# Patient Record
Sex: FEMALE | Race: BLACK OR AFRICAN AMERICAN | NOT HISPANIC OR LATINO | Employment: UNEMPLOYED | URBAN - METROPOLITAN AREA
[De-identification: names, ages, dates, MRNs, and addresses within clinical notes are randomized per-mention and may not be internally consistent; named-entity substitution may affect disease eponyms.]

---

## 2019-07-04 ENCOUNTER — HOSPITAL ENCOUNTER (EMERGENCY)
Facility: HOSPITAL | Age: 29
Discharge: HOME | End: 2019-07-05
Attending: EMERGENCY MEDICINE | Admitting: EMERGENCY MEDICINE
Payer: COMMERCIAL

## 2019-07-04 DIAGNOSIS — S09.93XA FACIAL INJURY, INITIAL ENCOUNTER: Primary | ICD-10-CM

## 2019-07-04 PROCEDURE — 99284 EMERGENCY DEPT VISIT MOD MDM: CPT

## 2019-07-05 ENCOUNTER — APPOINTMENT (EMERGENCY)
Dept: CT IMAGING | Facility: HOSPITAL | Age: 29
End: 2019-07-05
Payer: COMMERCIAL

## 2019-07-05 VITALS
HEIGHT: 64 IN | HEART RATE: 102 BPM | DIASTOLIC BLOOD PRESSURE: 70 MMHG | SYSTOLIC BLOOD PRESSURE: 113 MMHG | BODY MASS INDEX: 30.86 KG/M2 | WEIGHT: 180.78 LBS | RESPIRATION RATE: 18 BRPM | TEMPERATURE: 99.2 F | OXYGEN SATURATION: 100 %

## 2019-07-05 PROCEDURE — 70486 CT MAXILLOFACIAL W/O DYE: CPT

## 2019-07-05 PROCEDURE — 99283 EMERGENCY DEPT VISIT LOW MDM: CPT | Performed by: EMERGENCY MEDICINE

## 2019-07-05 PROCEDURE — 70450 CT HEAD/BRAIN W/O DYE: CPT

## 2019-07-05 RX ORDER — OXYCODONE HYDROCHLORIDE 5 MG/1
5 TABLET ORAL ONCE
Status: COMPLETED | OUTPATIENT
Start: 2019-07-05 | End: 2019-07-05

## 2019-07-05 RX ORDER — ACETAMINOPHEN 325 MG/1
650 TABLET ORAL ONCE
Status: COMPLETED | OUTPATIENT
Start: 2019-07-05 | End: 2019-07-05

## 2019-07-05 RX ADMIN — OXYCODONE HYDROCHLORIDE 5 MG: 5 TABLET ORAL at 02:21

## 2019-07-05 RX ADMIN — ACETAMINOPHEN 650 MG: 325 TABLET, FILM COATED ORAL at 00:58
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
## 2019-07-05 NOTE — DISCHARGE INSTRUCTIONS
Take motrin/tylenol for pain and follow up with your doctor for recheck   Nose may swell, try ice for this

## 2020-04-09 ENCOUNTER — TELEMEDICINE (OUTPATIENT)
Dept: OBGYN CLINIC | Facility: CLINIC | Age: 30
End: 2020-04-09
Payer: COMMERCIAL

## 2020-04-09 VITALS — HEIGHT: 64 IN | BODY MASS INDEX: 31.03 KG/M2

## 2020-04-09 DIAGNOSIS — Z98.84 HISTORY OF BARIATRIC SURGERY: ICD-10-CM

## 2020-04-09 DIAGNOSIS — G47.30 SLEEP APNEA, UNSPECIFIED TYPE: ICD-10-CM

## 2020-04-09 DIAGNOSIS — N91.1 SECONDARY AMENORRHEA: Primary | ICD-10-CM

## 2020-04-09 PROBLEM — Z72.0 TOBACCO ABUSE: Status: ACTIVE | Noted: 2018-03-15

## 2020-04-09 PROBLEM — A49.02 MRSA (METHICILLIN RESISTANT STAPHYLOCOCCUS AUREUS) INFECTION: Status: ACTIVE | Noted: 2020-03-26

## 2020-04-09 PROCEDURE — 99201 PR OFFICE OUTPATIENT NEW 10 MINUTES: CPT | Performed by: STUDENT IN AN ORGANIZED HEALTH CARE EDUCATION/TRAINING PROGRAM

## 2020-04-28 ENCOUNTER — TELEMEDICINE (OUTPATIENT)
Dept: OBGYN CLINIC | Facility: CLINIC | Age: 30
End: 2020-04-28
Payer: COMMERCIAL

## 2020-04-28 DIAGNOSIS — Z98.84 HISTORY OF BARIATRIC SURGERY: ICD-10-CM

## 2020-04-28 DIAGNOSIS — Z34.81 PRENATAL CARE, SUBSEQUENT PREGNANCY, FIRST TRIMESTER: Primary | ICD-10-CM

## 2020-04-28 PROCEDURE — 99215 OFFICE O/P EST HI 40 MIN: CPT | Performed by: STUDENT IN AN ORGANIZED HEALTH CARE EDUCATION/TRAINING PROGRAM

## 2020-04-28 RX ORDER — FERROUS SULFATE 325(65) MG
325 TABLET ORAL
Status: ON HOLD | COMMUNITY
End: 2021-11-03 | Stop reason: SDUPTHER

## 2020-05-04 ENCOUNTER — TELEPHONE (OUTPATIENT)
Dept: PERINATAL CARE | Facility: CLINIC | Age: 30
End: 2020-05-04

## 2020-05-11 ENCOUNTER — APPOINTMENT (OUTPATIENT)
Dept: LAB | Facility: HOSPITAL | Age: 30
End: 2020-05-11
Attending: STUDENT IN AN ORGANIZED HEALTH CARE EDUCATION/TRAINING PROGRAM
Payer: COMMERCIAL

## 2020-05-11 ENCOUNTER — TRANSCRIBE ORDERS (OUTPATIENT)
Dept: ADMINISTRATIVE | Facility: HOSPITAL | Age: 30
End: 2020-05-11

## 2020-05-11 ENCOUNTER — TELEPHONE (OUTPATIENT)
Dept: PERINATAL CARE | Facility: OTHER | Age: 30
End: 2020-05-11

## 2020-05-11 DIAGNOSIS — Z98.84 HISTORY OF BARIATRIC SURGERY: ICD-10-CM

## 2020-05-11 DIAGNOSIS — Z34.81 PRENATAL CARE, SUBSEQUENT PREGNANCY, FIRST TRIMESTER: Primary | ICD-10-CM

## 2020-05-11 DIAGNOSIS — Z34.81 PRENATAL CARE, SUBSEQUENT PREGNANCY, FIRST TRIMESTER: ICD-10-CM

## 2020-05-11 LAB
25(OH)D3 SERPL-MCNC: 14.8 NG/ML (ref 30–100)
ABO GROUP BLD: NORMAL
ALBUMIN SERPL BCP-MCNC: 3.2 G/DL (ref 3.5–5)
ALP SERPL-CCNC: 46 U/L (ref 46–116)
ALT SERPL W P-5'-P-CCNC: 13 U/L (ref 12–78)
ANION GAP SERPL CALCULATED.3IONS-SCNC: 11 MMOL/L (ref 4–13)
AST SERPL W P-5'-P-CCNC: 13 U/L (ref 5–45)
BACTERIA UR QL AUTO: ABNORMAL /HPF
BASOPHILS # BLD AUTO: 0.01 THOUSANDS/ΜL (ref 0–0.1)
BASOPHILS NFR BLD AUTO: 0 % (ref 0–1)
BILIRUB SERPL-MCNC: 0.3 MG/DL (ref 0.2–1)
BILIRUB UR QL STRIP: NEGATIVE
BLD GP AB SCN SERPL QL: NEGATIVE
BUN SERPL-MCNC: 8 MG/DL (ref 5–25)
CALCIUM SERPL-MCNC: 9 MG/DL (ref 8.3–10.1)
CHLORIDE SERPL-SCNC: 105 MMOL/L (ref 100–108)
CLARITY UR: CLEAR
CO2 SERPL-SCNC: 26 MMOL/L (ref 21–32)
COLOR UR: YELLOW
CREAT SERPL-MCNC: 0.72 MG/DL (ref 0.6–1.3)
EOSINOPHIL # BLD AUTO: 0.02 THOUSAND/ΜL (ref 0–0.61)
EOSINOPHIL NFR BLD AUTO: 0 % (ref 0–6)
ERYTHROCYTE [DISTWIDTH] IN BLOOD BY AUTOMATED COUNT: 13.8 % (ref 11.6–15.1)
EST. AVERAGE GLUCOSE BLD GHB EST-MCNC: 108 MG/DL
FOLATE SERPL-MCNC: >20 NG/ML (ref 3.1–17.5)
GFR SERPL CREATININE-BSD FRML MDRD: 132 ML/MIN/1.73SQ M
GLUCOSE P FAST SERPL-MCNC: 73 MG/DL (ref 65–99)
GLUCOSE UR STRIP-MCNC: NEGATIVE MG/DL
HBA1C MFR BLD: 5.4 %
HBV SURFACE AG SER QL: NORMAL
HCT VFR BLD AUTO: 33.6 % (ref 34.8–46.1)
HGB BLD-MCNC: 10.7 G/DL (ref 11.5–15.4)
HGB UR QL STRIP.AUTO: NEGATIVE
IMM GRANULOCYTES # BLD AUTO: 0.02 THOUSAND/UL (ref 0–0.2)
IMM GRANULOCYTES NFR BLD AUTO: 0 % (ref 0–2)
KETONES UR STRIP-MCNC: NEGATIVE MG/DL
LEUKOCYTE ESTERASE UR QL STRIP: ABNORMAL
LYMPHOCYTES # BLD AUTO: 2.09 THOUSANDS/ΜL (ref 0.6–4.47)
LYMPHOCYTES NFR BLD AUTO: 37 % (ref 14–44)
MCH RBC QN AUTO: 25.7 PG (ref 26.8–34.3)
MCHC RBC AUTO-ENTMCNC: 31.8 G/DL (ref 31.4–37.4)
MCV RBC AUTO: 81 FL (ref 82–98)
MONOCYTES # BLD AUTO: 0.32 THOUSAND/ΜL (ref 0.17–1.22)
MONOCYTES NFR BLD AUTO: 6 % (ref 4–12)
MUCOUS THREADS UR QL AUTO: ABNORMAL
NEUTROPHILS # BLD AUTO: 3.17 THOUSANDS/ΜL (ref 1.85–7.62)
NEUTS SEG NFR BLD AUTO: 57 % (ref 43–75)
NITRITE UR QL STRIP: POSITIVE
NON-SQ EPI CELLS URNS QL MICRO: ABNORMAL /HPF
NRBC BLD AUTO-RTO: 0 /100 WBCS
PH UR STRIP.AUTO: 7 [PH]
PLATELET # BLD AUTO: 222 THOUSANDS/UL (ref 149–390)
PMV BLD AUTO: 10.2 FL (ref 8.9–12.7)
POTASSIUM SERPL-SCNC: 3.4 MMOL/L (ref 3.5–5.3)
PROT SERPL-MCNC: 7.8 G/DL (ref 6.4–8.2)
PROT UR STRIP-MCNC: NEGATIVE MG/DL
RBC # BLD AUTO: 4.17 MILLION/UL (ref 3.81–5.12)
RBC #/AREA URNS AUTO: ABNORMAL /HPF
RH BLD: POSITIVE
RUBV IGG SERPL IA-ACNC: 75.1 IU/ML
SODIUM SERPL-SCNC: 142 MMOL/L (ref 136–145)
SP GR UR STRIP.AUTO: 1.02 (ref 1–1.03)
SPECIMEN EXPIRATION DATE: NORMAL
TSH SERPL DL<=0.05 MIU/L-ACNC: 1.36 UIU/ML (ref 0.36–3.74)
UROBILINOGEN UR QL STRIP.AUTO: 2 E.U./DL
VIT B12 SERPL-MCNC: 229 PG/ML (ref 100–900)
WBC # BLD AUTO: 5.63 THOUSAND/UL (ref 4.31–10.16)
WBC #/AREA URNS AUTO: ABNORMAL /HPF

## 2020-05-11 PROCEDURE — 80053 COMPREHEN METABOLIC PANEL: CPT

## 2020-05-11 PROCEDURE — 81001 URINALYSIS AUTO W/SCOPE: CPT

## 2020-05-11 PROCEDURE — 87186 SC STD MICRODIL/AGAR DIL: CPT

## 2020-05-11 PROCEDURE — 36415 COLL VENOUS BLD VENIPUNCTURE: CPT

## 2020-05-11 PROCEDURE — 87086 URINE CULTURE/COLONY COUNT: CPT

## 2020-05-11 PROCEDURE — 82607 VITAMIN B-12: CPT

## 2020-05-11 PROCEDURE — 83036 HEMOGLOBIN GLYCOSYLATED A1C: CPT

## 2020-05-11 PROCEDURE — 82746 ASSAY OF FOLIC ACID SERUM: CPT

## 2020-05-11 PROCEDURE — 80081 OBSTETRIC PANEL INC HIV TSTG: CPT

## 2020-05-11 PROCEDURE — 82306 VITAMIN D 25 HYDROXY: CPT

## 2020-05-11 PROCEDURE — 84443 ASSAY THYROID STIM HORMONE: CPT

## 2020-05-12 ENCOUNTER — ROUTINE PRENATAL (OUTPATIENT)
Dept: PERINATAL CARE | Facility: OTHER | Age: 30
End: 2020-05-12
Payer: COMMERCIAL

## 2020-05-12 VITALS
TEMPERATURE: 97.7 F | HEART RATE: 98 BPM | BODY MASS INDEX: 30.01 KG/M2 | SYSTOLIC BLOOD PRESSURE: 117 MMHG | HEIGHT: 64 IN | WEIGHT: 175.8 LBS | DIASTOLIC BLOOD PRESSURE: 77 MMHG

## 2020-05-12 DIAGNOSIS — O99.841 PREVIOUS GASTRIC BYPASS AFFECTING PREGNANCY IN FIRST TRIMESTER, ANTEPARTUM: ICD-10-CM

## 2020-05-12 DIAGNOSIS — Z34.81 PRENATAL CARE, SUBSEQUENT PREGNANCY, FIRST TRIMESTER: ICD-10-CM

## 2020-05-12 DIAGNOSIS — O35.9XX0 SUSPECTED FETAL ANOMALY, ANTEPARTUM, SINGLE OR UNSPECIFIED FETUS: Primary | ICD-10-CM

## 2020-05-12 DIAGNOSIS — Z3A.13 13 WEEKS GESTATION OF PREGNANCY: ICD-10-CM

## 2020-05-12 DIAGNOSIS — Z36.82 NUCHAL TRANSLUCENCY OF FETUS ON PRENATAL ULTRASOUND: ICD-10-CM

## 2020-05-12 PROBLEM — E55.9 VITAMIN D DEFICIENCY: Status: ACTIVE | Noted: 2020-05-12

## 2020-05-12 LAB
HIV 1+2 AB+HIV1 P24 AG SERPL QL IA: NORMAL
RPR SER QL: NORMAL

## 2020-05-12 PROCEDURE — 76801 OB US < 14 WKS SINGLE FETUS: CPT | Performed by: OBSTETRICS & GYNECOLOGY

## 2020-05-12 PROCEDURE — 76813 OB US NUCHAL MEAS 1 GEST: CPT | Performed by: OBSTETRICS & GYNECOLOGY

## 2020-05-12 PROCEDURE — 99241 PR OFFICE CONSULTATION NEW/ESTAB PATIENT 15 MIN: CPT | Performed by: OBSTETRICS & GYNECOLOGY

## 2020-05-12 RX ORDER — MELATONIN
1000 DAILY
COMMUNITY

## 2020-05-13 ENCOUNTER — DOCUMENTATION (OUTPATIENT)
Dept: PERINATAL CARE | Facility: CLINIC | Age: 30
End: 2020-05-13

## 2020-05-13 DIAGNOSIS — O23.41 URINARY TRACT INFECTION IN MOTHER DURING FIRST TRIMESTER OF PREGNANCY: Primary | ICD-10-CM

## 2020-05-13 LAB — BACTERIA UR CULT: ABNORMAL

## 2020-05-13 RX ORDER — CEPHALEXIN 500 MG/1
500 CAPSULE ORAL EVERY 6 HOURS SCHEDULED
Qty: 28 CAPSULE | Refills: 0 | Status: SHIPPED | OUTPATIENT
Start: 2020-05-13 | End: 2020-05-20

## 2020-05-14 ENCOUNTER — TELEPHONE (OUTPATIENT)
Dept: OBGYN CLINIC | Facility: CLINIC | Age: 30
End: 2020-05-14

## 2020-05-18 ENCOUNTER — INITIAL PRENATAL (OUTPATIENT)
Dept: OBGYN CLINIC | Facility: CLINIC | Age: 30
End: 2020-05-18
Payer: COMMERCIAL

## 2020-05-18 VITALS — SYSTOLIC BLOOD PRESSURE: 124 MMHG | DIASTOLIC BLOOD PRESSURE: 70 MMHG | BODY MASS INDEX: 29.76 KG/M2 | WEIGHT: 173.4 LBS

## 2020-05-18 DIAGNOSIS — Z11.3 SCREENING FOR STDS (SEXUALLY TRANSMITTED DISEASES): ICD-10-CM

## 2020-05-18 DIAGNOSIS — Z98.84 HISTORY OF BARIATRIC SURGERY: ICD-10-CM

## 2020-05-18 DIAGNOSIS — E55.9 VITAMIN D DEFICIENCY: ICD-10-CM

## 2020-05-18 DIAGNOSIS — Z34.92 PREGNANCY, OBSTETRICAL CARE, SECOND TRIMESTER: Primary | ICD-10-CM

## 2020-05-18 DIAGNOSIS — Z72.0 TOBACCO ABUSE: ICD-10-CM

## 2020-05-18 DIAGNOSIS — O35.9XX0 SUSPECTED FETAL ANOMALY, ANTEPARTUM, SINGLE OR UNSPECIFIED FETUS: ICD-10-CM

## 2020-05-18 LAB
SL AMB  POCT GLUCOSE, UA: NORMAL
SL AMB POCT URINE PROTEIN: NORMAL

## 2020-05-18 PROCEDURE — 87591 N.GONORRHOEAE DNA AMP PROB: CPT | Performed by: STUDENT IN AN ORGANIZED HEALTH CARE EDUCATION/TRAINING PROGRAM

## 2020-05-18 PROCEDURE — 99213 OFFICE O/P EST LOW 20 MIN: CPT | Performed by: STUDENT IN AN ORGANIZED HEALTH CARE EDUCATION/TRAINING PROGRAM

## 2020-05-18 PROCEDURE — 87491 CHLMYD TRACH DNA AMP PROBE: CPT | Performed by: STUDENT IN AN ORGANIZED HEALTH CARE EDUCATION/TRAINING PROGRAM

## 2020-05-18 PROCEDURE — G0145 SCR C/V CYTO,THINLAYER,RESCR: HCPCS | Performed by: STUDENT IN AN ORGANIZED HEALTH CARE EDUCATION/TRAINING PROGRAM

## 2020-05-21 LAB
C TRACH DNA SPEC QL NAA+PROBE: NEGATIVE
N GONORRHOEA DNA SPEC QL NAA+PROBE: NEGATIVE

## 2020-05-23 LAB
# FETUSES US: 1
CFDNA.FET/TOTAL PLAS.CFDNA: NORMAL
FET CHR 13 TS PLAS.CFDNA QL: NEGATIVE
FET CHR 18 TS PLAS.CFDNA QL: NEGATIVE
FET CHR 21 TS PLAS.CFDNA QL: NEGATIVE
FET CHR X + Y ANEUP PLAS.CFDNA QL: NORMAL
FET CHROM X + Y ANEUP CFDNA IMP: NORMAL
FET Y CHROM PLAS.CFDNA QL: NOT DETECTED
FET Y CHROM PLAS.CFDNA: NORMAL
GA (DAYS): 5 D
GA (WEEKS): 14 WK
MICRODELETION SYND BLD/T FISH: NORMAL
REF LAB TEST METHOD: NORMAL
SERVICE CMNT-IMP: NORMAL
SERVICE CMNT-IMP: NORMAL
SL AMB ABNORMAL MSS?: NORMAL
SL AMB ABNORMAL US?: NORMAL
SL AMB ADVANCED MATERNAL AGE?: NORMAL
SL AMB MICRODELETION INTERP: NORMAL
SL AMB MICRODELETION: NOT DETECTED
SL AMB PERSONAL/FAM HISTORY?: NORMAL
SL AMB SPECIFICATIONS: NORMAL

## 2020-05-26 LAB
LAB AP GYN PRIMARY INTERPRETATION: NORMAL
Lab: NORMAL

## 2020-05-27 ENCOUNTER — TELEPHONE (OUTPATIENT)
Dept: PERINATAL CARE | Facility: CLINIC | Age: 30
End: 2020-05-27

## 2020-05-27 ENCOUNTER — TELEPHONE (OUTPATIENT)
Dept: OBGYN CLINIC | Facility: CLINIC | Age: 30
End: 2020-05-27

## 2020-06-01 ENCOUNTER — TELEPHONE (OUTPATIENT)
Dept: LABOR AND DELIVERY | Facility: HOSPITAL | Age: 30
End: 2020-06-01

## 2020-06-01 ENCOUNTER — TELEPHONE (OUTPATIENT)
Dept: OBGYN CLINIC | Facility: CLINIC | Age: 30
End: 2020-06-01

## 2020-06-01 ENCOUNTER — HOSPITAL ENCOUNTER (INPATIENT)
Facility: HOSPITAL | Age: 30
LOS: 1 days | Discharge: HOME | DRG: 541 | End: 2020-06-03
Attending: OBSTETRICS & GYNECOLOGY | Admitting: OBSTETRICS & GYNECOLOGY
Payer: COMMERCIAL

## 2020-06-01 ENCOUNTER — TELEPHONE (OUTPATIENT)
Dept: OTHER | Facility: OTHER | Age: 30
End: 2020-06-01

## 2020-06-01 DIAGNOSIS — O03.4 INEVITABLE SPONTANEOUS ABORTION: Primary | ICD-10-CM

## 2020-06-01 DIAGNOSIS — O20.0 THREATENED ABORTION IN SECOND TRIMESTER: Primary | ICD-10-CM

## 2020-06-01 PROBLEM — Z3A.16 16 WEEKS GESTATION OF PREGNANCY: Status: ACTIVE | Noted: 2020-06-01

## 2020-06-02 ENCOUNTER — ANESTHESIA (INPATIENT)
Dept: LABOR AND DELIVERY | Facility: HOSPITAL | Age: 30
DRG: 541 | End: 2020-06-02
Payer: COMMERCIAL

## 2020-06-02 LAB
ABO GROUP BLD: NORMAL
AMPHETAMINES SERPL QL SCN: NEGATIVE
APTT PPP: 28 SECONDS (ref 23–37)
APTT PPP: 29 SECONDS (ref 23–37)
BACTERIA UR QL AUTO: ABNORMAL /HPF
BARBITURATES UR QL: NEGATIVE
BASOPHILS # BLD AUTO: 0.01 THOUSANDS/ΜL (ref 0–0.1)
BASOPHILS # BLD AUTO: 0.02 THOUSANDS/ΜL (ref 0–0.1)
BASOPHILS NFR BLD AUTO: 0 % (ref 0–1)
BASOPHILS NFR BLD AUTO: 0 % (ref 0–1)
BENZODIAZ UR QL: NEGATIVE
BILIRUB UR QL STRIP: NEGATIVE
BLD GP AB SCN SERPL QL: NEGATIVE
CLARITY UR: ABNORMAL
COCAINE UR QL: NEGATIVE
COLOR UR: YELLOW
EOSINOPHIL # BLD AUTO: 0.01 THOUSAND/ΜL (ref 0–0.61)
EOSINOPHIL # BLD AUTO: 0.03 THOUSAND/ΜL (ref 0–0.61)
EOSINOPHIL NFR BLD AUTO: 0 % (ref 0–6)
EOSINOPHIL NFR BLD AUTO: 0 % (ref 0–6)
ERYTHROCYTE [DISTWIDTH] IN BLOOD BY AUTOMATED COUNT: 13.4 % (ref 11.6–15.1)
ERYTHROCYTE [DISTWIDTH] IN BLOOD BY AUTOMATED COUNT: 13.7 % (ref 11.6–15.1)
FIBRINOGEN PPP-MCNC: 459 MG/DL (ref 227–495)
GLUCOSE UR STRIP-MCNC: NEGATIVE MG/DL
HCT VFR BLD AUTO: 24.6 % (ref 34.8–46.1)
HCT VFR BLD AUTO: 32.6 % (ref 34.8–46.1)
HGB BLD-MCNC: 10.5 G/DL (ref 11.5–15.4)
HGB BLD-MCNC: 8 G/DL (ref 11.5–15.4)
HGB UR QL STRIP.AUTO: ABNORMAL
IMM GRANULOCYTES # BLD AUTO: 0.03 THOUSAND/UL (ref 0–0.2)
IMM GRANULOCYTES # BLD AUTO: 0.07 THOUSAND/UL (ref 0–0.2)
IMM GRANULOCYTES NFR BLD AUTO: 0 % (ref 0–2)
IMM GRANULOCYTES NFR BLD AUTO: 1 % (ref 0–2)
INR PPP: 1.24 (ref 0.84–1.19)
KETONES UR STRIP-MCNC: ABNORMAL MG/DL
LEUKOCYTE ESTERASE UR QL STRIP: NEGATIVE
LYMPHOCYTES # BLD AUTO: 1.46 THOUSANDS/ΜL (ref 0.6–4.47)
LYMPHOCYTES # BLD AUTO: 2.64 THOUSANDS/ΜL (ref 0.6–4.47)
LYMPHOCYTES NFR BLD AUTO: 12 % (ref 14–44)
LYMPHOCYTES NFR BLD AUTO: 29 % (ref 14–44)
MCH RBC QN AUTO: 25.8 PG (ref 26.8–34.3)
MCH RBC QN AUTO: 26.1 PG (ref 26.8–34.3)
MCHC RBC AUTO-ENTMCNC: 32.2 G/DL (ref 31.4–37.4)
MCHC RBC AUTO-ENTMCNC: 32.5 G/DL (ref 31.4–37.4)
MCV RBC AUTO: 80 FL (ref 82–98)
MCV RBC AUTO: 80 FL (ref 82–98)
METHADONE UR QL: NEGATIVE
MONOCYTES # BLD AUTO: 0.53 THOUSAND/ΜL (ref 0.17–1.22)
MONOCYTES # BLD AUTO: 0.75 THOUSAND/ΜL (ref 0.17–1.22)
MONOCYTES NFR BLD AUTO: 6 % (ref 4–12)
MONOCYTES NFR BLD AUTO: 6 % (ref 4–12)
MUCOUS THREADS UR QL AUTO: ABNORMAL
NEUTROPHILS # BLD AUTO: 10.4 THOUSANDS/ΜL (ref 1.85–7.62)
NEUTROPHILS # BLD AUTO: 5.83 THOUSANDS/ΜL (ref 1.85–7.62)
NEUTS SEG NFR BLD AUTO: 65 % (ref 43–75)
NEUTS SEG NFR BLD AUTO: 81 % (ref 43–75)
NITRITE UR QL STRIP: NEGATIVE
NON-SQ EPI CELLS URNS QL MICRO: ABNORMAL /HPF
NRBC BLD AUTO-RTO: 0 /100 WBCS
NRBC BLD AUTO-RTO: 0 /100 WBCS
OPIATES UR QL SCN: NEGATIVE
PCP UR QL: NEGATIVE
PH UR STRIP.AUTO: 6.5 [PH]
PLATELET # BLD AUTO: 168 THOUSANDS/UL (ref 149–390)
PLATELET # BLD AUTO: 168 THOUSANDS/UL (ref 149–390)
PLATELET # BLD AUTO: 228 THOUSANDS/UL (ref 149–390)
PMV BLD AUTO: 10 FL (ref 8.9–12.7)
PMV BLD AUTO: 9.8 FL (ref 8.9–12.7)
PMV BLD AUTO: 9.8 FL (ref 8.9–12.7)
PROT UR STRIP-MCNC: NEGATIVE MG/DL
PROTHROMBIN TIME: 14.7 SECONDS (ref 11.6–14.5)
PROTHROMBIN TIME: 14.9 SECONDS (ref 11.6–14.5)
PT 1H NP PPP: 13.6 SEC (ref 12–14.3)
PT IMM NP PPP: 13.3 SECONDS (ref 12–14.3)
RBC # BLD AUTO: 3.06 MILLION/UL (ref 3.81–5.12)
RBC # BLD AUTO: 4.07 MILLION/UL (ref 3.81–5.12)
RBC #/AREA URNS AUTO: ABNORMAL /HPF
RH BLD: POSITIVE
RPR SER QL: NORMAL
SP GR UR STRIP.AUTO: 1.02 (ref 1–1.03)
SPECIMEN EXPIRATION DATE: NORMAL
THC UR QL: NEGATIVE
THROMBIN TIME: 13.6 SECONDS (ref 14.7–18.4)
UROBILINOGEN UR QL STRIP.AUTO: 4 E.U./DL
WBC # BLD AUTO: 12.71 THOUSAND/UL (ref 4.31–10.16)
WBC # BLD AUTO: 9.07 THOUSAND/UL (ref 4.31–10.16)
WBC #/AREA URNS AUTO: ABNORMAL /HPF

## 2020-06-02 PROCEDURE — 88305 TISSUE EXAM BY PATHOLOGIST: CPT | Performed by: PATHOLOGY

## 2020-06-02 PROCEDURE — 86920 COMPATIBILITY TEST SPIN: CPT

## 2020-06-02 PROCEDURE — 85025 COMPLETE CBC W/AUTO DIFF WBC: CPT | Performed by: OBSTETRICS & GYNECOLOGY

## 2020-06-02 PROCEDURE — 85610 PROTHROMBIN TIME: CPT | Performed by: OBSTETRICS & GYNECOLOGY

## 2020-06-02 PROCEDURE — 86901 BLOOD TYPING SEROLOGIC RH(D): CPT | Performed by: STUDENT IN AN ORGANIZED HEALTH CARE EDUCATION/TRAINING PROGRAM

## 2020-06-02 PROCEDURE — 86900 BLOOD TYPING SEROLOGIC ABO: CPT | Performed by: STUDENT IN AN ORGANIZED HEALTH CARE EDUCATION/TRAINING PROGRAM

## 2020-06-02 PROCEDURE — 99214 OFFICE O/P EST MOD 30 MIN: CPT

## 2020-06-02 PROCEDURE — 85611 PROTHROMBIN TEST: CPT | Performed by: OBSTETRICS & GYNECOLOGY

## 2020-06-02 PROCEDURE — 86592 SYPHILIS TEST NON-TREP QUAL: CPT | Performed by: STUDENT IN AN ORGANIZED HEALTH CARE EDUCATION/TRAINING PROGRAM

## 2020-06-02 PROCEDURE — NC001 PR NO CHARGE: Performed by: OBSTETRICS & GYNECOLOGY

## 2020-06-02 PROCEDURE — 85730 THROMBOPLASTIN TIME PARTIAL: CPT | Performed by: OBSTETRICS & GYNECOLOGY

## 2020-06-02 PROCEDURE — 85049 AUTOMATED PLATELET COUNT: CPT | Performed by: OBSTETRICS & GYNECOLOGY

## 2020-06-02 PROCEDURE — 85025 COMPLETE CBC W/AUTO DIFF WBC: CPT | Performed by: STUDENT IN AN ORGANIZED HEALTH CARE EDUCATION/TRAINING PROGRAM

## 2020-06-02 PROCEDURE — 86850 RBC ANTIBODY SCREEN: CPT | Performed by: STUDENT IN AN ORGANIZED HEALTH CARE EDUCATION/TRAINING PROGRAM

## 2020-06-02 PROCEDURE — 59812 TREATMENT OF MISCARRIAGE: CPT | Performed by: OBSTETRICS & GYNECOLOGY

## 2020-06-02 PROCEDURE — 81001 URINALYSIS AUTO W/SCOPE: CPT | Performed by: STUDENT IN AN ORGANIZED HEALTH CARE EDUCATION/TRAINING PROGRAM

## 2020-06-02 PROCEDURE — 85732 THROMBOPLASTIN TIME PARTIAL: CPT | Performed by: OBSTETRICS & GYNECOLOGY

## 2020-06-02 PROCEDURE — 80307 DRUG TEST PRSMV CHEM ANLYZR: CPT | Performed by: STUDENT IN AN ORGANIZED HEALTH CARE EDUCATION/TRAINING PROGRAM

## 2020-06-02 PROCEDURE — 85384 FIBRINOGEN ACTIVITY: CPT | Performed by: OBSTETRICS & GYNECOLOGY

## 2020-06-02 PROCEDURE — 85670 THROMBIN TIME PLASMA: CPT | Performed by: OBSTETRICS & GYNECOLOGY

## 2020-06-02 PROCEDURE — 10D17ZZ EXTRACTION OF PRODUCTS OF CONCEPTION, RETAINED, VIA NATURAL OR ARTIFICIAL OPENING: ICD-10-PCS | Performed by: OBSTETRICS & GYNECOLOGY

## 2020-06-02 PROCEDURE — 99221 1ST HOSP IP/OBS SF/LOW 40: CPT | Performed by: OBSTETRICS & GYNECOLOGY

## 2020-06-02 RX ORDER — MEPERIDINE HYDROCHLORIDE 25 MG/ML
12.5 INJECTION INTRAMUSCULAR; INTRAVENOUS; SUBCUTANEOUS
Status: DISCONTINUED | OUTPATIENT
Start: 2020-06-02 | End: 2020-06-03

## 2020-06-02 RX ORDER — SODIUM CHLORIDE, SODIUM LACTATE, POTASSIUM CHLORIDE, CALCIUM CHLORIDE 600; 310; 30; 20 MG/100ML; MG/100ML; MG/100ML; MG/100ML
100 INJECTION, SOLUTION INTRAVENOUS CONTINUOUS
Status: DISCONTINUED | OUTPATIENT
Start: 2020-06-02 | End: 2020-06-03 | Stop reason: HOSPADM

## 2020-06-02 RX ORDER — ONDANSETRON 2 MG/ML
4 INJECTION INTRAMUSCULAR; INTRAVENOUS ONCE AS NEEDED
Status: DISCONTINUED | OUTPATIENT
Start: 2020-06-02 | End: 2020-06-03

## 2020-06-02 RX ORDER — MISOPROSTOL 200 UG/1
600 TABLET ORAL ONCE
Status: COMPLETED | OUTPATIENT
Start: 2020-06-02 | End: 2020-06-02

## 2020-06-02 RX ORDER — MISOPROSTOL 200 UG/1
600 TABLET ORAL ONCE
Status: DISCONTINUED | OUTPATIENT
Start: 2020-06-02 | End: 2020-06-02

## 2020-06-02 RX ORDER — FENTANYL CITRATE 50 UG/ML
INJECTION, SOLUTION INTRAMUSCULAR; INTRAVENOUS AS NEEDED
Status: DISCONTINUED | OUTPATIENT
Start: 2020-06-02 | End: 2020-06-02 | Stop reason: SURG

## 2020-06-02 RX ORDER — FENTANYL CITRATE/PF 50 MCG/ML
25 SYRINGE (ML) INJECTION
Status: DISCONTINUED | OUTPATIENT
Start: 2020-06-02 | End: 2020-06-03

## 2020-06-02 RX ORDER — PROPOFOL 10 MG/ML
INJECTION, EMULSION INTRAVENOUS AS NEEDED
Status: DISCONTINUED | OUTPATIENT
Start: 2020-06-02 | End: 2020-06-02 | Stop reason: SURG

## 2020-06-02 RX ORDER — HYDROMORPHONE HCL/PF 1 MG/ML
0.4 SYRINGE (ML) INJECTION
Status: DISCONTINUED | OUTPATIENT
Start: 2020-06-02 | End: 2020-06-03

## 2020-06-02 RX ORDER — BUTORPHANOL TARTRATE 1 MG/ML
1 INJECTION, SOLUTION INTRAMUSCULAR; INTRAVENOUS ONCE
Status: COMPLETED | OUTPATIENT
Start: 2020-06-02 | End: 2020-06-02

## 2020-06-02 RX ORDER — CEFAZOLIN SODIUM 2 G/50ML
SOLUTION INTRAVENOUS AS NEEDED
Status: DISCONTINUED | OUTPATIENT
Start: 2020-06-02 | End: 2020-06-02 | Stop reason: SURG

## 2020-06-02 RX ORDER — ACETAMINOPHEN 325 MG/1
650 TABLET ORAL EVERY 6 HOURS PRN
Status: DISCONTINUED | OUTPATIENT
Start: 2020-06-02 | End: 2020-06-03 | Stop reason: HOSPADM

## 2020-06-02 RX ORDER — LOPERAMIDE HYDROCHLORIDE 2 MG/1
2 CAPSULE ORAL ONCE
Status: COMPLETED | OUTPATIENT
Start: 2020-06-02 | End: 2020-06-02

## 2020-06-02 RX ORDER — SODIUM CHLORIDE 9 MG/ML
INJECTION, SOLUTION INTRAVENOUS CONTINUOUS PRN
Status: DISCONTINUED | OUTPATIENT
Start: 2020-06-02 | End: 2020-06-02 | Stop reason: SURG

## 2020-06-02 RX ORDER — SUCCINYLCHOLINE/SOD CL,ISO/PF 100 MG/5ML
SYRINGE (ML) INTRAVENOUS AS NEEDED
Status: DISCONTINUED | OUTPATIENT
Start: 2020-06-02 | End: 2020-06-02 | Stop reason: SURG

## 2020-06-02 RX ORDER — KETOROLAC TROMETHAMINE 30 MG/ML
30 INJECTION, SOLUTION INTRAMUSCULAR; INTRAVENOUS ONCE
Status: COMPLETED | OUTPATIENT
Start: 2020-06-02 | End: 2020-06-02

## 2020-06-02 RX ORDER — PROMETHAZINE HYDROCHLORIDE 25 MG/ML
12.5 INJECTION, SOLUTION INTRAMUSCULAR; INTRAVENOUS ONCE
Status: DISCONTINUED | OUTPATIENT
Start: 2020-06-02 | End: 2020-06-03 | Stop reason: HOSPADM

## 2020-06-02 RX ORDER — SODIUM CHLORIDE, SODIUM LACTATE, POTASSIUM CHLORIDE, CALCIUM CHLORIDE 600; 310; 30; 20 MG/100ML; MG/100ML; MG/100ML; MG/100ML
125 INJECTION, SOLUTION INTRAVENOUS CONTINUOUS
Status: DISCONTINUED | OUTPATIENT
Start: 2020-06-02 | End: 2020-06-03 | Stop reason: HOSPADM

## 2020-06-02 RX ORDER — LIDOCAINE HYDROCHLORIDE 10 MG/ML
INJECTION, SOLUTION EPIDURAL; INFILTRATION; INTRACAUDAL; PERINEURAL AS NEEDED
Status: DISCONTINUED | OUTPATIENT
Start: 2020-06-02 | End: 2020-06-02 | Stop reason: SURG

## 2020-06-02 RX ORDER — CARBOPROST TROMETHAMINE 250 UG/ML
250 INJECTION, SOLUTION INTRAMUSCULAR ONCE
Status: COMPLETED | OUTPATIENT
Start: 2020-06-02 | End: 2020-06-02

## 2020-06-02 RX ORDER — ONDANSETRON 2 MG/ML
INJECTION INTRAMUSCULAR; INTRAVENOUS AS NEEDED
Status: DISCONTINUED | OUTPATIENT
Start: 2020-06-02 | End: 2020-06-02 | Stop reason: SURG

## 2020-06-02 RX ORDER — ONDANSETRON 2 MG/ML
4 INJECTION INTRAMUSCULAR; INTRAVENOUS EVERY 6 HOURS PRN
Status: DISCONTINUED | OUTPATIENT
Start: 2020-06-02 | End: 2020-06-03 | Stop reason: HOSPADM

## 2020-06-02 RX ADMIN — SODIUM CHLORIDE, SODIUM LACTATE, POTASSIUM CHLORIDE, AND CALCIUM CHLORIDE 125 ML/HR: .6; .31; .03; .02 INJECTION, SOLUTION INTRAVENOUS at 00:31

## 2020-06-02 RX ADMIN — KETOROLAC TROMETHAMINE 30 MG: 30 INJECTION, SOLUTION INTRAMUSCULAR at 11:49

## 2020-06-02 RX ADMIN — SODIUM CHLORIDE, SODIUM LACTATE, POTASSIUM CHLORIDE, AND CALCIUM CHLORIDE 125 ML/HR: .6; .31; .03; .02 INJECTION, SOLUTION INTRAVENOUS at 13:28

## 2020-06-02 RX ADMIN — SODIUM CHLORIDE, SODIUM LACTATE, POTASSIUM CHLORIDE, AND CALCIUM CHLORIDE: .6; .31; .03; .02 INJECTION, SOLUTION INTRAVENOUS at 13:50

## 2020-06-02 RX ADMIN — BUTORPHANOL TARTRATE 1 MG: 1 INJECTION, SOLUTION INTRAMUSCULAR; INTRAVENOUS at 03:36

## 2020-06-02 RX ADMIN — BUTORPHANOL TARTRATE 1 MG: 1 INJECTION, SOLUTION INTRAMUSCULAR; INTRAVENOUS at 05:37

## 2020-06-02 RX ADMIN — LOPERAMIDE HYDROCHLORIDE 2 MG: 2 CAPSULE ORAL at 11:33

## 2020-06-02 RX ADMIN — FENTANYL CITRATE 50 MCG: 50 INJECTION INTRAMUSCULAR; INTRAVENOUS at 14:04

## 2020-06-02 RX ADMIN — MISOPROSTOL 600 MCG: 200 TABLET ORAL at 01:49

## 2020-06-02 RX ADMIN — SODIUM CHLORIDE, SODIUM LACTATE, POTASSIUM CHLORIDE, AND CALCIUM CHLORIDE 999 ML/HR: .6; .31; .03; .02 INJECTION, SOLUTION INTRAVENOUS at 11:07

## 2020-06-02 RX ADMIN — PROPOFOL 200 MG: 10 INJECTION, EMULSION INTRAVENOUS at 14:04

## 2020-06-02 RX ADMIN — FENTANYL CITRATE 50 MCG: 50 INJECTION INTRAMUSCULAR; INTRAVENOUS at 14:26

## 2020-06-02 RX ADMIN — SODIUM CHLORIDE, SODIUM LACTATE, POTASSIUM CHLORIDE, AND CALCIUM CHLORIDE 999 ML/HR: .6; .31; .03; .02 INJECTION, SOLUTION INTRAVENOUS at 11:58

## 2020-06-02 RX ADMIN — MISOPROSTOL 400 MCG: 200 TABLET ORAL at 05:37

## 2020-06-02 RX ADMIN — SODIUM CHLORIDE: 9 INJECTION, SOLUTION INTRAVENOUS at 14:01

## 2020-06-02 RX ADMIN — LIDOCAINE HYDROCHLORIDE 50 MG: 10 INJECTION, SOLUTION EPIDURAL; INFILTRATION; INTRACAUDAL; PERINEURAL at 14:04

## 2020-06-02 RX ADMIN — ONDANSETRON 4 MG: 2 INJECTION INTRAMUSCULAR; INTRAVENOUS at 14:10

## 2020-06-02 RX ADMIN — CEFAZOLIN SODIUM 2000 MG: 2 SOLUTION INTRAVENOUS at 14:07

## 2020-06-02 RX ADMIN — CARBOPROST TROMETHAMINE 250 MCG: 250 INJECTION, SOLUTION INTRAMUSCULAR at 11:28

## 2020-06-02 RX ADMIN — Medication 100 MG: at 14:04

## 2020-06-02 RX ADMIN — ACETAMINOPHEN 650 MG: 325 TABLET, FILM COATED ORAL at 01:03

## 2020-06-03 VITALS
DIASTOLIC BLOOD PRESSURE: 55 MMHG | SYSTOLIC BLOOD PRESSURE: 103 MMHG | WEIGHT: 170 LBS | OXYGEN SATURATION: 100 % | TEMPERATURE: 98.1 F | BODY MASS INDEX: 29.02 KG/M2 | RESPIRATION RATE: 18 BRPM | HEART RATE: 83 BPM | HEIGHT: 64 IN

## 2020-06-03 LAB
APTT PPP: 34 SECONDS (ref 23–37)
BASOPHILS # BLD AUTO: 0.01 THOUSANDS/ΜL (ref 0–0.1)
BASOPHILS NFR BLD AUTO: 0 % (ref 0–1)
EOSINOPHIL # BLD AUTO: 0.04 THOUSAND/ΜL (ref 0–0.61)
EOSINOPHIL NFR BLD AUTO: 1 % (ref 0–6)
ERYTHROCYTE [DISTWIDTH] IN BLOOD BY AUTOMATED COUNT: 13.6 % (ref 11.6–15.1)
ERYTHROCYTE [DISTWIDTH] IN BLOOD BY AUTOMATED COUNT: 14 % (ref 11.6–15.1)
FIBRINOGEN PPP-MCNC: 390 MG/DL (ref 227–495)
HCT VFR BLD AUTO: 20.5 % (ref 34.8–46.1)
HCT VFR BLD AUTO: 24.7 % (ref 34.8–46.1)
HGB BLD-MCNC: 6.6 G/DL (ref 11.5–15.4)
HGB BLD-MCNC: 7.9 G/DL (ref 11.5–15.4)
IMM GRANULOCYTES # BLD AUTO: 0.04 THOUSAND/UL (ref 0–0.2)
IMM GRANULOCYTES NFR BLD AUTO: 1 % (ref 0–2)
INR PPP: 1.06 (ref 0.84–1.19)
INR PPP: 1.2 (ref 0.84–1.19)
LYMPHOCYTES # BLD AUTO: 2.39 THOUSANDS/ΜL (ref 0.6–4.47)
LYMPHOCYTES NFR BLD AUTO: 27 % (ref 14–44)
MCH RBC QN AUTO: 26.1 PG (ref 26.8–34.3)
MCH RBC QN AUTO: 26.5 PG (ref 26.8–34.3)
MCHC RBC AUTO-ENTMCNC: 32 G/DL (ref 31.4–37.4)
MCHC RBC AUTO-ENTMCNC: 32.2 G/DL (ref 31.4–37.4)
MCV RBC AUTO: 81 FL (ref 82–98)
MCV RBC AUTO: 83 FL (ref 82–98)
MONOCYTES # BLD AUTO: 0.54 THOUSAND/ΜL (ref 0.17–1.22)
MONOCYTES NFR BLD AUTO: 6 % (ref 4–12)
NEUTROPHILS # BLD AUTO: 5.7 THOUSANDS/ΜL (ref 1.85–7.62)
NEUTS SEG NFR BLD AUTO: 65 % (ref 43–75)
NRBC BLD AUTO-RTO: 0 /100 WBCS
PLATELET # BLD AUTO: 157 THOUSANDS/UL (ref 149–390)
PLATELET # BLD AUTO: 172 THOUSANDS/UL (ref 149–390)
PMV BLD AUTO: 10.2 FL (ref 8.9–12.7)
PMV BLD AUTO: 10.4 FL (ref 8.9–12.7)
PROTHROMBIN TIME: 13.1 SECONDS (ref 11.6–14.5)
PROTHROMBIN TIME: 13.2 SECONDS (ref 11.6–14.5)
PROTHROMBIN TIME: 14.6 SECONDS (ref 11.6–14.5)
RBC # BLD AUTO: 2.53 MILLION/UL (ref 3.81–5.12)
RBC # BLD AUTO: 2.98 MILLION/UL (ref 3.81–5.12)
THROMBIN TIME: 14.4 SECONDS (ref 14.7–18.4)
THROMBIN TIME: 14.4 SECONDS (ref 14.7–18.4)
WBC # BLD AUTO: 6.86 THOUSAND/UL (ref 4.31–10.16)
WBC # BLD AUTO: 8.72 THOUSAND/UL (ref 4.31–10.16)

## 2020-06-03 PROCEDURE — 85611 PROTHROMBIN TEST: CPT | Performed by: OBSTETRICS & GYNECOLOGY

## 2020-06-03 PROCEDURE — 85610 PROTHROMBIN TIME: CPT | Performed by: OBSTETRICS & GYNECOLOGY

## 2020-06-03 PROCEDURE — 30233N1 TRANSFUSION OF NONAUTOLOGOUS RED BLOOD CELLS INTO PERIPHERAL VEIN, PERCUTANEOUS APPROACH: ICD-10-PCS | Performed by: STUDENT IN AN ORGANIZED HEALTH CARE EDUCATION/TRAINING PROGRAM

## 2020-06-03 PROCEDURE — 85730 THROMBOPLASTIN TIME PARTIAL: CPT | Performed by: OBSTETRICS & GYNECOLOGY

## 2020-06-03 PROCEDURE — 85025 COMPLETE CBC W/AUTO DIFF WBC: CPT | Performed by: OBSTETRICS & GYNECOLOGY

## 2020-06-03 PROCEDURE — 99024 POSTOP FOLLOW-UP VISIT: CPT | Performed by: STUDENT IN AN ORGANIZED HEALTH CARE EDUCATION/TRAINING PROGRAM

## 2020-06-03 PROCEDURE — 85670 THROMBIN TIME PLASMA: CPT | Performed by: OBSTETRICS & GYNECOLOGY

## 2020-06-03 PROCEDURE — 85610 PROTHROMBIN TIME: CPT | Performed by: STUDENT IN AN ORGANIZED HEALTH CARE EDUCATION/TRAINING PROGRAM

## 2020-06-03 PROCEDURE — 85384 FIBRINOGEN ACTIVITY: CPT | Performed by: OBSTETRICS & GYNECOLOGY

## 2020-06-03 PROCEDURE — P9016 RBC LEUKOCYTES REDUCED: HCPCS

## 2020-06-03 PROCEDURE — 85027 COMPLETE CBC AUTOMATED: CPT | Performed by: STUDENT IN AN ORGANIZED HEALTH CARE EDUCATION/TRAINING PROGRAM

## 2020-06-03 RX ORDER — HYDROXYZINE HYDROCHLORIDE 25 MG/1
25 TABLET, FILM COATED ORAL EVERY 6 HOURS PRN
Status: DISCONTINUED | OUTPATIENT
Start: 2020-06-03 | End: 2020-06-03 | Stop reason: HOSPADM

## 2020-06-03 RX ORDER — OXYCODONE HYDROCHLORIDE 5 MG/1
5 TABLET ORAL EVERY 4 HOURS PRN
Status: DISCONTINUED | OUTPATIENT
Start: 2020-06-03 | End: 2020-06-03

## 2020-06-03 RX ORDER — KETOROLAC TROMETHAMINE 30 MG/ML
15 INJECTION, SOLUTION INTRAMUSCULAR; INTRAVENOUS EVERY 6 HOURS PRN
Status: DISCONTINUED | OUTPATIENT
Start: 2020-06-03 | End: 2020-06-03 | Stop reason: HOSPADM

## 2020-06-03 RX ORDER — KETOROLAC TROMETHAMINE 30 MG/ML
15 INJECTION, SOLUTION INTRAMUSCULAR; INTRAVENOUS EVERY 6 HOURS PRN
Status: DISCONTINUED | OUTPATIENT
Start: 2020-06-03 | End: 2020-06-03

## 2020-06-03 RX ORDER — ACETAMINOPHEN 325 MG/1
650 TABLET ORAL EVERY 6 HOURS PRN
Qty: 30 TABLET | Refills: 0 | Status: SHIPPED | OUTPATIENT
Start: 2020-06-03

## 2020-06-03 RX ADMIN — ACETAMINOPHEN 650 MG: 325 TABLET, FILM COATED ORAL at 05:25

## 2020-06-03 RX ADMIN — KETOROLAC TROMETHAMINE 15 MG: 30 INJECTION, SOLUTION INTRAMUSCULAR at 19:20

## 2020-06-03 RX ADMIN — OXYCODONE HYDROCHLORIDE 5 MG: 5 TABLET ORAL at 12:04

## 2020-06-03 RX ADMIN — KETOROLAC TROMETHAMINE 15 MG: 30 INJECTION, SOLUTION INTRAMUSCULAR at 09:42

## 2020-06-03 RX ADMIN — OXYCODONE HYDROCHLORIDE 5 MG: 5 TABLET ORAL at 06:35

## 2020-06-03 RX ADMIN — HYDROXYZINE HYDROCHLORIDE 25 MG: 25 TABLET ORAL at 20:31

## 2020-06-03 RX ADMIN — ACETAMINOPHEN 650 MG: 325 TABLET, FILM COATED ORAL at 12:04

## 2020-06-04 ENCOUNTER — TELEPHONE (OUTPATIENT)
Dept: OBGYN CLINIC | Facility: CLINIC | Age: 30
End: 2020-06-04

## 2020-06-04 DIAGNOSIS — R52 PAIN: Primary | ICD-10-CM

## 2020-06-04 LAB
ABO GROUP BLD BPU: NORMAL
BPU ID: NORMAL
CROSSMATCH: NORMAL
UNIT DISPENSE STATUS: NORMAL
UNIT PRODUCT CODE: NORMAL
UNIT RH: NORMAL

## 2020-06-08 ENCOUNTER — APPOINTMENT (EMERGENCY)
Dept: CT IMAGING | Facility: HOSPITAL | Age: 30
End: 2020-06-08
Payer: COMMERCIAL

## 2020-06-08 ENCOUNTER — HOSPITAL ENCOUNTER (EMERGENCY)
Facility: HOSPITAL | Age: 30
Discharge: HOME | End: 2020-06-09
Attending: EMERGENCY MEDICINE | Admitting: EMERGENCY MEDICINE
Payer: COMMERCIAL

## 2020-06-08 DIAGNOSIS — R93.5 ABNORMAL CT SCAN, PELVIS: ICD-10-CM

## 2020-06-08 DIAGNOSIS — R10.30 LOWER ABDOMINAL PAIN: Primary | ICD-10-CM

## 2020-06-08 LAB
ALBUMIN SERPL BCP-MCNC: 3 G/DL (ref 3.5–5)
ALP SERPL-CCNC: 47 U/L (ref 46–116)
ALT SERPL W P-5'-P-CCNC: 14 U/L (ref 12–78)
ANION GAP SERPL CALCULATED.3IONS-SCNC: 8 MMOL/L (ref 4–13)
AST SERPL W P-5'-P-CCNC: 12 U/L (ref 5–45)
BASOPHILS # BLD AUTO: 0.01 THOUSANDS/ΜL (ref 0–0.1)
BASOPHILS NFR BLD AUTO: 0 % (ref 0–1)
BILIRUB SERPL-MCNC: 0.2 MG/DL (ref 0.2–1)
BUN SERPL-MCNC: 9 MG/DL (ref 5–25)
CALCIUM SERPL-MCNC: 8.2 MG/DL (ref 8.3–10.1)
CHLORIDE SERPL-SCNC: 108 MMOL/L (ref 100–108)
CO2 SERPL-SCNC: 26 MMOL/L (ref 21–32)
CREAT SERPL-MCNC: 0.77 MG/DL (ref 0.6–1.3)
EOSINOPHIL # BLD AUTO: 0.05 THOUSAND/ΜL (ref 0–0.61)
EOSINOPHIL NFR BLD AUTO: 1 % (ref 0–6)
ERYTHROCYTE [DISTWIDTH] IN BLOOD BY AUTOMATED COUNT: 14 % (ref 11.6–15.1)
GFR SERPL CREATININE-BSD FRML MDRD: 122 ML/MIN/1.73SQ M
GLUCOSE SERPL-MCNC: 83 MG/DL (ref 65–140)
HCT VFR BLD AUTO: 26.4 % (ref 34.8–46.1)
HGB BLD-MCNC: 8.6 G/DL (ref 11.5–15.4)
LACTATE SERPL-SCNC: 1.3 MMOL/L (ref 0.5–2)
LIPASE SERPL-CCNC: 151 U/L (ref 73–393)
LYMPHOCYTES # BLD AUTO: 2.89 THOUSANDS/ΜL (ref 0.6–4.47)
LYMPHOCYTES NFR BLD AUTO: 36 % (ref 14–44)
MCH RBC QN AUTO: 26.5 PG (ref 26.8–34.3)
MCHC RBC AUTO-ENTMCNC: 32.6 G/DL (ref 31.4–37.4)
MCV RBC AUTO: 82 FL (ref 82–98)
MONOCYTES # BLD AUTO: 0.58 THOUSAND/ΜL (ref 0.17–1.22)
MONOCYTES NFR BLD AUTO: 7 % (ref 4–12)
NEUTROPHILS # BLD AUTO: 4.62 THOUSANDS/ΜL (ref 1.85–7.62)
NEUTS SEG NFR BLD AUTO: 56 % (ref 43–75)
PLATELET # BLD AUTO: 296 THOUSANDS/UL (ref 149–390)
PMV BLD AUTO: 9.8 FL (ref 8.9–12.7)
POTASSIUM SERPL-SCNC: 3.9 MMOL/L (ref 3.5–5.3)
PROT SERPL-MCNC: 7.2 G/DL (ref 6.4–8.2)
RBC # BLD AUTO: 3.24 MILLION/UL (ref 3.81–5.12)
SODIUM SERPL-SCNC: 142 MMOL/L (ref 136–145)
WBC # BLD AUTO: 8.15 THOUSAND/UL (ref 4.31–10.16)

## 2020-06-08 PROCEDURE — 83690 ASSAY OF LIPASE: CPT | Performed by: EMERGENCY MEDICINE

## 2020-06-08 PROCEDURE — 99284 EMERGENCY DEPT VISIT MOD MDM: CPT

## 2020-06-08 PROCEDURE — 96375 TX/PRO/DX INJ NEW DRUG ADDON: CPT

## 2020-06-08 PROCEDURE — 99284 EMERGENCY DEPT VISIT MOD MDM: CPT | Performed by: EMERGENCY MEDICINE

## 2020-06-08 PROCEDURE — 96374 THER/PROPH/DIAG INJ IV PUSH: CPT

## 2020-06-08 PROCEDURE — 83605 ASSAY OF LACTIC ACID: CPT | Performed by: EMERGENCY MEDICINE

## 2020-06-08 PROCEDURE — 85025 COMPLETE CBC W/AUTO DIFF WBC: CPT | Performed by: EMERGENCY MEDICINE

## 2020-06-08 PROCEDURE — 80053 COMPREHEN METABOLIC PANEL: CPT | Performed by: EMERGENCY MEDICINE

## 2020-06-08 PROCEDURE — 36415 COLL VENOUS BLD VENIPUNCTURE: CPT | Performed by: EMERGENCY MEDICINE

## 2020-06-08 PROCEDURE — 96361 HYDRATE IV INFUSION ADD-ON: CPT

## 2020-06-08 RX ORDER — METOCLOPRAMIDE HYDROCHLORIDE 5 MG/ML
10 INJECTION INTRAMUSCULAR; INTRAVENOUS ONCE
Status: COMPLETED | OUTPATIENT
Start: 2020-06-08 | End: 2020-06-08

## 2020-06-08 RX ORDER — KETOROLAC TROMETHAMINE 30 MG/ML
15 INJECTION, SOLUTION INTRAMUSCULAR; INTRAVENOUS ONCE
Status: COMPLETED | OUTPATIENT
Start: 2020-06-08 | End: 2020-06-08

## 2020-06-08 RX ORDER — DIPHENHYDRAMINE HYDROCHLORIDE 50 MG/ML
25 INJECTION INTRAMUSCULAR; INTRAVENOUS ONCE
Status: COMPLETED | OUTPATIENT
Start: 2020-06-08 | End: 2020-06-08

## 2020-06-08 RX ADMIN — DIPHENHYDRAMINE HYDROCHLORIDE 25 MG: 50 INJECTION, SOLUTION INTRAMUSCULAR; INTRAVENOUS at 23:23

## 2020-06-08 RX ADMIN — METOCLOPRAMIDE HYDROCHLORIDE 10 MG: 5 INJECTION INTRAMUSCULAR; INTRAVENOUS at 23:23

## 2020-06-08 RX ADMIN — IOHEXOL 50 ML: 240 INJECTION, SOLUTION INTRATHECAL; INTRAVASCULAR; INTRAVENOUS; ORAL at 23:17

## 2020-06-08 RX ADMIN — KETOROLAC TROMETHAMINE 15 MG: 30 INJECTION, SOLUTION INTRAMUSCULAR at 23:23

## 2020-06-08 RX ADMIN — SODIUM CHLORIDE 1000 ML: 0.9 INJECTION, SOLUTION INTRAVENOUS at 23:23

## 2020-06-09 ENCOUNTER — APPOINTMENT (EMERGENCY)
Dept: CT IMAGING | Facility: HOSPITAL | Age: 30
End: 2020-06-09
Payer: COMMERCIAL

## 2020-06-09 ENCOUNTER — OFFICE VISIT (OUTPATIENT)
Dept: OBGYN CLINIC | Facility: CLINIC | Age: 30
End: 2020-06-09

## 2020-06-09 ENCOUNTER — APPOINTMENT (EMERGENCY)
Dept: ULTRASOUND IMAGING | Facility: HOSPITAL | Age: 30
End: 2020-06-09
Payer: COMMERCIAL

## 2020-06-09 VITALS
SYSTOLIC BLOOD PRESSURE: 126 MMHG | HEART RATE: 72 BPM | WEIGHT: 177.91 LBS | RESPIRATION RATE: 15 BRPM | OXYGEN SATURATION: 99 % | HEIGHT: 64 IN | DIASTOLIC BLOOD PRESSURE: 78 MMHG | BODY MASS INDEX: 30.37 KG/M2 | TEMPERATURE: 99 F

## 2020-06-09 VITALS
SYSTOLIC BLOOD PRESSURE: 112 MMHG | WEIGHT: 174 LBS | HEIGHT: 64 IN | DIASTOLIC BLOOD PRESSURE: 70 MMHG | BODY MASS INDEX: 29.71 KG/M2

## 2020-06-09 DIAGNOSIS — O03.4 INEVITABLE SPONTANEOUS ABORTION: Primary | ICD-10-CM

## 2020-06-09 PROBLEM — Z3A.16 16 WEEKS GESTATION OF PREGNANCY: Status: RESOLVED | Noted: 2020-06-01 | Resolved: 2020-06-09

## 2020-06-09 PROBLEM — O03.9 SPONTANEOUS MISCARRIAGE: Status: ACTIVE | Noted: 2020-06-09

## 2020-06-09 PROCEDURE — 74177 CT ABD & PELVIS W/CONTRAST: CPT

## 2020-06-09 PROCEDURE — 76856 US EXAM PELVIC COMPLETE: CPT

## 2020-06-09 PROCEDURE — 96361 HYDRATE IV INFUSION ADD-ON: CPT

## 2020-06-09 PROCEDURE — 76830 TRANSVAGINAL US NON-OB: CPT

## 2020-06-09 PROCEDURE — 99024 POSTOP FOLLOW-UP VISIT: CPT | Performed by: STUDENT IN AN ORGANIZED HEALTH CARE EDUCATION/TRAINING PROGRAM

## 2020-06-09 RX ORDER — NAPROXEN 500 MG/1
500 TABLET ORAL 2 TIMES DAILY PRN
Qty: 20 TABLET | Refills: 0 | Status: SHIPPED | OUTPATIENT
Start: 2020-06-09

## 2020-06-09 RX ORDER — NAPROXEN 250 MG/1
500 TABLET ORAL ONCE
Status: COMPLETED | OUTPATIENT
Start: 2020-06-09 | End: 2020-06-09

## 2020-06-09 RX ADMIN — IOHEXOL 50 ML: 240 INJECTION, SOLUTION INTRATHECAL; INTRAVASCULAR; INTRAVENOUS; ORAL at 00:40

## 2020-06-09 RX ADMIN — IOHEXOL 100 ML: 350 INJECTION, SOLUTION INTRAVENOUS at 00:40

## 2020-06-09 RX ADMIN — NAPROXEN 500 MG: 250 TABLET ORAL at 03:47

## 2020-07-27 ENCOUNTER — TELEPHONE (OUTPATIENT)
Dept: PSYCHIATRY | Facility: CLINIC | Age: 30
End: 2020-07-27

## 2021-08-24 ENCOUNTER — HOSPITAL ENCOUNTER (EMERGENCY)
Facility: HOSPITAL | Age: 31
Discharge: HOME | End: 2021-08-25
Attending: EMERGENCY MEDICINE
Payer: COMMERCIAL

## 2021-08-24 DIAGNOSIS — K08.89 PAIN, DENTAL: ICD-10-CM

## 2021-08-24 DIAGNOSIS — K02.9 DENTAL CARIES: Primary | ICD-10-CM

## 2021-08-24 PROCEDURE — 99282 EMERGENCY DEPT VISIT SF MDM: CPT

## 2021-08-25 VITALS
SYSTOLIC BLOOD PRESSURE: 107 MMHG | TEMPERATURE: 98.4 F | RESPIRATION RATE: 18 BRPM | WEIGHT: 174 LBS | HEART RATE: 67 BPM | DIASTOLIC BLOOD PRESSURE: 57 MMHG | HEIGHT: 64 IN | OXYGEN SATURATION: 99 % | BODY MASS INDEX: 29.71 KG/M2

## 2021-08-25 PROCEDURE — 96372 THER/PROPH/DIAG INJ SC/IM: CPT

## 2021-08-25 PROCEDURE — 99284 EMERGENCY DEPT VISIT MOD MDM: CPT | Performed by: EMERGENCY MEDICINE

## 2021-08-25 RX ORDER — PENICILLIN V POTASSIUM 500 MG/1
500 TABLET ORAL 4 TIMES DAILY
Qty: 28 TABLET | Refills: 0 | Status: SHIPPED | OUTPATIENT
Start: 2021-08-25 | End: 2021-09-01

## 2021-08-25 RX ORDER — PENICILLIN V POTASSIUM 250 MG/1
500 TABLET ORAL ONCE
Status: COMPLETED | OUTPATIENT
Start: 2021-08-25 | End: 2021-08-25

## 2021-08-25 RX ORDER — MORPHINE SULFATE 15 MG/1
15 TABLET ORAL EVERY 8 HOURS PRN
Qty: 10 TABLET | Refills: 0 | Status: SHIPPED | OUTPATIENT
Start: 2021-08-25 | End: 2021-09-04

## 2021-08-25 RX ORDER — OXYCODONE HYDROCHLORIDE AND ACETAMINOPHEN 5; 325 MG/1; MG/1
1 TABLET ORAL ONCE
Status: COMPLETED | OUTPATIENT
Start: 2021-08-25 | End: 2021-08-25

## 2021-08-25 RX ORDER — KETOROLAC TROMETHAMINE 30 MG/ML
30 INJECTION, SOLUTION INTRAMUSCULAR; INTRAVENOUS ONCE
Status: COMPLETED | OUTPATIENT
Start: 2021-08-25 | End: 2021-08-25

## 2021-08-25 RX ORDER — NAPROXEN 500 MG/1
500 TABLET ORAL EVERY 12 HOURS PRN
Qty: 20 TABLET | Refills: 0 | Status: ON HOLD | OUTPATIENT
Start: 2021-08-25

## 2021-08-25 RX ORDER — MORPHINE SULFATE 15 MG/1
15 TABLET ORAL EVERY 4 HOURS PRN
Qty: 10 TABLET | Refills: 0 | Status: SHIPPED | OUTPATIENT
Start: 2021-08-25 | End: 2021-08-25 | Stop reason: SDUPTHER

## 2021-08-25 RX ADMIN — PENICILLIN V POTASSIUM 500 MG: 250 TABLET, FILM COATED ORAL at 00:16

## 2021-08-25 RX ADMIN — OXYCODONE HYDROCHLORIDE AND ACETAMINOPHEN 1 TABLET: 5; 325 TABLET ORAL at 00:16

## 2021-08-25 RX ADMIN — KETOROLAC TROMETHAMINE 30 MG: 30 INJECTION, SOLUTION INTRAMUSCULAR; INTRAVENOUS at 00:16

## 2021-08-25 NOTE — ED PROVIDER NOTES
History  Chief Complaint   Patient presents with    Dental Pain     right sided dental pain, pt crying in triage      Patient is a 62-year-old female with no significant past medical history, presents to the emergency department complaining of 1-2 weeks of right-sided dental pain  Patient reports she has pain to the right upper 2nd to last molar as well as her right lower tooth  She has an appointment with her dentist but is not for another month  She reports the pain is been getting progressively worse and it is not relieved by Tylenol which she tried taking at home  She has not tried any other over-the-counter meds  She reports headaches associated with the pain but otherwise denies any fever, chills, dizziness or near syncope, cough, URI symptoms, difficulty swallowing or handling secretions, facial or neck redness or swelling, chest pain, palpitations, dyspnea, abdominal pain, nausea, vomiting, change in bowel habits, urinary symptoms, skin rash or color change, extremity weakness or paresthesia or other focal neurologic deficits  Denies having issues with these teeth in the past   Patient does report she has multiple dental fillings from prior cavities  History provided by:  Patient   used: No    Dental Pain  Associated symptoms: headaches    Associated symptoms: no congestion, no drooling, no facial swelling, no fever and no neck pain        Prior to Admission Medications   Prescriptions Last Dose Informant Patient Reported? Taking?    Prenatal MV & Min w/FA-DHA ([facility] PRENATAL GUMMY PO)  Self Yes No   Sig: Take by mouth   acetaminophen (TYLENOL) 325 mg tablet  Self No No   Sig: Take 2 tablets (650 mg total) by mouth every 6 (six) hours as needed for mild pain or headaches   cholecalciferol (VITAMIN D3) 1,000 units tablet  Self Yes No   Sig: Take 1,000 Units by mouth daily   ferrous sulfate 325 (65 Fe) mg tablet  Self Yes No   Sig: Take 325 mg by mouth daily with breakfast naproxen (NAPROSYN) 500 mg tablet  Self No No   Sig: Take 1 tablet (500 mg total) by mouth 2 (two) times a day as needed for moderate pain for up to 20 doses      Facility-Administered Medications: None       Past Medical History:   Diagnosis Date    Gastric bypass status for obesity 2016    sleeve    Post partum depression 2014    Sleep apnea, obstructive     prior to weight loss surg       Past Surgical History:   Procedure Laterality Date    DILATION AND EVACUATION  2019        GASTRIC RESTRICTION SURGERY  2015    gastric sleeve    OPEN ANTERIOR SHOULDER RECONSTRUCTION Left 2018    OTHER SURGICAL HISTORY      sweat gland removal    RETAINED PLACENTA REMOVAL N/A 2020    Procedure: EXTRACTION OF [id number];  Surgeon: Pauline Londono MD;  Location: [facility] ;  Service: Obstetrics       Family History   Problem Relation Age of Onset    Heart disease Mother     Seizures Mother     Hypertension Mother     Anxiety disorder Mother     Bipolar disorder Mother     Kidney disease Mother     Sudden death Father     No Known Problems Sister     No Known Problems Brother     No Known Problems Son     Pancreatic cancer Maternal Grandmother     Seizures Maternal Grandmother     Diabetes Maternal Grandmother     No Known Problems Sister     No Known Problems Brother     No Known Problems Brother     Sudden death Brother 21        MVA     I have reviewed and agree with the history as documented      E-Cigarette/Vaping    E-Cigarette Use Never User      E-Cigarette/Vaping Substances    Nicotine No     THC No     CBD No     Flavoring No     Other No     Unknown No      Social History     Tobacco Use    Smoking status: Current Every Day Smoker     Packs/day: 0 50     Types: Cigarettes     Last attempt to quit: 2020     Years since quittin 3    Smokeless tobacco: Never Used   Vaping Use    Vaping Use: Never used   Substance Use Topics    Alcohol use: Not Currently Comment: social    Drug use: Never       Review of Systems   Constitutional: Negative for chills and fever  HENT: Positive for dental problem  Negative for congestion, drooling, ear pain, facial swelling, rhinorrhea, sore throat, trouble swallowing and voice change  +Right upper and lower dental pain  Eyes: Negative for photophobia, pain and visual disturbance  Respiratory: Negative for cough, chest tightness, shortness of breath and wheezing  Cardiovascular: Negative for chest pain and palpitations  Gastrointestinal: Negative for abdominal pain, constipation, diarrhea, nausea and vomiting  Genitourinary: Negative for dysuria, flank pain, frequency and hematuria  Musculoskeletal: Negative for back pain, neck pain and neck stiffness  Skin: Negative for color change, pallor, rash and wound  Allergic/Immunologic: Negative for immunocompromised state  Neurological: Positive for headaches  Negative for dizziness, syncope, weakness, light-headedness and numbness  Hematological: Negative for adenopathy  Psychiatric/Behavioral: Negative for confusion and decreased concentration  All other systems reviewed and are negative  Physical Exam  Physical Exam  Vitals and nursing note reviewed  Constitutional:       General: She is not in acute distress  Appearance: Normal appearance  She is well-developed  She is not ill-appearing, toxic-appearing or diaphoretic  HENT:      Head: Normocephalic and atraumatic  Right Ear: External ear normal       Left Ear: External ear normal       Nose: Nose normal       Mouth/Throat:      Mouth: Mucous membranes are moist       Pharynx: Oropharynx is clear  No oropharyngeal exudate  Comments: Multiple dental caries and prior dental fillings  Tooth #2 and # 28 are tender to touch  No obvious dental abscess appreciated  No facial swelling or induration  No evidence of Britton's angina  No trismus    Patient speaking and handling secretions without difficulty  Eyes:      Extraocular Movements: Extraocular movements intact  Conjunctiva/sclera: Conjunctivae normal    Neck:      Vascular: No JVD  Comments: Bilateral submandibular and anterior cervical lymphadenopathy, right greater than left  Cardiovascular:      Rate and Rhythm: Normal rate and regular rhythm  Pulses: Normal pulses  Heart sounds: Normal heart sounds  No murmur heard  No friction rub  No gallop  Pulmonary:      Effort: Pulmonary effort is normal  No respiratory distress  Breath sounds: Normal breath sounds  No wheezing, rhonchi or rales  Abdominal:      General: There is no distension  Palpations: Abdomen is soft  Tenderness: There is no abdominal tenderness  There is no guarding or rebound  Musculoskeletal:         General: No swelling or tenderness  Normal range of motion  Cervical back: Normal range of motion and neck supple  No rigidity  Lymphadenopathy:      Cervical: Cervical adenopathy present  Skin:     General: Skin is warm and dry  Coloration: Skin is not pale  Findings: No erythema or rash  Neurological:      General: No focal deficit present  Mental Status: She is alert and oriented to person, place, and time  Sensory: No sensory deficit  Motor: No weakness     Psychiatric:         Mood and Affect: Mood normal          Behavior: Behavior normal          Vital Signs  ED Triage Vitals   Temperature Pulse Respirations Blood Pressure SpO2   08/24/21 2303 08/24/21 2303 08/24/21 2303 08/24/21 2303 08/24/21 2303   98 °F (36 7 °C) 99 18 123/68 99 %      Temp Source Heart Rate Source Patient Position - Orthostatic VS BP Location FiO2 (%)   08/24/21 2303 08/24/21 2303 08/24/21 2303 08/24/21 2303 --   Oral Monitor Lying Right arm       Pain Score       08/25/21 0016       Worst Possible Pain         Vitals:    08/24/21 2303 08/25/21 0030   BP: 123/68 107/57   BP Location: Right arm Right arm   Pulse: 99 67 Resp: 18 18   Temp: 98 °F (36 7 °C) 98 4 °F (36 9 °C)   TempSrc: Oral Oral   SpO2: 99% 99%   Weight: 78 9 kg (174 lb)    Height: 5' 4" (1 626 m)        Visual Acuity      ED Medications  Medications   ketorolac (TORADOL) injection 30 mg (30 mg Intramuscular Given 8/25/21 0016)   oxyCODONE-acetaminophen (PERCOCET) 5-325 mg per tablet 1 tablet (1 tablet Oral Given 8/25/21 0016)   penicillin V potassium (VEETID) tablet 500 mg (500 mg Oral Given 8/25/21 0016)       Diagnostic Studies  Results Reviewed     None                 No orders to display              Procedures  Procedures         ED Course                                           MDM  Number of Diagnoses or Management Options  Diagnosis management comments: 26-year-old female presents with acute dental pain in the right upper and lower teeth ongoing for 1-2 weeks  Patient does have a dentist and made an appointment but does not recall when it is and thinks it may be in September  No obvious dental abscess but patient has multiple dental caries and will cover with a course of penicillin  Will provide IM Toradol and Percocet in the ED for pain relief  Discussed ED return parameters with patient including worsening or uncontrolled pain, new fevers, severe intractable headache, cranial nerve dysfunction, difficulty swallowing or handling secretions, facial swelling or redness, neck swelling or redness or any other new concerning symptoms        Disposition  Final diagnoses:   Dental caries   Pain, dental     Time reflects when diagnosis was documented in both MDM as applicable and the Disposition within this note     Time User Action Codes Description Comment    8/25/2021 12:54 AM Texie Coppersmith E Add [K08 89] Toothache     8/25/2021 12:54 AM Texie Coppersmith E Add [K02 9] Dental caries     8/25/2021 12:54 AM Texie Coppersmith E Add [K08 89] Pain, dental     8/25/2021 12:54 AM Texie Coppersmith E Modify [K02 9] Dental caries     8/25/2021 12:54 AM Gilda Martínez Remove [K08 89] Toothache       ED Disposition     ED Disposition Condition Date/Time Comment    Discharge Stable Wed Aug 25, 2021 12:54 AM Austin Meyers discharge to home/self care  Follow-up Information     Follow up With Specialties Details Why Contact Info Additional Information    Jorje Ba MD Family Medicine Schedule an appointment as soon as possible for a visit   [facility]  [address] [place] [address]  [address]  [address]       Dentist  Schedule an appointment as soon as possible for a visit        [facility] Emergency Department Emergency Medicine Go to  If symptoms worsen [facility] Emergency Department, [address] [place] [address], [id number]          Patient's Medications   Discharge Prescriptions    MORPHINE (MSIR) 15 MG TABLET    Take 1 tablet (15 mg total) by mouth every 4 (four) hours as needed for severe pain for up to 10 daysMax Daily Amount: 90 mg       Start Date: 8/25/2021 End Date: 9/4/2021       Order Dose: 15 mg       Quantity: 10 tablet    Refills: 0    NAPROXEN (NAPROSYN) 500 MG TABLET    Take 1 tablet (500 mg total) by mouth every 12 (twelve) hours as needed for mild pain or moderate pain       Start Date: 8/25/2021 End Date: --       Order Dose: 500 mg       Quantity: 20 tablet    Refills: 0    PENICILLIN V POTASSIUM (VEETID) 500 MG TABLET    Take 1 tablet (500 mg total) by mouth 4 (four) times a day for 7 days       Start Date: 8/25/2021 End Date: 9/1/2021       Order Dose: 500 mg       Quantity: 28 tablet    Refills: 0     No discharge procedures on file      PDMP Review     None          ED Provider  Electronically Signed by           Santi Salazar DO  08/25/21 9341

## 2021-08-25 NOTE — DISCHARGE INSTRUCTIONS
Toothache   WHAT YOU NEED TO KNOW:   A toothache is pain that is caused by irritation of the nerves in the center of your tooth  The irritation may be caused by several problems, such as a cavity, an infection, a cracked tooth, or gum disease  DISCHARGE INSTRUCTIONS:   Return to the emergency department if:   · You have trouble breathing or swallowing  · You have swelling in your face or neck  Contact your dentist if:   · You have a fever and chills  · You have trouble opening or closing your mouth  · You have swelling around your tooth  · You have questions or concerns about your condition or care  Medicines: You may  need any of the following:  · NSAIDs , such as ibuprofen, help decrease swelling, pain, and fever  This medicine is available with or without a doctor's order  NSAIDs can cause stomach bleeding or kidney problems in certain people  If you take blood thinner medicine, always ask if NSAIDs are safe for you  Always read the medicine label and follow directions  Do not give these medicines to children under 10months of age without direction from your child's healthcare provider  · Acetaminophen  decreases pain and fever  It is available without a doctor's order  Ask how much to take and how often to take it  Follow directions  Acetaminophen can cause liver damage if not taken correctly  · Prescription pain medicine  may be given  Ask your healthcare provider how to take this medicine safely  Some prescription pain medicines contain acetaminophen  Do not take other medicines that contain acetaminophen without talking to your healthcare provider  Too much acetaminophen may cause liver damage  Prescription pain medicine may cause constipation  Ask your healthcare provider how to prevent or treat constipation  · Antibiotics  help treat or prevent a bacterial infection  · Take your medicine as directed    Contact your healthcare provider if you think your medicine is not helping or if you have side effects  Tell him of her if you are allergic to any medicine  Keep a list of the medicines, vitamins, and herbs you take  Include the amounts, and when and why you take them  Bring the list or the pill bottles to follow-up visits  Carry your medicine list with you in case of an emergency  Self-care:   · Rinse your mouth with warm salt water  4 times a day or as directed  · Eat soft foods  to help relieve pain caused by chewing  · Apply ice on your jaw or cheek  for 15 to 20 minutes every hour or as directed  Use an ice pack, or put crushed ice in a plastic bag  Cover it with a towel before you apply it  Ice helps prevent tissue damage and decreases swelling and pain  Help prevent a toothache:   · Brush your teeth at least 2 times a day  · Use dental floss to clean between your teeth at least 1 time a day  · See your dentist regularly every 6 months for dental cleanings and oral exams  Follow up with your dentist as directed: You may be referred to a dental surgeon  Write down your questions so you remember to ask them during your visits  © Copyright [employer] 2021 Information is for End User's use only and may not be sold, redistributed or otherwise used for commercial purposes  All illustrations and images included in CareNotes® are the copyrighted property of A D A M , Inc  or [facility] Ceci Tompkins   The above information is an  only  It is not intended as medical advice for individual conditions or treatments  Talk to your doctor, nurse or pharmacist before following any medical regimen to see if it is safe and effective for you  Dental Caries   WHAT YOU NEED TO KNOW:   Dental caries are also called cavities  Cavities are caused by bacteria  When the bacteria in tooth plaque (sticky film) mix with certain types of carbohydrate, this creates acid  The acid breaks down areas of enamel, which covers the outside of a tooth  This creates a small hole in the tooth called a cavity  DISCHARGE INSTRUCTIONS:   Seek care immediately if:   · Your face, jaw, cheek, eye, or neck begin to swell  Contact your dentist if:   · You have a fever  · Your tooth pain gets worse  · You have questions or concerns about your condition or care  Follow up with your dentist as directed:  Write down your questions so you remember to ask them during your visits  Prevent dental caries:   · Brush your teeth at least 2 times a day with fluoride toothpaste  · Use dental floss to clean between your teeth at least once a day  · Rinse your mouth with water or mouthwash after meals and snacks  · Chew sugarless gum after meals and snacks  · See your dentist regularly for dental cleanings and oral exams  © 2017 [address] is for End User's use only and may not be sold, redistributed or otherwise used for commercial purposes  All illustrations and images included in CareNotes® are the copyrighted property of A D A M , Inc  or Mathieu Noyola  The above information is an  only  It is not intended as medical advice for individual conditions or treatments  Talk to your doctor, nurse or pharmacist before following any medical regimen to see if it is safe and effective for you

## 2021-10-31 ENCOUNTER — APPOINTMENT (EMERGENCY)
Dept: CT IMAGING | Facility: HOSPITAL | Age: 31
DRG: 720 | End: 2021-10-31
Payer: COMMERCIAL

## 2021-10-31 ENCOUNTER — HOSPITAL ENCOUNTER (INPATIENT)
Facility: HOSPITAL | Age: 31
LOS: 3 days | Discharge: HOME | DRG: 720 | End: 2021-11-03
Attending: EMERGENCY MEDICINE | Admitting: INTERNAL MEDICINE
Payer: COMMERCIAL

## 2021-10-31 DIAGNOSIS — N39.0 UTI (URINARY TRACT INFECTION): ICD-10-CM

## 2021-10-31 DIAGNOSIS — A41.9 SEPSIS (HCC): Primary | ICD-10-CM

## 2021-10-31 DIAGNOSIS — E87.6 HYPOKALEMIA: ICD-10-CM

## 2021-10-31 PROBLEM — R51.9 HEADACHE: Status: ACTIVE | Noted: 2021-10-31

## 2021-10-31 PROBLEM — D64.9 ANEMIA: Status: ACTIVE | Noted: 2021-10-31

## 2021-10-31 LAB
ALBUMIN SERPL BCP-MCNC: 3.1 G/DL (ref 3.5–5)
ALP SERPL-CCNC: 63 U/L (ref 46–116)
ALT SERPL W P-5'-P-CCNC: 24 U/L (ref 12–78)
ANION GAP SERPL CALCULATED.3IONS-SCNC: 8 MMOL/L (ref 4–13)
APTT PPP: 43 SECONDS (ref 23–37)
AST SERPL W P-5'-P-CCNC: 33 U/L (ref 5–45)
ATRIAL RATE: 81 BPM
BASOPHILS # BLD AUTO: 0.01 THOUSANDS/ΜL (ref 0–0.1)
BASOPHILS NFR BLD AUTO: 0 % (ref 0–1)
BILIRUB SERPL-MCNC: 0.52 MG/DL (ref 0.2–1)
BILIRUB UR QL STRIP: NEGATIVE
BUN SERPL-MCNC: 9 MG/DL (ref 5–25)
CALCIUM ALBUM COR SERPL-MCNC: 8.8 MG/DL (ref 8.3–10.1)
CALCIUM SERPL-MCNC: 8.1 MG/DL (ref 8.3–10.1)
CHLORIDE SERPL-SCNC: 105 MMOL/L (ref 100–108)
CLARITY UR: CLEAR
CO2 SERPL-SCNC: 26 MMOL/L (ref 21–32)
COLOR UR: YELLOW
CREAT SERPL-MCNC: 0.96 MG/DL (ref 0.6–1.3)
EOSINOPHIL # BLD AUTO: 0 THOUSAND/ΜL (ref 0–0.61)
EOSINOPHIL NFR BLD AUTO: 0 % (ref 0–6)
ERYTHROCYTE [DISTWIDTH] IN BLOOD BY AUTOMATED COUNT: 19.6 % (ref 11.6–15.1)
GFR SERPL CREATININE-BSD FRML MDRD: 92 ML/MIN/1.73SQ M
GLUCOSE SERPL-MCNC: 94 MG/DL (ref 65–140)
GLUCOSE UR STRIP-MCNC: NEGATIVE MG/DL
HCG SERPL QL: NEGATIVE
HCT VFR BLD AUTO: 28.1 % (ref 34.8–46.1)
HGB BLD-MCNC: 8.3 G/DL (ref 11.5–15.4)
HGB UR QL STRIP.AUTO: NEGATIVE
IMM GRANULOCYTES # BLD AUTO: 0.04 THOUSAND/UL (ref 0–0.2)
IMM GRANULOCYTES NFR BLD AUTO: 1 % (ref 0–2)
INR PPP: 1.53 (ref 0.84–1.19)
KETONES UR STRIP-MCNC: ABNORMAL MG/DL
LACTATE SERPL-SCNC: 0.9 MMOL/L (ref 0.5–2)
LEUKOCYTE ESTERASE UR QL STRIP: NEGATIVE
LYMPHOCYTES # BLD AUTO: 0.53 THOUSANDS/ΜL (ref 0.6–4.47)
LYMPHOCYTES NFR BLD AUTO: 9 % (ref 14–44)
MCH RBC QN AUTO: 19.3 PG (ref 26.8–34.3)
MCHC RBC AUTO-ENTMCNC: 29.5 G/DL (ref 31.4–37.4)
MCV RBC AUTO: 65 FL (ref 82–98)
MONOCYTES # BLD AUTO: 0.17 THOUSAND/ΜL (ref 0.17–1.22)
MONOCYTES NFR BLD AUTO: 3 % (ref 4–12)
NEUTROPHILS # BLD AUTO: 5.29 THOUSANDS/ΜL (ref 1.85–7.62)
NEUTS SEG NFR BLD AUTO: 87 % (ref 43–75)
NITRITE UR QL STRIP: NEGATIVE
NRBC BLD AUTO-RTO: 0 /100 WBCS
P AXIS: 58 DEGREES
PH UR STRIP.AUTO: 6.5 [PH]
PLATELET # BLD AUTO: 301 THOUSANDS/UL (ref 149–390)
PMV BLD AUTO: 9.8 FL (ref 8.9–12.7)
POTASSIUM SERPL-SCNC: 3.3 MMOL/L (ref 3.5–5.3)
PR INTERVAL: 146 MS
PROT SERPL-MCNC: 7.2 G/DL (ref 6.4–8.2)
PROT UR STRIP-MCNC: NEGATIVE MG/DL
PROTHROMBIN TIME: 17.7 SECONDS (ref 11.6–14.5)
QRS AXIS: 81 DEGREES
QRSD INTERVAL: 98 MS
QT INTERVAL: 392 MS
QTC INTERVAL: 455 MS
RBC # BLD AUTO: 4.31 MILLION/UL (ref 3.81–5.12)
SODIUM SERPL-SCNC: 139 MMOL/L (ref 136–145)
SP GR UR STRIP.AUTO: <=1.005 (ref 1–1.03)
T WAVE AXIS: 39 DEGREES
UROBILINOGEN UR QL STRIP.AUTO: 2 E.U./DL
VENTRICULAR RATE: 81 BPM
WBC # BLD AUTO: 6.04 THOUSAND/UL (ref 4.31–10.16)

## 2021-10-31 PROCEDURE — 36415 COLL VENOUS BLD VENIPUNCTURE: CPT | Performed by: EMERGENCY MEDICINE

## 2021-10-31 PROCEDURE — 96375 TX/PRO/DX INJ NEW DRUG ADDON: CPT

## 2021-10-31 PROCEDURE — 84703 CHORIONIC GONADOTROPIN ASSAY: CPT | Performed by: EMERGENCY MEDICINE

## 2021-10-31 PROCEDURE — 96367 TX/PROPH/DG ADDL SEQ IV INF: CPT

## 2021-10-31 PROCEDURE — 99285 EMERGENCY DEPT VISIT HI MDM: CPT

## 2021-10-31 PROCEDURE — 87040 BLOOD CULTURE FOR BACTERIA: CPT | Performed by: EMERGENCY MEDICINE

## 2021-10-31 PROCEDURE — 83605 ASSAY OF LACTIC ACID: CPT | Performed by: EMERGENCY MEDICINE

## 2021-10-31 PROCEDURE — 85025 COMPLETE CBC W/AUTO DIFF WBC: CPT | Performed by: EMERGENCY MEDICINE

## 2021-10-31 PROCEDURE — 84145 PROCALCITONIN (PCT): CPT | Performed by: EMERGENCY MEDICINE

## 2021-10-31 PROCEDURE — 99222 1ST HOSP IP/OBS MODERATE 55: CPT

## 2021-10-31 PROCEDURE — 85610 PROTHROMBIN TIME: CPT | Performed by: EMERGENCY MEDICINE

## 2021-10-31 PROCEDURE — 81003 URINALYSIS AUTO W/O SCOPE: CPT | Performed by: EMERGENCY MEDICINE

## 2021-10-31 PROCEDURE — G1004 CDSM NDSC: HCPCS

## 2021-10-31 PROCEDURE — 99285 EMERGENCY DEPT VISIT HI MDM: CPT | Performed by: EMERGENCY MEDICINE

## 2021-10-31 PROCEDURE — 85730 THROMBOPLASTIN TIME PARTIAL: CPT | Performed by: EMERGENCY MEDICINE

## 2021-10-31 PROCEDURE — 93005 ELECTROCARDIOGRAM TRACING: CPT

## 2021-10-31 PROCEDURE — 96361 HYDRATE IV INFUSION ADD-ON: CPT

## 2021-10-31 PROCEDURE — 74177 CT ABD & PELVIS W/CONTRAST: CPT

## 2021-10-31 PROCEDURE — 93010 ELECTROCARDIOGRAM REPORT: CPT | Performed by: INTERNAL MEDICINE

## 2021-10-31 PROCEDURE — 80053 COMPREHEN METABOLIC PANEL: CPT | Performed by: EMERGENCY MEDICINE

## 2021-10-31 PROCEDURE — 96365 THER/PROPH/DIAG IV INF INIT: CPT

## 2021-10-31 RX ORDER — MAGNESIUM SULFATE HEPTAHYDRATE 40 MG/ML
2 INJECTION, SOLUTION INTRAVENOUS ONCE
Status: COMPLETED | OUTPATIENT
Start: 2021-10-31 | End: 2021-10-31

## 2021-10-31 RX ORDER — DIPHENHYDRAMINE HYDROCHLORIDE 50 MG/ML
25 INJECTION INTRAMUSCULAR; INTRAVENOUS ONCE
Status: COMPLETED | OUTPATIENT
Start: 2021-10-31 | End: 2021-10-31

## 2021-10-31 RX ORDER — KETOROLAC TROMETHAMINE 30 MG/ML
15 INJECTION, SOLUTION INTRAMUSCULAR; INTRAVENOUS ONCE
Status: COMPLETED | OUTPATIENT
Start: 2021-10-31 | End: 2021-10-31

## 2021-10-31 RX ORDER — ACETAMINOPHEN 325 MG/1
650 TABLET ORAL ONCE
Status: COMPLETED | OUTPATIENT
Start: 2021-10-31 | End: 2021-10-31

## 2021-10-31 RX ORDER — SODIUM CHLORIDE 9 MG/ML
100 INJECTION, SOLUTION INTRAVENOUS CONTINUOUS
Status: DISCONTINUED | OUTPATIENT
Start: 2021-10-31 | End: 2021-11-01

## 2021-10-31 RX ORDER — POTASSIUM CHLORIDE 20 MEQ/1
40 TABLET, EXTENDED RELEASE ORAL ONCE
Status: COMPLETED | OUTPATIENT
Start: 2021-10-31 | End: 2021-10-31

## 2021-10-31 RX ORDER — METOCLOPRAMIDE HYDROCHLORIDE 5 MG/ML
10 INJECTION INTRAMUSCULAR; INTRAVENOUS ONCE
Status: COMPLETED | OUTPATIENT
Start: 2021-10-31 | End: 2021-10-31

## 2021-10-31 RX ADMIN — CEFTRIAXONE SODIUM 1000 MG: 10 INJECTION, POWDER, FOR SOLUTION INTRAVENOUS at 19:31

## 2021-10-31 RX ADMIN — MAGNESIUM SULFATE HEPTAHYDRATE 2 G: 40 INJECTION, SOLUTION INTRAVENOUS at 18:23

## 2021-10-31 RX ADMIN — KETOROLAC TROMETHAMINE 15 MG: 30 INJECTION, SOLUTION INTRAMUSCULAR at 18:19

## 2021-10-31 RX ADMIN — ACETAMINOPHEN 650 MG: 325 TABLET, FILM COATED ORAL at 16:42

## 2021-10-31 RX ADMIN — IOHEXOL 100 ML: 350 INJECTION, SOLUTION INTRAVENOUS at 19:17

## 2021-10-31 RX ADMIN — POTASSIUM CHLORIDE 40 MEQ: 1500 TABLET, EXTENDED RELEASE ORAL at 22:08

## 2021-10-31 RX ADMIN — SODIUM CHLORIDE 1000 ML: 0.9 INJECTION, SOLUTION INTRAVENOUS at 17:48

## 2021-10-31 RX ADMIN — SODIUM CHLORIDE 1000 ML: 0.9 INJECTION, SOLUTION INTRAVENOUS at 21:26

## 2021-10-31 RX ADMIN — METOCLOPRAMIDE 10 MG: 5 INJECTION, SOLUTION INTRAMUSCULAR; INTRAVENOUS at 18:19

## 2021-10-31 RX ADMIN — DIPHENHYDRAMINE HYDROCHLORIDE 25 MG: 50 INJECTION, SOLUTION INTRAMUSCULAR; INTRAVENOUS at 18:16

## 2021-11-01 LAB
ANION GAP SERPL CALCULATED.3IONS-SCNC: 8 MMOL/L (ref 4–13)
BASOPHILS # BLD AUTO: 0.01 THOUSANDS/ΜL (ref 0–0.1)
BASOPHILS NFR BLD AUTO: 0 % (ref 0–1)
BUN SERPL-MCNC: 8 MG/DL (ref 5–25)
CALCIUM SERPL-MCNC: 7.2 MG/DL (ref 8.3–10.1)
CHLORIDE SERPL-SCNC: 111 MMOL/L (ref 100–108)
CO2 SERPL-SCNC: 22 MMOL/L (ref 21–32)
CREAT SERPL-MCNC: 0.68 MG/DL (ref 0.6–1.3)
EOSINOPHIL # BLD AUTO: 0.02 THOUSAND/ΜL (ref 0–0.61)
EOSINOPHIL NFR BLD AUTO: 1 % (ref 0–6)
ERYTHROCYTE [DISTWIDTH] IN BLOOD BY AUTOMATED COUNT: 19.8 % (ref 11.6–15.1)
GFR SERPL CREATININE-BSD FRML MDRD: 137 ML/MIN/1.73SQ M
GLUCOSE SERPL-MCNC: 88 MG/DL (ref 65–140)
HCT VFR BLD AUTO: 26.3 % (ref 34.8–46.1)
HGB BLD-MCNC: 7.8 G/DL (ref 11.5–15.4)
IMM GRANULOCYTES # BLD AUTO: 0.03 THOUSAND/UL (ref 0–0.2)
IMM GRANULOCYTES NFR BLD AUTO: 1 % (ref 0–2)
LYMPHOCYTES # BLD AUTO: 0.89 THOUSANDS/ΜL (ref 0.6–4.47)
LYMPHOCYTES NFR BLD AUTO: 20 % (ref 14–44)
MCH RBC QN AUTO: 19.8 PG (ref 26.8–34.3)
MCHC RBC AUTO-ENTMCNC: 29.7 G/DL (ref 31.4–37.4)
MCV RBC AUTO: 67 FL (ref 82–98)
MONOCYTES # BLD AUTO: 0.29 THOUSAND/ΜL (ref 0.17–1.22)
MONOCYTES NFR BLD AUTO: 7 % (ref 4–12)
NEUTROPHILS # BLD AUTO: 3.19 THOUSANDS/ΜL (ref 1.85–7.62)
NEUTS SEG NFR BLD AUTO: 71 % (ref 43–75)
NRBC BLD AUTO-RTO: 0 /100 WBCS
PLATELET # BLD AUTO: 261 THOUSANDS/UL (ref 149–390)
PMV BLD AUTO: 10 FL (ref 8.9–12.7)
POTASSIUM SERPL-SCNC: 4.2 MMOL/L (ref 3.5–5.3)
PROCALCITONIN SERPL-MCNC: 15.27 NG/ML
PROCALCITONIN SERPL-MCNC: 20.54 NG/ML
RBC # BLD AUTO: 3.94 MILLION/UL (ref 3.81–5.12)
SODIUM SERPL-SCNC: 141 MMOL/L (ref 136–145)
WBC # BLD AUTO: 4.43 THOUSAND/UL (ref 4.31–10.16)

## 2021-11-01 PROCEDURE — 87491 CHLMYD TRACH DNA AMP PROBE: CPT | Performed by: INTERNAL MEDICINE

## 2021-11-01 PROCEDURE — 87591 N.GONORRHOEAE DNA AMP PROB: CPT | Performed by: INTERNAL MEDICINE

## 2021-11-01 PROCEDURE — 85025 COMPLETE CBC W/AUTO DIFF WBC: CPT

## 2021-11-01 PROCEDURE — 86618 LYME DISEASE ANTIBODY: CPT | Performed by: INTERNAL MEDICINE

## 2021-11-01 PROCEDURE — 84145 PROCALCITONIN (PCT): CPT | Performed by: EMERGENCY MEDICINE

## 2021-11-01 PROCEDURE — 36415 COLL VENOUS BLD VENIPUNCTURE: CPT | Performed by: EMERGENCY MEDICINE

## 2021-11-01 PROCEDURE — 87207 SMEAR SPECIAL STAIN: CPT | Performed by: INTERNAL MEDICINE

## 2021-11-01 PROCEDURE — 99233 SBSQ HOSP IP/OBS HIGH 50: CPT | Performed by: STUDENT IN AN ORGANIZED HEALTH CARE EDUCATION/TRAINING PROGRAM

## 2021-11-01 PROCEDURE — 87798 DETECT AGENT NOS DNA AMP: CPT | Performed by: INTERNAL MEDICINE

## 2021-11-01 PROCEDURE — 80048 BASIC METABOLIC PNL TOTAL CA: CPT

## 2021-11-01 PROCEDURE — 99232 SBSQ HOSP IP/OBS MODERATE 35: CPT | Performed by: INTERNAL MEDICINE

## 2021-11-01 RX ORDER — NICOTINE 21 MG/24HR
14 PATCH, TRANSDERMAL 24 HOURS TRANSDERMAL DAILY
Status: DISCONTINUED | OUTPATIENT
Start: 2021-11-01 | End: 2021-11-03 | Stop reason: HOSPADM

## 2021-11-01 RX ORDER — BUTALBITAL, ACETAMINOPHEN AND CAFFEINE 50; 325; 40 MG/1; MG/1; MG/1
1 TABLET ORAL ONCE
Status: COMPLETED | OUTPATIENT
Start: 2021-11-01 | End: 2021-11-01

## 2021-11-01 RX ORDER — FERROUS SULFATE 325(65) MG
325 TABLET ORAL
Status: DISCONTINUED | OUTPATIENT
Start: 2021-11-01 | End: 2021-11-03 | Stop reason: HOSPADM

## 2021-11-01 RX ORDER — BUTALBITAL, ACETAMINOPHEN AND CAFFEINE 50; 325; 40 MG/1; MG/1; MG/1
1 TABLET ORAL EVERY 4 HOURS PRN
Status: DISCONTINUED | OUTPATIENT
Start: 2021-11-01 | End: 2021-11-03 | Stop reason: HOSPADM

## 2021-11-01 RX ORDER — BUTALBITAL, ACETAMINOPHEN AND CAFFEINE 50; 325; 40 MG/1; MG/1; MG/1
1 TABLET ORAL EVERY 4 HOURS PRN
Status: DISCONTINUED | OUTPATIENT
Start: 2021-11-01 | End: 2021-11-01

## 2021-11-01 RX ORDER — ONDANSETRON 2 MG/ML
4 INJECTION INTRAMUSCULAR; INTRAVENOUS EVERY 6 HOURS PRN
Status: DISCONTINUED | OUTPATIENT
Start: 2021-11-01 | End: 2021-11-03 | Stop reason: HOSPADM

## 2021-11-01 RX ORDER — ACETAMINOPHEN 325 MG/1
975 TABLET ORAL EVERY 6 HOURS PRN
Status: DISCONTINUED | OUTPATIENT
Start: 2021-11-01 | End: 2021-11-03 | Stop reason: HOSPADM

## 2021-11-01 RX ADMIN — BUTALBITAL, ACETAMINOPHEN AND CAFFEINE 1 TABLET: 50; 325; 40 TABLET ORAL at 13:07

## 2021-11-01 RX ADMIN — Medication 14 MG: at 18:55

## 2021-11-01 RX ADMIN — FERROUS SULFATE TAB 325 MG (65 MG ELEMENTAL FE) 325 MG: 325 (65 FE) TAB at 08:28

## 2021-11-01 RX ADMIN — ACETAMINOPHEN 650 MG: 325 TABLET, FILM COATED ORAL at 15:47

## 2021-11-01 RX ADMIN — SODIUM CHLORIDE 100 ML/HR: 0.9 INJECTION, SOLUTION INTRAVENOUS at 00:54

## 2021-11-01 RX ADMIN — SODIUM CHLORIDE 1000 ML: 0.9 INJECTION, SOLUTION INTRAVENOUS at 08:31

## 2021-11-01 RX ADMIN — BUTALBITAL, ACETAMINOPHEN AND CAFFEINE 1 TABLET: 50; 325; 40 TABLET ORAL at 05:09

## 2021-11-02 PROBLEM — R65.10 SIRS (SYSTEMIC INFLAMMATORY RESPONSE SYNDROME) (HCC): Status: ACTIVE | Noted: 2021-10-31

## 2021-11-02 LAB
% PARASITEMIA: 0
ANION GAP SERPL CALCULATED.3IONS-SCNC: 7 MMOL/L (ref 4–13)
ANISOCYTOSIS BLD QL SMEAR: PRESENT
B BURGDOR IGG+IGM SER-ACNC: 86
BASOPHILS # BLD MANUAL: 0.07 THOUSAND/UL (ref 0–0.1)
BASOPHILS NFR MAR MANUAL: 2 % (ref 0–1)
BUN SERPL-MCNC: 6 MG/DL (ref 5–25)
C TRACH DNA SPEC QL NAA+PROBE: NEGATIVE
CALCIUM SERPL-MCNC: 7.9 MG/DL (ref 8.3–10.1)
CHLORIDE SERPL-SCNC: 110 MMOL/L (ref 100–108)
CO2 SERPL-SCNC: 23 MMOL/L (ref 21–32)
CREAT SERPL-MCNC: 0.63 MG/DL (ref 0.6–1.3)
EOSINOPHIL # BLD MANUAL: 0.03 THOUSAND/UL (ref 0–0.4)
EOSINOPHIL NFR BLD MANUAL: 1 % (ref 0–6)
ERYTHROCYTE [DISTWIDTH] IN BLOOD BY AUTOMATED COUNT: 19.9 % (ref 11.6–15.1)
GFR SERPL CREATININE-BSD FRML MDRD: 140 ML/MIN/1.73SQ M
GLUCOSE SERPL-MCNC: 88 MG/DL (ref 65–140)
HCT VFR BLD AUTO: 25.2 % (ref 34.8–46.1)
HGB BLD-MCNC: 7.4 G/DL (ref 11.5–15.4)
HYPERCHROMIA BLD QL SMEAR: PRESENT
LG PLATELETS BLD QL SMEAR: PRESENT
LYMPHOCYTES # BLD AUTO: 1.86 THOUSAND/UL (ref 0.6–4.47)
LYMPHOCYTES # BLD AUTO: 54 % (ref 14–44)
MAGNESIUM SERPL-MCNC: 1.9 MG/DL (ref 1.6–2.6)
MCH RBC QN AUTO: 19.3 PG (ref 26.8–34.3)
MCHC RBC AUTO-ENTMCNC: 29.4 G/DL (ref 31.4–37.4)
MCV RBC AUTO: 66 FL (ref 82–98)
MONOCYTES # BLD AUTO: 0.35 THOUSAND/UL (ref 0–1.22)
MONOCYTES NFR BLD: 10 % (ref 4–12)
N GONORRHOEA DNA SPEC QL NAA+PROBE: NEGATIVE
NEUTROPHILS # BLD MANUAL: 1.14 THOUSAND/UL (ref 1.85–7.62)
NEUTS SEG NFR BLD AUTO: 33 % (ref 43–75)
PARASITE BLD: NO
PATHOLOGIST INTERPRETATION: NORMAL
PLATELET # BLD AUTO: 238 THOUSANDS/UL (ref 149–390)
PLATELET BLD QL SMEAR: ADEQUATE
PMV BLD AUTO: 9.9 FL (ref 8.9–12.7)
POTASSIUM SERPL-SCNC: 4 MMOL/L (ref 3.5–5.3)
RBC # BLD AUTO: 3.83 MILLION/UL (ref 3.81–5.12)
SCHISTOCYTES BLD QL SMEAR: PRESENT
SODIUM SERPL-SCNC: 140 MMOL/L (ref 136–145)
WBC # BLD AUTO: 3.45 THOUSAND/UL (ref 4.31–10.16)

## 2021-11-02 PROCEDURE — 85027 COMPLETE CBC AUTOMATED: CPT | Performed by: STUDENT IN AN ORGANIZED HEALTH CARE EDUCATION/TRAINING PROGRAM

## 2021-11-02 PROCEDURE — 99232 SBSQ HOSP IP/OBS MODERATE 35: CPT | Performed by: INTERNAL MEDICINE

## 2021-11-02 PROCEDURE — 83735 ASSAY OF MAGNESIUM: CPT | Performed by: STUDENT IN AN ORGANIZED HEALTH CARE EDUCATION/TRAINING PROGRAM

## 2021-11-02 PROCEDURE — 85007 BL SMEAR W/DIFF WBC COUNT: CPT | Performed by: STUDENT IN AN ORGANIZED HEALTH CARE EDUCATION/TRAINING PROGRAM

## 2021-11-02 PROCEDURE — 99233 SBSQ HOSP IP/OBS HIGH 50: CPT | Performed by: STUDENT IN AN ORGANIZED HEALTH CARE EDUCATION/TRAINING PROGRAM

## 2021-11-02 PROCEDURE — 80048 BASIC METABOLIC PNL TOTAL CA: CPT | Performed by: STUDENT IN AN ORGANIZED HEALTH CARE EDUCATION/TRAINING PROGRAM

## 2021-11-02 RX ORDER — DIPHENHYDRAMINE HYDROCHLORIDE 50 MG/ML
25 INJECTION INTRAMUSCULAR; INTRAVENOUS EVERY 8 HOURS PRN
Status: DISCONTINUED | OUTPATIENT
Start: 2021-11-02 | End: 2021-11-03 | Stop reason: HOSPADM

## 2021-11-02 RX ORDER — METOCLOPRAMIDE HYDROCHLORIDE 5 MG/ML
10 INJECTION INTRAMUSCULAR; INTRAVENOUS ONCE
Status: COMPLETED | OUTPATIENT
Start: 2021-11-02 | End: 2021-11-02

## 2021-11-02 RX ORDER — KETOROLAC TROMETHAMINE 30 MG/ML
30 INJECTION, SOLUTION INTRAMUSCULAR; INTRAVENOUS ONCE
Status: COMPLETED | OUTPATIENT
Start: 2021-11-02 | End: 2021-11-02

## 2021-11-02 RX ADMIN — FERROUS SULFATE TAB 325 MG (65 MG ELEMENTAL FE) 325 MG: 325 (65 FE) TAB at 08:19

## 2021-11-02 RX ADMIN — BUTALBITAL, ACETAMINOPHEN AND CAFFEINE 1 TABLET: 50; 325; 40 TABLET ORAL at 05:52

## 2021-11-02 RX ADMIN — Medication 14 MG: at 08:19

## 2021-11-02 RX ADMIN — IRON SUCROSE 100 MG: 20 INJECTION, SOLUTION INTRAVENOUS at 17:55

## 2021-11-02 RX ADMIN — METOCLOPRAMIDE 10 MG: 5 INJECTION, SOLUTION INTRAMUSCULAR; INTRAVENOUS at 09:23

## 2021-11-02 RX ADMIN — KETOROLAC TROMETHAMINE 30 MG: 30 INJECTION, SOLUTION INTRAMUSCULAR at 09:23

## 2021-11-03 VITALS
BODY MASS INDEX: 29.17 KG/M2 | WEIGHT: 170.86 LBS | SYSTOLIC BLOOD PRESSURE: 117 MMHG | HEIGHT: 64 IN | OXYGEN SATURATION: 98 % | RESPIRATION RATE: 18 BRPM | TEMPERATURE: 98 F | HEART RATE: 52 BPM | DIASTOLIC BLOOD PRESSURE: 68 MMHG

## 2021-11-03 LAB
ANION GAP SERPL CALCULATED.3IONS-SCNC: 8 MMOL/L (ref 4–13)
BASOPHILS # BLD AUTO: 0.01 THOUSANDS/ΜL (ref 0–0.1)
BASOPHILS NFR BLD AUTO: 0 % (ref 0–1)
BUN SERPL-MCNC: 9 MG/DL (ref 5–25)
CALCIUM SERPL-MCNC: 7.9 MG/DL (ref 8.3–10.1)
CHLORIDE SERPL-SCNC: 107 MMOL/L (ref 100–108)
CO2 SERPL-SCNC: 26 MMOL/L (ref 21–32)
CREAT SERPL-MCNC: 0.78 MG/DL (ref 0.6–1.3)
EOSINOPHIL # BLD AUTO: 0.03 THOUSAND/ΜL (ref 0–0.61)
EOSINOPHIL NFR BLD AUTO: 1 % (ref 0–6)
ERYTHROCYTE [DISTWIDTH] IN BLOOD BY AUTOMATED COUNT: 19.7 % (ref 11.6–15.1)
GFR SERPL CREATININE-BSD FRML MDRD: 119 ML/MIN/1.73SQ M
GLUCOSE SERPL-MCNC: 78 MG/DL (ref 65–140)
HCT VFR BLD AUTO: 23.7 % (ref 34.8–46.1)
HGB BLD-MCNC: 7.1 G/DL (ref 11.5–15.4)
IMM GRANULOCYTES # BLD AUTO: 0.01 THOUSAND/UL (ref 0–0.2)
IMM GRANULOCYTES NFR BLD AUTO: 0 % (ref 0–2)
LYMPHOCYTES # BLD AUTO: 2.36 THOUSANDS/ΜL (ref 0.6–4.47)
LYMPHOCYTES NFR BLD AUTO: 51 % (ref 14–44)
MAGNESIUM SERPL-MCNC: 1.7 MG/DL (ref 1.6–2.6)
MCH RBC QN AUTO: 19.4 PG (ref 26.8–34.3)
MCHC RBC AUTO-ENTMCNC: 30 G/DL (ref 31.4–37.4)
MCV RBC AUTO: 65 FL (ref 82–98)
MONOCYTES # BLD AUTO: 0.25 THOUSAND/ΜL (ref 0.17–1.22)
MONOCYTES NFR BLD AUTO: 6 % (ref 4–12)
NEUTROPHILS # BLD AUTO: 1.89 THOUSANDS/ΜL (ref 1.85–7.62)
NEUTS SEG NFR BLD AUTO: 42 % (ref 43–75)
NRBC BLD AUTO-RTO: 0 /100 WBCS
PLATELET # BLD AUTO: 241 THOUSANDS/UL (ref 149–390)
PMV BLD AUTO: 10.6 FL (ref 8.9–12.7)
POTASSIUM SERPL-SCNC: 4 MMOL/L (ref 3.5–5.3)
PROCALCITONIN SERPL-MCNC: 5.91 NG/ML
RBC # BLD AUTO: 3.66 MILLION/UL (ref 3.81–5.12)
SODIUM SERPL-SCNC: 141 MMOL/L (ref 136–145)
WBC # BLD AUTO: 4.55 THOUSAND/UL (ref 4.31–10.16)

## 2021-11-03 PROCEDURE — 99239 HOSP IP/OBS DSCHRG MGMT >30: CPT | Performed by: STUDENT IN AN ORGANIZED HEALTH CARE EDUCATION/TRAINING PROGRAM

## 2021-11-03 PROCEDURE — 85025 COMPLETE CBC W/AUTO DIFF WBC: CPT | Performed by: STUDENT IN AN ORGANIZED HEALTH CARE EDUCATION/TRAINING PROGRAM

## 2021-11-03 PROCEDURE — 80048 BASIC METABOLIC PNL TOTAL CA: CPT | Performed by: STUDENT IN AN ORGANIZED HEALTH CARE EDUCATION/TRAINING PROGRAM

## 2021-11-03 PROCEDURE — 83735 ASSAY OF MAGNESIUM: CPT | Performed by: STUDENT IN AN ORGANIZED HEALTH CARE EDUCATION/TRAINING PROGRAM

## 2021-11-03 PROCEDURE — 84145 PROCALCITONIN (PCT): CPT | Performed by: INTERNAL MEDICINE

## 2021-11-03 PROCEDURE — 99232 SBSQ HOSP IP/OBS MODERATE 35: CPT | Performed by: INTERNAL MEDICINE

## 2021-11-03 RX ORDER — FERROUS SULFATE 325(65) MG
325 TABLET ORAL
Qty: 30 TABLET | Refills: 0 | Status: SHIPPED | OUTPATIENT
Start: 2021-11-03 | End: 2022-08-10

## 2021-11-03 RX ORDER — BUTALBITAL, ACETAMINOPHEN AND CAFFEINE 50; 325; 40 MG/1; MG/1; MG/1
1 TABLET ORAL EVERY 4 HOURS PRN
Qty: 30 TABLET | Refills: 0 | Status: SHIPPED | OUTPATIENT
Start: 2021-11-03 | End: 2021-11-13

## 2021-11-03 RX ADMIN — FERROUS SULFATE TAB 325 MG (65 MG ELEMENTAL FE) 325 MG: 325 (65 FE) TAB at 08:08

## 2021-11-03 RX ADMIN — IRON SUCROSE 100 MG: 20 INJECTION, SOLUTION INTRAVENOUS at 08:08

## 2021-11-03 RX ADMIN — Medication 14 MG: at 08:08

## 2021-11-06 LAB
BACTERIA BLD CULT: NORMAL
BACTERIA BLD CULT: NORMAL

## 2021-11-09 LAB — A PHAGOCYTOPH DNA BLD QL NAA+PROBE: NEGATIVE

## 2022-08-09 PROCEDURE — 99284 EMERGENCY DEPT VISIT MOD MDM: CPT

## 2022-08-09 PROCEDURE — 99285 EMERGENCY DEPT VISIT HI MDM: CPT | Performed by: EMERGENCY MEDICINE

## 2022-08-10 ENCOUNTER — APPOINTMENT (EMERGENCY)
Dept: CT IMAGING | Facility: HOSPITAL | Age: 32
DRG: 098 | End: 2022-08-10
Payer: COMMERCIAL

## 2022-08-10 ENCOUNTER — HOSPITAL ENCOUNTER (INPATIENT)
Facility: HOSPITAL | Age: 32
LOS: 1 days | Discharge: HOME | DRG: 098 | End: 2022-08-13
Attending: EMERGENCY MEDICINE | Admitting: INTERNAL MEDICINE
Payer: COMMERCIAL

## 2022-08-10 DIAGNOSIS — K04.7 DENTAL ABSCESS: Primary | ICD-10-CM

## 2022-08-10 DIAGNOSIS — D64.9 ANEMIA, UNSPECIFIED TYPE: ICD-10-CM

## 2022-08-10 DIAGNOSIS — Z34.90 PREGNANCY: ICD-10-CM

## 2022-08-10 DIAGNOSIS — L03.211 FACIAL CELLULITIS: ICD-10-CM

## 2022-08-10 DIAGNOSIS — Z34.90 PREGNANT: ICD-10-CM

## 2022-08-10 LAB
ALBUMIN SERPL BCP-MCNC: 3.2 G/DL (ref 3.5–5)
ALP SERPL-CCNC: 45 U/L (ref 46–116)
ALT SERPL W P-5'-P-CCNC: 10 U/L (ref 12–78)
ANION GAP SERPL CALCULATED.3IONS-SCNC: 9 MMOL/L (ref 4–13)
AST SERPL W P-5'-P-CCNC: 21 U/L (ref 5–45)
BASOPHILS # BLD AUTO: 0.01 THOUSANDS/ΜL (ref 0–0.1)
BASOPHILS NFR BLD AUTO: 0 % (ref 0–1)
BILIRUB SERPL-MCNC: 0.31 MG/DL (ref 0.2–1)
BUN SERPL-MCNC: 9 MG/DL (ref 5–25)
CALCIUM ALBUM COR SERPL-MCNC: 9.3 MG/DL (ref 8.3–10.1)
CALCIUM SERPL-MCNC: 8.7 MG/DL (ref 8.3–10.1)
CHLORIDE SERPL-SCNC: 103 MMOL/L (ref 96–108)
CO2 SERPL-SCNC: 25 MMOL/L (ref 21–32)
CREAT SERPL-MCNC: 0.64 MG/DL (ref 0.6–1.3)
EOSINOPHIL # BLD AUTO: 0.02 THOUSAND/ΜL (ref 0–0.61)
EOSINOPHIL NFR BLD AUTO: 0 % (ref 0–6)
ERYTHROCYTE [DISTWIDTH] IN BLOOD BY AUTOMATED COUNT: 18.6 % (ref 11.6–15.1)
EXT PREG TEST URINE: POSITIVE
EXT. CONTROL ED NAV: ABNORMAL
GFR SERPL CREATININE-BSD FRML MDRD: 120 ML/MIN/1.73SQ M
GLUCOSE SERPL-MCNC: 83 MG/DL (ref 65–140)
HCT VFR BLD AUTO: 29 % (ref 34.8–46.1)
HGB BLD-MCNC: 8.8 G/DL (ref 11.5–15.4)
IMM GRANULOCYTES # BLD AUTO: 0.02 THOUSAND/UL (ref 0–0.2)
IMM GRANULOCYTES NFR BLD AUTO: 0 % (ref 0–2)
LACTATE SERPL-SCNC: 0.6 MMOL/L (ref 0.5–2)
LYMPHOCYTES # BLD AUTO: 2.03 THOUSANDS/ΜL (ref 0.6–4.47)
LYMPHOCYTES NFR BLD AUTO: 24 % (ref 14–44)
MCH RBC QN AUTO: 21.8 PG (ref 26.8–34.3)
MCHC RBC AUTO-ENTMCNC: 30.3 G/DL (ref 31.4–37.4)
MCV RBC AUTO: 72 FL (ref 82–98)
MONOCYTES # BLD AUTO: 0.7 THOUSAND/ΜL (ref 0.17–1.22)
MONOCYTES NFR BLD AUTO: 8 % (ref 4–12)
NEUTROPHILS # BLD AUTO: 5.6 THOUSANDS/ΜL (ref 1.85–7.62)
NEUTS SEG NFR BLD AUTO: 68 % (ref 43–75)
NRBC BLD AUTO-RTO: 0 /100 WBCS
PLATELET # BLD AUTO: 256 THOUSANDS/UL (ref 149–390)
PMV BLD AUTO: 10.1 FL (ref 8.9–12.7)
POTASSIUM SERPL-SCNC: 4.2 MMOL/L (ref 3.5–5.3)
PROT SERPL-MCNC: 7.7 G/DL (ref 6.4–8.4)
RBC # BLD AUTO: 4.03 MILLION/UL (ref 3.81–5.12)
SODIUM SERPL-SCNC: 137 MMOL/L (ref 135–147)
WBC # BLD AUTO: 8.38 THOUSAND/UL (ref 4.31–10.16)

## 2022-08-10 PROCEDURE — 70491 CT SOFT TISSUE NECK W/DYE: CPT

## 2022-08-10 PROCEDURE — 85025 COMPLETE CBC W/AUTO DIFF WBC: CPT | Performed by: EMERGENCY MEDICINE

## 2022-08-10 PROCEDURE — 96365 THER/PROPH/DIAG IV INF INIT: CPT

## 2022-08-10 PROCEDURE — 81025 URINE PREGNANCY TEST: CPT | Performed by: EMERGENCY MEDICINE

## 2022-08-10 PROCEDURE — 36415 COLL VENOUS BLD VENIPUNCTURE: CPT | Performed by: EMERGENCY MEDICINE

## 2022-08-10 PROCEDURE — 83605 ASSAY OF LACTIC ACID: CPT | Performed by: EMERGENCY MEDICINE

## 2022-08-10 PROCEDURE — G1004 CDSM NDSC: HCPCS

## 2022-08-10 PROCEDURE — 99220 PR INITIAL OBSERVATION CARE/DAY 70 MINUTES: CPT | Performed by: INTERNAL MEDICINE

## 2022-08-10 PROCEDURE — 80053 COMPREHEN METABOLIC PANEL: CPT | Performed by: EMERGENCY MEDICINE

## 2022-08-10 RX ORDER — ACETAMINOPHEN 325 MG/1
650 TABLET ORAL EVERY 6 HOURS PRN
Status: DISCONTINUED | OUTPATIENT
Start: 2022-08-10 | End: 2022-08-13 | Stop reason: HOSPADM

## 2022-08-10 RX ORDER — ACETAMINOPHEN 325 MG/1
975 TABLET ORAL ONCE
Status: COMPLETED | OUTPATIENT
Start: 2022-08-10 | End: 2022-08-10

## 2022-08-10 RX ADMIN — ACETAMINOPHEN 650 MG: 325 TABLET, FILM COATED ORAL at 21:36

## 2022-08-10 RX ADMIN — AMPICILLIN SODIUM AND SULBACTAM SODIUM 3 G: 2; 1 INJECTION, POWDER, FOR SOLUTION INTRAMUSCULAR; INTRAVENOUS at 12:34

## 2022-08-10 RX ADMIN — ACETAMINOPHEN 975 MG: 325 TABLET, FILM COATED ORAL at 05:30

## 2022-08-10 RX ADMIN — SODIUM CHLORIDE 3 G: 9 INJECTION, SOLUTION INTRAVENOUS at 05:28

## 2022-08-10 RX ADMIN — AMPICILLIN SODIUM AND SULBACTAM SODIUM 3 G: 2; 1 INJECTION, POWDER, FOR SOLUTION INTRAMUSCULAR; INTRAVENOUS at 23:11

## 2022-08-10 RX ADMIN — ACETAMINOPHEN 650 MG: 325 TABLET, FILM COATED ORAL at 10:33

## 2022-08-10 RX ADMIN — AMPICILLIN SODIUM AND SULBACTAM SODIUM 3 G: 2; 1 INJECTION, POWDER, FOR SOLUTION INTRAMUSCULAR; INTRAVENOUS at 16:37

## 2022-08-10 RX ADMIN — IOHEXOL 85 ML: 350 INJECTION, SOLUTION INTRAVENOUS at 04:48

## 2022-08-10 RX ADMIN — ACETAMINOPHEN 650 MG: 325 TABLET, FILM COATED ORAL at 16:38

## 2022-08-10 NOTE — ED PROVIDER NOTES
History  Chief Complaint   Patient presents with    Oral Swelling     Patient reports being 8 weeks pregnant and presents with right sided swelling to their cheek initially starting 3 weeks ago that they were placed on antibiotics for however, when patient found out they were pregnant they discontinued them  Patient reports initial relief from swelling after starting antibiotics but reports return of swelling in the past 3 days  29 y o  female presents with one month of right upper dental/mandibular pain and swelling   Patient describes "a knot" that came no gradually, improved somehwat with antibiotics that were started on 07/08 at [facility] and subsequently worsened after stopping the antibiotics when the patient found out she was pregnant       Patient denies any trauma    Last time patient seen by dentist was earlier this year after dental extraction in February/March  Patient denies any history of diabetes, malnutrition, alcoholism, or immunocompromised state   Patient denies any history of intravenous drug use   Patient denies use of phenytoin, cyclosporine, calcium channel blockers  ROS: Patient associates fever at home few days ago, afebrile in the emergency room;  denies change in voice, dysphagia, odynophagia, dysarthria, neck pain, abdominal pain, nausea/vomiting, diarrhea, chest pain, dyspnea, hemoptysis, arthralgia   All other systems reviewed and negative  Focused Objective:  Vital signs reviewed  HENT: Head normocephalic   Trismus present, no pooled secretions   Normal lingual exam with no elevation or protusion of the tongue    Normal sublingual exam; no clinical signs of Britton's Angina   Normal labial mucosa with no lesions or masses noted   Normal soft palate with no bulging or fluctuant noted   No tonsillar erythema noted or exudates noted, there is no tonsillar asymmetry and the uvula and raphe are midline  Right-sided facial swelling    Teeth: gingiva normal without ulceration or pseudomembranes, bleeding not present; no clinical signs of ANUG   Good overall dental hygiene with limited exam secondary to trismus   Area palpated and fluctuant area possibly amenable to drainage not present  Neck: Normal inspection with no erythema or asymmetry; no clinical sign's of Lemierre's syndrome   Supple with normal range of motion and without nuchal rigidity   No masses or nodes on palpitation   Normal palpitation of the submandibular and sublingual glands   No stridor  Neuro:  Alert  No cranial nerve VII deficit  Medical Decision Making  Patient presents with facial swelling demonstrating a large differential   Patient's symptoms started with dental pain improved transiently with antibiotics  Concerns for odontorrhagic source but unable to clearly locate on physical exam which is somewhat limited due to patient's trismus and swelling  Considering patient's pregnancy, will defer imaging at this point and discuss with OMS  Patient does have trismus and have reported history of fever at home  Will monitor and reassess  Dental Pain  Quality:  Aching  Severity:  Moderate  Onset quality:  Gradual  Timing:  Constant  Progression:  Worsening  Chronicity:  New  Worsened by:  Nothing  Ineffective treatments:  None tried  Associated symptoms: facial pain, facial swelling and trismus    Associated symptoms: no congestion, no difficulty swallowing, no drooling, no fever, no gum swelling, no headaches, no neck pain, no neck swelling, no oral bleeding and no oral lesions        Prior to Admission Medications   Prescriptions Last Dose Informant Patient Reported? Taking?    Prenatal MV & Min w/FA-DHA ([facility] PO)  Self Yes No   Sig: Take by mouth   acetaminophen (TYLENOL) 325 mg tablet  Self No No   Sig: Take 2 tablets (650 mg total) by mouth every 6 (six) hours as needed for mild pain or headaches   cholecalciferol (VITAMIN D3) 1,000 units tablet  Self Yes No   Sig: Take 1,000 Units by mouth daily   Patient not taking: Reported on 10/31/2021   naproxen (NAPROSYN) 500 mg tablet  Self No No   Sig: Take 1 tablet (500 mg total) by mouth 2 (two) times a day as needed for moderate pain for up to 20 doses   Patient not taking: Reported on 10/31/2021   naproxen (NAPROSYN) 500 mg tablet   No No   Sig: Take 1 tablet (500 mg total) by mouth every 12 (twelve) hours as needed for mild pain or moderate pain   Patient not taking: Reported on 10/31/2021      Facility-Administered Medications: None       Past Medical History:   Diagnosis Date    Gastric bypass status for obesity 2016    sleeve    Post partum depression 2014    Sleep apnea, obstructive     prior to weight loss surg       Past Surgical History:   Procedure Laterality Date    DILATION AND EVACUATION  2019        GASTRIC RESTRICTION SURGERY  2015    gastric sleeve    OPEN ANTERIOR SHOULDER RECONSTRUCTION Left 2018    OTHER SURGICAL HISTORY      sweat gland removal    RETAINED PLACENTA REMOVAL N/A 2020    Procedure: EXTRACTION OF PITYXBGO,IMHYWO;  Surgeon: Efe Acuna MD;  Location: [facility] ;  Service: Obstetrics       Family History   Problem Relation Age of Onset    Heart disease Mother     Seizures Mother     Hypertension Mother     Anxiety disorder Mother     Bipolar disorder Mother     Kidney disease Mother     Sudden death Father     No Known Problems Sister     No Known Problems Brother     No Known Problems Son     Pancreatic cancer Maternal Grandmother     Seizures Maternal Grandmother     Diabetes Maternal Grandmother     No Known Problems Sister     No Known Problems Brother     No Known Problems Brother     Sudden death Brother 21        MVA     I have reviewed and agree with the history as documented      E-Cigarette/Vaping    E-Cigarette Use Never User      E-Cigarette/Vaping Substances    Nicotine No     THC No     CBD No     Flavoring No     Other No     Unknown No      Social History Tobacco Use    Smoking status: Current Every Day Smoker     Packs/day: 0 50     Types: Cigarettes     Last attempt to quit: 2020     Years since quittin 3    Smokeless tobacco: Never Used   Vaping Use    Vaping Use: Never used   Substance Use Topics    Alcohol use: Not Currently     Comment: social    Drug use: Never       Review of Systems   Constitutional: Negative for fever  HENT: Positive for facial swelling  Negative for congestion, drooling and mouth sores  Musculoskeletal: Negative for neck pain  Neurological: Negative for headaches  All other systems reviewed and are negative  Physical Exam  Physical Exam  Vitals reviewed  HENT:      Head: Atraumatic  Mouth/Throat:      Comments: See HPI  Eyes:      General: No scleral icterus  Pupils: Pupils are equal, round, and reactive to light  Cardiovascular:      Rate and Rhythm: Normal rate  Abdominal:      General: There is no distension  Musculoskeletal:         General: No deformity  Cervical back: Neck supple  Skin:     General: Skin is warm and dry  Neurological:      General: No focal deficit present  Mental Status: She is alert     Psychiatric:         Mood and Affect: Mood normal          Vital Signs  ED Triage Vitals   Temperature Pulse Respirations Blood Pressure SpO2   08/10/22 0008 08/10/22 0008 08/10/22 0008 08/10/22 0008 08/10/22 0008   97 8 °F (36 6 °C) 94 18 132/64 100 %      Temp Source Heart Rate Source Patient Position - Orthostatic VS BP Location FiO2 (%)   08/10/22 0008 08/10/22 0008 08/10/22 0008 08/10/22 0008 --   Temporal Monitor Sitting Left arm       Pain Score       08/10/22 1033       10 - Worst Possible Pain           Vitals:    08/10/22 1700 08/10/22 1800 08/10/22 2007 08/10/22 2031   BP: 119/55 117/55 107/58 106/65   Pulse: 71 71 77    Patient Position - Orthostatic VS: Lying            Visual Acuity      ED Medications  Medications   acetaminophen (TYLENOL) tablet 650 mg (650 mg Oral Given 8/10/22 1638)   ampicillin-sulbactam (UNASYN) 3 g in sodium chloride 0 9 % 100 mL IVPB (3 g Intravenous New Bag 8/10/22 1637)   acetaminophen (TYLENOL) tablet 975 mg (975 mg Oral Given 8/10/22 0530)   iohexol (OMNIPAQUE) 350 MG/ML injection (MULTI-DOSE) 85 mL (85 mL Intravenous Given 8/10/22 0448)   ampicillin-sulbactam (UNASYN) 3 g in sodium chloride 0 9 % 100 mL IVPB (0 g Intravenous Stopped 8/10/22 0603)       Diagnostic Studies  Results Reviewed     Procedure Component Value Units Date/Time    POCT pregnancy, urine [[id number]]  (Abnormal) Resulted: 08/10/22 0258    Lab Status: Final result Updated: 08/10/22 0258     EXT PREG TEST UR (Ref: Negative) POSITIVE     Control VALID    Lactic acid [[id number]]  (Normal) Collected: 08/10/22 0206    Lab Status: Final result Specimen: Blood from Arm, Left Updated: 08/10/22 0242     LACTIC ACID 0 6 mmol/L     Narrative:      Result may be elevated if tourniquet was used during collection      Comprehensive metabolic panel [[id number]]  (Abnormal) Collected: 08/10/22 0206    Lab Status: Final result Specimen: Blood from Arm, Left Updated: 08/10/22 0240     Sodium 137 mmol/L      Potassium 4 2 mmol/L      Chloride 103 mmol/L      CO2 25 mmol/L      ANION GAP 9 mmol/L      BUN 9 mg/dL      Creatinine 0 64 mg/dL      Glucose 83 mg/dL      Calcium 8 7 mg/dL      Corrected Calcium 9 3 mg/dL      AST 21 U/L      ALT 10 U/L      Alkaline Phosphatase 45 U/L      Total Protein 7 7 g/dL      Albumin 3 2 g/dL      Total Bilirubin 0 31 mg/dL      eGFR 120 ml/min/1 73sq m     Narrative:      Agus guidelines for Chronic Kidney Disease (CKD):     Stage 1 with normal or high GFR (GFR > 90 mL/min/1 73 square meters)    Stage 2 Mild CKD (GFR = 60-89 mL/min/1 73 square meters)    Stage 3A Moderate CKD (GFR = 45-59 mL/min/1 73 square meters)    Stage 3B Moderate CKD (GFR = 30-44 mL/min/1 73 square meters)    Stage 4 Severe CKD (GFR = 15-29 mL/min/1 73 square meters)    Stage 5 End Stage CKD (GFR <15 mL/min/1 73 square meters)  Note: GFR calculation is accurate only with a steady state creatinine    CBC and differential [[id number]]  (Abnormal) Collected: 08/10/22 0206    Lab Status: Final result Specimen: Blood from Arm, Left Updated: 08/10/22 0223     WBC 8 38 Thousand/uL      RBC 4 03 Million/uL      Hemoglobin 8 8 g/dL      Hematocrit 29 0 %      MCV 72 fL      MCH 21 8 pg      MCHC 30 3 g/dL      RDW 18 6 %      MPV 10 1 fL      Platelets 830 Thousands/uL      nRBC 0 /100 WBCs      Neutrophils Relative 68 %      Immat GRANS % 0 %      Lymphocytes Relative 24 %      Monocytes Relative 8 %      Eosinophils Relative 0 %      Basophils Relative 0 %      Neutrophils Absolute 5 60 Thousands/µL      Immature Grans Absolute 0 02 Thousand/uL      Lymphocytes Absolute 2 03 Thousands/µL      Monocytes Absolute 0 70 Thousand/µL      Eosinophils Absolute 0 02 Thousand/µL      Basophils Absolute 0 01 Thousands/µL                  CT soft tissue neck with contrast   Final Result by Faizan Rivers DO (08/10 0503)      Findings suggesting right facial cellulitis with myositis of the right masseter muscle  Probable subcentimeter abscess within the right masseter, abutting the right mandibular angle  Correlate for adjacent dental infection  The study was marked in [facility] for immediate notification  Workstation performed: [id number]                    Procedures  Procedures         ED Course  ED Course as of 08/10/22 2129   Wed Aug 10, 2022   0413 Talked with on-call OMS who suggested obtaining CT imaging as it would be difficult to plan operative interventions without this  Discussed with the patient, risks and benefits associated with this treatment and after discussion, she consented to obtaining imaging    0708 Patient's CT demonstrating facial cellulitis with associated involvement of the masseter  Possible dental abscess    Patient is pregnant and no beds available at [facility], discussed with on-call OBGYN who feels patient is able to be cared for at [facility], no pregnancy involvement  They can consult if necessary  Updated OMS  Will continue on antibiotics and admit to medicine for continued monitoring evaluation  SBIRT 22yo+    Flowsheet Row Most Recent Value   SBIRT (23 yo +)    In order to provide better care to our patients, we are screening all of our patients for alcohol and drug use  Would it be okay to ask you these screening questions? Yes Filed at: 08/10/2022 3897   Initial Alcohol Screen: US AUDIT-C     1  How often do you have a drink containing alcohol? 0 Filed at: 08/10/2022 0215   2  How many drinks containing alcohol do you have on a typical day you are drinking? 0 Filed at: 08/10/2022 0215   3b  FEMALE Any Age, or MALE 65+: How often do you have 4 or more drinks on one occassion? 0 Filed at: 08/10/2022 0215   Audit-C Score 0 Filed at: 08/10/2022 5169   HELEN: How many times in the past year have you    Used an illegal drug or used a prescription medication for non-medical reasons? Never Filed at: 08/10/2022 0215                    MDM    Disposition  Final diagnoses:   Dental abscess   Facial cellulitis   Pregnant     Time reflects when diagnosis was documented in both MDM as applicable and the Disposition within this note     Time User Action Codes Description Comment    8/10/2022  7:08 AM Shahab Hind Add [K04 7] Dental abscess     8/10/2022  7:08 AM Shahab Hind Add [O20 179] Facial cellulitis     8/10/2022  7:08 AM Shahab Hind Add [Z34 90] Pregnant     8/10/2022  8:03 AM Alvin Hicks Add [Z34 90] Pregnancy       ED Disposition     ED Disposition   Admit    Condition   Stable    Date/Time   Wed Aug 10, 2022  7:08 AM    Comment   Case was discussed with CARL and the patient's admission status was agreed to be Admission Status: observation status to the service of Dr Partha Gomez              Follow-up Information    None         Current Discharge Medication List      CONTINUE these medications which have NOT CHANGED    Details   acetaminophen (TYLENOL) 325 mg tablet Take 2 tablets (650 mg total) by mouth every 6 (six) hours as needed for mild pain or headaches  Qty: 30 tablet, Refills: 0    Associated Diagnoses: Retained placenta; Inevitable spontaneous       cholecalciferol (VITAMIN D3) 1,000 units tablet Take 1,000 Units by mouth daily      !! naproxen (NAPROSYN) 500 mg tablet Take 1 tablet (500 mg total) by mouth 2 (two) times a day as needed for moderate pain for up to 20 doses  Qty: 20 tablet, Refills: 0    Associated Diagnoses: Lower abdominal pain      !! naproxen (NAPROSYN) 500 mg tablet Take 1 tablet (500 mg total) by mouth every 12 (twelve) hours as needed for mild pain or moderate pain  Qty: 20 tablet, Refills: 0    Associated Diagnoses: Dental caries; Pain, dental      Prenatal MV & Min w/FA-DHA ([facility] PO) Take by mouth       !! - Potential duplicate medications found  Please discuss with provider  No discharge procedures on file      PDMP Review     None          ED Provider  Electronically Signed by           Kamari Chavarria MD  08/10/22 5103

## 2022-08-10 NOTE — CASE MANAGEMENT
Case Management Assessment & Discharge Planning Note    Patient name Naveen Baptiste  Location ED 20/ED 20 MRN [id number]  : 1990 Date 8/10/2022       Current Admission Date: 8/10/2022  Current Admission Diagnosis:Facial cellulitis   Patient Active Problem List    Diagnosis Date Noted    Pregnancy 08/10/2022    Facial cellulitis 08/10/2022    SIRS (systemic inflammatory response syndrome) ([facility] [address] ) 10/31/2021    Headache 10/31/2021    Anemia 10/31/2021    Hypokalemia 10/31/2021    Second trimester loss 2020    Inevitable spontaneous  2020    Suspected fetal anomaly, antepartum 2020    Pregnancy affected by previous bariatric surgery, currently in first trimester 2020    Vitamin D deficiency 2020    MRSA (methicillin resistant Staphylococcus aureus) infection 2020    Sleep apnea 03/15/2018    History of bariatric surgery 03/15/2018    Tobacco abuse 03/15/2018      LOS (days): 0  Geometric Mean LOS (GMLOS) (days):   Days to GMLOS:     OBJECTIVE:              Current admission status: Observation       Preferred Pharmacy:   [facility] HEATHER RUIZ [address] [place] [address] [facility] [place] [address]  Phone: [phone] Fax: [phone]    Primary Care Provider: Cain Scherer MD    Primary Insurance: [employer] [facility]  Secondary Insurance:     ASSESSMENT:  [address] [place] [address] Representative - Mother   Primary Phone: [phone] (Home)               Advance Directives  Does patient have a [address] [place] [address] Avenue?: No  Was patient offered paperwork?: Yes (not interested)  Does patient currently have a Health Care decision maker?: Yes, please see Health Care Proxy section  Does patient have Advance Directives?: No  Was patient offered paperwork?: Yes         Readmission Root Cause  30 Day Readmission: No    Patient Information  Admitted from[de-identified] Home  Mental Status: Alert  During Assessment patient was accompanied by: Not accompanied during assessment  Assessment information provided by[de-identified] Patient  Primary Caregiver: Self  Support Systems: Parent  [place] of Residence: [place] [address] do you live in?: [address] entry access options   Select all that apply : Stairs  Number of steps to enter home : One Flight (13 steps)  Do the steps have railings?: Yes  Type of Current Residence: [address] home, Apartment  Floor Level: 2  Upon entering residence, is there a bedroom on the main floor (no further steps)?: No  A bedroom is located on the following floor levels of residence (select all that apply):: 2nd Floor  Upon entering residence, is there a bathroom on the main floor (no further steps)?: Yes  Number of steps to 2nd floor from main floor: One Flight  In the last 12 months, was there a time when you were not able to pay the mortgage or rent on time?: No  In the last 12 months, how many places have you lived?: 1  In the last 12 months, was there a time when you did not have a steady place to sleep or slept in a shelter (including now)?: No  Homeless/housing insecurity resource given?: N/A  Living Arrangements: Lives w/ Son (son is 9years old)  Is patient a ?: No    Activities of Daily Living Prior to Admission  Functional Status: Independent  Completes ADLs independently?: Yes  Ambulates independently?: Yes  Does patient use assisted devices?: No  Does patient currently own DME?: No  Does patient have a history of Outpatient Therapy (PT/OT)?: Yes  Does the patient have a history of Short-Term Rehab?: No  Does patient have a history of [facility]?: No  Does patient currently have [place] [address]?: No         Patient Information Continued  Income Source: Employed  Does patient have prescription coverage?: Yes  Within the past 12 months, you worried that your food would run out before you got the money to buy more : Never true  Within the past 12 months, the food you bought just didn't last and you didn't have money to get more : Never true  Food insecurity resource given?: N/A  Does patient receive dialysis treatments?: No  Does patient have a history of substance abuse?: No  Does patient have a history of Mental Health Diagnosis?: No         Means of Transportation  Means of Transport to Appts[de-identified] Drives Self  In the past 12 months, has lack of transportation kept you from meetings, work, or from getting things needed for daily living?: No  Was application for public transport provided?: N/A        DISCHARGE DETAILS:    Discharge planning discussed with[de-identified] patient  Freedom of Choice: Yes  Comments - Freedom of Choice: Pt is able to communicate with family herself  CM contacted family/caregiver?: No- see comments                  Requested [address]         Is the patient interested in [place] [address] at discharge?: No    DME Referral Provided  Referral made for DME?: No              Treatment Team Recommendation: Home  Discharge Destination Plan[de-identified] Home  Transport at Discharge : Automobile, Self

## 2022-08-10 NOTE — ASSESSMENT & PLAN NOTE
Presented with worsening facial pain  Found on ct scan to have R facial cellulitis with probable abscess   ED discussed with OMFS who will see in consult  Currently afebrile, wbc wnl  Will place on IV Unasyn  F/u OMS recs

## 2022-08-10 NOTE — ED NOTES
Medication requested from Melchor Hussein RN  08/10/22 8324 Pt signed out to me by DANNY Deshpande. Surgery consult note appreciated. Abdomen: soft, non-tender, non-distended, no rebound or guarding. No N/V. Pt still without bowel movement. Will continue to re-assess overnight with CURT. Surgery team following. Advance to clears in AM if no worsening.

## 2022-08-10 NOTE — H&P
[address] [place] [address]  H&P- Kayy Nuñez 1990, 29 y o  female MRN: [id number]  Unit/Bed#: ED 20 Encounter: [id number]  Primary Care Provider: Aldo Durán MD   Date and time admitted to hospital: 8/10/2022 12:40 AM    * Facial cellulitis  Assessment & Plan  Presented with worsening facial pain  Found on ct scan to have R facial cellulitis with probable abscess   ED discussed with OMFS who will see in consult  Currently afebrile, wbc wnl  Will place on IV Unasyn  F/u OMS recs    Pregnancy  Assessment & Plan  Incidentally found to be 8 weeks pregnant  OB aware will be consulted    Anemia  Assessment & Plan  Iron def anemia  From menstrula cycles  hgb stable  C/w iron supp      VTE Prophylaxis: Pharmacologic VTE Prophylaxis contraindicated due to low risk  / sequential compression device   Code Status: full code  POLST: There is no POLST form on file for this patient (pre-hospital)  Discussion with family: pt    Anticipated Length of Stay:  Patient will be admitted on an Observation basis with an anticipated length of stay of  < 2 midnights  Justification for Hospital Stay: Facial cellulitis    Total Time for Visit, including Counseling / Coordination of Care: 60 minutes  Greater than 50% of this total time spent on direct patient counseling and coordination of care  Chief Complaint:   Facial pain    History of Present Illness:    Kayy Nuñez is a 29 y o  female 5 week pregnant female with presented with R sided facial pain that began 3 weeks ago   Progressively worsened  Started abx 3 days ago with no reliefs  Denies fevers, chills cough, or any other symptoms    Review of Systems:    Review of Systems   Constitutional: Negative for activity change, appetite change, chills, diaphoresis, fever and unexpected weight change  HENT: Negative for congestion, facial swelling and rhinorrhea  Eyes: Negative for photophobia and visual disturbance     Respiratory: Negative for cough, shortness of breath and wheezing  Cardiovascular: Negative for chest pain and palpitations  Gastrointestinal: Negative for abdominal pain, blood in stool, constipation, diarrhea, nausea and vomiting  Genitourinary: Negative for decreased urine volume, difficulty urinating, dysuria, flank pain, frequency, hematuria and urgency  Musculoskeletal: Negative for arthralgias, back pain, joint swelling and myalgias  Neurological: Negative for dizziness, syncope, facial asymmetry, light-headedness, numbness and headaches  Psychiatric/Behavioral: Negative for confusion and decreased concentration  The patient is not nervous/anxious  Past Medical and Surgical History:     Past Medical History:   Diagnosis Date    Gastric bypass status for obesity 2016    sleeve    Post partum depression 2014    Sleep apnea, obstructive     prior to weight loss surg       Past Surgical History:   Procedure Laterality Date    DILATION AND EVACUATION  2019        GASTRIC RESTRICTION SURGERY  2015    gastric sleeve    OPEN ANTERIOR SHOULDER RECONSTRUCTION Left 2018    OTHER SURGICAL HISTORY      sweat gland removal    RETAINED PLACENTA REMOVAL N/A 2020    Procedure: EXTRACTION OF BVFYMDOS,MFWYNX;  Surgeon: Oscar Parmar MD;  Location: [facility] ;  Service: Obstetrics       Meds/Allergies:    Prior to Admission medications    Medication Sig Start Date End Date Taking?  Authorizing Provider   acetaminophen (TYLENOL) 325 mg tablet Take 2 tablets (650 mg total) by mouth every 6 (six) hours as needed for mild pain or headaches 6/3/20   Connie Winslow MD   cholecalciferol (VITAMIN D3) 1,000 units tablet Take 1,000 Units by mouth daily  Patient not taking: Reported on 10/31/2021    Historical Provider, MD   naproxen (NAPROSYN) 500 mg tablet Take 1 tablet (500 mg total) by mouth 2 (two) times a day as needed for moderate pain for up to 20 doses  Patient not taking: Reported on 10/31/2021 6/9/20   Do Sharp MD naproxen (NAPROSYN) 500 mg tablet Take 1 tablet (500 mg total) by mouth every 12 (twelve) hours as needed for mild pain or moderate pain  Patient not taking: Reported on 10/31/2021 8/25/21   Courtney Britton DO   Prenatal MV & Min w/FA-DHA ([facility] PREN[facility]AL GUMMY PO) Take by mouth    Historical Provider, MD   ferrous sulfate 325 (65 Fe) mg tablet Take 1 tablet (325 mg total) by mouth daily with breakfast 11/3/21 8/10/22  Adriana Das MD     I have reviewed home medications with patient personally      Allergies: No Known Allergies    Social History:     Marital Status: Single     Patient Pre-hospital Living Situation: home  Patient Pre-hospital Level of Mobility: independent  Patient Pre-hospital Diet Restrictions: none  Substance Use History:   Social History     Substance and Sexual Activity   Alcohol Use Not Currently    Comment: social     Social History     Tobacco Use   Smoking Status Current Every Day Smoker    Packs/day: 0 50    Types: Cigarettes    Last attempt to quit: 2020    Years since quittin 3   Smokeless Tobacco Never Used     Social History     Substance and Sexual Activity   Drug Use Never       Family History:    Family History   Problem Relation Age of Onset    Heart disease Mother     Seizures Mother     Hypertension Mother     Anxiety disorder Mother     Bipolar disorder Mother     Kidney disease Mother    [place] Sudden death Father     No Known Problems Sister     No Known Problems Brother     No Known Problems Son     Pancreatic cancer Maternal Grandmother     Seizures Maternal Grandmother     Diabetes Maternal Grandmother     No Known Problems Sister     No Known Problems Brother     No Known Problems Brother     Sudden death Brother 21        MVA       Physical Exam:     Vitals:   Blood Pressure: 105/54 (08/10/22 0600)  Pulse: 74 (08/10/22 0600)  Temperature: 98 2 °F (36 8 °C) (08/10/22 0116)  Temp Source: Temporal (08/10/22 0008)  Respirations: 16 (08/10/22 0600)  SpO2: 98 % (08/10/22 0600)    Physical Exam  Constitutional:       General: She is not in acute distress  Appearance: She is well-developed  She is not diaphoretic  HENT:      Head: Normocephalic and atraumatic  Nose: Nose normal       Mouth/Throat:      Pharynx: No oropharyngeal exudate  Eyes:      General: No scleral icterus  Right eye: No discharge  Left eye: No discharge  Conjunctiva/sclera: Conjunctivae normal    Neck:      Thyroid: No thyromegaly  Vascular: No JVD  Cardiovascular:      Rate and Rhythm: Normal rate and regular rhythm  Heart sounds: Normal heart sounds  No murmur heard  No friction rub  No gallop  Pulmonary:      Effort: Pulmonary effort is normal  No respiratory distress  Breath sounds: Normal breath sounds  No wheezing or rales  Chest:      Chest wall: No tenderness  Abdominal:      General: Bowel sounds are normal  There is no distension  Palpations: Abdomen is soft  Tenderness: There is no abdominal tenderness  There is no guarding or rebound  Musculoskeletal:         General: No tenderness or deformity  Normal range of motion  Cervical back: Normal range of motion and neck supple  Skin:     General: Skin is warm and dry  Findings: No erythema or rash  Neurological:      Mental Status: She is alert  Mental status is at baseline  Cranial Nerves: No cranial nerve deficit  Sensory: No sensory deficit  Motor: No abnormal muscle tone  Coordination: Coordination normal            Additional Data:     Lab Results: I have personally reviewed pertinent reports        Results from last 7 days   Lab Units 08/10/22  0206   WBC Thousand/uL 8 38   HEMOGLOBIN g/dL 8 8*   HEMATOCRIT % 29 0*   PLATELETS Thousands/uL 256   NEUTROS PCT % 68   LYMPHS PCT % 24   MONOS PCT % 8   EOS PCT % 0     Results from last 7 days   Lab Units 08/10/22  0206   SODIUM mmol/L 137   POTASSIUM mmol/L 4 2 CHLORIDE mmol/L 103   CO2 mmol/L 25   BUN mg/dL 9   CREATININE mg/dL 0 64   ANION GAP mmol/L 9   CALCIUM mg/dL 8 7   ALBUMIN g/dL 3 2*   TOTAL BILIRUBIN mg/dL 0 31   ALK PHOS U/L 45*   ALT U/L 10*   AST U/L 21   GLUCOSE RANDOM mg/dL 83                 Results from last 7 days   Lab Units 08/10/22  0206   LACTIC ACID mmol/L 0 6       Imaging: I have personally reviewed pertinent reports  CT soft tissue neck with contrast   Final Result by Faizan Rivers DO (08/10 0500)      Findings suggesting right facial cellulitis with myositis of the right masseter muscle  Probable subcentimeter abscess within the right masseter, abutting the right mandibular angle  Correlate for adjacent dental infection  The study was marked in [facility] for immediate notification  Workstation performed: [id number]             EKG, Pathology, and Other Studies Reviewed on Admission:   · EKG: reviewed    Allscripts / Epic Records Reviewed: Yes     ** Please Note: This note has been constructed using a voice recognition system   **

## 2022-08-10 NOTE — ED NOTES
Pt is sleeping and resting comfortably at this time showing no signs of distress      Orion Holguin RN  08/10/22 0800

## 2022-08-11 ENCOUNTER — ANESTHESIA (OUTPATIENT)
Dept: PERIOP | Facility: HOSPITAL | Age: 32
DRG: 098 | End: 2022-08-11
Payer: COMMERCIAL

## 2022-08-11 ENCOUNTER — ANESTHESIA EVENT (OUTPATIENT)
Dept: PERIOP | Facility: HOSPITAL | Age: 32
DRG: 098 | End: 2022-08-11
Payer: COMMERCIAL

## 2022-08-11 LAB
ANION GAP SERPL CALCULATED.3IONS-SCNC: 10 MMOL/L (ref 4–13)
BASOPHILS # BLD AUTO: 0.01 THOUSANDS/ΜL (ref 0–0.1)
BASOPHILS NFR BLD AUTO: 0 % (ref 0–1)
BUN SERPL-MCNC: 7 MG/DL (ref 5–25)
CALCIUM SERPL-MCNC: 8.3 MG/DL (ref 8.3–10.1)
CHLORIDE SERPL-SCNC: 103 MMOL/L (ref 96–108)
CO2 SERPL-SCNC: 23 MMOL/L (ref 21–32)
CREAT SERPL-MCNC: 0.61 MG/DL (ref 0.6–1.3)
EOSINOPHIL # BLD AUTO: 0.02 THOUSAND/ΜL (ref 0–0.61)
EOSINOPHIL NFR BLD AUTO: 0 % (ref 0–6)
ERYTHROCYTE [DISTWIDTH] IN BLOOD BY AUTOMATED COUNT: 18.5 % (ref 11.6–15.1)
GFR SERPL CREATININE-BSD FRML MDRD: 122 ML/MIN/1.73SQ M
GLUCOSE SERPL-MCNC: 85 MG/DL (ref 65–140)
HCT VFR BLD AUTO: 26.9 % (ref 34.8–46.1)
HGB BLD-MCNC: 8.5 G/DL (ref 11.5–15.4)
IMM GRANULOCYTES # BLD AUTO: 0.02 THOUSAND/UL (ref 0–0.2)
IMM GRANULOCYTES NFR BLD AUTO: 0 % (ref 0–2)
LYMPHOCYTES # BLD AUTO: 1.89 THOUSANDS/ΜL (ref 0.6–4.47)
LYMPHOCYTES NFR BLD AUTO: 29 % (ref 14–44)
MCH RBC QN AUTO: 22.3 PG (ref 26.8–34.3)
MCHC RBC AUTO-ENTMCNC: 31.6 G/DL (ref 31.4–37.4)
MCV RBC AUTO: 71 FL (ref 82–98)
MONOCYTES # BLD AUTO: 0.53 THOUSAND/ΜL (ref 0.17–1.22)
MONOCYTES NFR BLD AUTO: 8 % (ref 4–12)
NEUTROPHILS # BLD AUTO: 4.14 THOUSANDS/ΜL (ref 1.85–7.62)
NEUTS SEG NFR BLD AUTO: 63 % (ref 43–75)
NRBC BLD AUTO-RTO: 0 /100 WBCS
PLATELET # BLD AUTO: 239 THOUSANDS/UL (ref 149–390)
PMV BLD AUTO: 10.2 FL (ref 8.9–12.7)
POTASSIUM SERPL-SCNC: 3.4 MMOL/L (ref 3.5–5.3)
RBC # BLD AUTO: 3.81 MILLION/UL (ref 3.81–5.12)
SODIUM SERPL-SCNC: 136 MMOL/L (ref 135–147)
WBC # BLD AUTO: 6.61 THOUSAND/UL (ref 4.31–10.16)

## 2022-08-11 PROCEDURE — 80048 BASIC METABOLIC PNL TOTAL CA: CPT | Performed by: INTERNAL MEDICINE

## 2022-08-11 PROCEDURE — 99232 SBSQ HOSP IP/OBS MODERATE 35: CPT | Performed by: INTERNAL MEDICINE

## 2022-08-11 PROCEDURE — 85025 COMPLETE CBC W/AUTO DIFF WBC: CPT | Performed by: INTERNAL MEDICINE

## 2022-08-11 RX ORDER — POTASSIUM CHLORIDE 20 MEQ/1
40 TABLET, EXTENDED RELEASE ORAL ONCE
Status: COMPLETED | OUTPATIENT
Start: 2022-08-11 | End: 2022-08-11

## 2022-08-11 RX ORDER — FERROUS SULFATE 325(65) MG
325 TABLET ORAL
Status: DISCONTINUED | OUTPATIENT
Start: 2022-08-12 | End: 2022-08-13 | Stop reason: HOSPADM

## 2022-08-11 RX ADMIN — ACETAMINOPHEN 650 MG: 325 TABLET, FILM COATED ORAL at 04:55

## 2022-08-11 RX ADMIN — ACETAMINOPHEN 650 MG: 325 TABLET, FILM COATED ORAL at 12:04

## 2022-08-11 RX ADMIN — ACETAMINOPHEN 650 MG: 325 TABLET, FILM COATED ORAL at 23:58

## 2022-08-11 RX ADMIN — AMPICILLIN SODIUM AND SULBACTAM SODIUM 3 G: 2; 1 INJECTION, POWDER, FOR SOLUTION INTRAMUSCULAR; INTRAVENOUS at 18:08

## 2022-08-11 RX ADMIN — ACETAMINOPHEN 650 MG: 325 TABLET, FILM COATED ORAL at 18:09

## 2022-08-11 RX ADMIN — AMPICILLIN SODIUM AND SULBACTAM SODIUM 3 G: 2; 1 INJECTION, POWDER, FOR SOLUTION INTRAMUSCULAR; INTRAVENOUS at 11:55

## 2022-08-11 RX ADMIN — POTASSIUM CHLORIDE 40 MEQ: 1500 TABLET, EXTENDED RELEASE ORAL at 11:55

## 2022-08-11 RX ADMIN — AMPICILLIN SODIUM AND SULBACTAM SODIUM 3 G: 2; 1 INJECTION, POWDER, FOR SOLUTION INTRAMUSCULAR; INTRAVENOUS at 04:58

## 2022-08-11 RX ADMIN — AMPICILLIN SODIUM AND SULBACTAM SODIUM 3 G: 2; 1 INJECTION, POWDER, FOR SOLUTION INTRAMUSCULAR; INTRAVENOUS at 23:20

## 2022-08-11 NOTE — CONSULTS
Consultation - OMFS  Jolanta Olmedo 29 y o  female MRN: [id number]  Unit/Bed#: -01 Encounter: [id number]          History of Present Illness   Physician Requesting Consult: Betty Orona MD  Reason for Consult / Principal Problem: oral infection    HPI:  Jolanta Olmedo is a 29 y o  female who presents with right oral infection  8 weeks gestation with right sided facial swelling  Pt indicates pain and swelling have increased in past 24 hours  Oral antibiotics 3 days ago with no relief  Pt denies odontogenic pain  Inpatient consult to Oral and Maxillofacial Surgery  Consult performed by: Arslan Beasley DMD  Consult ordered by: Betty Orona MD          Review of Systems   Constitutional: Positive for activity change, appetite change and fever  HENT: Positive for dental problem, ear pain and facial swelling  Eyes: Positive for photophobia  Negative for pain  Respiratory: Negative  Cardiovascular: Negative  All other systems reviewed and are negative        Historical Information   Past Medical History:   Diagnosis Date    Gastric bypass status for obesity 2016    sleeve    Post partum depression 2014    Sleep apnea, obstructive     prior to weight loss surg     Past Surgical History:   Procedure Laterality Date    DILATION AND EVACUATION  2019        GASTRIC RESTRICTION SURGERY  2015    gastric sleeve    OPEN ANTERIOR SHOULDER RECONSTRUCTION Left 2018    OTHER SURGICAL HISTORY      sweat gland removal    RETAINED PLACENTA REMOVAL N/A 2020    Procedure: EXTRACTION OF SHERYL ALCARAZ;  Surgeon: Debra Rose MD;  Location: [facility] ;  Service: Obstetrics     Social History   Social History     Substance and Sexual Activity   Alcohol Use Not Currently    Comment: social     Social History     Substance and Sexual Activity   Drug Use Never     Social History     Tobacco Use   Smoking Status Current Every Day Smoker    Packs/day: 0 50    Types: Cigarettes    Last attempt to quit: 2020    Years since quittin 3   Smokeless Tobacco Never Used     Family History   Problem Relation Age of Onset    Heart disease Mother     Seizures Mother     Hypertension Mother     Anxiety disorder Mother     Bipolar disorder Mother     Kidney disease Mother     Sudden death Father     No Known Problems Sister     No Known Problems Brother     No Known Problems Son     Pancreatic cancer Maternal Grandmother     Seizures Maternal Grandmother     Diabetes Maternal Grandmother     No Known Problems Sister     No Known Problems Brother     No Known Problems Brother     Sudden death Brother 21        MVA       Meds/Allergies   Current Facility-Administered Medications   Medication Dose Route Frequency    acetaminophen (TYLENOL) tablet 650 mg  650 mg Oral Q6H PRN    ampicillin-sulbactam (UNASYN) 3 g in sodium chloride 0 9 % 100 mL IVPB  3 g Intravenous Q6H     Medications Prior to Admission   Medication    acetaminophen (TYLENOL) 325 mg tablet    cholecalciferol (VITAMIN D3) 1,000 units tablet    naproxen (NAPROSYN) 500 mg tablet    naproxen (NAPROSYN) 500 mg tablet    Prenatal MV & Min w/FA-DHA ([facility] PRENATAL GUMMY PO)     No Known Allergies    Objective   Vitals:   Blood Pressure: 106/65 (08/10/22 2031), Pulse: 77 (08/10/22 2007), Temperature: 98 9 °F (37 2 °C) (08/10/22 2031), Temp Source: Temporal (08/10/22 0008), Respirations: 18 (08/10/22 2007)      Physical Exam  Vitals and nursing note reviewed  Exam conducted with a chaperone present  Constitutional:       General: She is awake  Appearance: Normal appearance  She is well-developed  HENT:      Head: Normocephalic  Nose: Nose normal       Mouth/Throat:      Comments: Right facial swelling  Oral exam limited secondary to patient cooperation  I am unable to locate an odontogenic source for the infection  Eyes:      Extraocular Movements: Extraocular movements intact        Pupils: Pupils are equal, round, and reactive to light  Cardiovascular:      Rate and Rhythm: Normal rate  Musculoskeletal:      Cervical back: Normal range of motion and neck supple  Neurological:      Mental Status: She is alert  Lab Results:   Admission on 08/10/2022   Component Date Value    WBC 08/10/2022 8 38     RBC 08/10/2022 4 03     Hemoglobin 08/10/2022 8 8 (A)    Hematocrit 08/10/2022 29 0 (A)    MCV 08/10/2022 72 (A)    MCH 08/10/2022 21 8 (A)    MCHC 08/10/2022 30 3 (A)    RDW 08/10/2022 18 6 (A)    MPV 08/10/2022 10 1     Platelets 96/02/9404 256     nRBC 08/10/2022 0     Neutrophils Relative 08/10/2022 68     Immat GRANS % 08/10/2022 0     Lymphocytes Relative 08/10/2022 24     Monocytes Relative 08/10/2022 8     Eosinophils Relative 08/10/2022 0     Basophils Relative 08/10/2022 0     Neutrophils Absolute 08/10/2022 5 60     Immature Grans Absolute 08/10/2022 0 02     Lymphocytes Absolute 08/10/2022 2 03     Monocytes Absolute 08/10/2022 0 70     Eosinophils Absolute 08/10/2022 0 02     Basophils Absolute 08/10/2022 0 01     Sodium 08/10/2022 137     Potassium 08/10/2022 4 2     Chloride 08/10/2022 103     CO2 08/10/2022 25     ANION GAP 08/10/2022 9     BUN 08/10/2022 9     Creatinine 08/10/2022 0 64     Glucose 08/10/2022 83     Calcium 08/10/2022 8 7     Corrected Calcium 08/10/2022 9 3     AST 08/10/2022 21     ALT 08/10/2022 10 (A)    Alkaline Phosphatase 08/10/2022 45 (A)    Total Protein 08/10/2022 7 7     Albumin 08/10/2022 3 2 (A)    Total Bilirubin 08/10/2022 0 31     eGFR 08/10/2022 120     LACTIC ACID 08/10/2022 0 6     EXT PREG TEST UR (Ref: N* 08/10/2022 POSITIVE     Control 08/10/2022 VALID        Assessment/Plan     Imaging Studies: I have personally reviewed pertinent reports  Ct scan neck reviewed  Possible source of infection teeth #31 or 32    However, I cannot be certain    Assessment:  29year old female presents with right facial swelling and pain   Exam limited secondary to cooperation and pain  I am unsure of the source of infection  #32 is likely source, however, I cannot be certain  Plan:  Overnight observation  Based on exam I am concerned about patient management in office with local anesthesia  May require GA as inpatient  Reviewed with SLIM  I asked for OB consult and opinion on anesthesia    Will discuss with SLIM in AM

## 2022-08-11 NOTE — UTILIZATION REVIEW
Initial Clinical Review    OBs order 8/10 0708 converted to IP on 8/12 @ 1458   Level of Care Med Surg   Estimated length of stay More than 2 Midnights   Certification I certify that inpatient services are medically necessary for this patient for a duration of greater than two midnights  See H&P and MD Progress Notes for additional information about the patient's course of treatment       08/12/22 1458  Inpatient Admission  Once         08/12/22 1457   08/10/22 0709  Place in Observation  Once         08/10/22 0708       ED Arrival Information     Expected   -    Arrival   8/9/2022 23:57    Acuity   Urgent            Means of arrival   Walk-In    Escorted by   Self    Service   Hospitalist    Admission type   Urgent            Arrival complaint   facial swelling           Chief Complaint   Patient presents with    Oral Swelling     Patient reports being 8 weeks pregnant and presents with right sided swelling to their cheek initially starting 3 weeks ago that they were placed on antibiotics for however, when patient found out they were pregnant they discontinued them  Patient reports initial relief from swelling after starting antibiotics but reports return of swelling in the past 3 days  Initial Presentation: 29 y o  female 5 week pregnant female with presented with R sided facial pain that began 3 weeks ago   Progressively worsened  Started abx 3 days ago with no reliefs  Ct revealed R facial cellulitis with probable abscess   Admitted OBS status for facial cellulitis placed on iv unasyn   + anemia hgb stable   8/10 OMFS consult   right facial swelling and pain  Exam limited secondary to cooperation and pain  I am unsure of the source of infection  #32 is likely source  Plan OB consult and opinion on anesthesia   8/11 IM Note   feeling no change in facial pain and trismus  Cont iv unasyn   Discussed w/ OB on 8/11  They are okay with general anesthesia  Advised to avoid sugammadex   Also said to obtain fetal heart tones via ultrasound before and after surgery  Awaiting further rec from OMFS regarding IP vs OP intervention and timing of procedure   8/12 IM Note   Facial cellulitis assoc abscess , s/p tooth extraction in OR    Cont IV abx x1 more day   Pain 10/10 given tylenol and ibuprofen for pain   Painful to speak and  her mouth d/t inflammation following procedure   R sided facial swelling      ED Triage Vitals   Temperature Pulse Respirations Blood Pressure SpO2   08/10/22 0008 08/10/22 0008 08/10/22 0008 08/10/22 0008 08/10/22 0008   97 8 °F (36 6 °C) 94 18 132/64 100 %      Temp Source Heart Rate Source Patient Position - Orthostatic VS BP Location FiO2 (%)   08/10/22 0008 08/10/22 0008 08/10/22 0008 08/10/22 0008 --   Temporal Monitor Sitting Left arm       Pain Score       08/10/22 1033       10 - Worst Possible Pain          Wt Readings from Last 1 Encounters:   11/01/21 77 5 kg (170 lb 13 7 oz)     Additional Vital Signs:  08/11/22 0717 97 8 °F (36 6 °C) 85 16 93/52 66 95 % -- --   08/11/22 0510 98 5 °F (36 9 °C) 71 -- -- -- 97 % -- --   08/10/22 2300 -- -- -- -- -- -- None (Room air) --   08/10/22 22:28:32 98 4 °F (36 9 °C) 70 18 105/67 80 96 % -- --   08/10/22 20:31:55 98 9 °F (37 2 °C) -- -- 106/65 79 -- -- --   08/10/22 2007 -- 77 18 107/58 75 100 % None (Room air) --   08/10/22 1800 -- 71 18 117/55 79 99 % None (Room air) --   08/10/22 1740 -- -- -- -- -- 100 % -- --   08/10/22 1700 -- 71 18 119/55 79 100 % None (Room air) Lying   08/10/22 1645 -- 82 16 120/59 81 100 % None (Room air) --   08/10/22 1636 -- 76 20 120/59 -- 100 % -- --   08/10/22 1400 -- 74 18 122/62 -- 100 % None (Room air) --   08/10/22 0900 -- 78 20 99/52 71 99 % None (Room air) --   08/10/22 0700 -- 70 20 94/52 70 99 % None (Room air) --   08/10/22 0600 -- 74 16 105/54 74 98 % None (Room air) --   08/10/22 0500 -- 74 16 110/59 80 100 % None (Room air) --   08/10/22 0400 -- 71 16 118/56 80 99 % None (Room air) -- 08/10/22 0300 -- 76 17 120/65 86 100 % None (Room air) --   08/10/22 0230 -- -- -- -- -- -- None (Room air) --   08/10/22 0130 -- 82 18 126/66 90 100 % None (Room air) --   08/10/22 0116 98 2 °F (36 8 °C) 96 18 128/66 89 100 %         Pertinent Labs/Diagnostic Test Results:   CT soft tissue neck with contrast   Final Result by Jonathan Webber DO (08/10 0503)      Findings suggesting right facial cellulitis with myositis of the right masseter muscle  Probable subcentimeter abscess within the right masseter, abutting the right mandibular angle  Correlate for adjacent dental infection  The study was marked in [facility] for immediate notification           Workstation performed: [id number]           Results from last 7 days   Lab Units 08/11/22  0445 08/10/22  0206   WBC Thousand/uL 6 61 8 38   HEMOGLOBIN g/dL 8 5* 8 8*   HEMATOCRIT % 26 9* 29 0*   PLATELETS Thousands/uL 239 256   NEUTROS ABS Thousands/µL 4 14 5 60     Results from last 7 days   Lab Units 08/11/22  0445 08/10/22  0206   SODIUM mmol/L 136 137   POTASSIUM mmol/L 3 4* 4 2   CHLORIDE mmol/L 103 103   CO2 mmol/L 23 25   ANION GAP mmol/L 10 9   BUN mg/dL 7 9   CREATININE mg/dL 0 61 0 64   EGFR ml/min/1 73sq m 122 120   CALCIUM mg/dL 8 3 8 7     Results from last 7 days   Lab Units 08/10/22  0206   AST U/L 21   ALT U/L 10*   ALK PHOS U/L 45*   TOTAL PROTEIN g/dL 7 7   ALBUMIN g/dL 3 2*   TOTAL BILIRUBIN mg/dL 0 31     Results from last 7 days   Lab Units 08/11/22  0445 08/10/22  0206   GLUCOSE RANDOM mg/dL 85 83     Results from last 7 days   Lab Units 08/10/22  0206   LACTIC ACID mmol/L 0 6       ED Treatment:   Medication Administration from 08/09/2022 9777 to 08/10/2022 2020       Date/Time Order Dose Route Action     08/10/2022 0530 acetaminophen (TYLENOL) tablet 975 mg 975 mg Oral Given     08/10/2022 0528 ampicillin-sulbactam (UNASYN) 3 g in sodium chloride 0 9 % 100 mL IVPB 3 g Intravenous New Bag     08/10/2022 1638 acetaminophen (TYLENOL) tablet 650 mg 650 mg Oral Given     08/10/2022 1033 acetaminophen (TYLENOL) tablet 650 mg 650 mg Oral Given     08/10/2022 1637 ampicillin-sulbactam (UNASYN) 3 g in sodium chloride 0 9 % 100 mL IVPB 3 g Intravenous New Bag     08/10/2022 1234 ampicillin-sulbactam (UNASYN) 3 g in sodium chloride 0 9 % 100 mL IVPB 3 g Intravenous New Bag        Past Medical History:   Diagnosis Date    Gastric bypass status for obesity 2016    sleeve    Post partum depression 2014    Sleep apnea, obstructive     prior to weight loss surg     Present on Admission:   Anemia      Admitting Diagnosis: Dental abscess [K04 7]  Pregnancy [Z34 90]  Pregnant [Z34 90]  Facial cellulitis [L03 211]  Oral abscess [K12 2]  Age/Sex: 29 y o  female  Admission Orders:  Scheduled Medications:  ampicillin-sulbactam, 3 g, Intravenous, Q6H      Continuous IV Infusions:     PRN Meds:  acetaminophen, 650 mg, Oral, Q6H PRN    I&O   Reg diet       IP CONSULT TO ORAL AND MAXILLOFACIAL SURGERY  IP CONSULT TO OB GYN    Network Utilization Review Department  ATTENTION: Please call with any questions or concerns to [phone] and carefully listen to the prompts so that you are directed to the right person  All voicemails are confidential   Zenia Morris all requests for admission clinical reviews, approved or denied determinations and any other requests to dedicated fax number below belonging to the campus where the patient is receiving treatment   List of dedicated fax numbers for the Facilities:  [address] DENIALS (Administrative/Medical Necessity) [phone]   [address] (Maternity/NICU/Pediatrics) [phone]   [address] 40 125 [facility]  [address] Medical [place] [address]   [address]   Anatoly Chicas [address] [place] [address] Britney Dumont [address] [phone]

## 2022-08-11 NOTE — ASSESSMENT & PLAN NOTE
Presented with worsening facial pain  Found on ct scan to have R facial cellulitis with probable abscess   ED discussed with OMFS who will see in consult  Currently afebrile, wbc wnl  Will place on IV Unasyn  - Discussed w/ OB on 8/11  They are okay with general anesthesia  Advised to avoid sugammadex   Also said to obtain fetal heart tones via ultrasound before and after surgery  - Patient is currently on normal diet - awaiting further recommendation from OMFS regarding inpatient vs outpatient intervention and timing of procedure

## 2022-08-11 NOTE — PROGRESS NOTES
[address] [place] [address]  Progress Note Jesusita Smoke 1990, 29 y o  female MRN: [id number]  Unit/Bed#: -01 Encounter: [id number]  Primary Care Provider: Juice Albert MD   Date and time admitted to hospital: 8/10/2022 12:40 AM    * Facial cellulitis  Assessment & Plan  Presented with worsening facial pain  Found on ct scan to have R facial cellulitis with probable abscess   ED discussed with OMFS who will see in consult  Currently afebrile, wbc wnl  Will place on IV Unasyn  - Discussed w/ OB on 8/11  They are okay with general anesthesia  Advised to avoid sugammadex  Also said to obtain fetal heart tones via ultrasound before and after surgery  - Patient is currently on normal diet - awaiting further recommendation from OMFS regarding inpatient vs outpatient intervention and timing of procedure    Pregnancy  Assessment & Plan  Incidentally found to be 8 weeks pregnant  OB aware will be consulted      Anemia  Assessment & Plan  Iron def anemia  From menstrula cycles  hgb 8 5 this morning (8/11)  C/w iron supp        VTE Pharmacologic Prophylaxis: none    Mechanical VTE Prophylaxis in Place: No    Patient Centered Rounds: I have performed bedside rounds with nursing staff today  Discussions with Specialists or Other Care Team Provider: Discussed w/ OB and OMFS    Education and Discussions with Family / Patient: discussed with patient    Current Length of Stay: 0 day(s)    Current Patient Status: Observation     Discharge Plan / Estimated Discharge Date: Anticipate discharge in 48-72 hrs to home  Code Status: Level 1 - Full Code      Subjective:   Pt was seen at bedside this morning  Says she is feeling no change in facial pain and trismus  No fevers overnight but says she has the chills which she attributes to her anemia  Says the pain is around the right side of her jaw and felt around the ear   Denies any shortness of breath, cough, or any change in feeling since yesterday  Objective:     Vitals:   Temp (24hrs), Av 4 °F (36 9 °C), Min:97 8 °F (36 6 °C), Max:98 9 °F (37 2 °C)    Temp:  [97 8 °F (36 6 °C)-98 9 °F (37 2 °C)] 97 8 °F (36 6 °C)  HR:  [70-85] 85  Resp:  [16-20] 16  BP: ()/(52-67) 93/52  SpO2:  [95 %-100 %] 95 %  There is no height or weight on file to calculate BMI  Input and Output Summary (last 24 hours): Intake/Output Summary (Last 24 hours) at 2022 1301  Last data filed at 2022 0845  Gross per 24 hour   Intake 100 ml   Output --   Net 100 ml       Physical Exam:     Physical Exam  Constitutional:       General: She is not in acute distress  Appearance: Normal appearance  She is not toxic-appearing  Comments: Appears to be uncomfortable   HENT:      Head: Normocephalic and atraumatic  Jaw: Trismus, tenderness, swelling and pain on movement present  Comments: Symptoms on right side     Right Ear: External ear normal       Left Ear: External ear normal       Mouth/Throat:      Mouth: Mucous membranes are moist    Eyes:      General: No scleral icterus  Right eye: No discharge  Left eye: No discharge  Cardiovascular:      Rate and Rhythm: Normal rate and regular rhythm  Pulses: Normal pulses  Pulmonary:      Effort: Pulmonary effort is normal  No respiratory distress  Abdominal:      General: There is no distension  Palpations: Abdomen is soft  Musculoskeletal:         General: No swelling  Normal range of motion  Cervical back: Normal range of motion  Tenderness present  No muscular tenderness  Skin:     General: Skin is warm and dry  Neurological:      Mental Status: She is alert and oriented to person, place, and time  Mental status is at baseline     Psychiatric:         Mood and Affect: Mood normal          Behavior: Behavior normal           Additional Data:     Labs:  Results from last 7 days   Lab Units 22  0445   WBC Thousand/uL 6 61   HEMOGLOBIN g/dL 8 5* HEMATOCRIT % 26 9*   PLATELETS Thousands/uL 239   NEUTROS PCT % 63   LYMPHS PCT % 29   MONOS PCT % 8   EOS PCT % 0     Results from last 7 days   Lab Units 08/11/22  0445 08/10/22  0206   SODIUM mmol/L 136 137   POTASSIUM mmol/L 3 4* 4 2   CHLORIDE mmol/L 103 103   CO2 mmol/L 23 25   BUN mg/dL 7 9   CREATININE mg/dL 0 61 0 64   ANION GAP mmol/L 10 9   CALCIUM mg/dL 8 3 8 7   ALBUMIN g/dL  --  3 2*   TOTAL BILIRUBIN mg/dL  --  0 31   ALK PHOS U/L  --  45*   ALT U/L  --  10*   AST U/L  --  21   GLUCOSE RANDOM mg/dL 85 83                 Results from last 7 days   Lab Units 08/10/22  0206   LACTIC ACID mmol/L 0 6       Imaging: Reviewed radiology reports from this admission including: CT head    Recent Cultures (last 7 days):           Lines/Drains:  Invasive Devices  Report    Peripheral Intravenous Line  Duration           Peripheral IV 08/10/22 Left Antecubital 1 day                Telemetry:        Last 24 Hours Medication List:   Current Facility-Administered Medications   Medication Dose Route Frequency Provider Last Rate    acetaminophen  650 mg Oral Q6H PRN Andrea Hurtado MD      ampicillin-sulbactam  3 g Intravenous Q6H Andrea Hurtado MD 3 g (08/11/22 1155)    [START ON 8/12/2022] ferrous sulfate  325 mg Oral Daily With Breakfast Shantal Ramsey MD          Today, Patient Was Seen By: Shantal Ramsey MD    ** Please Note: This note has been constructed using a voice recognition system   **

## 2022-08-11 NOTE — PLAN OF CARE
Problem: PAIN - ADULT  Goal: Verbalizes/displays adequate comfort level or baseline comfort level  Description: Interventions:  - Encourage patient to monitor pain and request assistance  - Assess pain using appropriate pain scale  - Administer analgesics based on type and severity of pain and evaluate response  - Implement non-pharmacological measures as appropriate and evaluate response  - Consider cultural and social influences on pain and pain management  - Notify physician/advanced practitioner if interventions unsuccessful or patient reports new pain  Outcome: Progressing     Problem: INFECTION - ADULT  Goal: Absence or prevention of progression during hospitalization  Description: INTERVENTIONS:  - Assess and monitor for signs and symptoms of infection  - Monitor lab/diagnostic results  - Monitor all insertion sites, i e  indwelling lines, tubes, and drains  - Monitor endotracheal if appropriate and nasal secretions for changes in amount and color  - [place] appropriate cooling/warming therapies per order  - Administer medications as ordered  - Instruct and encourage patient and family to use good hand hygiene technique  - Identify and instruct in appropriate isolation precautions for identified infection/condition  Outcome: Progressing

## 2022-08-12 LAB
ANION GAP SERPL CALCULATED.3IONS-SCNC: 10 MMOL/L (ref 4–13)
BASOPHILS # BLD AUTO: 0.02 THOUSANDS/ΜL (ref 0–0.1)
BASOPHILS NFR BLD AUTO: 0 % (ref 0–1)
BUN SERPL-MCNC: 7 MG/DL (ref 5–25)
CALCIUM SERPL-MCNC: 8.5 MG/DL (ref 8.3–10.1)
CHLORIDE SERPL-SCNC: 104 MMOL/L (ref 96–108)
CO2 SERPL-SCNC: 23 MMOL/L (ref 21–32)
CREAT SERPL-MCNC: 0.66 MG/DL (ref 0.6–1.3)
EOSINOPHIL # BLD AUTO: 0.03 THOUSAND/ΜL (ref 0–0.61)
EOSINOPHIL NFR BLD AUTO: 1 % (ref 0–6)
ERYTHROCYTE [DISTWIDTH] IN BLOOD BY AUTOMATED COUNT: 18.3 % (ref 11.6–15.1)
GFR SERPL CREATININE-BSD FRML MDRD: 118 ML/MIN/1.73SQ M
GLUCOSE P FAST SERPL-MCNC: 84 MG/DL (ref 65–99)
GLUCOSE SERPL-MCNC: 84 MG/DL (ref 65–140)
HCT VFR BLD AUTO: 26.5 % (ref 34.8–46.1)
HGB BLD-MCNC: 8.2 G/DL (ref 11.5–15.4)
IMM GRANULOCYTES # BLD AUTO: 0.01 THOUSAND/UL (ref 0–0.2)
IMM GRANULOCYTES NFR BLD AUTO: 0 % (ref 0–2)
LYMPHOCYTES # BLD AUTO: 2.4 THOUSANDS/ΜL (ref 0.6–4.47)
LYMPHOCYTES NFR BLD AUTO: 42 % (ref 14–44)
MCH RBC QN AUTO: 21.9 PG (ref 26.8–34.3)
MCHC RBC AUTO-ENTMCNC: 30.9 G/DL (ref 31.4–37.4)
MCV RBC AUTO: 71 FL (ref 82–98)
MONOCYTES # BLD AUTO: 0.42 THOUSAND/ΜL (ref 0.17–1.22)
MONOCYTES NFR BLD AUTO: 7 % (ref 4–12)
NEUTROPHILS # BLD AUTO: 2.89 THOUSANDS/ΜL (ref 1.85–7.62)
NEUTS SEG NFR BLD AUTO: 50 % (ref 43–75)
NRBC BLD AUTO-RTO: 0 /100 WBCS
PLATELET # BLD AUTO: 241 THOUSANDS/UL (ref 149–390)
PMV BLD AUTO: 10.5 FL (ref 8.9–12.7)
POTASSIUM SERPL-SCNC: 3.8 MMOL/L (ref 3.5–5.3)
RBC # BLD AUTO: 3.74 MILLION/UL (ref 3.81–5.12)
SODIUM SERPL-SCNC: 137 MMOL/L (ref 135–147)
WBC # BLD AUTO: 5.77 THOUSAND/UL (ref 4.31–10.16)

## 2022-08-12 PROCEDURE — 99232 SBSQ HOSP IP/OBS MODERATE 35: CPT | Performed by: INTERNAL MEDICINE

## 2022-08-12 PROCEDURE — 80048 BASIC METABOLIC PNL TOTAL CA: CPT | Performed by: INTERNAL MEDICINE

## 2022-08-12 PROCEDURE — 0C960ZZ DRAINAGE OF LOWER GINGIVA, OPEN APPROACH: ICD-10-PCS | Performed by: DENTIST

## 2022-08-12 PROCEDURE — 0W930ZZ DRAINAGE OF ORAL CAVITY AND THROAT, OPEN APPROACH: ICD-10-PCS | Performed by: DENTIST

## 2022-08-12 PROCEDURE — 85025 COMPLETE CBC W/AUTO DIFF WBC: CPT | Performed by: INTERNAL MEDICINE

## 2022-08-12 PROCEDURE — 0CTX0Z0 RESECTION OF LOWER TOOTH, SINGLE, OPEN APPROACH: ICD-10-PCS | Performed by: DENTIST

## 2022-08-12 RX ORDER — BUPIVACAINE HYDROCHLORIDE AND EPINEPHRINE 5; 5 MG/ML; UG/ML
INJECTION, SOLUTION EPIDURAL; INTRACAUDAL; PERINEURAL AS NEEDED
Status: DISCONTINUED | OUTPATIENT
Start: 2022-08-12 | End: 2022-08-12 | Stop reason: HOSPADM

## 2022-08-12 RX ORDER — FENTANYL CITRATE/PF 50 MCG/ML
50 SYRINGE (ML) INJECTION
Status: COMPLETED | OUTPATIENT
Start: 2022-08-12 | End: 2022-08-12

## 2022-08-12 RX ORDER — IBUPROFEN 400 MG/1
400 TABLET ORAL ONCE
Status: COMPLETED | OUTPATIENT
Start: 2022-08-12 | End: 2022-08-12

## 2022-08-12 RX ORDER — PROPOFOL 10 MG/ML
INJECTION, EMULSION INTRAVENOUS CONTINUOUS PRN
Status: DISCONTINUED | OUTPATIENT
Start: 2022-08-12 | End: 2022-08-12

## 2022-08-12 RX ORDER — FENTANYL CITRATE 50 UG/ML
INJECTION, SOLUTION INTRAMUSCULAR; INTRAVENOUS AS NEEDED
Status: DISCONTINUED | OUTPATIENT
Start: 2022-08-12 | End: 2022-08-12

## 2022-08-12 RX ORDER — PROPOFOL 10 MG/ML
INJECTION, EMULSION INTRAVENOUS AS NEEDED
Status: DISCONTINUED | OUTPATIENT
Start: 2022-08-12 | End: 2022-08-12

## 2022-08-12 RX ORDER — SODIUM CHLORIDE, SODIUM LACTATE, POTASSIUM CHLORIDE, CALCIUM CHLORIDE 600; 310; 30; 20 MG/100ML; MG/100ML; MG/100ML; MG/100ML
INJECTION, SOLUTION INTRAVENOUS CONTINUOUS PRN
Status: DISCONTINUED | OUTPATIENT
Start: 2022-08-12 | End: 2022-08-12

## 2022-08-12 RX ORDER — MAGNESIUM HYDROXIDE 1200 MG/15ML
LIQUID ORAL AS NEEDED
Status: DISCONTINUED | OUTPATIENT
Start: 2022-08-12 | End: 2022-08-12 | Stop reason: HOSPADM

## 2022-08-12 RX ORDER — IBUPROFEN 400 MG/1
400 TABLET ORAL EVERY 6 HOURS PRN
Status: COMPLETED | OUTPATIENT
Start: 2022-08-12 | End: 2022-08-13

## 2022-08-12 RX ORDER — PYRIDOXINE HCL (VITAMIN B6) 50 MG
50 TABLET ORAL 3 TIMES DAILY
Status: DISCONTINUED | OUTPATIENT
Start: 2022-08-12 | End: 2022-08-13 | Stop reason: HOSPADM

## 2022-08-12 RX ADMIN — AMPICILLIN SODIUM AND SULBACTAM SODIUM 3 G: 2; 1 INJECTION, POWDER, FOR SOLUTION INTRAMUSCULAR; INTRAVENOUS at 04:47

## 2022-08-12 RX ADMIN — FENTANYL CITRATE 50 MCG: 50 INJECTION INTRAMUSCULAR; INTRAVENOUS at 08:59

## 2022-08-12 RX ADMIN — IBUPROFEN 400 MG: 400 TABLET, FILM COATED ORAL at 10:59

## 2022-08-12 RX ADMIN — ACETAMINOPHEN 650 MG: 325 TABLET, FILM COATED ORAL at 09:46

## 2022-08-12 RX ADMIN — AMPICILLIN SODIUM AND SULBACTAM SODIUM 3 G: 2; 1 INJECTION, POWDER, FOR SOLUTION INTRAMUSCULAR; INTRAVENOUS at 17:46

## 2022-08-12 RX ADMIN — IBUPROFEN 400 MG: 400 TABLET, FILM COATED ORAL at 16:05

## 2022-08-12 RX ADMIN — FENTANYL CITRATE 25 MCG: 50 INJECTION INTRAMUSCULAR; INTRAVENOUS at 08:27

## 2022-08-12 RX ADMIN — AMPICILLIN SODIUM AND SULBACTAM SODIUM 3 G: 2; 1 INJECTION, POWDER, FOR SOLUTION INTRAMUSCULAR; INTRAVENOUS at 22:52

## 2022-08-12 RX ADMIN — PROPOFOL 40 MG: 10 INJECTION, EMULSION INTRAVENOUS at 08:25

## 2022-08-12 RX ADMIN — Medication 50 MG: at 20:56

## 2022-08-12 RX ADMIN — FENTANYL CITRATE 50 MCG: 50 INJECTION INTRAMUSCULAR; INTRAVENOUS at 08:49

## 2022-08-12 RX ADMIN — PROPOFOL 50 MG: 10 INJECTION, EMULSION INTRAVENOUS at 08:15

## 2022-08-12 RX ADMIN — SODIUM CHLORIDE, SODIUM LACTATE, POTASSIUM CHLORIDE, AND CALCIUM CHLORIDE: .6; .31; .03; .02 INJECTION, SOLUTION INTRAVENOUS at 07:34

## 2022-08-12 RX ADMIN — AMPICILLIN SODIUM AND SULBACTAM SODIUM 3 G: 2; 1 INJECTION, POWDER, FOR SOLUTION INTRAMUSCULAR; INTRAVENOUS at 13:06

## 2022-08-12 RX ADMIN — PROPOFOL 50 MCG/KG/MIN: 10 INJECTION, EMULSION INTRAVENOUS at 08:15

## 2022-08-12 RX ADMIN — PROPOFOL 40 MG: 10 INJECTION, EMULSION INTRAVENOUS at 08:20

## 2022-08-12 RX ADMIN — ACETAMINOPHEN 650 MG: 325 TABLET, FILM COATED ORAL at 19:31

## 2022-08-12 RX ADMIN — FENTANYL CITRATE 50 MCG: 50 INJECTION INTRAMUSCULAR; INTRAVENOUS at 08:35

## 2022-08-12 RX ADMIN — Medication 50 MG: at 16:05

## 2022-08-12 RX ADMIN — FENTANYL CITRATE 25 MCG: 50 INJECTION INTRAMUSCULAR; INTRAVENOUS at 08:24

## 2022-08-12 NOTE — PROGRESS NOTES
[address] [place] [address]  Progress Note Johanna [place] 1990, 29 y o  female MRN: [id number]  Unit/Bed#: -01 Encounter: [id number]  Primary Care Provider: Estelle Saldana MD   Date and time admitted to hospital: 8/10/2022 12:40 AM    * Facial cellulitis  Assessment & Plan  Presented with worsening facial pain  Found on ct scan to have R facial cellulitis with probable abscess   ED discussed with OMFS who will see in consult  Currently afebrile, wbc wnl  Will place on IV Unasyn  - Discussed w/ OB on 8/11  They are okay with general anesthesia  Advised to avoid sugammadex  Also said to obtain fetal heart tones via ultrasound before and after surgery  - Tooth #32 (right lower molar) was extracted on morning of 8/12  Procedure was done inpatient under general anesthesia  - Continue with IV unasyn and monitor for rest of today  - Tylenol 650mg prn for pain  - per pharmacy, patient is ok to receive occasional doses of low dose ibuprofen as she is still in her 1st trimester of pregnncy    Pregnancy  Assessment & Plan  Incidentally found to be 8 weeks pregnant  OB aware will be consulted  Checked w/ pharmacy, safe to give 400mg ibuprofen prn for post surgical pain       Anemia  Assessment & Plan  Iron def anemia  From menstrula cycles  hgb 8 2 this morning (8/12)  C/w iron supp        VTE Pharmacologic Prophylaxis: none    Mechanical VTE Prophylaxis in Place: No    Patient Centered Rounds: I have performed bedside rounds with nursing staff today  Discussions with Specialists or Other Care Team Provider: Discussed with OB and OMFS    Education and Discussions with Family / Patient: discussed plan with patient    Current Length of Stay: 0 day(s)    Current Patient Status: Observation     Discharge Plan / Estimated Discharge Date: Anticipate discharge tomorrow to home  Code Status: Level 1 - Full Code      Subjective:   Patient was seen this morning following procedure  Says pain is currently 10/10  Was given 650mg tylenol and 400mg ibuprofen for the pain  Currently painful to speak and move her mouth due to inflammation following procedure  Left patient to get some rest      Objective:     Vitals:   Temp (24hrs), Av 5 °F (36 9 °C), Min:98 2 °F (36 8 °C), Max:98 9 °F (37 2 °C)    Temp:  [98 2 °F (36 8 °C)-98 9 °F (37 2 °C)] 98 2 °F (36 8 °C)  HR:  [57-89] 61  Resp:  [15-35] 15  BP: (104-149)/(58-89) 123/79  SpO2:  [94 %-100 %] 99 %  Body mass index is 29 33 kg/m²  Input and Output Summary (last 24 hours): Intake/Output Summary (Last 24 hours) at 2022 1147  Last data filed at 2022 0836  Gross per 24 hour   Intake 840 ml   Output --   Net 840 ml       Physical Exam:     Physical Exam  Constitutional:       General: She is not in acute distress  Appearance: Normal appearance  She is not toxic-appearing  Comments: Appears lethargic due to anesthesia   HENT:      Head:      Comments: Right sided facial swelling present     Right Ear: External ear normal       Left Ear: External ear normal       Nose: Nose normal    Eyes:      General:         Right eye: No discharge  Left eye: No discharge  Cardiovascular:      Rate and Rhythm: Normal rate and regular rhythm  Pulmonary:      Effort: Pulmonary effort is normal       Breath sounds: Normal breath sounds  Abdominal:      General: There is no distension  Tenderness: There is no abdominal tenderness  There is no guarding  Musculoskeletal:         General: Normal range of motion  Right lower leg: No edema  Left lower leg: No edema  Skin:     General: Skin is warm and dry  Findings: No lesion or rash  Neurological:      Mental Status: She is alert  Mental status is at baseline     Psychiatric:         Mood and Affect: Mood normal          Behavior: Behavior normal          Additional Data:     Labs:  Results from last 7 days   Lab Units 22  0440   WBC Thousand/uL 5 77   HEMOGLOBIN g/dL 8 2* HEMATOCRIT % 26 5*   PLATELETS Thousands/uL 241   NEUTROS PCT % 50   LYMPHS PCT % 42   MONOS PCT % 7   EOS PCT % 1     Results from last 7 days   Lab Units 08/12/22  0440 08/11/22  0445 08/10/22  0206   SODIUM mmol/L 137   < > 137   POTASSIUM mmol/L 3 8   < > 4 2   CHLORIDE mmol/L 104   < > 103   CO2 mmol/L 23   < > 25   BUN mg/dL 7   < > 9   CREATININE mg/dL 0 66   < > 0 64   ANION GAP mmol/L 10   < > 9   CALCIUM mg/dL 8 5   < > 8 7   ALBUMIN g/dL  --   --  3 2*   TOTAL BILIRUBIN mg/dL  --   --  0 31   ALK PHOS U/L  --   --  45*   ALT U/L  --   --  10*   AST U/L  --   --  21   GLUCOSE RANDOM mg/dL 84   < > 83    < > = values in this interval not displayed  Results from last 7 days   Lab Units 08/10/22  0206   LACTIC ACID mmol/L 0 6       Imaging: Reviewed radiology reports from this admission including: CT head    Recent Cultures (last 7 days):           Lines/Drains:  Invasive Devices  Report    Peripheral Intravenous Line  Duration           Peripheral IV 08/10/22 Left Antecubital 2 days                Telemetry:        Last 24 Hours Medication List:   Current Facility-Administered Medications   Medication Dose Route Frequency Provider Last Rate    acetaminophen  650 mg Oral Q6H PRN Danna Cloud, DMD      ampicillin-sulbactam  3 g Intravenous Q6H Danna Cloud, DMD 3 g (08/12/22 0447)    ferrous sulfate  325 mg Oral Daily With Breakfast Danna Cloud, DMD          Today, Patient Was Seen By: Misha Quan MD    ** Please Note: This note has been constructed using a voice recognition system   **

## 2022-08-12 NOTE — CASE MANAGEMENT
Case Management Progress Note    Patient name Bess Number  Location [facility] 87 301/-01 MRN [id number]  : 1990 Date 2022       LOS (days): 0  Geometric Mean LOS (GMLOS) (days):   Days to [facility]:        OBJECTIVE:        Current admission status: Inpatient  Preferred Pharmacy:   [facility] HEATHER RUIZ [address] [place] [address]  Phone: [phone] Fax: [phone]    Primary Care Provider: Rojas Claire MD    Primary Insurance: [facility]  Secondary Insurance:     PROGRESS NOTE:  Pt s/p OR for extration of teeth and I/D of face/neck  No referrals made at this time;  CM to follow for post d/c needs

## 2022-08-12 NOTE — PLAN OF CARE
Problem: PAIN - ADULT  Goal: Verbalizes/displays adequate comfort level or baseline comfort level  Description: Interventions:  - Encourage patient to monitor pain and request assistance  - Assess pain using appropriate pain scale  - Administer analgesics based on type and severity of pain and evaluate response  - Implement non-pharmacological measures as appropriate and evaluate response  - Consider cultural and social influences on pain and pain management  - Notify physician/advanced practitioner if interventions unsuccessful or patient reports new pain  Outcome: Progressing     Problem: INFECTION - ADULT  Goal: Absence or prevention of progression during hospitalization  Description: INTERVENTIONS:  - Assess and monitor for signs and symptoms of infection  - Monitor lab/diagnostic results  - Monitor all insertion sites, i e  indwelling lines, tubes, and drains  - Monitor endotracheal if appropriate and nasal secretions for changes in amount and color  - [place] appropriate cooling/warming therapies per order  - Administer medications as ordered  - Instruct and encourage patient and family to use good hand hygiene technique  - Identify and instruct in appropriate isolation precautions for identified infection/condition  Outcome: Progressing     Problem: SAFETY ADULT  Goal: Patient will remain free of falls  Description: INTERVENTIONS:  - Educate patient/family on patient safety including physical limitations  - Instruct patient to call for assistance with activity   - Consult OT/PT to assist with strengthening/mobility   - Keep Call bell within reach  - Keep bed low and locked with side rails adjusted as appropriate  - Keep care items and personal belongings within reach  - Initiate and maintain comfort rounds  - Make Fall Risk Sign visible to staff  - Offer Toileting every 2 Hours, in advance of need  - Initiate/Maintain bedalarm  - Obtain necessary fall risk management equipment:   - Apply yellow socks and bracelet for high fall risk patients  - Consider moving patient to room near nurses station  Outcome: Progressing  Goal: Maintain or return to baseline ADL function  Description: INTERVENTIONS:  -  Assess patient's ability to carry out ADLs; assess patient's baseline for ADL function and identify physical deficits which impact ability to perform ADLs (bathing, care of mouth/teeth, toileting, grooming, dressing, etc )  - Assess/evaluate cause of self-care deficits   - Assess range of motion  - Assess patient's mobility; develop plan if impaired  - Assess patient's need for assistive devices and provide as appropriate  - Encourage maximum independence but intervene and supervise when necessary  - Involve family in performance of ADLs  - Assess for home care needs following discharge   - Consider OT consult to assist with ADL evaluation and planning for discharge  - Provide patient education as appropriate  Outcome: Progressing  Goal: Maintains/Returns to pre admission functional level  Description: INTERVENTIONS:  - Perform BMAT or MOVE assessment daily    - Set and communicate daily mobility goal to care team and patient/family/caregiver  - Collaborate with rehabilitation services on mobility goals if consulted  - Perform Range of Motion 3 times a day  - Reposition patient every 2 hours    - Dangle patient 3 times a day  - Stand patient 3 times a day  - Ambulate patient 3 times a day  - Out of bed to chair 3 times a day   - Out of bed for meals 3 times a day  - Out of bed for toileting  - Record patient progress and toleration of activity level   Outcome: Progressing     Problem: DISCHARGE PLANNING  Goal: Discharge to home or other facility with appropriate resources  Description: INTERVENTIONS:  - Identify barriers to discharge w/patient and caregiver  - Arrange for needed discharge resources and transportation as appropriate  - Identify discharge learning needs (meds, wound care, etc )  - Arrange for interpretive services to assist at discharge as needed  - Refer to Case Management Department for coordinating discharge planning if the patient needs post-hospital services based on physician/advanced practitioner order or complex needs related to functional status, cognitive ability, or social support system  Outcome: Progressing     Problem: Knowledge Deficit  Goal: Patient/family/caregiver demonstrates understanding of disease process, treatment plan, medications, and discharge instructions  Description: Complete learning assessment and assess knowledge base    Interventions:  - Provide teaching at level of understanding  - Provide teaching via preferred learning methods  Outcome: Progressing

## 2022-08-12 NOTE — ANESTHESIA PREPROCEDURE EVALUATION
Procedure:  EXTRACTION TEETH MULTIPLE (Right Mouth)  INCISION AND DRAINAGE (I&D) HEAD/FACE (Right Face)    Relevant Problems   GI/HEPATIC   (+) History of bariatric surgery      GYN   (+) Pregnancy      HEMATOLOGY   (+) Anemia      NEURO/PSYCH   (+) Headache      PULMONARY   (+) Sleep apnea      First trimester pregnancy  JONNY  S/p sleeve gastrectomy  Facial cellulitis    Physical Exam    Airway  Comment: 2-2 FB trismus due to pain  Per OMFS improved since yesterday  Mallampati score: II  TM Distance: >3 FB  Neck ROM: full     Dental       Cardiovascular  Cardiovascular exam normal    Pulmonary  Pulmonary exam normal     Other Findings        Anesthesia Plan  ASA Score- 3     Anesthesia Type- IV sedation with anesthesia with ASA Monitors  Additional Monitors:   Airway Plan:     Comment: Avoid teratogenic medications (midaz, nitrous oxide)  Plan for MAC with lidocaine/propofol only and maintenance of normoxia and normotension  Back up GETA  Discussed increased risk of pregnancy loss (miscarriage) due to anesthesia/surgery/infection combination          Plan Factors-Exercise tolerance (METS): >4 METS  Chart reviewed  EKG reviewed  Imaging results reviewed  Existing labs reviewed  Patient summary reviewed  Patient is not a current smoker  Induction- intravenous  Postoperative Plan-     Informed Consent- Anesthetic plan and risks discussed with patient  I personally reviewed this patient with the CRNA  Discussed and agreed on the Anesthesia Plan with the CRNA  Andrew Garcia

## 2022-08-12 NOTE — OP NOTE
OPERATIVE REPORT  PATIENT NAME: Herman Denson    :  1990  MRN: [id number]  Pt Location: [facility] OR ROOM 04    SURGERY DATE: 2022    Surgeon(s) and Role:     Maico Noland DMD - Primary    Preop Diagnosis:  Dental abscess [K04 7]  Facial cellulitis [L03 211]    Post-Op Diagnosis Codes:     * Dental abscess [K04 7]     * Facial cellulitis [L03 211]    Procedures:  Removal of complete bony impacted tooth number 32  Intraoral incision and drainage right dentoalveolar structure  Intraoral incision and drainage right submandibular space  Intraoral incision and drainage right  space    Specimen(s):  No specimen    Estimated Blood Loss:   Minimal    Drains:  No drains    Anesthesia Type:   General    Operative Indications:  Dental abscess [K04 7]  Facial cellulitis [L03 211]      Operative Findings:  Significant amount of granulation tissue associated with completely impacted tooth number 32    Complications:   None    Procedure and Technique:  The patient was greeted in the preoperative area  All the risks and benefits of the procedure were once again explained and the risks of sinus communication as well as lower chin and lip numbness were explained in detail all questions were answered  Consent had already been signed  I also reviewed the patient with the anesthesia team that there is a chance of miscarriage secondary to the procedure  She was understanding and accepting of this  And she acknowledged she has had previous miscarriage in the past   Care was then handed back to the anesthesia team     The patient was brought into the operating room by the anesthesia team and the patient was placed in a supine position where the patient remained for the rest of the case  Anesthesia was able to establish total IV anesthesia without any complications   Care was then handed back to the OMFS team     Patient was draped in sterile manner timeout was performed in which the patient was correctly identified by name medical record number as well as a site of the procedure be performed  Local anesthesia per OR record    Fifteen blade was used to make incision along the right external oblique ridge  Mucosal flap elevated  Tooth number 32 was sectioned and extracted  Granulation tissue was noted around this tooth  A curette was used to separate the periosteum from the bone  Blunt dissection into this plane was achieved  Fifteen blade used to make a mucosal incision in the right vestibule  Blunt dissection to the submandibular and  spaces  There is no purulent drainage was expressed  This was expected as the patient was in the cellulitic face or infection  In addition any cultures would be likely unhelpful secondary to chronic antibiotic use  Surgical sites were thoroughly irrigated         I was present for the entire procedure    Patient Disposition:  PACU , hemodynamically stable and extubated and stable      SIGNATURE: Chad Bobo DMD  DATE: August 12, 2022  TIME: 8:34 AM

## 2022-08-12 NOTE — ASSESSMENT & PLAN NOTE
Presented with worsening facial pain  Found on ct scan to have R facial cellulitis with probable abscess   ED discussed with OMFS who will see in consult  Currently afebrile, wbc wnl  Will place on IV Unasyn  - Discussed w/ OB on 8/11  They are okay with general anesthesia  Advised to avoid sugammadex  Also said to obtain fetal heart tones via ultrasound before and after surgery  - Tooth #32 (right lower molar) was extracted on morning of 8/12   Procedure was done inpatient under general anesthesia  - Continue with IV unasyn and monitor for rest of today  - Tylenol 650mg prn for pain  - per pharmacy, patient is ok to receive occasional doses of low dose ibuprofen as she is still in her 1st trimester of pregnncy

## 2022-08-12 NOTE — PLAN OF CARE
Problem: INFECTION - ADULT  Goal: Absence or prevention of progression during hospitalization  Description: INTERVENTIONS:  - Assess and monitor for signs and symptoms of infection  - Monitor lab/diagnostic results  - Monitor all insertion sites, i e  indwelling lines, tubes, and drains  - Monitor endotracheal if appropriate and nasal secretions for changes in amount and color  - [place] appropriate cooling/warming therapies per order  - Administer medications as ordered  - Instruct and encourage patient and family to use good hand hygiene technique  - Identify and instruct in appropriate isolation precautions for identified infection/condition  Outcome: Progressing     Problem: Knowledge Deficit  Goal: Patient/family/caregiver demonstrates understanding of disease process, treatment plan, medications, and discharge instructions  Description: Complete learning assessment and assess knowledge base    Interventions:  - Provide teaching at level of understanding  - Provide teaching via preferred learning methods  Outcome: Progressing

## 2022-08-12 NOTE — ASSESSMENT & PLAN NOTE
Incidentally found to be 8 weeks pregnant  OB aware will be consulted  Checked w/ pharmacy, safe to give 400mg ibuprofen prn for post surgical pain

## 2022-08-12 NOTE — ANESTHESIA POSTPROCEDURE EVALUATION
Post-Op Assessment Note    CV Status:  Stable    Pain management: adequate     Mental Status:  Alert and awake   Hydration Status:  Euvolemic   PONV Controlled:  Controlled   Airway Patency:  Patent      Post Op Vitals Reviewed: Yes      Staff: CRNA         No complications documented      /83 (08/12/22 0839)    Temp 98 9 °F (37 2 °C) (08/12/22 0839)    Pulse 89 (08/12/22 0839)   Resp (!) 35 (08/12/22 0839)    SpO2 100 % (08/12/22 0839)

## 2022-08-13 VITALS
DIASTOLIC BLOOD PRESSURE: 58 MMHG | SYSTOLIC BLOOD PRESSURE: 105 MMHG | OXYGEN SATURATION: 94 % | BODY MASS INDEX: 29.17 KG/M2 | HEIGHT: 64 IN | HEART RATE: 63 BPM | WEIGHT: 170.86 LBS | RESPIRATION RATE: 16 BRPM | TEMPERATURE: 98.5 F

## 2022-08-13 PROBLEM — K04.7 DENTAL ABSCESS: Status: ACTIVE | Noted: 2022-08-13

## 2022-08-13 LAB
BASOPHILS # BLD AUTO: 0 THOUSANDS/ΜL (ref 0–0.1)
BASOPHILS NFR BLD AUTO: 0 % (ref 0–1)
EOSINOPHIL # BLD AUTO: 0.01 THOUSAND/ΜL (ref 0–0.61)
EOSINOPHIL NFR BLD AUTO: 0 % (ref 0–6)
ERYTHROCYTE [DISTWIDTH] IN BLOOD BY AUTOMATED COUNT: 18.6 % (ref 11.6–15.1)
HCT VFR BLD AUTO: 25.5 % (ref 34.8–46.1)
HGB BLD-MCNC: 7.8 G/DL (ref 11.5–15.4)
IMM GRANULOCYTES # BLD AUTO: 0.02 THOUSAND/UL (ref 0–0.2)
IMM GRANULOCYTES NFR BLD AUTO: 0 % (ref 0–2)
LYMPHOCYTES # BLD AUTO: 1.8 THOUSANDS/ΜL (ref 0.6–4.47)
LYMPHOCYTES NFR BLD AUTO: 35 % (ref 14–44)
MCH RBC QN AUTO: 21.9 PG (ref 26.8–34.3)
MCHC RBC AUTO-ENTMCNC: 30.6 G/DL (ref 31.4–37.4)
MCV RBC AUTO: 72 FL (ref 82–98)
MONOCYTES # BLD AUTO: 0.35 THOUSAND/ΜL (ref 0.17–1.22)
MONOCYTES NFR BLD AUTO: 7 % (ref 4–12)
NEUTROPHILS # BLD AUTO: 2.91 THOUSANDS/ΜL (ref 1.85–7.62)
NEUTS SEG NFR BLD AUTO: 58 % (ref 43–75)
NRBC BLD AUTO-RTO: 0 /100 WBCS
PLATELET # BLD AUTO: 222 THOUSANDS/UL (ref 149–390)
PMV BLD AUTO: 9.7 FL (ref 8.9–12.7)
RBC # BLD AUTO: 3.56 MILLION/UL (ref 3.81–5.12)
WBC # BLD AUTO: 5.09 THOUSAND/UL (ref 4.31–10.16)

## 2022-08-13 PROCEDURE — 85025 COMPLETE CBC W/AUTO DIFF WBC: CPT | Performed by: INTERNAL MEDICINE

## 2022-08-13 PROCEDURE — 99239 HOSP IP/OBS DSCHRG MGMT >30: CPT | Performed by: INTERNAL MEDICINE

## 2022-08-13 RX ORDER — FERROUS SULFATE 325(65) MG
325 TABLET ORAL
Qty: 30 TABLET | Refills: 0 | Status: SHIPPED | OUTPATIENT
Start: 2022-08-14 | End: 2022-09-06 | Stop reason: ALTCHOICE

## 2022-08-13 RX ORDER — AMOXICILLIN AND CLAVULANATE POTASSIUM 875; 125 MG/1; MG/1
1 TABLET, FILM COATED ORAL EVERY 12 HOURS SCHEDULED
Qty: 8 TABLET | Refills: 0 | Status: SHIPPED | OUTPATIENT
Start: 2022-08-13 | End: 2022-08-17

## 2022-08-13 RX ADMIN — FERROUS SULFATE TAB 325 MG (65 MG ELEMENTAL FE) 325 MG: 325 (65 FE) TAB at 08:02

## 2022-08-13 RX ADMIN — AMPICILLIN SODIUM AND SULBACTAM SODIUM 3 G: 2; 1 INJECTION, POWDER, FOR SOLUTION INTRAMUSCULAR; INTRAVENOUS at 06:30

## 2022-08-13 RX ADMIN — IBUPROFEN 400 MG: 400 TABLET, FILM COATED ORAL at 06:39

## 2022-08-13 RX ADMIN — Medication 50 MG: at 09:21

## 2022-08-13 RX ADMIN — IBUPROFEN 400 MG: 400 TABLET, FILM COATED ORAL at 00:06

## 2022-08-13 NOTE — PLAN OF CARE
Problem: PAIN - ADULT  Goal: Verbalizes/displays adequate comfort level or baseline comfort level  Description: Interventions:  - Encourage patient to monitor pain and request assistance  - Assess pain using appropriate pain scale  - Administer analgesics based on type and severity of pain and evaluate response  - Implement non-pharmacological measures as appropriate and evaluate response  - Consider cultural and social influences on pain and pain management  - Notify physician/advanced practitioner if interventions unsuccessful or patient reports new pain  Outcome: Progressing     Problem: INFECTION - ADULT  Goal: Absence or prevention of progression during hospitalization  Description: INTERVENTIONS:  - Assess and monitor for signs and symptoms of infection  - Monitor lab/diagnostic results  - Monitor all insertion sites, i e  indwelling lines, tubes, and drains  - Monitor endotracheal if appropriate and nasal secretions for changes in amount and color  - [place] appropriate cooling/warming therapies per order  - Administer medications as ordered  - Instruct and encourage patient and family to use good hand hygiene technique  - Identify and instruct in appropriate isolation precautions for identified infection/condition  Outcome: Progressing

## 2022-08-13 NOTE — ASSESSMENT & PLAN NOTE
· Presented with facial swelling and trismus  · OMFS consulted, patient received general anesthesia after being cleared by OB G, underwent drainage of abscess and right lower molar extraction on 08/12/2022  · Postprocedure patient experienced significant improvement in swelling, pain and trismus    · Patient is agreeable to discharge home with oral antibiotics  · Status post 3 days of IV Unasyn, will provided additional 4 days of Augmentin at discharge  · Follow-up with OMFS as previously discussed

## 2022-08-13 NOTE — DISCHARGE SUMMARY
[facility]  Discharge- Miles Brizuela 1990, 29 y o  female MRN: [id number]  Unit/Bed#: -01 Encounter: [id number]  Primary Care Provider: Carmelita Lenz MD   Date and time admitted to hospital: 8/10/2022 12:40 AM    * Facial cellulitis  Assessment & Plan  Presented with worsening facial pain  Found on ct scan to have R facial cellulitis with probable abscess   Has been afebrile with normal white count post admission  Has been significantly improving post drainage of dental abscess    Status post 3 days of IV Unasyn while inpatient  Will discharge an additional 4 days for completion of course  See assessment and plan of dental abscess        Dental abscess  Assessment & Plan  · Presented with facial swelling and trismus  · OMFS consulted, patient received general anesthesia after being cleared by OB G, underwent drainage of abscess and right lower molar extraction on 08/12/2022  · Postprocedure patient experienced significant improvement in swelling, pain and trismus    · Patient is agreeable to discharge home with oral antibiotics  · Status post 3 days of IV Unasyn, will provided additional 4 days of Augmentin at discharge  · Follow-up with OMFS as previously discussed    Pregnancy  Assessment & Plan  Incidentally found to be 8 weeks pregnant        Anemia  Assessment & Plan  Iron def anemia  C/w iron supp              Medical Problems             Resolved Problems  Date Reviewed: 8/10/2022   None                 Admission Date:   Admission Orders (From admission, onward)     Ordered        08/12/22 1457  Inpatient Admission  Once            08/10/22 0708  Place in Observation  Once                        Admitting Diagnosis: Dental abscess [K04 7]  Pregnancy [Z34 90]  Pregnant [Z34 90]  Facial cellulitis [L03 211]  Oral abscess [K12 2]    HPI:  Per admitting provider, Miles Brizuela is a 29 y o  female 5 week pregnant female with presented with R sided facial pain that began 3 weeks ago   Progressively worsened  Started abx 3 days ago with no reliefs  Denies fevers, chills cough, or any other symptoms"  Procedures Performed: No orders of the defined types were placed in this encounter  Summary of Hospital Course:  Post admission, patient was started on IV Unasyn  OMFS was consulted, requested VBG consult prior to procedure as patient may require general anesthesia  OBGYN has recommended continuation of general anesthesia, and no delay in treatment due to pregnancy  Patient underwent tooth extraction and and abscess drainage on 08/12/2022  Postprocedure she experienced improvement in facial swelling and pain  She has received 3 days of IV Unasyn as inpatient for cellulitis and abscess, will receive an additional 4 days at discharge  Patient medically clear for discharge  See above assessment plan for more detail  Significant Findings, Care, Treatment and Services Provided:  None    Complications:  None    Condition at Discharge: good         Discharge instructions/Information to patient and family:   See after visit summary for information provided to patient and family  Provisions for Follow-Up Care:  See after visit summary for information related to follow-up care and any pertinent home health orders  PCP: Chapis Watkins MD    Disposition: Home    Planned Readmission: No    Discharge Statement   I spent 30 minutes discharging the patient  This time was spent on the day of discharge  I had direct contact with the patient on the day of discharge  Additional documentation is required if more than 30 minutes were spent on discharge  Discharge Medications:  See after visit summary for reconciled discharge medications provided to patient and family

## 2022-08-13 NOTE — ASSESSMENT & PLAN NOTE
Presented with worsening facial pain  Found on ct scan to have R facial cellulitis with probable abscess   Has been afebrile with normal white count post admission  Has been significantly improving post drainage of dental abscess    Status post 3 days of IV Unasyn while inpatient  Will discharge an additional 4 days for completion of course  See assessment and plan of dental abscess

## 2022-08-15 NOTE — UTILIZATION REVIEW
Notification of Discharge   This is a Notification of Discharge from our facility [address]  Please be advised that this patient has been discharge from our facility  Below you will find the admission and discharge date and time including the patients disposition  UTILIZATION REVIEW CONTACT:  Seamus Pelletier  Utilization   Network Utilization Review Department  Phone: [phone] x carefully listen to the prompts  All voicemails are confidential   Email: [email or web address]     PHYSICIAN ADVISORY SERVICES:  FOR LKYR-JT-UMGD REVIEW - MEDICAL NECESSITY DENIAL  Phone: [phone]  Fax: [phone]  Email: [email or web address]     PRESENTATION DATE: 8/10/2022 12:40 AM  OBERVATION ADMISSION DATE: 08/10/2022  INPATIENT ADMISSION DATE: 8/12/22  2:57 PM   DISCHARGE DATE: 8/13/2022 12:15 PM  DISPOSITION: Home/Self Care Home/Self Care      IMPORTANT INFORMATION:  Send all requests for admission clinical reviews, approved or denied determinations and any other requests to dedicated fax number below belonging to the campus where the patient is receiving treatment   List of dedicated fax numbers:  [address] DENIALS (Administrative/Medical Necessity) [phone]   1000  [facility] (Maternity/NICU/Pediatrics) [phone]   [place] [facility] [phone]   130 [facility] [phone]   [address] [place] [address] [phone]   [address] [place] [address] [address] [place] [address],[address] [phone]   [facility]  [phone]   [facility], [facility]  [address]01 [facility] 1000 [address] [facility] [phone]

## 2022-08-22 ENCOUNTER — ULTRASOUND (OUTPATIENT)
Dept: OBGYN CLINIC | Facility: MEDICAL CENTER | Age: 32
End: 2022-08-22
Payer: COMMERCIAL

## 2022-08-22 VITALS
BODY MASS INDEX: 32.1 KG/M2 | DIASTOLIC BLOOD PRESSURE: 70 MMHG | HEIGHT: 64 IN | SYSTOLIC BLOOD PRESSURE: 120 MMHG | WEIGHT: 188 LBS

## 2022-08-22 DIAGNOSIS — Z34.81 PRENATAL CARE, SUBSEQUENT PREGNANCY, FIRST TRIMESTER: Primary | ICD-10-CM

## 2022-08-22 DIAGNOSIS — O36.80X1 ENCOUNTER TO DETERMINE FETAL VIABILITY OF PREGNANCY, FETUS 1: ICD-10-CM

## 2022-08-22 PROCEDURE — 76817 TRANSVAGINAL US OBSTETRIC: CPT | Performed by: STUDENT IN AN ORGANIZED HEALTH CARE EDUCATION/TRAINING PROGRAM

## 2022-08-22 RX ORDER — METRONIDAZOLE 500 MG/1
TABLET ORAL
COMMUNITY
Start: 2022-08-16 | End: 2022-09-08 | Stop reason: ALTCHOICE

## 2022-08-22 NOTE — PROGRESS NOTES
SV MAC=547 BPM  CRL=31 1mm=10w0d    Edc=3/20/2023    UT=103 x 70 x 71 mm  RT OV=25 x 23 x 19 mm  LT OV=24 x 20 x 18 mm

## 2022-08-22 NOTE — PROGRESS NOTES
Here today for a early [address]  This was unplanned pregnancy/welcomed with boyfriend Deena Kovacs  This will be their first together  LMP 6/14/22, home pregnancy test was postive  Overall feeling ok, had a oral abscess few weeks ago remains tender  Was in ER for UTI and yeast injection 8/16 states she just picked up antibiotic today and only took 1 dose of monistat  Educated on importance of taking both for full dose and increasing fluids 80-100oz a day  She was also educated on starting/taking consistantly prenatal gummy vitamins and iron anemia  Reviewed MFM Referral and routine OB care with SANDI  States she had a 16wk SAB fall 2020

## 2022-08-26 ENCOUNTER — INITIAL PRENATAL (OUTPATIENT)
Dept: OBGYN CLINIC | Facility: CLINIC | Age: 32
End: 2022-08-26
Payer: COMMERCIAL

## 2022-08-26 VITALS — HEIGHT: 64 IN | WEIGHT: 188 LBS | BODY MASS INDEX: 32.1 KG/M2

## 2022-08-26 DIAGNOSIS — Z34.81 PRENATAL CARE, SUBSEQUENT PREGNANCY, FIRST TRIMESTER: ICD-10-CM

## 2022-08-26 PROCEDURE — 99215 OFFICE O/P EST HI 40 MIN: CPT | Performed by: STUDENT IN AN ORGANIZED HEALTH CARE EDUCATION/TRAINING PROGRAM

## 2022-08-26 NOTE — PATIENT INSTRUCTIONS
Congratulations!! Please review our Pregnancy Essential Guide and [facility] L&D Virtual tour from our [employer]  [facility] Pregnancy Essentials Guide  [facility] ([facility])     [address] [facility] ([facility])

## 2022-08-26 NOTE — PROGRESS NOTES
OB INTAKE INTERVIEW  Patient is 32 y o y o  who presents for OB intake at 11wks  She is accompanied by: partner  The father of her baby Shirley Aleman) is involved in the pregnancy and is 28years old    Last Menstrual Period: 2022  Ultrasound: Measured 10 weeks 0 days on 2022 by Nilay Garcia  Estimated Date of Delivery: 3/20/2023 confirmed by 10 week US    Signs/Symptoms of Pregnancy  Current pregnancy symptoms: itchy breasts  Constipation no  Headaches no  Cramping/spotting no  PICA cravings no    Diabetes-  Body mass index is 32 27 kg/m²  If patient has 1 or more, please order early 1 hour GTT  History of GDM YES  BMI >35 no  History of PCOS or current metformin use no  History of LGA/macrosomic infant (4000g/9lbs) no    If patient has 2 or more, please order early 1 hour GTT  BMI>30 YES  AMA no  First degree relative with type 2 diabetes no  History of chronic HTN, hyperlipidemia, elevated A1C no  High risk race (, , ,  or ) YES    Hypertension- if you answer yes, please order preeclampsia labs (cbc, comprehensive metabolic panel, urine protein creatinine ratio, uric acid)  History of of chronic HTN no  History of gestational HTN no  History of preeclampsia, eclampsia, or HELLP syndrome no  History of diabetes no  History of lupus, autoimmune disease, kidney disease no    Thyroid- if yes order TSH with reflex T4  History of thyroid disease no    Bleeding Disorder or Hx of DVT-patient or first degree relative with history of  Order the following if not done previously     (Factor V, antithrombin III, prothrombin gene mutation, protein C and S Ag, lupus anticoagulant, anticardiolipin, beta-2 glycoprotein)   no    OB/GYN-  History of abnormal pap smear no  History of HPV no  History of Herpes/HSV no  History of other STI (gonorrhea, chlamydia, trich) no  History of prior  YESx1  History of prior  no  History of  delivery prior to 36 weeks 6 days no  History of blood transfusion YESx3 after 16 wk loss- retained placenta  Ok for blood transfusion YES    Substance screening- if yes outside of tobacco for her or anyone in her home-order urine drug screen  History of tobacco use YES   Currently using tobacco YES - down to 3 cigs a day  Currently using alcohol no  Presently using drugs no  Past drug use  no  IV drug use-If yes add Hep C antibody to labs no  Partner drug use no  Parent/Family drug use no    MRSA Screening-   Does the pt have a hx of MRSA? no  If yes- please follow MRSA protocol and obtain a nasal swab for MRSA culture    Immunizations:  Influenza vaccine given this season No  Discussed Tdap vaccine YES  Discussed COVID Vaccine no    Genetic/MFM-  Do you or your partner have a history of any of the following in yourselves or first degree relatives? Cystic fibrosis no  Spinal muscular atrophy no  Hemoglobinopathy/Sickle Cell/Thalassemia no  Fragile X Intellectual Disability no    If yes, discuss carrier screening and recommend consultation with [facility]/genetic counseling  If no, discuss option for carrier screening and/or genetic testing with Nuchal Ultrasound  Patient interested YES  Appointment at [facility] made YES, NT is 9/16 and level 2 is 11/1    Interview education  [facility] Pregnancy Essentials Book reviewed and discussed YES    Nurse/Family Partnership- patient may qualify no; referral placed no    Prenatal lab work scripts YES  Extra labs ordered:  1hr, bypass labs * I also entered smoking referral to [facility]    The patient has a history now or in prior pregnancy notable for:  gestational DM, tobacco use and gastric bypass      Details that I feel the provider should be aware of: Mandi Gray and Alberta Jose De Jesus are expecting their first baby together with SLOGA  They each already have 1 child  She had a 16 wk loss with leaded to bleeding and needing a transfusion & D&C  She had gastric sleeve in 2016  Overall she is doing pretty well   Hx of GDM with last pregnancy  She is transferring to [facility] because she said she had a good experience at [address] [place] [address] with her loss vs her delivery at [facility] even though they are right around the corner from her  PN1 visit scheduled  The patient was oriented to our practice, reviewed delivering physicians and [address] [place] [address] for Delivery  All questions were answered      Interviewed by: Amanda Kim MA

## 2022-09-02 ENCOUNTER — APPOINTMENT (OUTPATIENT)
Dept: LAB | Facility: CLINIC | Age: 32
End: 2022-09-02
Payer: COMMERCIAL

## 2022-09-02 DIAGNOSIS — Z34.81 PRENATAL CARE, SUBSEQUENT PREGNANCY, FIRST TRIMESTER: ICD-10-CM

## 2022-09-02 LAB
25(OH)D3 SERPL-MCNC: 11.6 NG/ML (ref 30–100)
ABO GROUP BLD: NORMAL
BACTERIA UR QL AUTO: ABNORMAL /HPF
BASOPHILS # BLD AUTO: 0.01 THOUSANDS/ΜL (ref 0–0.1)
BASOPHILS NFR BLD AUTO: 0 % (ref 0–1)
BILIRUB UR QL STRIP: NEGATIVE
BLD GP AB SCN SERPL QL: NEGATIVE
CALCIUM SERPL-MCNC: 9 MG/DL (ref 8.3–10.1)
CLARITY UR: CLEAR
COLOR UR: YELLOW
EOSINOPHIL # BLD AUTO: 0.03 THOUSAND/ΜL (ref 0–0.61)
EOSINOPHIL NFR BLD AUTO: 0 % (ref 0–6)
ERYTHROCYTE [DISTWIDTH] IN BLOOD BY AUTOMATED COUNT: 18.3 % (ref 11.6–15.1)
GLUCOSE 1H P 50 G GLC PO SERPL-MCNC: 104 MG/DL (ref 40–134)
GLUCOSE UR STRIP-MCNC: ABNORMAL MG/DL
HBV SURFACE AG SER QL: NORMAL
HCT VFR BLD AUTO: 34 % (ref 34.8–46.1)
HCV AB SER QL: NORMAL
HGB BLD-MCNC: 10.2 G/DL (ref 11.5–15.4)
HGB UR QL STRIP.AUTO: NEGATIVE
IMM GRANULOCYTES # BLD AUTO: 0.04 THOUSAND/UL (ref 0–0.2)
IMM GRANULOCYTES NFR BLD AUTO: 1 % (ref 0–2)
KETONES UR STRIP-MCNC: NEGATIVE MG/DL
LEUKOCYTE ESTERASE UR QL STRIP: ABNORMAL
LYMPHOCYTES # BLD AUTO: 2 THOUSANDS/ΜL (ref 0.6–4.47)
LYMPHOCYTES NFR BLD AUTO: 30 % (ref 14–44)
MCH RBC QN AUTO: 21.8 PG (ref 26.8–34.3)
MCHC RBC AUTO-ENTMCNC: 30 G/DL (ref 31.4–37.4)
MCV RBC AUTO: 73 FL (ref 82–98)
MONOCYTES # BLD AUTO: 0.38 THOUSAND/ΜL (ref 0.17–1.22)
MONOCYTES NFR BLD AUTO: 6 % (ref 4–12)
MUCOUS THREADS UR QL AUTO: ABNORMAL
NEUTROPHILS # BLD AUTO: 4.31 THOUSANDS/ΜL (ref 1.85–7.62)
NEUTS SEG NFR BLD AUTO: 63 % (ref 43–75)
NITRITE UR QL STRIP: NEGATIVE
NON-SQ EPI CELLS URNS QL MICRO: ABNORMAL /HPF
NRBC BLD AUTO-RTO: 0 /100 WBCS
PH UR STRIP.AUTO: 6.5 [PH]
PLATELET # BLD AUTO: 281 THOUSANDS/UL (ref 149–390)
PMV BLD AUTO: 10.6 FL (ref 8.9–12.7)
PROT UR STRIP-MCNC: ABNORMAL MG/DL
RBC # BLD AUTO: 4.68 MILLION/UL (ref 3.81–5.12)
RBC #/AREA URNS AUTO: ABNORMAL /HPF
RH BLD: POSITIVE
RUBV IGG SERPL IA-ACNC: 60.8 IU/ML
SP GR UR STRIP.AUTO: 1.02 (ref 1–1.03)
TIBC SERPL-MCNC: 484 UG/DL (ref 250–450)
UROBILINOGEN UR STRIP-ACNC: 2 MG/DL
VIT B12 SERPL-MCNC: 213 PG/ML (ref 100–900)
WBC # BLD AUTO: 6.77 THOUSAND/UL (ref 4.31–10.16)
WBC #/AREA URNS AUTO: ABNORMAL /HPF

## 2022-09-02 PROCEDURE — 83036 HEMOGLOBIN GLYCOSYLATED A1C: CPT

## 2022-09-02 PROCEDURE — 36415 COLL VENOUS BLD VENIPUNCTURE: CPT

## 2022-09-02 PROCEDURE — 80081 OBSTETRIC PANEL INC HIV TSTG: CPT

## 2022-09-02 PROCEDURE — 83550 IRON BINDING TEST: CPT

## 2022-09-02 PROCEDURE — 82310 ASSAY OF CALCIUM: CPT

## 2022-09-02 PROCEDURE — 86803 HEPATITIS C AB TEST: CPT

## 2022-09-02 PROCEDURE — 81001 URINALYSIS AUTO W/SCOPE: CPT

## 2022-09-02 PROCEDURE — 82728 ASSAY OF FERRITIN: CPT

## 2022-09-02 PROCEDURE — 82306 VITAMIN D 25 HYDROXY: CPT

## 2022-09-02 PROCEDURE — 87086 URINE CULTURE/COLONY COUNT: CPT

## 2022-09-02 PROCEDURE — 82607 VITAMIN B-12: CPT

## 2022-09-02 PROCEDURE — 82950 GLUCOSE TEST: CPT

## 2022-09-03 LAB
BACTERIA UR CULT: NORMAL
EST. AVERAGE GLUCOSE BLD GHB EST-MCNC: 111 MG/DL
FERRITIN SERPL-MCNC: 7 NG/ML (ref 8–388)
HBA1C MFR BLD: 5.5 %
HIV 1+2 AB+HIV1 P24 AG SERPL QL IA: NORMAL
RPR SER QL: NORMAL

## 2022-09-06 ENCOUNTER — TELEPHONE (OUTPATIENT)
Dept: OBGYN CLINIC | Facility: CLINIC | Age: 32
End: 2022-09-06

## 2022-09-06 DIAGNOSIS — E55.9 VITAMIN D DEFICIENCY: ICD-10-CM

## 2022-09-06 DIAGNOSIS — E61.1 IRON DEFICIENCY: Primary | ICD-10-CM

## 2022-09-06 PROBLEM — N32.81 OAB (OVERACTIVE BLADDER): Status: ACTIVE | Noted: 2022-06-08

## 2022-09-06 PROBLEM — O03.4 INEVITABLE SPONTANEOUS ABORTION: Status: RESOLVED | Noted: 2020-06-01 | Resolved: 2022-09-06

## 2022-09-06 PROBLEM — N39.0 FREQUENT UTI: Status: ACTIVE | Noted: 2022-06-08

## 2022-09-06 PROBLEM — G43.709 CHRONIC MIGRAINE WITHOUT AURA WITHOUT STATUS MIGRAINOSUS, NOT INTRACTABLE: Status: ACTIVE | Noted: 2021-11-08

## 2022-09-06 PROBLEM — G47.30 SLEEP APNEA: Status: RESOLVED | Noted: 2018-03-15 | Resolved: 2022-09-06

## 2022-09-06 PROBLEM — O99.841 PREGNANCY AFFECTED BY PREVIOUS BARIATRIC SURGERY, CURRENTLY IN FIRST TRIMESTER: Status: RESOLVED | Noted: 2020-05-12 | Resolved: 2022-09-06

## 2022-09-06 PROBLEM — A49.02 MRSA (METHICILLIN RESISTANT STAPHYLOCOCCUS AUREUS) INFECTION: Status: RESOLVED | Noted: 2020-03-26 | Resolved: 2022-09-06

## 2022-09-06 PROBLEM — D50.0 IRON DEFICIENCY ANEMIA DUE TO CHRONIC BLOOD LOSS: Status: ACTIVE | Noted: 2021-11-08

## 2022-09-06 PROBLEM — O03.9 SPONTANEOUS MISCARRIAGE: Status: RESOLVED | Noted: 2020-06-09 | Resolved: 2022-09-06

## 2022-09-06 PROBLEM — Z34.81 PRENATAL CARE, SUBSEQUENT PREGNANCY IN FIRST TRIMESTER: Status: ACTIVE | Noted: 2022-09-06

## 2022-09-06 PROBLEM — O35.9XX0 SUSPECTED FETAL ANOMALY, ANTEPARTUM: Status: RESOLVED | Noted: 2020-05-12 | Resolved: 2022-09-06

## 2022-09-06 LAB — FERRITIN SERPL-MCNC: 7 NG/ML (ref 8–388)

## 2022-09-06 NOTE — TELEPHONE ENCOUNTER
Pt does not refer herself to bariatric nutritionist  I put in referral to [facility]   Pt aware of lo vit d and iron

## 2022-09-06 NOTE — ASSESSMENT & PLAN NOTE
Sanaz Ro is a 29y o  year old [id number] at 12w2d who presents for initial prenatal visit  Planned pregnancy  Gastric Bypass patient   Denies complaints  Denies pelvic pain, bleeding, LOF  FOB Sole Talon   Exam WNL  Prenatal labs WNL  Early Glucose 104,  Rh pos  2020 Neg,  GC/CT sent today  Works out of her home   Recent  loss at 16 weeks in   Has appt with [facility] on    Recent diagnosis of B the patient was prescribed Flagyl p o  patient cannot tolerate it orally, prescription change to Metrogel patient instructed on use patient advised to be sure and complete the entire treatment as low level or untreated infections can lead to  labor or loss of pregnancy  Patient has not started supplemental iron yet, she is still experiencing nausea and has a hard time eating or keeping anything down  Patient will start once nausea passes, patient did have an appointment with the nutritionist based on her bariatric history    Patient Education: Patient was counseled regarding diet, exercise, weight gain, foods to avoid, vaccines in pregnancy, aneuploidy screening, travel precautions to include seat belt use and VTE risk reduction  She has been provided our pregnancy packet which includes pregnancy warning signs,how and when to contact providers, visit intervals, Maternal fetal medicine information, medication recommendations that are safe during pregnancy, dietary suggestions, activity recommendations, breastfeeding information as well as websites for additional information, and delivery location      Past Medical History:   Diagnosis Date    Anemia     Gastric bypass status for obesity 2016    sleeve    Post partum depression 2014    Sleep apnea, obstructive     prior to weight loss surg     Past Surgical History:   Procedure Laterality Date    DILATION AND EVACUATION  2019        GASTRIC RESTRICTION SURGERY  2015    gastric sleeve    INCISION AND DRAINAGE OF WOUND Right 2022    Procedure: INCISION AND DRAINAGE (I&D) HEAD/FACE;  Surgeon: Danna Mayorga DMD;  [facility];  Service: Maxillofacial    MULTIPLE TOOTH EXTRACTIONS Right 2022    Procedure: EXTRACTION TEETH MULTIPLE;  Surgeon: Danna Mayorga DMD;  [facility];  Service: Maxillofacial    OPEN ANTERIOR SHOULDER RECONSTRUCTION Left 2018    OTHER SURGICAL HISTORY      sweat gland removal    RETAINED PLACENTA REMOVAL N/A 2020    Procedure: EXTRACTION OF CGARJOBO,BMBSDT;  Surgeon: Mi Dalton MD;  Location: [facility] ;  Service: Obstetrics     Immunization History   Administered Date(s) Administered    HPV Quadrivalent 2007    Tdap 2007, 2014    Tuberculin Skin Test-PPD Intradermal 2015         Family History   Problem Relation Age of Onset    Stroke Mother     Heart disease Mother     Seizures Mother     Hypertension Mother     Anxiety disorder Mother     Bipolar disorder Mother     Kidney disease Mother     Sudden death Father     No Known Problems Sister     No Known Problems Sister     No Known Problems Brother     No Known Problems Brother     No Known Problems Brother     Sudden death Brother 21        MVA    No Known Problems Son     Pancreatic cancer Maternal Grandmother     Seizures Maternal Grandmother     Diabetes Maternal Grandmother      Social History     Tobacco Use    Smoking status: Current Every Day Smoker     Packs/day: 0 25     Types: Cigarettes     Last attempt to quit: 2020     Years since quittin 4    Smokeless tobacco: Never Used    Tobacco comment: still smoking 3 day   Vaping Use    Vaping Use: Never used   Substance Use Topics    Alcohol use: Not Currently     Comment: social    Drug use: Never       Current Outpatient Medications:     Prenatal MV & Min w/FA-DHA ([facility] PRENATAL GUMMY PO), Take by mouth, Disp: , Rfl:     metroNIDAZOLE (FLAGYL) 500 mg tablet, , Disp: , Rfl:   Patient Active Problem List Diagnosis Date Noted    Prenatal care, subsequent pregnancy in first trimester 2022    Dental abscess 2022    Pregnancy 08/10/2022    Facial cellulitis 08/10/2022    Frequent UTI 2022    OAB (overactive bladder) 2022    Chronic migraine without aura without status migrainosus, not intractable 2021    Iron deficiency anemia due to chronic blood loss 2021    SIRS (systemic inflammatory response syndrome) ([facility] [address] ) 10/31/2021    Headache 10/31/2021    Anemia 10/31/2021    Hypokalemia 10/31/2021    Vitamin D deficiency 2020    History of bariatric surgery 03/15/2018    Tobacco abuse 03/15/2018       No Known Allergies    OB History    Para Term  AB Living   4 2 1 1 1 1   SAB IAB Ectopic Multiple Live Births   0 0 0 0 1      # Outcome Date GA Lbr Jun/2nd Weight Sex Delivery Anes PTL Lv   4 Current            3   16w0d          2 AB 2019 10w0d          1 Term 14 37w0d  2948 g (6 lb 8 oz) M Vag-Spont   SONAM      Complications: Gestational diabetes       Vitals:    22 1551   BP: 110/68   BP Location: Right arm   Patient Position: Sitting   Cuff Size: Standard   Weight: 86 6 kg (191 lb)   Height: 5' 4" (1 626 m)     Body mass index is 32 79 kg/m²

## 2022-09-06 NOTE — TELEPHONE ENCOUNTER
Please let patient know vitamin D and iron are deficient- does she still follow with her nutritionist from her bariatric surgery? If not can we refer her to ours?  Thanks

## 2022-09-06 NOTE — PATIENT INSTRUCTIONS
Pregnancy at 11 to 14 Weeks   AMBULATORY CARE:   Changes happening to your body: You are now at the end of your first trimester and entering your second trimester  Morning sickness usually goes away by this time  You may have other symptoms such as fatigue, frequent urination, and headaches  You may have gained 2 to 4 pounds by now  Seek care immediately if:   You have pain or cramping in your abdomen or low back  You have heavy vaginal bleeding or clotting  You pass material that looks like tissue or large clots  Collect the material and bring it with you  Call your doctor or obstetrician if:   You cannot keep food or drinks down, and you are losing weight  You have light vaginal bleeding  You have chills or a fever  You have vaginal itching, burning, or pain  You have yellow, green, white, or foul-smelling vaginal discharge  You have pain or burning when you urinate, less urine than usual, or pink or bloody urine  You have questions or concerns about your condition or care  How to care for yourself at this stage of your pregnancy:       Get plenty of rest   You may feel more tired than normal  You may need to take naps or go to bed earlier  Manage nausea and vomiting  Avoid fatty and spicy foods  Eat small meals throughout the day instead of large meals  Anna may help to decrease nausea  Ask your healthcare provider about other ways of decreasing nausea and vomiting  Eat a variety of healthy foods  Healthy foods include fruits, vegetables, whole-grain breads, low-fat dairy foods, beans, lean meats, and fish  Drink liquids as directed  Ask how much liquid to drink each day and which liquids are best for you  Limit caffeine to less than 200 milligrams each day  Limit your intake of fish to 2 servings each week  Choose fish low in mercury such as canned light tuna, shrimp, salmon, cod, or tilapia   Do not  eat fish high in mercury such as swordfish, tilefish, yamilka mackerel, and shark  Take prenatal vitamins as directed  Your need for certain vitamins and minerals, such as folic acid, increases during pregnancy  Prenatal vitamins provide some of the extra vitamins and minerals you need  Prenatal vitamins may also help to decrease the risk of certain birth defects  Do not smoke  Smoking increases your risk of a miscarriage and other health problems during your pregnancy  Smoking can cause your baby to be born too early or weigh less at birth  Ask your healthcare provider for information if you need help quitting  Do not drink alcohol  Alcohol passes from your body to your baby through the placenta  It can affect your baby's brain development and cause fetal alcohol syndrome (FAS)  FAS is a group of conditions that causes mental, behavior, and growth problems  Talk to your healthcare provider before you take any medicines  Many medicines may harm your baby if you take them when you are pregnant  Do not take any medicines, vitamins, herbs, or supplements without first talking to your healthcare provider  Never use illegal or street drugs (such as marijuana or cocaine) while you are pregnant  Safety tips during pregnancy:   Avoid hot tubs and saunas  Do not use a hot tub or sauna while you are pregnant, especially during your first trimester  Hot tubs and saunas may raise your baby's temperature and increase the risk of birth defects  Avoid toxoplasmosis  This is an infection caused by eating raw meat or being around infected cat feces  It can cause birth defects, miscarriages, and other problems  Wash your hands after you touch raw meat  Make sure any meat is well-cooked before you eat it  Avoid raw eggs and unpasteurized milk  Use gloves or ask someone else to clean your cat's litter box while you are pregnant  Changes happening with your baby: Your baby has fully formed fingernails and toenails  Your baby's heartbeat can now be heard   Ask your healthcare provider if you can listen to your baby's heartbeat  By week 14, your baby is over 4 inches long from the top of the head to the rump (baby's bottom)  Your baby weighs over 3 ounces  Prenatal care:  Prenatal care is a series of visits with your healthcare provider throughout your pregnancy  During the first 28 weeks of your pregnancy, you will see your healthcare provider 1 time each month  Prenatal care can help prevent problems during pregnancy and childbirth  Your healthcare provider will check your blood pressure and weight  Your baby's heart rate will also be checked  You may also need the following at some visits:  A pelvic exam  allows your healthcare provider to see your cervix (the bottom part of your uterus)  Your healthcare provider will use a speculum to open your vagina  He or she will check the size and shape of your uterus  Blood tests  may be done to check for any of the following:    Gestational diabetes or anemia (low iron level)    Blood type or Rh factor, or certain birth defects    Immunity to certain diseases, such as chickenpox or rubella    An infection, such as a sexually transmitted infection, HIV, or hepatitis B    Hepatitis B  may need to be prevented or treated  Hepatitis B is inflammation of the liver caused by the hepatitis B virus (HBV)  HBV can spread from a mother to her baby during delivery  You will be checked for HBV as early as possible in the first trimester of each pregnancy  You need the test even if you received the hepatitis B vaccine or were tested before  You may need to have an HBV infection treated before you give birth  Urine tests  may also be done to check for sugar and protein  These can be signs of gestational diabetes or preeclampsia  Urine tests may also be done to check for signs of infection  A fetal ultrasound  shows pictures of your baby inside your uterus  The pictures are used to check your baby's development, movement, and position  Genetic disorder screening tests  may be offered to you  These tests check your baby's risk for genetic disorders such as Down syndrome  A screening test includes a blood test and ultrasound  Follow up with your doctor or obstetrician as directed:  Go to all prenatal visits  Write down your questions so you remember to ask them during your visits  © Copyright [employer]  Information is for End User's use only and may not be sold, redistributed or otherwise used for commercial purposes  All illustrations and images included in CareNotes® are the copyrighted property of A D A M , Inc  or [facility] Ceci Tompkins   The above information is an  only  It is not intended as medical advice for individual conditions or treatments  Talk to your doctor, nurse or pharmacist before following any medical regimen to see if it is safe and effective for you  Valuable Online Resource:    [facility] pregnancy essential guide    http://precious langley/      On the right side of the screen is a 50 page guide providing valuable information about your entire pregnancy  On the left hand side of the site you will see several other links to great information and resources that [facility] offers     If you click on the tab that says "Pregnancy and Birth Packet" this opens another  guide to labor and delivery information as well as breast feeding information,  care, pediatricians, car seat safety and much more     The [facility] and [address] tab has a virtual tour of the new L&D unit, as well as valuable information about classes that are offered, breast feeding support, support groups and much more  I highly recommend the virtual Breast Feeding class, very informational even if you have breast fed in the past  Check for available dates ! Click around and enjoy all we have to offer!     Please note that all information in regards to locations and visiting hours have not been updated due to 2 Rosalba Garcia delivery location is [address] @ [address] [facility] [address] Round Ligament Pain   WHAT YOU NEED TO KNOW:   What is round ligament pain? Round ligament pain is caused when ligaments are stretched as your uterus (womb) gets bigger during pregnancy  Round ligaments are found on each side of your uterus  They are bands of tissue that hold the uterus in place  Round ligament pain happens most often during the second trimester  It is a normal part of pregnancy and should stop by the third trimester  The pain is not serious and will not hurt your baby  What are the signs and symptoms of round ligament pain? Pain on one or both sides of your lower abdomen or groin that may move up to your hip    Spasms in the muscles in your abdomen    Pain that lasts a few seconds    Pain that happens when you exercise, sneeze, change positions, or stand quickly    How is round ligament pain diagnosed? Your healthcare provider will examine you and ask about your pain  Tell the provider when the pain started, and if you feel it on one or both sides  Your provider may ask if anything helps the pain or makes it worse  What can I do to manage my pain? Round ligament pain does not need to be treated  The following may help make you more comfortable:  Rest as often as you can  Rest can help relieve round ligament pain  You might want to lie on the side that has pain  Place a pillow under your abdomen  Keep another pillow between your knees  Move slowly  Sudden movement can stretch the ligaments and cause pain  Stand, sit, and change positions slowly  Try to tighten the muscles in your hips before you sneeze or laugh  You can also sit down and bring your knees up toward your abdomen  This can help relieve tension on the ligaments  Exercise as directed  Gentle exercise can keep the ligaments loose and strengthen core (abdominal) muscles   An example is swimming, or a yoga program designed for pregnancy  Ask your healthcare provider which exercises are safe for you and how often to exercise  For most healthy women, a good goal is to try to get at least 30 minutes of exercise every day  If activity causes pain, try not to walk too long or too far at one time  Break your exercise up into short amounts  Apply a warm compress to the area  Warmth can relieve pain and muscle spasms  Ask your healthcare provider if you can take a warm bath or use a heating pad  Keep all heat settings low  High heat can be dangerous for your baby  Do not sit in a hot tub or use hot water in your bath  You may also be able to massage the area gently while you are applying heat  Massage can help relieve pain  Ask about pain medicines  Ask your healthcare provider before you take any medicine during pregnancy, including over-the-counter pain medicines  Your healthcare provider may recommend acetaminophen to relieve the pain  Ask how much to take and how often to take it  Follow directions  Acetaminophen can cause liver damage  Too much medicine can be harmful to your baby  When should I contact my healthcare provider? You have pain that is spreading to other parts of your body  You have new or worsening pain  You have pain that lasts longer than a few minutes at a time  You have questions or concerns about your condition or care  CARE AGREEMENT:   You have the right to help plan your care  Learn about your health condition and how it may be treated  Discuss treatment options with your healthcare providers to decide what care you want to receive  You always have the right to refuse treatment  The above information is an  only  It is not intended as medical advice for individual conditions or treatments  Talk to your doctor, nurse or pharmacist before following any medical regimen to see if it is safe and effective for you    © Copyright [employer] 2022 Information is for End User's use only and may not be sold, redistributed or otherwise used for commercial purposes  All illustrations and images included in CareNotes® are the copyrighted property of [employer] , [employer]  or [facility] Campbell Contractions   AMBULATORY CARE:   Mitra Wang contractions  are tightening and squeezing of the muscles of your uterus (womb) during pregnancy  The uterine muscles control the uterus  Clinton Christy contractions stop on their own  They are not true labor contractions and do not cause your cervix (opening to your uterus) to dilate (open)  Common symptoms include the following:   Pain or discomfort in your groin or lower abdomen that comes and goes    Your contractions are short, and do not last longer each time they happen    Your contractions do not get closer together each time    Your contractions do not get stronger or more painful each time    Your contractions stop when you change your position or rest    Seek care immediately if:   You have bleeding from your vagina  You have fluid leaking from your vagina that does not stop  You feel a gush of fluid from your vagina  Your contractions happen every 5 minutes or sooner, and last for more than 60 seconds  Your contractions begin to feel stronger or more painful  You feel a change in your baby's movement, or you feel fewer than 6 to 10 movements in an hour  Call your doctor or obstetrician if:   You have a fever  You have questions or concerns about your condition or care  Treatment for Clinton Christy contractions  may include pain medicine to relieve discomfort or pain or sedatives to relax the muscles of your uterus  If you are dehydrated, he or she may give you fluids through an IV or tell you to drink liquids  Self-care:   Change your activity or your position  when you feel contractions begin  Walk if you have been lying or sitting  Lie down if you have been standing or walking  True labor will not stop by changing your position or activity  Take a warm bath  to relax your body  Drink more liquids  to prevent dehydration  Ask how much liquid to drink each day and which liquids are best for you  Practice your labor breathing  to decrease your discomfort  This may help you get ready for true labor  Take slow, deep breaths, or fast, short breaths  Ask your healthcare provider how to practice labor breathing  Follow up with your doctor or obstetrician as directed:  Write down your questions so you remember to ask them during your visits  © Copyright [employer] 2022 Information is for End User's use only and may not be sold, redistributed or otherwise used for commercial purposes  All illustrations and images included in CareNotes® are the copyrighted property of A D A M , Inc  or Terrie Estrada  The above information is an  only  It is not intended as medical advice for individual conditions or treatments  Talk to your doctor, nurse or pharmacist before following any medical regimen to see if it is safe and effective for you  Bacterial Vaginosis   AMBULATORY CARE:   Bacterial vaginosis  is an infection in the vagina  It may cause vaginitis (irritation and inflammation of the vagina)  The cause is not known  Bacteria normally found in the vagina are imbalanced  Your risk increases if you are sexually active, you use a douche, or you have an intrauterine device (IUD)  Common signs and symptoms of bacterial vaginosis:   White, gray, or yellow vaginal discharge    Vaginal discharge that smells like fish    Itching or burning around the outside of your vagina    Call your doctor or gynecologist if:   Your symptoms come back or do not improve with treatment  You have vaginal bleeding that is not your monthly period  You have questions or concerns about your condition or care  Antibiotics  are given to kill the bacteria   They may be given as a pill or as a cream to put in your vagina  Bacterial vaginosis and pregnancy:  If you have bacterial vaginosis during pregnancy, your baby may be born early or have a low birth weight  Your healthcare provider may recommend testing for bacterial vaginosis before or during your pregnancy  He or she will talk to you about your risk for premature delivery, and make sure you know the benefits and risks of testing  Prevent bacterial vaginosis:   Keep your vaginal area clean and dry  Wear underwear and pantyhose with a cotton crotch  Wipe from front to back after you urinate or have a bowel movement  After you bathe, rinse soap from your vaginal area to decrease your risk for irritation  Do not use products that cause irritation  Always use unscented tampons or sanitary pads  Do not use feminine sprays, powders, or scented tampons  They may cause irritation and increase your risk for vaginosis  Detergents and fabric softeners may also cause irritation  Do not use a douche  This can cause an imbalance in healthy vaginal bacteria  Use latex condoms during sex  This helps prevent another infection and keeps your partner from getting the infection  Follow up with your doctor or gynecologist as directed: Bacterial vaginosis increases the risk for several health problems, such as pelvic inflammatory disease (PID) or sexually transmitted infections  Work with your healthcare providers to schedule regular appointments to check for health problems  Write down your questions so you remember to ask them during your visits  © Copyright [employer] 2022 Information is for End User's use only and may not be sold, redistributed or otherwise used for commercial purposes  All illustrations and images included in CareNotes® are the copyrighted property of A D A [employer] Inc  or Terrie Estrada  The above information is an  only  It is not intended as medical advice for individual conditions or treatments   Talk to your doctor, nurse or pharmacist before following any medical regimen to see if it is safe and effective for you  Perineal Hygiene     No soaps or feminine wash to the vulva  Use only water to cleanse, or water with Dove or [employer] if necessary  No lotion to the area  Use only coconut oil for moisture if needed   No douching     Cotton underware, loose fitting clothing  Only perfume-free, dye-free laundry detergent, use a second rinse cycle   Avoid fabric softeners/dryer sheets  Coconut oil as a lubricant (if not using condoms) or another scent-free lubricant (Astroglide, Uberlube) if needed  Partner to avoid the same products as well  Over the counter probiotic to restore vaginal enid may be helpful as well     You may also look into Boric Acid vaginal suppositories to restore vaginal PH balance for up to 2 weeks as directed on the box   You may not use these if you are pregnant

## 2022-09-07 NOTE — PROGRESS NOTES
[id number]  Vaginal delivery 1 SAB @ 16 weeks 1 AB( 12 weeks 2 days )  Pap 05/18/2020Negative HPV Negative   GC/CT:  PN1 Labs: 9/2/2022 Plus early glucose @104  Blood Type: A  Positive :   MFM Level 1: Schedule for 9/16/20222  MFM Level 2: schedule for 11/1/2022  AFP:   28 Week Labs:  TDap:  Flu:  GBS:   Blue Folder: Received at today's visit and reviewed with patient   Yellow Folder:  Ped Referral :  Breast pump:  L&D forms:  Delivery consent:

## 2022-09-08 ENCOUNTER — INITIAL PRENATAL (OUTPATIENT)
Dept: OBGYN CLINIC | Facility: MEDICAL CENTER | Age: 32
End: 2022-09-08
Payer: COMMERCIAL

## 2022-09-08 ENCOUNTER — TELEMEDICINE (OUTPATIENT)
Dept: PERINATAL CARE | Facility: CLINIC | Age: 32
End: 2022-09-08
Payer: COMMERCIAL

## 2022-09-08 VITALS
WEIGHT: 191 LBS | BODY MASS INDEX: 32.61 KG/M2 | HEIGHT: 64 IN | DIASTOLIC BLOOD PRESSURE: 68 MMHG | SYSTOLIC BLOOD PRESSURE: 110 MMHG

## 2022-09-08 DIAGNOSIS — Z34.81 PRENATAL CARE, SUBSEQUENT PREGNANCY IN FIRST TRIMESTER: Primary | ICD-10-CM

## 2022-09-08 DIAGNOSIS — B96.89 BV (BACTERIAL VAGINOSIS): ICD-10-CM

## 2022-09-08 DIAGNOSIS — R11.0 NAUSEA: ICD-10-CM

## 2022-09-08 DIAGNOSIS — N76.0 BV (BACTERIAL VAGINOSIS): ICD-10-CM

## 2022-09-08 DIAGNOSIS — Z98.84 HISTORY OF BARIATRIC SURGERY: Primary | ICD-10-CM

## 2022-09-08 DIAGNOSIS — E61.1 IRON DEFICIENCY: ICD-10-CM

## 2022-09-08 DIAGNOSIS — Z3A.12 12 WEEKS GESTATION OF PREGNANCY: ICD-10-CM

## 2022-09-08 DIAGNOSIS — K59.00 CONSTIPATION DURING PREGNANCY IN FIRST TRIMESTER: ICD-10-CM

## 2022-09-08 DIAGNOSIS — O99.611 CONSTIPATION DURING PREGNANCY IN FIRST TRIMESTER: ICD-10-CM

## 2022-09-08 DIAGNOSIS — E55.9 VITAMIN D DEFICIENCY: ICD-10-CM

## 2022-09-08 LAB
SL AMB  POCT GLUCOSE, UA: NEGATIVE
SL AMB POCT URINE PROTEIN: ABNORMAL

## 2022-09-08 PROCEDURE — 99213 OFFICE O/P EST LOW 20 MIN: CPT | Performed by: OBSTETRICS & GYNECOLOGY

## 2022-09-08 PROCEDURE — 87591 N.GONORRHOEAE DNA AMP PROB: CPT | Performed by: OBSTETRICS & GYNECOLOGY

## 2022-09-08 PROCEDURE — 87491 CHLMYD TRACH DNA AMP PROBE: CPT | Performed by: OBSTETRICS & GYNECOLOGY

## 2022-09-08 PROCEDURE — 97802 MEDICAL NUTRITION INDIV IN: CPT

## 2022-09-08 RX ORDER — METRONIDAZOLE 7.5 MG/G
1 GEL VAGINAL
Qty: 45 G | Refills: 0 | Status: SHIPPED | OUTPATIENT
Start: 2022-09-08 | End: 2022-09-13

## 2022-09-08 NOTE — PROGRESS NOTES
Virtual Regular Visit    Verification of patient location:    Patient is located in the following state in which I hold an active license [place]      Assessment/Plan:    Problem List Items Addressed This Visit        Other    History of bariatric surgery - Primary    Vitamin D deficiency    Pregnancy      Other Visit Diagnoses     Iron deficiency                   Reason for visit is   Chief Complaint   Patient presents with    Patient Education    Virtual Regular Visit        Encounter provider Benita Lizarraga    Provider located at [address] [place] [address] [id number] [phone]      Recent Visits  No visits were found meeting these conditions  Showing recent visits within past 7 days and meeting all other requirements  Today's Visits  Date Type Provider Dept   09/08/22 1401 [address]   Showing today's visits and meeting all other requirements  Future Appointments  No visits were found meeting these conditions  Showing future appointments within next 150 days and meeting all other requirements       The patient was identified by name and date of birth  Arvind Leija was informed that this is a telemedicine visit and that the visit is being conducted through [facility] and patient was informed this is a secure, HIPAA-complaint platform  She agrees to proceed     My office door was closed  No one else was in the room  She acknowledged consent and understanding of privacy and security of the video platform  The patient has agreed to participate and understands they can discontinue the visit at any time  Patient is aware this is a billable service  Subjective  Arvind Leija is a 29 y o  pregnant female        HPI     Past Medical History:   Diagnosis Date    Anemia     Gastric bypass status for obesity 2016    sleeve    Post partum depression 2014    Sleep apnea, obstructive     prior to weight loss surg       Past Surgical History:   Procedure Laterality Date    DILATION AND EVACUATION  2019        GASTRIC RESTRICTION SURGERY  2015    gastric sleeve    INCISION AND DRAINAGE OF WOUND Right 2022    Procedure: INCISION AND DRAINAGE (I&D) HEAD/FACE;  Surgeon: Danna Mayorga DMD;  [facility];  Service: Maxillofacial    MULTIPLE TOOTH EXTRACTIONS Right 2022    Procedure: EXTRACTION TEETH MULTIPLE;  Surgeon: Danna Mayorga DMD;  [facility];  Service: Maxillofacial    OPEN ANTERIOR SHOULDER RECONSTRUCTION Left 2018    OTHER SURGICAL HISTORY      sweat gland removal    RETAINED PLACENTA REMOVAL N/A 2020    Procedure: EXTRACTION OF FSOAQRQF,QXWUSK;  Surgeon: Mi Dalton MD;  [facility];  Service: Obstetrics       Current Outpatient Medications   Medication Sig Dispense Refill    Prenatal MV & Min w/FA-DHA ([facility] PR[facility] GUM[facility] PO) Take by mouth      metroNIDAZOLE (FLAGYL) 500 mg tablet  (Patient not taking: Reported on 2022)       No current facility-administered medications for this visit  No Known Allergies    Review of Systems    Video Exam    There were no vitals filed for this visit  Physical Exam     I spent 30 minutes with patient today in which greater than 50% of the time was spent in counseling/coordination of care regarding history of bariatric surgery and currently 12 wks pregnant      Thank you for referring your patient to [facility]  Florencio Flowers is a  29 y o  female who presents today for MNT counseling  Patient is at Unknown gestation, Estimated Date of Delivery: None noted        Reviewed and updated the following from patients medical record: PMH, Problem List, Allergies, and Current Medications  Visit Diagnosis:  s/p bariatric surgery 2016 gastric sleeve surgery completed at [facility]   Patient reported weight prior to surgery was 220 pounds   Patient completed recent 22 1 hr GTT result was 104  Patient reported no history of dumping syndrome and tolerated GTT well without problems       PMH/PSH:  Past Medical History:   Diagnosis Date    Anemia     Gastric bypass status for obesity 2016    sleeve    Post partum depression 2014    Sleep apnea, obstructive     prior to weight loss surg     Past Surgical History:   Procedure Laterality Date    DILATION AND EVACUATION  2019        GASTRIC RESTRICTION SURGERY  2015    gastric sleeve    INCISION AND DRAINAGE OF WOUND Right 2022    Procedure: INCISION AND DRAINAGE (I&D) HEAD/FACE;  Surgeon: Oz Mcghee DMD;  [facility];  Service: Maxillofacial    MULTIPLE TOOTH EXTRACTIONS Right 2022    Procedure: EXTRACTION TEETH MULTIPLE;  Surgeon: Oz Mcghee DMD;  [facility];  Service: Maxillofacial    OPEN ANTERIOR SHOULDER RECONSTRUCTION Left     OTHER SURGICAL HISTORY      sweat gland removal    RETAINED PLACENTA REMOVAL N/A 2020    Procedure: EXTRACTION OF DMQMTYYA,HFHGXG;  Surgeon: Gabriella Quintero MD;  [facility]: [facility] ;  Service: Obstetrics       Labs:    Lab Results   Component Value Date    GLUC 84 2022     Lab Results   Component Value Date    ALS1ZTKO14KT 104 2022     Lab Results   Component Value Date    [id number] 1 363 2020 HGA1c 5 5  Reviewed bariatric lab panel     Medications:  Prenatal multivitamin mineral supplement    Anthropometrics:  Ht Readings from Last 3 Encounters:   22 5' 4" (1 626 m)   22 5' 4 25" (1 632 m)   22 5' 4" (1 626 m)     Wt Readings from Last 3 Encounters:   22 85 3 kg (188 lb)   22 85 3 kg (188 lb)   22 77 5 kg (170 lb 13 7 oz)     Pre-gravid Weight: 188 pounds  Pre-gravid BMI: 32 25  Weight Change: stable weight status  Weight gain recommendations: IOM guidelines for weight gain based on pre-pregnancy BMI are 11-20 pounds  Comments: Patient has been struggling with consistent nausea without vomiting  Patient also reported a history of constipation prior to bariatric surgery and pregnancy  Patient reports typically week to week and half for a regular bowel movement  Recent Ultrasound for Growth Shows:   Noted 9/16/22 Nuchal Transluency scheduled and 11/1/22 Detailed ultrasound  9/8/22 initial OB appointment scheduled    Daily Calorie and Protein Requirements:  Calories: approximately 2200 calories  Protein: 65 gms protein    24- hr Diet Recall:  B:patient reported typically not eating breakfast but if she does she will eat eggs and sausage with water or juice  S: no snack  L (3 PM): Emily sandwich or [employer] french fries  S (depends on hunger) will stop for a french andrade or [employer] mozzarella sticks  D(6 to 8:30 PM): variety of foods -last night half of a chicken, shrimp quesadilla with sugar sweetened green tea    S (11 Pm): EMILY half wrap grilled chicken     Diet Review: Patient is in the process of moving and meals are not being prepared at home  Patient is frequently dining out at [facility] or getting foods from [facility]  Food aversion right now to dairy foods and beef and pork  Patient tries to delay liquid intake to 30 minutes after meals  Patient is not drinking water between meals  Sometimes will eat fresh fruit and vegetables and whole grains  Diet Instruction:  1  Encouraged meal pattern of 3 meals and 3 snacks daily  Suggested patient start her day earlier eating breakfast and consistently have snacks between meals to help meet nutritional needs and help with nausea and constipation  Suggested sipping on a water bottle between meals  Patient typically starts with protein foods first and will delay liquid intake 20 minutes after meals  2  Basic review of macronutrient's with appropriate amounts of carbohydrate, protein and fat at each meal and snack  3  Reviewed proper portion sizes of CHO, PRO and Fat via food models  4   Provided suggested meal/snack options to increase nutrition and maintain consistent  meal and snack intakes  Encouraged preparing meals and freezing them for easier food preparation during her move  Dining out ideas also discussed  Supplements like Fairlife protein shake, CIB and Glucerna were discussed  Patient has a dairy food aversion at the present time but is agreeable to try later in her pregnancy if needed for snacks between meals  Dislikes Premier shakes  5  Instructed on how to keep a 3-day food diary to be brought to next appointment    Physical Activity Assessment:  Exercise (type/frequency): 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  Does not exercise regularly       Discussed benefits of physical activity in maintaining healthy weight in pregnancy, and encouraged activity as physically able  Always consult a physician prior to starting an exercise program  Recommend 20-30 minutes daily  Goals:  1  Appropriate weight gain in pregnancy  2  Compliance to meal plan  3  Compliance to exercise plan (if applicable)  4  Patient completed early screening for GDM  Patient tolerated 1 hr GTT and would prefer to complete another GTT between 24-28 wks versus 1 week of SMBG  5  Suggested patient discuss at her initial OB appointment today consistent nausea, and also iron supplements she was advised to take but has not yet started  Requested patient also report constipation concerns as well  Learner/s Present:Learners Present: Patient   Barriers to Learning/Change: no barriers    Expected Compliance: good    Begin Time: 11:00 am  End Time: 11:30 am    F/U: no follow up was scheduled today  Provided with contact number [phone] if a follow up is needed in future    It was a pleasure working with them today  Please feel free to call with any questions or concerns      Nancy Mcclendon, RD,LDN,CDE  Diabetes Educator  [facility]  Diabetes in Pregnancy Program  [address] [place] [address],[address]  [address]

## 2022-09-08 NOTE — PROGRESS NOTES
Problem   Prenatal Care, Subsequent Pregnancy in First Trimester    Blood Type: A  Rhogam:   Pap 2020  GC/CT   PN1 Labs: completed 1 hr Glucola Normal       H&H: 10 , ferritin 7, TIBC 484, Vit D 11 6,   Has not started iron supplements due to nausea  Unable to take metronidazole for BV change to Metrogel  28 Week Labs:  AFP:  Level 1:,   Level 2: 22  Tdap:  Flu:   GBS swab:     Blue folder:given   Yellow folder:     Breast pump order:  L&D forms:    Delivery consent:   IOL:              Prenatal care, subsequent pregnancy in first trimester  PN1  Yary Martinez is a 29y o  year old [id number] at [address] who presents for initial prenatal visit  Planned pregnancy  Gastric Bypass patient   Denies complaints  Denies pelvic pain, bleeding, LOF  FOB Elliott Done   Exam WNL  Prenatal labs WNL  Early Glucose 104,  Rh pos  2020 Neg,  GC/CT sent today  Works out of her home   Recent  loss at 16 weeks in   Has appt with [facility] on    Recent diagnosis of B the patient was prescribed Flagyl p o  patient cannot tolerate it orally, prescription change to Metrogel patient instructed on use patient advised to be sure and complete the entire treatment as low level or untreated infections can lead to  labor or loss of pregnancy  Patient has not started supplemental iron yet, she is still experiencing nausea and has a hard time eating or keeping anything down  Patient will start once nausea passes, patient did have an appointment with the nutritionist based on her bariatric history    Patient Education: Patient was counseled regarding diet, exercise, weight gain, foods to avoid, vaccines in pregnancy, aneuploidy screening, travel precautions to include seat belt use and VTE risk reduction    She has been provided our pregnancy packet which includes pregnancy warning signs,how and when to contact providers, visit intervals, Maternal fetal medicine information, medication recommendations that are safe during pregnancy, dietary suggestions, activity recommendations, breastfeeding information as well as websites for additional information, and delivery location      Past Medical History:   Diagnosis Date    Anemia     Gastric bypass status for obesity 2016    sleeve    Post partum depression 2014    Sleep apnea, obstructive     prior to weight loss surg     Past Surgical History:   Procedure Laterality Date    DILATION AND EVACUATION  2019        GASTRIC RESTRICTION SURGERY  2015    gastric sleeve    INCISION AND DRAINAGE OF WOUND Right 2022    Procedure: INCISION AND DRAINAGE (I&D) HEAD/FACE;  Surgeon: Wanetta Canavan, DMD;  [facility];  Service: Maxillofacial    MULTIPLE TOOTH EXTRACTIONS Right 2022    Procedure: EXTRACTION TEETH MULTIPLE;  Surgeon: Wanetta Canavan, DMD;  [facility];  Service: Maxillofacial    OPEN ANTERIOR SHOULDER RECONSTRUCTION Left     OTHER SURGICAL HISTORY      sweat gland removal    RETAINED PLACENTA REMOVAL N/A 2020    Procedure: EXTRACTION OF QKAQWQAL,VRUFSE;  Surgeon: Aixa Martins MD;  [facility] ;  Service: Obstetrics     Immunization History   Administered Date(s) Administered    HPV Quadrivalent 2007    Tdap 2007, 2014    Tuberculin Skin Test-PPD Intradermal 2015         Family History   Problem Relation Age of Onset    Stroke Mother     Heart disease Mother     Seizures Mother     Hypertension Mother     Anxiety disorder Mother     Bipolar disorder Mother     Kidney disease Mother     Sudden death Father     No Known Problems Sister     No Known Problems Sister     No Known Problems Brother     No Known Problems Brother     No Known Problems Brother     Sudden death Brother 21        MVA    No Known Problems Son     Pancreatic cancer Maternal Grandmother     Seizures Maternal Grandmother     Diabetes Maternal Grandmother      Social History     Tobacco Use    Smoking status: Current Every Day Smoker     Packs/day: 0 25     Types: Cigarettes     Last attempt to quit: 2020     Years since quittin 4    Smokeless tobacco: Never Used    Tobacco comment: still smoking 3 day   Vaping Use    Vaping Use: Never used   Substance Use Topics    Alcohol use: Not Currently     Comment: social    Drug use: Never       Current Outpatient Medications:     Prenatal MV & Min w/FA-DHA ([facility] PRENATAL GUMMY PO), Take by mouth, Disp: , Rfl:     metroNIDAZOLE (FLAGYL) 500 mg tablet, , Disp: , Rfl:   Patient Active Problem List    Diagnosis Date Noted    Prenatal care, subsequent pregnancy in first trimester 2022    Dental abscess 2022    Pregnancy 08/10/2022    Facial cellulitis 08/10/2022    Frequent UTI 2022    OAB (overactive bladder) 2022    Chronic migraine without aura without status migrainosus, not intractable 2021    Iron deficiency anemia due to chronic blood loss 2021    [facility]ic inflammatory response syndrome) [facility] 10/31/2021    Headache 10/31/2021    Anemia 10/31/2021    Hypokalemia 10/31/2021    Vitamin D deficiency 2020    History of bariatric surgery 03/15/2018    Tobacco abuse 03/15/2018       No Known Allergies    OB History    Para Term  AB Living   4 2 1 1 1 1   SAB IAB Ectopic Multiple Live Births   0 0 0 0 1      # Outcome Date GA Lbr Jun/2nd Weight Sex Delivery Anes PTL Lv   4 Current            3   16w0d          2 AB 2019 10w0d          1 Term 14 37w0d  2948 g (6 lb 8 oz) M Vag-Spont   SONAM      Complications: Gestational diabetes       Vitals:    22 1551   BP: 110/68   BP Location: Right arm   Patient Position: Sitting   Cuff Size: Standard   Weight: 86 6 kg (191 lb)   Height: 5' 4" (1 626 m)     Body mass index is 32 79 kg/m²

## 2022-09-09 LAB
C TRACH DNA SPEC QL NAA+PROBE: NEGATIVE
N GONORRHOEA DNA SPEC QL NAA+PROBE: NEGATIVE

## 2022-09-20 ENCOUNTER — TELEPHONE (OUTPATIENT)
Dept: PERINATAL CARE | Facility: CLINIC | Age: 32
End: 2022-09-20

## 2022-09-20 ENCOUNTER — TELEMEDICINE (OUTPATIENT)
Dept: PERINATAL CARE | Facility: CLINIC | Age: 32
End: 2022-09-20

## 2022-09-20 DIAGNOSIS — O09.292 HISTORY OF PREGNANCY LOSS IN PRIOR PREGNANCY, CURRENTLY PREGNANT IN SECOND TRIMESTER: ICD-10-CM

## 2022-09-20 DIAGNOSIS — O35.2XX0 HEREDITARY DISEASE IN FAMILY POSSIBLY AFFECTING FETUS, AFFECTING MANAGEMENT OF MOTHER IN PREGNANCY, SINGLE OR UNSPECIFIED FETUS: ICD-10-CM

## 2022-09-20 DIAGNOSIS — Z31.5 ENCOUNTER FOR PROCREATIVE GENETIC COUNSELING: ICD-10-CM

## 2022-09-20 DIAGNOSIS — Z36.0 ENCOUNTER FOR ANTENATAL SCREENING FOR CHROMOSOMAL ANOMALIES: Primary | ICD-10-CM

## 2022-09-20 DIAGNOSIS — Z82.79 FAMILY HISTORY OF DOWN SYNDROME: ICD-10-CM

## 2022-09-20 PROCEDURE — NC001 PR NO CHARGE: Performed by: GENETIC COUNSELOR, MS

## 2022-09-20 NOTE — PROGRESS NOTES
Genetic Counseling   High-Risk Gestation Note    Appointment Date:  2022  Referred By: Ana Maira Cancino, *  YOB: 1990  Partner:  Reba Winston  Indication for Visit:  personal and/or family history of chromosomal abnormality:  Down syndrome and family history of autism; prenatal testing and screening options  Pregnancy History:   Estimated Date of Delivery: 3/21/23  Estimated Gestational Age: 14w0d      Virtual Regular Visit    Verification of patient location:    Patient is located in the following state in which I hold an active license [place]      Assessment/Plan:    Problem List Items Addressed This Visit    None     Visit Diagnoses     Encounter for  screening for chromosomal anomalies    -  Primary    History of pregnancy loss in prior pregnancy, currently pregnant in second trimester        Family history of Down syndrome        Hereditary disease in family possibly affecting fetus, affecting management of mother in pregnancy, single or unspecified fetus        Encounter for procreative genetic counseling                   Reason for visit is   Chief Complaint   Patient presents with    Virtual Regular Visit        Encounter provider Suki Rich    Provider located at [address] [place] [id number] [phone]      Recent Visits  No visits were found meeting these conditions  Showing recent visits within past 7 days and meeting all other requirements  Today's Visits  Date Type Provider Dept   22 Telephone Geetha Purvis   Showing today's visits and meeting all other requirements  Future Appointments  No visits were found meeting these conditions  Showing future appointments within next 150 days and meeting all other requirements       The patient was identified by name and date of birth   Louiseshay Morillo was informed that this is a telemedicine visit and that the visit is being conducted through [facility] and patient was informed this is a secure, HIPAA-complaint platform  She agrees to proceed     My office door was closed  No one else was in the room  She acknowledged consent and understanding of privacy and security of the video platform  The patient has agreed to participate and understands they can discontinue the visit at any time  Ethan Grace is a 29 y o  female who presented with her partner for genetic counseling to discuss prenatal testing and screening options, as well as the above mentioned family history  The patient had a prior pregnancy loss at 16 weeks gestation  She presented to the ER with bleeding and cramping, where on manual exam no cervix was palpable with membranes and fetal parts palpated in vagina  Angelina Portillo had a vaginal delivery of a nonviable fetus and subsequent D&E procedure for retained placenta  No abnormalities were noted during that pregnancy and she had a normal Qnatal NIPT result indicating a female fetus  We discussed that based on this history a cervical check/transvaginal ultrasound would likely be indicated at 16 weeks gestation  I informed her that I will discuss this history with one of our [facility] providers and our schedulers will reach out to her to make an ultrasound appointment  We discussed that the risk of Down syndrome at age 34 at delivery is 1/741, and the risk for any chromosomal abnormality at this age is 1/385  The risks, benefits, and limitations of amniocentesis were discussed with the patient  Amniocentesis is performed under direct real time ultrasound visualization to avoid both the fetus and the placenta  Once amniotic fluid is withdrawn, laboratory analysis is performed and amniotic fluid alpha-fetoprotein, as well as chromosome and/or microarray analysis is undertaken    The risk of genetic amniocentesis includes, but is not limited to less than 1 in 300 pregnancy loss rate or  delivery rate if 23 weeks or greater, infection, bleeding, rupture of membranes, failure of cells to grow, karyotype error, laboratory error, etc   Occasionally a repeat amniocentesis is necessary due to cell culture failure  Chromosome/microarray analysis from amniocentesis is 99 9% accurate and alpha-fetoprotein analysis can detect approximately 95% of open neural tube defects  We reviewed the testing option of cell free fetal DNA screening (also known as noninvasive prenatal testing or NIPT)  We discussed that it is a serum test to identify fragments of placental DNA in maternal blood  We reviewed the benefits and limitations of cell free fetal DNA screening in detecting Down syndrome, Trisomy 13, Trisomy 25 and sex chromosome aneuploidies  We also discussed that cell free fetal DNA screening does not detect additional chromosomal abnormalities and the possibility of a failed test result  As cell free fetal DNA screening does not detect open neural tube defects, MSAFP screening is available at 15-20 weeks gestation  Quad screening consists of second trimester biochemical analysis of four analytes  It is able to detect approximately 81% of pregnancies in which the fetus has Down syndrome, 75% of pregnancies in which the fetus has trisomy 25 and 85% of pregnancies which the fetus has an open neural tube defect  It can also indirectly identify other chromosomal abnormalities, copy number variants, genetic syndromes, or adverse pregnancy outcomes if serum analyte levels are abnormal     We reviewed that level II anatomy ultrasound is typically performed at approximately 20 weeks gestation  Level II ultrasound evaluation is between 60-80% accurate in detecting major physical birth defects and variations in fetal development that may be associated with chromosome/genetic abnormalities  Level II ultrasound evaluation is not able to detect all birth defects or health problems      After discussing the available prenatal screening and testing options Tamiko Lopez elected to pursue cell free fetal DNA screening  The patient was scheduled for a nurse visit for an [employer] blood draw on 9/22/22 at our [facility] location  She was informed that the results will disclose the fetal sex and will be available in her MyChart to review  Results take approximately 7-10 days  The patient declined amniocentesis secondary to procedural related complications  She may reconsider diagnostic testing should the cell free fetal DNA screening come back abnormal   Tamiko Lopez is also planning on pursuing MSAFP screening and Level II ultrasound at the appropriate times  Histories for the patient and her partner's family were taken during our session  Tamiko Lopez reports a niece (her maternal brother's daughter) with Down syndrome  She states that she has typical facies but is not aware of any genetic testing to confirm full Trisomy 21  We reviewed that approximately 95% of cases of Down syndrome are considered sporadic secondary to chromosomal nondisjunction  Therefore the risk would not be increased over the patient's age related risk for the pregnancy  Another 2-3% of cases are secondary to chromosomal translocations  Should the patient be a carrier of a balanced translocation the risk for the pregnancy to be affected could be substantially higher  Therefore, peripheral chromosome analysis was offered which the patient may consider at a later time  Serjio Knutson has a maternal cousin with autism, etiology unknown  We reviewed the general population risk for a diagnosis of autism, which is estimated at approximately 1/  We also reviewed the possible etiologies of autism, including multifactorial and genetic  Autism may be secondary to a chromosomal abnormality or single gene disorder; a genetic cause can now be identified in up to approximately 35-40% of cases    However there are over 800 genes associated with autism that have been identified to date, making a prenatal diagnosis and risk assessment difficult without records on the affected individual   Further review of family history for the patient and her partner was noncontributory  The family history was not significant for other genetic diseases or disorders, intellectual disability, birth defects, fetal loss, or consanguinity  Patient reports being of  descent and that her partner is of [place] descent  She denies either of them having known Ashkenazi Buddhism ancestry  The benefits and limitations of Cystic fibrosis (CF), Spinal muscular atrophy (SMA), hemoglobinopathy, and expanded carrier screening was discussed  The patient was unsure of pursuing carrier screening at this time and elected to further consider the options  Should she decide to have any of the blood work performed she will contact our office or her OB office  Lastly, we discussed the fact that everyone in the general population regardless of age, family history, or medical background has approximately a 3-5% risk of having a child with some type of congenital anomaly, genetic disease or intellectual disability  Currently there are no tests available to rule out all birth defects or health problems  Briannesilvino Floresmani was provided with our contact information  I encouraged her to call with any questions or concerns  Plan/Tests Ordered:  1) Patient declined amniocentesis, Quad screen, peripheral chromosome analysis, and expanded carrier screening  2) Patient elected NIPT - nurse visit for [employer] blood draw scheduled for 9/22/22 at our [facility] location  3) MSAFP screening at 15-20 weeks gestation - to be ordered by patient OB office  4) Transvaginal ultrasound at 16 weeks gestation or per [facility] discretion  5) Level II anatomy ultrasound scheduled for 11/1/22          HPI     Past Medical History:   Diagnosis Date    Anemia     Gastric bypass status for obesity 2016    sleeve    Post partum depression 2014    Sleep apnea, obstructive     prior to weight loss surg       Past Surgical History:   Procedure Laterality Date    DILATION AND EVACUATION  2019        GASTRIC RESTRICTION SURGERY  2015    gastric sleeve    INCISION AND DRAINAGE OF WOUND Right 2022    Procedure: INCISION AND DRAINAGE (I&D) HEAD/FACE;  Surgeon: Marian Jiménez DMD;  Location: [facility];  Service: Maxillofacial    MULTIPLE TOOTH EXTRACTIONS Right 2022    Procedure: EXTRACTION TEETH MULTIPLE;  Surgeon: Marian Jiménez DMD;  [facility]: [facility];  Service: Maxillofacial    OPEN ANTERIOR SHOULDER RECONSTRUCTION Left 2018    OTHER SURGICAL HISTORY      sweat gland removal    RETAINED PLACENTA REMOVAL N/A 2020    Procedure: EXTRACTION OF ELIZABETH,BXOXGI;  Surgeon: Briseida Asif MD;  Location: [facility] ;  Service: Obstetrics       Current Outpatient Medications   Medication Sig Dispense Refill    Prenatal MV & Min w/FA-DHA ([facility] G[facility] PO) Take by mouth       No current facility-administered medications for this visit  No Known Allergies    Review of Systems    Video Exam    There were no vitals filed for this visit      Physical Exam     I spent 45 minutes directly with the patient during this visit

## 2022-09-20 NOTE — TELEPHONE ENCOUNTER
Spoke with Dr Beulah John about patient's prior pregnancy loss at 16 weeks gestation, likely from cervical insufficiency  Patient did not have NT ultrasound or MFM consult yet in current pregnancy  Dr Beulah John would like patient to be seen as soon as possible for early anatomy/transvaginal ultrasound as cerclage may be an option  Two attempts made to reach patient but both times "phone was not accepting calls" and no voicemail is available  MyChart message sent with two available appointment slots and M phone number to call to get scheduled

## 2022-09-23 ENCOUNTER — CLINICAL SUPPORT (OUTPATIENT)
Dept: PERINATAL CARE | Facility: OTHER | Age: 32
End: 2022-09-23
Payer: COMMERCIAL

## 2022-09-23 DIAGNOSIS — Z3A.12 12 WEEKS GESTATION OF PREGNANCY: ICD-10-CM

## 2022-09-23 PROCEDURE — 36415 COLL VENOUS BLD VENIPUNCTURE: CPT

## 2022-10-04 ENCOUNTER — APPOINTMENT (OUTPATIENT)
Dept: LAB | Facility: MEDICAL CENTER | Age: 32
End: 2022-10-04
Payer: COMMERCIAL

## 2022-10-04 ENCOUNTER — ROUTINE PRENATAL (OUTPATIENT)
Dept: OBGYN CLINIC | Facility: MEDICAL CENTER | Age: 32
End: 2022-10-04
Payer: COMMERCIAL

## 2022-10-04 ENCOUNTER — TELEPHONE (OUTPATIENT)
Dept: OBGYN CLINIC | Facility: CLINIC | Age: 32
End: 2022-10-04

## 2022-10-04 VITALS — DIASTOLIC BLOOD PRESSURE: 72 MMHG | BODY MASS INDEX: 33.57 KG/M2 | WEIGHT: 195.6 LBS | SYSTOLIC BLOOD PRESSURE: 110 MMHG

## 2022-10-04 DIAGNOSIS — Z33.1 INCIDENTAL PREGNANCY: ICD-10-CM

## 2022-10-04 DIAGNOSIS — Z87.59 HISTORY OF POSTPARTUM DEPRESSION: ICD-10-CM

## 2022-10-04 DIAGNOSIS — Z36.9 ENCOUNTER FOR ANTENATAL SCREENING: ICD-10-CM

## 2022-10-04 DIAGNOSIS — Z86.59 HISTORY OF POSTPARTUM DEPRESSION: ICD-10-CM

## 2022-10-04 DIAGNOSIS — O99.842 PREGNANCY AFFECTED BY PREVIOUS BARIATRIC SURGERY, CURRENTLY IN SECOND TRIMESTER: ICD-10-CM

## 2022-10-04 DIAGNOSIS — O09.292 HISTORY OF PREGNANCY LOSS IN PRIOR PREGNANCY, CURRENTLY PREGNANT IN SECOND TRIMESTER: ICD-10-CM

## 2022-10-04 DIAGNOSIS — Z86.32 HISTORY OF GESTATIONAL DIABETES: ICD-10-CM

## 2022-10-04 DIAGNOSIS — Z3A.16 16 WEEKS GESTATION OF PREGNANCY: ICD-10-CM

## 2022-10-04 DIAGNOSIS — Z34.82 PRENATAL CARE, SUBSEQUENT PREGNANCY, SECOND TRIMESTER: Primary | ICD-10-CM

## 2022-10-04 PROCEDURE — 36415 COLL VENOUS BLD VENIPUNCTURE: CPT

## 2022-10-04 PROCEDURE — 99213 OFFICE O/P EST LOW 20 MIN: CPT | Performed by: PHYSICIAN ASSISTANT

## 2022-10-04 PROCEDURE — 82105 ALPHA-FETOPROTEIN SERUM: CPT

## 2022-10-04 NOTE — PROGRESS NOTES
Problem List Items Addressed This Visit        Other    16 weeks gestation of pregnancy     Jong Viveros  is a 29 y o  [id number] @16w0d who presents for routine prenatal visit  Denies OB complaints  NIPT - low risk  MASFP ordered  Level II scheduled for 22     + flutters  Denies contractions, cramping, leakage of fluid or vaginal bleeding  Reviewed PTL precautions and reasons to call  History of pregnancy loss in prior pregnancy, currently pregnant in second trimester     16 wk loss with vaginal delivery and D&C for retained placenta in   Placental pathology with some signs of bacterial colonization, but per chart never had clinically apparent infection or received antibiotics  She missed appt for Level I US  Recommended calling [facility] for scheduling  Reviewed reasoning for US sooner that 20 week US  She is agreeable to this  Number for office and referral provided  NIPT - low risk  AFP ordered with instructions  Relevant Orders    Ambulatory Referral to Maternal Fetal Medicine    Pregnancy affected by previous bariatric surgery, currently in second trimester     Recommended f/u with bariatric surgery and nutritional consult due to prior gastric sleeve, low vitamin d and iron deficiency anemia  Recommended starting PO iron separate from her PNV and switching from gummy to capsule form  Reviewed side effect of constipation and supportive therapies for this  Reviewed dietary intake as well  Relevant Orders    Ambulatory Referral to Bariatric Surgery    History of postpartum depression    History of gestational diabetes     Early 1 hr normal  Encouraged healthy diet              Other Visit Diagnoses     Prenatal care, subsequent pregnancy, second trimester    -  Primary    Incidental pregnancy        Relevant Orders    Alpha fetoprotein, maternal    Encounter for  screening        Relevant Orders    Alpha fetoprotein, maternal

## 2022-10-04 NOTE — PROGRESS NOTES
Patient here for PN visit  She denies any complaints  She is not feeling movement yet  Level II US scheduled 11/1/22  AFP screen order given today  She did not provide a urine sample today

## 2022-10-04 NOTE — ASSESSMENT & PLAN NOTE
16 wk loss with vaginal delivery and D&C for retained placenta in 2020  Placental pathology with some signs of bacterial colonization, but per chart never had clinically apparent infection or received antibiotics  She missed appt for Level I US  Recommended calling [facility] for scheduling  Reviewed reasoning for US sooner that 20 week US  She is agreeable to this  Number for office and referral provided  NIPT - low risk  AFP ordered with instructions

## 2022-10-04 NOTE — TELEPHONE ENCOUNTER
----- Message from Marcia Durant PA-C sent at 10/4/2022 12:18 PM EDT -----  Regarding: needs appt with MFM  Can you call over to [facility] and help to get [facility] scheduled for ultrasound  She missed her appt for Level I on 9/16/22  She was counseled at [address] today of US sooner than 20 week secondary to hx of prior 16 w loss       ThanksOumou

## 2022-10-04 NOTE — ASSESSMENT & PLAN NOTE
Recommended f/u with bariatric surgery and nutritional consult due to prior gastric sleeve, low vitamin d and iron deficiency anemia  Recommended starting PO iron separate from her PNV and switching from gummy to capsule form  Reviewed side effect of constipation and supportive therapies for this  Reviewed dietary intake as well

## 2022-10-04 NOTE — ASSESSMENT & PLAN NOTE
Freedom Blankenship  is a 29 y o  [id number] @16w0d who presents for routine prenatal visit  Denies OB complaints  NIPT - low risk  MASFP ordered  Level II scheduled for 11/1/22     + flutters  Denies contractions, cramping, leakage of fluid or vaginal bleeding  Reviewed PTL precautions and reasons to call

## 2022-10-07 LAB
2ND TRIMESTER 4 SCREEN SERPL-IMP: NORMAL
AFP ADJ MOM SERPL: 1.03
AFP INTERP AMN-IMP: NORMAL
AFP INTERP SERPL-IMP: NORMAL
AFP INTERP SERPL-IMP: NORMAL
AFP SERPL-MCNC: 33.6 NG/ML
AGE AT DELIVERY: 31.1 YR
GA METHOD: NORMAL
GA: 16 WEEKS
IDDM PATIENT QL: NO
MULTIPLE PREGNANCY: NO
NEURAL TUBE DEFECT RISK FETUS: NORMAL %

## 2022-10-11 ENCOUNTER — ROUTINE PRENATAL (OUTPATIENT)
Dept: PERINATAL CARE | Facility: OTHER | Age: 32
End: 2022-10-11
Payer: COMMERCIAL

## 2022-10-11 VITALS
SYSTOLIC BLOOD PRESSURE: 114 MMHG | HEIGHT: 64 IN | WEIGHT: 195 LBS | HEART RATE: 74 BPM | BODY MASS INDEX: 33.29 KG/M2 | DIASTOLIC BLOOD PRESSURE: 60 MMHG

## 2022-10-11 DIAGNOSIS — Z34.81 PRENATAL CARE, SUBSEQUENT PREGNANCY, FIRST TRIMESTER: ICD-10-CM

## 2022-10-11 DIAGNOSIS — Z3A.17 17 WEEKS GESTATION OF PREGNANCY: ICD-10-CM

## 2022-10-11 DIAGNOSIS — O99.212 MATERNAL OBESITY, ANTEPARTUM, SECOND TRIMESTER: ICD-10-CM

## 2022-10-11 DIAGNOSIS — O09.892 HISTORY OF PREMATURE DELIVERY, CURRENTLY PREGNANT, SECOND TRIMESTER: Primary | ICD-10-CM

## 2022-10-11 DIAGNOSIS — O99.842 BARIATRIC SURGERY STATUS COMPLICATING PREGNANCY, SECOND TRIMESTER: ICD-10-CM

## 2022-10-11 PROCEDURE — 76805 OB US >/= 14 WKS SNGL FETUS: CPT | Performed by: OBSTETRICS & GYNECOLOGY

## 2022-10-11 PROCEDURE — 76817 TRANSVAGINAL US OBSTETRIC: CPT | Performed by: OBSTETRICS & GYNECOLOGY

## 2022-10-11 PROCEDURE — 99215 OFFICE O/P EST HI 40 MIN: CPT | Performed by: OBSTETRICS & GYNECOLOGY

## 2022-10-11 NOTE — LETTER
October 12, 2022     Gisselle [place], [address]  [address] [address]    Patient: Sharda Kothari   YOB: 1990   Date of Visit: 10/11/2022       Dear Dr Roverto Oseguera: Thank you for referring Sharda Kothari to me for evaluation  Below are my notes for this consultation  If you have questions, please do not hesitate to call me  I look forward to following your patient along with you  Sincerely,        Kym Matute MD        CC: No Recipients  Kym Matute MD  10/10/2022 11:12 AM  Sign when Signing Visit  Please refer to the [facility] ultrasound report in Ob Procedures for additional information regarding today's visit

## 2022-10-12 RX ORDER — ASPIRIN 81 MG/1
162 TABLET, CHEWABLE ORAL DAILY
Qty: 180 TABLET | Refills: 1 | Status: SHIPPED | OUTPATIENT
Start: 2022-10-12 | End: 2023-01-10

## 2022-10-21 ENCOUNTER — DOCUMENTATION (OUTPATIENT)
Dept: PERINATAL CARE | Facility: CLINIC | Age: 32
End: 2022-10-21

## 2022-10-21 ENCOUNTER — TELEPHONE (OUTPATIENT)
Dept: PERINATAL CARE | Facility: OTHER | Age: 32
End: 2022-10-21

## 2022-10-21 NOTE — TELEPHONE ENCOUNTER
----- Message from Carol Dasilva sent at 10/21/2022 11:57 AM EDT -----  Regarding: Prior Auth  Hello,  Patient called 10/21 stating she is waiting for her Progesterone prior auth  Was ordered on 10/11 and sent to [facility]  Thank you,  Kay TILLEY

## 2022-10-21 NOTE — PROGRESS NOTES
Received a call from Giuliana Buckley  from [facility] with medical approval for vaginal progesterone  Approval # [id number]    Phone # [phone]  Jaclyn Mcfarland did state that patient's copay will be $1 and [employer] and [employer] is aware of patient's approval for the vaginal progesterone  Jaclyn Mcfarland will notify the patient as well

## 2022-10-21 NOTE — TELEPHONE ENCOUNTER
Dr Karan To,    I spoke with Osmin Cuellar, RN today and clarified vaginal progesterone was ordered by Dr Karan Oglesby submitted a prior authorization on 10/13/2022  [facility] pharmacy is attempting to have [facility] cover vaginal progesterone  Initial authorization from 10/13/2022 was rejected because compound ingredients need to added to prior authorization which was submitted this afternoon  Asked [address] [place] [address] @ [phone] to run medication in their system but continues to be "rejected" for ingredients  Explained to pharmacy technician compound ingredients were submitted today  Called and spoke with patient @ 1320  Patient is aware vaginal progesterone may not be approved today but please check with [facility] pharmacy on Monday  Any issues give our office a call

## 2022-10-27 ENCOUNTER — ROUTINE PRENATAL (OUTPATIENT)
Dept: PERINATAL CARE | Facility: OTHER | Age: 32
End: 2022-10-27
Payer: COMMERCIAL

## 2022-10-27 VITALS
HEART RATE: 99 BPM | HEIGHT: 64 IN | BODY MASS INDEX: 33.36 KG/M2 | DIASTOLIC BLOOD PRESSURE: 60 MMHG | SYSTOLIC BLOOD PRESSURE: 110 MMHG | WEIGHT: 195.4 LBS

## 2022-10-27 DIAGNOSIS — O09.292 PRIOR PERINATAL LOSS IN SECOND TRIMESTER, ANTEPARTUM: Primary | ICD-10-CM

## 2022-10-27 DIAGNOSIS — Z03.75 ENCOUNTER FOR SUSPECTED CERVICAL SHORTENING RULED OUT: ICD-10-CM

## 2022-10-27 DIAGNOSIS — Z3A.19 19 WEEKS GESTATION OF PREGNANCY: ICD-10-CM

## 2022-10-27 PROCEDURE — 76817 TRANSVAGINAL US OBSTETRIC: CPT | Performed by: OBSTETRICS & GYNECOLOGY

## 2022-10-27 NOTE — LETTER
October 27, 2022     [place], [address]   [address] [place] [address]    Patient: Sofi Hannah   YOB: 1990   Date of Visit: 10/27/2022       Dear Dr Thomas Capone: Thank you for referring Sofi Hannah to me for evaluation  Below are my notes for this consultation  If you have questions, please do not hesitate to call me  I look forward to following your patient along with you  Sincerely,        MD Alva        CC: No Recipients  MD Alva  10/27/2022  8:32 PM  Sign when Signing Visit  Ms Anderson presents today for transvaginal ultrasound  at 19w2d  On exam the patient appears well, in no acute distress, and her abdomen is nontender  She had a normal NIPT and MSAFP    A transvaginal ultrasound was performed to assess the cervix  The cervical length was 4 17 cm, which is normal for her current gestational age  There was no significant funneling or dynamic changes appreciated  We reviewed today's findings and her questions were answered  We discussed follow-up  She is scheduled for a level 2 ultrasound and cervical length screening in 2 weeks- scheduled for 11/11/22  She has not started vaginal progesterone as she just picked it up today  We discussed the rationale for use and she was strongly encouraged to begin using tonight at bedtime  Should you have any questions, please do not hesitate to contact our office  The total time dedicated to this patient encounter was 10 minutes (3 preparation, 5 face-to-face, and  2 documenting)  Split-shared decision-making between [employer] and myself was utilized, with the majority of the time spent by CC video  I supervised the Advanced Practitioner, and we discussed the care of this patient before she saw the patient independently while I was present in the office  I reviewed the note, her ultrasound pictures and agree        Alva

## 2022-10-27 NOTE — PROGRESS NOTES
Ultrasound Probe Disinfection    A transvaginal ultrasound was performed  Prior to use, disinfection was performed with High Level Disinfection Process (Trophon)  Probe serial number [id number]: [id number] was used        Kylee Cardona  10/27/22  11:38 AM

## 2022-10-28 NOTE — PROGRESS NOTES
Ms Zamora March presents today for transvaginal ultrasound  at 19w2d  On exam the patient appears well, in no acute distress, and her abdomen is nontender  She had a normal NIPT and MSAFP    A transvaginal ultrasound was performed to assess the cervix  The cervical length was 4 17 cm, which is normal for her current gestational age  There was no significant funneling or dynamic changes appreciated  We reviewed today's findings and her questions were answered  We discussed follow-up  She is scheduled for a level 2 ultrasound and cervical length screening in 2 weeks- scheduled for 11/11/22  She has not started vaginal progesterone as she just picked it up today  We discussed the rationale for use and she was strongly encouraged to begin using tonight at bedtime  Should you have any questions, please do not hesitate to contact our office  The total time dedicated to this patient encounter was 10 minutes (3 preparation, 5 face-to-face, and  2 documenting)  Split-shared decision-making between [employer] and myself was utilized, with the majority of the time spent by [employer]  I supervised the Advanced Practitioner, and we discussed the care of this patient before she saw the patient independently while I was present in the office  I reviewed the note, her ultrasound pictures and agree        Clarita Corye

## 2022-11-11 ENCOUNTER — HOSPITAL ENCOUNTER (OUTPATIENT)
Facility: HOSPITAL | Age: 32
Setting detail: OBSERVATION
Discharge: HOME | End: 2022-11-12
Attending: EMERGENCY MEDICINE | Admitting: INTERNAL MEDICINE

## 2022-11-11 ENCOUNTER — ROUTINE PRENATAL (OUTPATIENT)
Dept: PERINATAL CARE | Facility: OTHER | Age: 32
End: 2022-11-11

## 2022-11-11 ENCOUNTER — APPOINTMENT (EMERGENCY)
Dept: CT IMAGING | Facility: HOSPITAL | Age: 32
End: 2022-11-11

## 2022-11-11 VITALS
BODY MASS INDEX: 33.22 KG/M2 | DIASTOLIC BLOOD PRESSURE: 66 MMHG | WEIGHT: 194.6 LBS | HEIGHT: 64 IN | HEART RATE: 100 BPM | SYSTOLIC BLOOD PRESSURE: 110 MMHG

## 2022-11-11 DIAGNOSIS — Z3A.21 21 WEEKS GESTATION OF PREGNANCY: ICD-10-CM

## 2022-11-11 DIAGNOSIS — Z3A.17 17 WEEKS GESTATION OF PREGNANCY: ICD-10-CM

## 2022-11-11 DIAGNOSIS — O88.219 PULMONARY EMBOLISM AFFECTING PREGNANCY: Primary | ICD-10-CM

## 2022-11-11 DIAGNOSIS — Z36.86 ENCOUNTER FOR ANTENATAL SCREENING FOR CERVICAL LENGTH: ICD-10-CM

## 2022-11-11 DIAGNOSIS — O99.212 MATERNAL OBESITY, ANTEPARTUM, SECOND TRIMESTER: ICD-10-CM

## 2022-11-11 DIAGNOSIS — O09.892 HISTORY OF PREMATURE DELIVERY, CURRENTLY PREGNANT, SECOND TRIMESTER: Primary | ICD-10-CM

## 2022-11-11 DIAGNOSIS — O99.842 PREGNANCY AFFECTED BY PREVIOUS BARIATRIC SURGERY, CURRENTLY IN SECOND TRIMESTER: ICD-10-CM

## 2022-11-11 LAB
2HR DELTA HS TROPONIN: >1 NG/L
ALBUMIN SERPL BCP-MCNC: 3 G/DL (ref 3.5–5)
ALP SERPL-CCNC: 56 U/L (ref 46–116)
ALT SERPL W P-5'-P-CCNC: 13 U/L (ref 12–78)
ANION GAP SERPL CALCULATED.3IONS-SCNC: 11 MMOL/L (ref 4–13)
AST SERPL W P-5'-P-CCNC: 9 U/L (ref 5–45)
BASOPHILS # BLD AUTO: 0.01 THOUSANDS/ÂΜL (ref 0–0.1)
BASOPHILS NFR BLD AUTO: 0 % (ref 0–1)
BILIRUB SERPL-MCNC: 0.25 MG/DL (ref 0.2–1)
BUN SERPL-MCNC: 9 MG/DL (ref 5–25)
CALCIUM ALBUM COR SERPL-MCNC: 9.8 MG/DL (ref 8.3–10.1)
CALCIUM SERPL-MCNC: 9 MG/DL (ref 8.3–10.1)
CARDIAC TROPONIN I PNL SERPL HS: 3 NG/L
CARDIAC TROPONIN I PNL SERPL HS: <2 NG/L
CHLORIDE SERPL-SCNC: 104 MMOL/L (ref 96–108)
CO2 SERPL-SCNC: 22 MMOL/L (ref 21–32)
CREAT SERPL-MCNC: 0.57 MG/DL (ref 0.6–1.3)
D DIMER PPP FEU-MCNC: 2.79 UG/ML FEU
EOSINOPHIL # BLD AUTO: 0.03 THOUSAND/ÂΜL (ref 0–0.61)
EOSINOPHIL NFR BLD AUTO: 0 % (ref 0–6)
ERYTHROCYTE [DISTWIDTH] IN BLOOD BY AUTOMATED COUNT: 17.2 % (ref 11.6–15.1)
FLUAV RNA RESP QL NAA+PROBE: NEGATIVE
FLUBV RNA RESP QL NAA+PROBE: NEGATIVE
GFR SERPL CREATININE-BSD FRML MDRD: 124 ML/MIN/1.73SQ M
GLUCOSE SERPL-MCNC: 97 MG/DL (ref 65–140)
HCT VFR BLD AUTO: 26.7 % (ref 34.8–46.1)
HGB BLD-MCNC: 8.5 G/DL (ref 11.5–15.4)
IMM GRANULOCYTES # BLD AUTO: 0.03 THOUSAND/UL (ref 0–0.2)
IMM GRANULOCYTES NFR BLD AUTO: 0 % (ref 0–2)
LYMPHOCYTES # BLD AUTO: 1.97 THOUSANDS/ÂΜL (ref 0.6–4.47)
LYMPHOCYTES NFR BLD AUTO: 21 % (ref 14–44)
MCH RBC QN AUTO: 21.3 PG (ref 26.8–34.3)
MCHC RBC AUTO-ENTMCNC: 31.8 G/DL (ref 31.4–37.4)
MCV RBC AUTO: 67 FL (ref 82–98)
MONOCYTES # BLD AUTO: 0.6 THOUSAND/ÂΜL (ref 0.17–1.22)
MONOCYTES NFR BLD AUTO: 6 % (ref 4–12)
NEUTROPHILS # BLD AUTO: 6.94 THOUSANDS/ÂΜL (ref 1.85–7.62)
NEUTS SEG NFR BLD AUTO: 73 % (ref 43–75)
NRBC BLD AUTO-RTO: 0 /100 WBCS
PLATELET # BLD AUTO: 258 THOUSANDS/UL (ref 149–390)
PMV BLD AUTO: 9.8 FL (ref 8.9–12.7)
POTASSIUM SERPL-SCNC: 3.4 MMOL/L (ref 3.5–5.3)
PROT SERPL-MCNC: 7.8 G/DL (ref 6.4–8.4)
RBC # BLD AUTO: 3.99 MILLION/UL (ref 3.81–5.12)
RSV RNA RESP QL NAA+PROBE: NEGATIVE
SARS-COV-2 RNA RESP QL NAA+PROBE: NEGATIVE
SODIUM SERPL-SCNC: 137 MMOL/L (ref 135–147)
WBC # BLD AUTO: 9.58 THOUSAND/UL (ref 4.31–10.16)

## 2022-11-11 RX ORDER — ENOXAPARIN SODIUM 100 MG/ML
1 INJECTION SUBCUTANEOUS ONCE
Status: COMPLETED | OUTPATIENT
Start: 2022-11-12 | End: 2022-11-12

## 2022-11-11 RX ADMIN — IOHEXOL 85 ML: 350 INJECTION, SOLUTION INTRAVENOUS at 23:31

## 2022-11-11 NOTE — PROGRESS NOTES
Ultrasound Probe Disinfection    A transvaginal ultrasound was performed  Prior to use, disinfection was performed with High Level Disinfection Process (Trophon)  Probe serial number [id number]: [id number] was used        [place]  11/11/22  3:12 PM

## 2022-11-11 NOTE — Clinical Note
Case was discussed with Hospitalist and the patient's admission status was agreed to be Admission Status: observation status to the service of Dr Aroldo Kc

## 2022-11-11 NOTE — LETTER
November 12, 2022     Blaine Chong, [address]   [address] [place] [address]    Patient: Beck Peña   YOB: 1990   Date of Visit: 11/11/2022       Dear Dr Emerald Carey: Thank you for referring Beck Peña to me for evaluation  Below are my notes for this consultation  If you have questions, please do not hesitate to call me  I look forward to following your patient along with you  Sincerely,        Roland Smith MD        CC: No Recipients  Roland Smith MD  11/9/2022  6:27 PM  Sign when Signing Visit  Please refer to the [facility] ultrasound report in Ob Procedures for additional information regarding today's visit

## 2022-11-12 VITALS
TEMPERATURE: 98.3 F | SYSTOLIC BLOOD PRESSURE: 112 MMHG | OXYGEN SATURATION: 100 % | HEIGHT: 64 IN | RESPIRATION RATE: 20 BRPM | BODY MASS INDEX: 33.23 KG/M2 | HEART RATE: 105 BPM | DIASTOLIC BLOOD PRESSURE: 70 MMHG | WEIGHT: 194.67 LBS

## 2022-11-12 PROBLEM — O99.212 MATERNAL OBESITY, ANTEPARTUM, SECOND TRIMESTER: Status: ACTIVE | Noted: 2022-11-12

## 2022-11-12 PROBLEM — O09.892 HISTORY OF PREMATURE DELIVERY, CURRENTLY PREGNANT, SECOND TRIMESTER: Status: ACTIVE | Noted: 2022-11-12

## 2022-11-12 PROBLEM — I26.99 PULMONARY EMBOLISM (HCC): Status: ACTIVE | Noted: 2022-11-12

## 2022-11-12 PROBLEM — Z3A.21 21 WEEKS GESTATION OF PREGNANCY: Status: ACTIVE | Noted: 2022-09-06

## 2022-11-12 LAB
ANION GAP SERPL CALCULATED.3IONS-SCNC: 10 MMOL/L (ref 4–13)
ATRIAL RATE: 106 BPM
ATRIAL RATE: 110 BPM
BASOPHILS # BLD AUTO: 0.01 THOUSANDS/ÂΜL (ref 0–0.1)
BASOPHILS NFR BLD AUTO: 0 % (ref 0–1)
BUN SERPL-MCNC: 8 MG/DL (ref 5–25)
CALCIUM SERPL-MCNC: 8.6 MG/DL (ref 8.3–10.1)
CARDIAC TROPONIN I PNL SERPL HS: <2 NG/L
CHLORIDE SERPL-SCNC: 106 MMOL/L (ref 96–108)
CO2 SERPL-SCNC: 22 MMOL/L (ref 21–32)
CREAT SERPL-MCNC: 0.46 MG/DL (ref 0.6–1.3)
EOSINOPHIL # BLD AUTO: 0.03 THOUSAND/ÂΜL (ref 0–0.61)
EOSINOPHIL NFR BLD AUTO: 0 % (ref 0–6)
ERYTHROCYTE [DISTWIDTH] IN BLOOD BY AUTOMATED COUNT: 17.2 % (ref 11.6–15.1)
ERYTHROCYTE [DISTWIDTH] IN BLOOD BY AUTOMATED COUNT: 17.2 % (ref 11.6–15.1)
GFR SERPL CREATININE-BSD FRML MDRD: 133 ML/MIN/1.73SQ M
GLUCOSE SERPL-MCNC: 70 MG/DL (ref 65–140)
HCT VFR BLD AUTO: 23.8 % (ref 34.8–46.1)
HCT VFR BLD AUTO: 25.8 % (ref 34.8–46.1)
HGB BLD-MCNC: 7.4 G/DL (ref 11.5–15.4)
HGB BLD-MCNC: 7.7 G/DL (ref 11.5–15.4)
IMM GRANULOCYTES # BLD AUTO: 0.05 THOUSAND/UL (ref 0–0.2)
IMM GRANULOCYTES NFR BLD AUTO: 1 % (ref 0–2)
LYMPHOCYTES # BLD AUTO: 2.4 THOUSANDS/ÂΜL (ref 0.6–4.47)
LYMPHOCYTES NFR BLD AUTO: 28 % (ref 14–44)
MCH RBC QN AUTO: 20.8 PG (ref 26.8–34.3)
MCH RBC QN AUTO: 21.2 PG (ref 26.8–34.3)
MCHC RBC AUTO-ENTMCNC: 29.8 G/DL (ref 31.4–37.4)
MCHC RBC AUTO-ENTMCNC: 31.1 G/DL (ref 31.4–37.4)
MCV RBC AUTO: 68 FL (ref 82–98)
MCV RBC AUTO: 70 FL (ref 82–98)
MONOCYTES # BLD AUTO: 0.67 THOUSAND/ÂΜL (ref 0.17–1.22)
MONOCYTES NFR BLD AUTO: 8 % (ref 4–12)
NEUTROPHILS # BLD AUTO: 5.52 THOUSANDS/ÂΜL (ref 1.85–7.62)
NEUTS SEG NFR BLD AUTO: 63 % (ref 43–75)
NRBC BLD AUTO-RTO: 0 /100 WBCS
P AXIS: 47 DEGREES
P AXIS: 71 DEGREES
PLATELET # BLD AUTO: 218 THOUSANDS/UL (ref 149–390)
PLATELET # BLD AUTO: 220 THOUSANDS/UL (ref 149–390)
PMV BLD AUTO: 9.7 FL (ref 8.9–12.7)
PMV BLD AUTO: 9.9 FL (ref 8.9–12.7)
POTASSIUM SERPL-SCNC: 3.6 MMOL/L (ref 3.5–5.3)
PR INTERVAL: 130 MS
PR INTERVAL: 138 MS
QRS AXIS: 80 DEGREES
QRS AXIS: 83 DEGREES
QRSD INTERVAL: 88 MS
QRSD INTERVAL: 88 MS
QT INTERVAL: 346 MS
QT INTERVAL: 350 MS
QTC INTERVAL: 464 MS
QTC INTERVAL: 468 MS
RBC # BLD AUTO: 3.49 MILLION/UL (ref 3.81–5.12)
RBC # BLD AUTO: 3.7 MILLION/UL (ref 3.81–5.12)
SODIUM SERPL-SCNC: 138 MMOL/L (ref 135–147)
T WAVE AXIS: 22 DEGREES
T WAVE AXIS: 51 DEGREES
VENTRICULAR RATE: 106 BPM
VENTRICULAR RATE: 110 BPM
WBC # BLD AUTO: 8.12 THOUSAND/UL (ref 4.31–10.16)
WBC # BLD AUTO: 8.68 THOUSAND/UL (ref 4.31–10.16)

## 2022-11-12 RX ORDER — ASPIRIN 81 MG/1
162 TABLET, CHEWABLE ORAL DAILY
Status: DISCONTINUED | OUTPATIENT
Start: 2022-11-12 | End: 2022-11-12

## 2022-11-12 RX ORDER — POTASSIUM CHLORIDE 20 MEQ/1
40 TABLET, EXTENDED RELEASE ORAL ONCE
Status: COMPLETED | OUTPATIENT
Start: 2022-11-12 | End: 2022-11-12

## 2022-11-12 RX ORDER — CYANOCOBALAMIN 1000 UG/ML
1000 INJECTION, SOLUTION INTRAMUSCULAR; SUBCUTANEOUS ONCE
Status: COMPLETED | OUTPATIENT
Start: 2022-11-12 | End: 2022-11-12

## 2022-11-12 RX ORDER — ASPIRIN 81 MG/1
81 TABLET, CHEWABLE ORAL DAILY
Qty: 180 TABLET | Refills: 0 | Status: SHIPPED | OUTPATIENT
Start: 2022-11-12 | End: 2022-12-01 | Stop reason: SDUPTHER

## 2022-11-12 RX ORDER — ENOXAPARIN SODIUM 100 MG/ML
1 INJECTION SUBCUTANEOUS EVERY 12 HOURS
Qty: 60 ML | Refills: 2 | Status: SHIPPED | OUTPATIENT
Start: 2022-11-12

## 2022-11-12 RX ORDER — LANOLIN ALCOHOL/MO/W.PET/CERES
1000 CREAM (GRAM) TOPICAL DAILY
Qty: 90 TABLET | Refills: 1 | Status: SHIPPED | OUTPATIENT
Start: 2022-11-12

## 2022-11-12 RX ORDER — ENOXAPARIN SODIUM 100 MG/ML
1 INJECTION SUBCUTANEOUS EVERY 12 HOURS SCHEDULED
Status: DISCONTINUED | OUTPATIENT
Start: 2022-11-12 | End: 2022-11-12 | Stop reason: HOSPADM

## 2022-11-12 RX ADMIN — ENOXAPARIN SODIUM 90 MG: 100 INJECTION SUBCUTANEOUS at 00:12

## 2022-11-12 RX ADMIN — CYANOCOBALAMIN 1000 MCG: 1000 INJECTION, SOLUTION INTRAMUSCULAR; SUBCUTANEOUS at 13:38

## 2022-11-12 RX ADMIN — POTASSIUM CHLORIDE 40 MEQ: 1500 TABLET, EXTENDED RELEASE ORAL at 01:27

## 2022-11-12 RX ADMIN — ENOXAPARIN SODIUM 90 MG: 100 INJECTION SUBCUTANEOUS at 12:59

## 2022-11-12 NOTE — ED PROVIDER NOTES
History  Chief Complaint   Patient presents with   • Pregnancy Problem     21 weeks pregnant  Patient reports shortness of breath, and sensation of "heart racing" for 3 days  Denies any chest pain or other symptoms  Presents emergency department with shortness of breath and a sensation of elevated heart rate is been going on for last 3 or 4 days  He has been pretty consistent is not associated with any pain  She does have some nasal congestion but no cough no fever no chills  She is currently pregnant at 21 weeks  She is  4 para 1  Past medical history negative  Patient does smoke, does not drink alcohol  Surgical history negative       History provided by:  Patient   used: No    Pregnancy Problem  Associated symptoms: no abdominal pain, no back pain, no dysuria and no fever        Prior to Admission Medications   Prescriptions Last Dose Informant Patient Reported? Taking?    Prenatal MV & Min w/FA-DHA ([facility])  Self Yes No   Sig: Take by mouth   aspirin 81 mg chewable tablet   No No   Sig: Chew 2 tablets (162 mg total) daily   aspirin 81 mg chewable tablet   No Yes   Sig: Chew 1 tablet (81 mg total) daily   Patient not taking: Reported on 11/15/2022   progesterone 200 mg vaginal suppository   Yes No   Sig: INSERT ONE PER VAGINA AT BEDTIME      Facility-Administered Medications: None       Past Medical History:   Diagnosis Date   • Anemia    • Gastric bypass status for obesity 2016    sleeve   • Post partum depression    • Sleep apnea, obstructive     prior to weight loss surg       Past Surgical History:   Procedure Laterality Date   • DILATION AND EVACUATION  2019       • GASTRIC RESTRICTION SURGERY  2015    gastric sleeve   • INCISION AND DRAINAGE OF WOUND Right 2022    Procedure: INCISION AND DRAINAGE (I&D) HEAD/FACE;  Surgeon: Bladimir Joseph DMD;  Location: [facility];  Service: Maxillofacial   • MULTIPLE TOOTH EXTRACTIONS Right 2022 Procedure: EXTRACTION TEETH MULTIPLE;  Surgeon: Sahil Montoya DMD;  Location: [facility];  Service: Maxillofacial   • OPEN ANTERIOR SHOULDER RECONSTRUCTION Left 2018   • OTHER SURGICAL HISTORY      sweat gland removal   • RETAINED PLACENTA REMOVAL N/A 2020    Procedure: EXTRACTION OF BEBO LE;  Surgeon: Danelle Kaba MD;  Location: [facility] ;  Service: Obstetrics       Family History   Problem Relation Age of Onset   • Stroke Mother    • Heart disease Mother    • Seizures Mother    • Hypertension Mother    • Anxiety disorder Mother    • Bipolar disorder Mother    • Kidney disease Mother    • Diabetes Mother    • Sudden death Father    • No Known Problems Sister    • No Known Problems Sister    • No Known Problems Brother    • No Known Problems Brother    • No Known Problems Brother    • Sudden death Brother 21        MVA   • No Known Problems Son    • Pancreatic cancer Maternal Grandmother    • Seizures Maternal Grandmother    • Diabetes Maternal Grandmother      I have reviewed and agree with the history as documented  E-Cigarette/Vaping   • E-Cigarette Use Never User      E-Cigarette/Vaping Substances   • Nicotine No    • THC No    • CBD No    • Flavoring No    • Other No    • Unknown No      Social History     Tobacco Use   • Smoking status: Current Some Day Smoker     Packs/day: 0 25     Years: 10 00     Pack years: 2 50     Types: Cigarettes     Last attempt to quit: 2020     Years since quittin 6   • Smokeless tobacco: Former User   • Tobacco comment: still smoking 3 day   Vaping Use   • Vaping Use: Never used   Substance Use Topics   • Alcohol use: Not Currently     Comment: social   • Drug use: Never       Review of Systems   Constitutional: Negative for chills and fever  HENT: Positive for congestion  Negative for ear pain and sore throat  Eyes: Negative for pain and visual disturbance  Respiratory: Positive for shortness of breath  Negative for cough      Cardiovascular: Negative for chest pain and palpitations  Gastrointestinal: Negative for abdominal pain and vomiting  Genitourinary: Negative for dysuria and hematuria  Musculoskeletal: Negative for arthralgias and back pain  Skin: Negative for color change and rash  Neurological: Negative for seizures and syncope  All other systems reviewed and are negative  Physical Exam  Physical Exam  Vitals and nursing note reviewed  Constitutional:       General: She is not in acute distress  Appearance: Normal appearance  She is well-developed  HENT:      Head: Normocephalic and atraumatic  Right Ear: External ear normal       Left Ear: External ear normal       Nose: Nose normal       Mouth/Throat:      Mouth: Mucous membranes are moist    Eyes:      Conjunctiva/sclera: Conjunctivae normal       Pupils: Pupils are equal, round, and reactive to light  Cardiovascular:      Rate and Rhythm: Regular rhythm  Tachycardia present  Pulses: Normal pulses  Heart sounds: Normal heart sounds  No murmur heard  Pulmonary:      Effort: Pulmonary effort is normal  No respiratory distress  Breath sounds: Normal breath sounds  Abdominal:      General: Abdomen is flat  Palpations: Abdomen is soft  Tenderness: There is no abdominal tenderness  Musculoskeletal:      Cervical back: Neck supple  Skin:     General: Skin is warm and dry  Capillary Refill: Capillary refill takes less than 2 seconds  Neurological:      General: No focal deficit present  Mental Status: She is alert and oriented to person, place, and time     Psychiatric:         Mood and Affect: Mood normal          Behavior: Behavior normal          Vital Signs  ED Triage Vitals   Temperature Pulse Respirations Blood Pressure SpO2   11/11/22 2035 11/11/22 2035 11/11/22 2035 11/11/22 2035 11/11/22 2035   98 3 °F (36 8 °C) (!) 107 18 130/72 98 %      Temp Source Heart Rate Source Patient Position - Orthostatic VS BP Location FiO2 (%)   11/11/22 2035 11/11/22 2035 11/11/22 2035 11/11/22 2035 --   Oral Monitor Sitting Right arm       Pain Score       11/12/22 0158       No Pain           Vitals:    11/12/22 0600 11/12/22 0800 11/12/22 1100 11/12/22 1300   BP: 105/55 112/60 110/65 112/70   Pulse: 85 87 91 105   Patient Position - Orthostatic VS:   Lying Sitting         Visual Acuity      ED Medications  Medications   iohexol (OMNIPAQUE) 350 MG/ML injection (SINGLE-DOSE) 85 mL (85 mL Intravenous Given 11/11/22 2331)   enoxaparin (LOVENOX) subcutaneous injection 90 mg (90 mg Subcutaneous Given 11/12/22 0012)   potassium chloride (K-DUR,KLOR-CON) CR tablet 40 mEq (40 mEq Oral Given 11/12/22 0127)   cyanocobalamin injection 1,000 mcg (1,000 mcg Intramuscular Given 11/12/22 1338)       Diagnostic Studies  Results Reviewed     Procedure Component Value Units Date/Time    CBC and Platelet [[id number]]  (Abnormal) Collected: 11/12/22 1045    Lab Status: Final result Specimen: Blood from Arm, Right Updated: 11/12/22 1051     WBC 8 12 Thousand/uL      RBC 3 70 Million/uL      Hemoglobin 7 7 g/dL      Hematocrit 25 8 %      MCV 70 fL      MCH 20 8 pg      MCHC 29 8 g/dL      RDW 17 2 %      Platelets 180 Thousands/uL      MPV 9 7 fL     Basic metabolic panel [[id number]]  (Abnormal) Collected: 11/12/22 0605    Lab Status: Final result Specimen: Blood from Arm, Right Updated: 11/12/22 0650     Sodium 138 mmol/L      Potassium 3 6 mmol/L      Chloride 106 mmol/L      CO2 22 mmol/L      ANION GAP 10 mmol/L      BUN 8 mg/dL      Creatinine 0 46 mg/dL      Glucose 70 mg/dL      Calcium 8 6 mg/dL      eGFR 133 ml/min/1 73sq m     Narrative:      [facility] guidelines for Chronic Kidney Disease (CKD):   •  Stage 1 with normal or high GFR (GFR > 90 mL/min/1 73 square meters)  •  Stage 2 Mild CKD (GFR = 60-89 mL/min/1 73 square meters)  •  Stage 3A Moderate CKD (GFR = 45-59 mL/min/1 73 square meters)  •  Stage 3B Moderate CKD (GFR = 30-44 mL/min/1 73 square meters)  •  Stage 4 Severe CKD (GFR = 15-29 mL/min/1 73 square meters)  •  Stage 5 End Stage CKD (GFR <15 mL/min/1 73 square meters)  Note: GFR calculation is accurate only with a steady state creatinine    CBC and differential [[id number]]  (Abnormal) Collected: 11/12/22 0605    Lab Status: Final result Specimen: Blood from Arm, Right Updated: 11/12/22 0622     WBC 8 68 Thousand/uL      RBC 3 49 Million/uL      Hemoglobin 7 4 g/dL      Hematocrit 23 8 %      MCV 68 fL      MCH 21 2 pg      MCHC 31 1 g/dL      RDW 17 2 %      MPV 9 9 fL      Platelets 661 Thousands/uL      nRBC 0 /100 WBCs      Neutrophils Relative 63 %      Immat GRANS % 1 %      Lymphocytes Relative 28 %      Monocytes Relative 8 %      Eosinophils Relative 0 %      Basophils Relative 0 %      Neutrophils Absolute 5 52 Thousands/µL      Immature Grans Absolute 0 05 Thousand/uL      Lymphocytes Absolute 2 40 Thousands/µL      Monocytes Absolute 0 67 Thousand/µL      Eosinophils Absolute 0 03 Thousand/µL      Basophils Absolute 0 01 Thousands/µL     HS Troponin I 4hr [[id number]] Collected: 11/12/22 0130    Lab Status: Final result Specimen: Blood from Arm, Right Updated: 11/12/22 0200     hs TnI 4hr <2 ng/L      Delta 4hr hsTnI --    HS Troponin I 2hr [[id number]]  (Normal) Collected: 11/11/22 2256    Lab Status: Final result Specimen: Blood from Arm, Right Updated: 11/11/22 2333     hs TnI 2hr 3 ng/L      Delta 2hr hsTnI >1 ng/L     FLU/RSV/COVID - if FLU/RSV clinically relevant [[id number]]  (Normal) Collected: 11/11/22 2131    Lab Status: Final result Specimen: Nares from Nasopharyngeal Swab Updated: 11/11/22 2216     SARS-CoV-2 Negative     INFLUENZA A PCR Negative     INFLUENZA B PCR Negative     RSV PCR Negative    Narrative:      FOR PEDIATRIC PATIENTS - copy/paste COVID Guidelines URL to browser: [email or web address]    SARS-CoV-2 assay is a Nucleic Acid Amplification assay intended for the  qualitative detection of nucleic acid from SARS-CoV-2 in nasopharyngeal  swabs  Results are for the presumptive identification of SARS-CoV-2 RNA  Positive results are indicative of infection with SARS-CoV-2, the virus  causing COVID-19, but do not rule out bacterial infection or co-infection  with other viruses  Laboratories within the [place] and its  territories are required to report all positive results to the appropriate  public health authorities  Negative results do not preclude SARS-CoV-2  infection and should not be used as the sole basis for treatment or other  patient management decisions  Negative results must be combined with  clinical observations, patient history, and epidemiological information  This test has not been FDA cleared or approved  This test has been authorized by FDA under an Emergency Use Authorization  (EUA)  This test is only authorized for the duration of time the  declaration that circumstances exist justifying the authorization of the  emergency use of an in vitro diagnostic tests for detection of SARS-CoV-2  virus and/or diagnosis of COVID-19 infection under section 564(b)(1) of  the Act, 21 U  [place] C  782GOY-0(Z)(8), unless the authorization is terminated  or revoked sooner  The test has been validated but independent review by FDA  and CLIA is pending  Test performed using [employer] Gene[employer]pert: This RT-PCR assay targets N2,  a region unique to SARS-CoV-2  A conserved region in the E-gene was chosen  for pan-Sarbecovirus detection which includes SARS-CoV-2  According to CMS-2020-01-R, this platform meets the definition of high-throughput technology      D-dimer, quantitative [[id number]]  (Abnormal) Collected: 11/11/22 2125    Lab Status: Final result Specimen: Blood from Arm, Right Updated: 11/11/22 2210     D-Dimer, Quant 2 79 ug/ml FEU     HS Troponin 0hr (reflex protocol) [[id number]]  (Normal) Collected: 11/11/22 2043    Lab Status: Final result Specimen: Blood from Arm, Right Updated: 11/11/22 2117     hs TnI 0hr <2 ng/L     Comprehensive metabolic panel [[id number]]  (Abnormal) Collected: 11/11/22 2043    Lab Status: Final result Specimen: Blood from Arm, Right Updated: 11/11/22 2111     Sodium 137 mmol/L      Potassium 3 4 mmol/L      Chloride 104 mmol/L      CO2 22 mmol/L      ANION GAP 11 mmol/L      BUN 9 mg/dL      Creatinine 0 57 mg/dL      Glucose 97 mg/dL      Calcium 9 0 mg/dL      Corrected Calcium 9 8 mg/dL      AST 9 U/L      ALT 13 U/L      Alkaline Phosphatase 56 U/L      Total Protein 7 8 g/dL      Albumin 3 0 g/dL      Total Bilirubin 0 25 mg/dL      eGFR 124 ml/min/1 73sq m     Narrative:      [facility] guidelines for Chronic Kidney Disease (CKD):   •  Stage 1 with normal or high GFR (GFR > 90 mL/min/1 73 square meters)  •  Stage 2 Mild CKD (GFR = 60-89 mL/min/1 73 square meters)  •  Stage 3A Moderate CKD (GFR = 45-59 mL/min/1 73 square meters)  •  Stage 3B Moderate CKD (GFR = 30-44 mL/min/1 73 square meters)  •  Stage 4 Severe CKD (GFR = 15-29 mL/min/1 73 square meters)  •  Stage 5 End Stage CKD (GFR <15 mL/min/1 73 square meters)  Note: GFR calculation is accurate only with a steady state creatinine    CBC and differential [[id number]]  (Abnormal) Collected: 11/11/22 2043    Lab Status: Final result Specimen: Blood from Arm, Right Updated: 11/11/22 2049     WBC 9 58 Thousand/uL      RBC 3 99 Million/uL      Hemoglobin 8 5 g/dL      Hematocrit 26 7 %      MCV 67 fL      MCH 21 3 pg      MCHC 31 8 g/dL      RDW 17 2 %      MPV 9 8 fL      Platelets 246 Thousands/uL      nRBC 0 /100 WBCs      Neutrophils Relative 73 %      Immat GRANS % 0 %      Lymphocytes Relative 21 %      Monocytes Relative 6 %      Eosinophils Relative 0 %      Basophils Relative 0 %      Neutrophils Absolute 6 94 Thousands/µL      Immature Grans Absolute 0 03 Thousand/uL      Lymphocytes Absolute 1 97 Thousands/µL Monocytes Absolute 0 60 Thousand/µL      Eosinophils Absolute 0 03 Thousand/µL      Basophils Absolute 0 01 Thousands/µL                  CTA ED chest PE Study   Final Result by Maria Luz Engle DO (11/12 0678)      Bilateral pulmonary emboli, left greater than right, as described, no discrete evidence of right heart strain  Lungs appear grossly clear  Other findings as above  I personally discussed this study with Juan Antonio Salguero on 11/12/2022 at 12:38 AM          Workstation performed: [id number]                    Procedures  Procedures         ED Course  ED Course as of 11/15/22 1745   Fri Nov 11, 2022   2244 hs TnI 0hr: <2   4757 SARS-COV-2: Negative   2244 INFLU A PCR: Negative   2244 RSV PCR: Negative   2244 D-Dimer, Quant(!): 2 79                               SBIRT 20yo+    Flowsheet Row Most Recent Value   SBIRT (23 yo +)    In order to provide better care to our patients, we are screening all of our patients for alcohol and drug use  Would it be okay to ask you these screening questions? Yes Filed at: 11/11/2022 2145   Initial Alcohol Screen: US AUDIT-C     1  How often do you have a drink containing alcohol? 0 Filed at: 11/11/2022 2145   2  How many drinks containing alcohol do you have on a typical day you are drinking? 0 Filed at: 11/11/2022 2145   3b  FEMALE Any Age, or MALE 65+: How often do you have 4 or more drinks on one occassion? 0 Filed at: 11/11/2022 2145   Audit-C Score 0 Filed at: 11/11/2022 2145   HELEN: How many times in the past year have you    Used an illegal drug or used a prescription medication for non-medical reasons?  Never Filed at: 11/11/2022 2145                    MDM  Number of Diagnoses or Management Options     Amount and/or Complexity of Data Reviewed  Clinical lab tests: ordered and reviewed  Tests in the radiology section of CPT®: ordered and reviewed  Discuss the patient with other providers: yes (Dr Varun Hernandez, [facility] [employer]  )    Risk of Complications, Morbidity, and/or Mortality  Presenting problems: high  Diagnostic procedures: high  Management options: high    Patient Progress  Patient progress: stable      Disposition  Final diagnoses:   Pulmonary embolism affecting pregnancy     Time reflects when diagnosis was documented in both MDM as applicable and the Disposition within this note     Time User Action Codes Description Comment    11/12/2022 12:58 AM Harshadsilvino Shilo Hoyt Add [O88 219] Pulmonary embolism affecting pregnancy     11/12/2022  1:23 PM Zoila Luo Add [Z3A 17] 17 weeks gestation of pregnancy       ED Disposition     ED Disposition   Discharge    Condition   --    Date/Time   Sat Nov 12, 2022  1:52 PM    Comment   Debbi Morales discharge to home/self care  Follow-up Information    None         Discharge Medication List as of 11/12/2022  1:33 PM      START taking these medications    Details   enoxaparin (LOVENOX) 100 mg/mL Inject 0 9 mL (90 mg total) under the skin every 12 (twelve) hours, Starting Sat 11/12/2022, Normal      vitamin B-12 (VITAMIN B-12) 1,000 mcg tablet Take 1 tablet (1,000 mcg total) by mouth daily, Starting Sat 11/12/2022, Normal         CONTINUE these medications which have CHANGED    Details   aspirin 81 mg chewable tablet Chew 1 tablet (81 mg total) daily, Starting Sat 11/12/2022, Until Fri 2/10/2023, Normal         CONTINUE these medications which have NOT CHANGED    Details   Prenatal MV & Min w/FA-DHA ([facility]) Take by mouth, Historical Med      progesterone 200 mg vaginal suppository INSERT ONE PER VAGINA AT BEDTIME, Historical Med             No discharge procedures on file      PDMP Review     None          ED Provider  Electronically Signed by           Davy Abarca MD  11/15/22 9098

## 2022-11-12 NOTE — H&P
[address] [place] [address]  H&P- Brian Mendoza 1990, 29 y o  female MRN: [id number]  Unit/Bed#: FT 05 Encounter: [id number]  Primary Care Provider: Jono Barrientos MD   Date and time admitted to hospital: 2022  8:48 PM    * Pulmonary embolism [facility])  Assessment & Plan  Patient reports SOB and tachycardia at home for the past 3-4d  She is currently 21 weeks pregnant  Reports her HR has improved currently, but still feels SOB  Denies other symptom/complaint at this time  /72 (BP Location: Right arm)   Pulse (!) 107   Temp 98 3 °F (36 8 °C) (Oral)   Resp 18   Ht 5' 4" (1 626 m)   Wt 88 3 kg (194 lb 10 7 oz)   LMP 2022   SpO2 98%   BMI 33 41 kg/m²     · Reporting SOB and tachycardia at home for the past 3-4d  · D-dimer 2 79  · CTA PE study showing BL PE L>R w/o heart strain   · HR 91; 98%O2 RA  · Started on SubQ Lovenox 1mg/kg BID   · Tele monitoring   · Continuous O2 monitoring     Anemia  Assessment & Plan  · HGB 8 5; appears to be around baseline   · Denies s/s of bleeding; monitor  · AM CBC    Hypokalemia  Assessment & Plan  · Potassium 3 4  · Replete and recheck     21 weeks gestation of pregnancy  Assessment & Plan  · Follows with [facility] as outpatient  ·  4 para 1  · ED spoke with OB who report patient is ok to stay for anticoagulation   · Continue outpatient f/u     VTE Pharmacologic Prophylaxis: VTE Score: 5 High Risk (Score >/= 5) - Pharmacological DVT Prophylaxis Ordered: heparin drip  Sequential Compression Devices Ordered  Code Status: Level 1 - Full Code   Discussion with family: update in AM      Anticipated Length of Stay: Patient will be admitted on an inpatient basis with an anticipated length of stay of greater than 2 midnights secondary to acute PE  Total Time for Visit, including Counseling / Coordination of Care: 45 minutes Greater than 50% of this total time spent on direct patient counseling and coordination of care      Chief Complaint: SOB    History of Present Illness:  Dante Conner is a 29 y o  female with a PMH of Anemia, bariatric surgery hx, and current pregnancy who presents with SOB  Patient reports SOB and tachycardia at home for the past 3-4d  She is currently 21 weeks pregnant  Reports her SOB has improved currently  Denies other symptom/complaint at this time  All questions answered at the bedside to the best of my ability  Review of Systems:  Review of Systems   Constitutional: Negative for activity change, appetite change, chills, diaphoresis, fatigue and fever  HENT: Negative for congestion, ear pain, nosebleeds and trouble swallowing  Eyes: Negative for pain and visual disturbance  Respiratory: Positive for shortness of breath  Negative for apnea, cough, chest tightness and wheezing  Cardiovascular: Positive for palpitations  Negative for chest pain and leg swelling  Gastrointestinal: Negative for abdominal distention, abdominal pain, blood in stool, constipation, diarrhea, nausea and vomiting  Endocrine: Negative for cold intolerance, heat intolerance and polyuria  Genitourinary: Negative for difficulty urinating, dysuria, flank pain and hematuria  Musculoskeletal: Negative for arthralgias, neck pain and neck stiffness  Skin: Negative for color change, rash and wound  Neurological: Negative for dizziness, tremors, syncope, weakness, light-headedness, numbness and headaches  Hematological: Negative for adenopathy  All other systems reviewed and are negative        Past Medical and Surgical History:   Past Medical History:   Diagnosis Date   • Anemia    • Gastric bypass status for obesity 2016    sleeve   • Post partum depression    • Sleep apnea, obstructive     prior to weight loss surg       Past Surgical History:   Procedure Laterality Date   • DILATION AND EVACUATION  2019       • GASTRIC RESTRICTION SURGERY  2015    gastric sleeve   • INCISION AND DRAINAGE OF WOUND Right 2022 Procedure: INCISION AND DRAINAGE (I&D) HEAD/FACE;  Surgeon: Anthony Aguilar DMD;  Location: [facility];  Service: Maxillofacial   • MULTIPLE TOOTH EXTRACTIONS Right 2022    Procedure: EXTRACTION TEETH MULTIPLE;  Surgeon: Anthony Aguilar DMD;  Location: [facility];  Service: Maxillofacial   • OPEN ANTERIOR SHOULDER RECONSTRUCTION Left 2018   • OTHER SURGICAL HISTORY      sweat gland removal   • RETAINED PLACENTA REMOVAL N/A 2020    Procedure: EXTRACTION OF GFDXTOLJ,WMQEJA;  Surgeon: Zofia Tran MD;  Location: [facility] ;  Service: Obstetrics       Meds/Allergies:  Prior to Admission medications    Medication Sig Start Date End Date Taking? Authorizing Provider   aspirin 81 mg chewable tablet Chew 2 tablets (162 mg total) daily 10/12/22 1/10/23  Lior Sommer MD   Prenatal MV & Min w/FA-DHA ([facility] PRENATAL GUMMY PO) Take by mouth    Historical Provider, MD   progesterone 200 mg vaginal suppository INSERT ONE PER VAGINA AT BEDTIME 10/21/22   Historical Provider, MD     I have reviewed home medications with patient personally      Allergies: No Known Allergies    Social History:  Marital Status: Single   Occupation: N/A  Patient Pre-hospital Living Situation: Home  Patient Pre-hospital Level of Mobility: walks  Patient Pre-hospital Diet Restrictions: None  Substance Use History:   Social History     Substance and Sexual Activity   Alcohol Use Not Currently    Comment: social     Social History     Tobacco Use   Smoking Status Current Some Day Smoker   • Packs/day: 0 25   • Years: 10 00   • Pack years: 2 50   • Types: Cigarettes   • Last attempt to quit: 2020   • Years since quittin 5   Smokeless Tobacco Former User   Tobacco Comment    still smoking 3 day     Social History     Substance and Sexual Activity   Drug Use Never       Family History:  Family History   Problem Relation Age of Onset   • Stroke Mother    • Heart disease Mother    • Seizures Mother    • Hypertension Mother    • Anxiety disorder Mother    • Bipolar disorder Mother    • Kidney disease Mother    • Diabetes Mother    • Sudden death Father    • No Known Problems Sister    • No Known Problems Sister    • No Known Problems Brother    • No Known Problems Brother    • No Known Problems Brother    • Sudden death Brother 21        MVA   • No Known Problems Son    • Pancreatic cancer Maternal Grandmother    • Seizures Maternal Grandmother    • Diabetes Maternal Grandmother        Physical Exam:     Vitals:   Blood Pressure: 112/58 (11/12/22 0000)  Pulse: 91 (11/12/22 0000)  Temperature: 98 3 °F (36 8 °C) (11/11/22 2035)  Temp Source: Oral (11/11/22 2035)  Respirations: 20 (11/12/22 0000)  Height: 5' 4" (162 6 cm) (11/11/22 2035)  Weight - Scale: 88 3 kg (194 lb 10 7 oz) (11/11/22 2035)  SpO2: 100 % (11/12/22 0000)    Physical Exam  Vitals and nursing note reviewed  Constitutional:       General: She is not in acute distress  Appearance: Normal appearance  HENT:      Head: Normocephalic and atraumatic  Right Ear: External ear normal       Left Ear: External ear normal       Nose: Nose normal       Mouth/Throat:      Mouth: Mucous membranes are moist    Eyes:      Pupils: Pupils are equal, round, and reactive to light  Cardiovascular:      Rate and Rhythm: Regular rhythm  Tachycardia present  Pulses: Normal pulses  Heart sounds: Normal heart sounds  No murmur heard  Pulmonary:      Effort: Pulmonary effort is normal  No respiratory distress  Breath sounds: Normal breath sounds  No wheezing or rales  Chest:      Chest wall: No tenderness  Abdominal:      General: Bowel sounds are normal  There is distension (Pregnant )  Palpations: Abdomen is soft  There is no mass  Tenderness: There is no abdominal tenderness  There is no guarding  Musculoskeletal:         General: No swelling or tenderness  Cervical back: Normal range of motion and neck supple  No rigidity or tenderness        Right lower leg: No edema  Left lower leg: No edema  Skin:     General: Skin is warm and dry  Capillary Refill: Capillary refill takes less than 2 seconds  Findings: No lesion or rash  Neurological:      General: No focal deficit present  Mental Status: She is alert  Psychiatric:         Mood and Affect: Mood normal           Additional Data:     Lab Results:  Results from last 7 days   Lab Units 11/11/22 2043   WBC Thousand/uL 9 58   HEMOGLOBIN g/dL 8 5*   HEMATOCRIT % 26 7*   PLATELETS Thousands/uL 258   NEUTROS PCT % 73   LYMPHS PCT % 21   MONOS PCT % 6   EOS PCT % 0     Results from last 7 days   Lab Units 11/11/22 2043   SODIUM mmol/L 137   POTASSIUM mmol/L 3 4*   CHLORIDE mmol/L 104   CO2 mmol/L 22   BUN mg/dL 9   CREATININE mg/dL 0 57*   ANION GAP mmol/L 11   CALCIUM mg/dL 9 0   ALBUMIN g/dL 3 0*   TOTAL BILIRUBIN mg/dL 0 25   ALK PHOS U/L 56   ALT U/L 13   AST U/L 9   GLUCOSE RANDOM mg/dL 97                       Imaging: Reviewed radiology reports from this admission including: CTA PE study  CTA ED chest PE Study   Final Result by Marcos Merino DO (11/12 3644)      Bilateral pulmonary emboli, left greater than right, as described, no discrete evidence of right heart strain  Lungs appear grossly clear  Other findings as above  I personally discussed this study with Yesenia Melchor on 11/12/2022 at 12:38 AM          Workstation performed: [id number]             EKG and Other Studies Reviewed on Admission:   · EKG: EKG reivewed   ** Please Note: This note has been constructed using a voice recognition system   **

## 2022-11-12 NOTE — ASSESSMENT & PLAN NOTE
· Follows with [facility] as outpatient  ·  4 para 1  · Discussed with ob gyn on call, no need for formal consult  Recommend continue lovenox  · Will stop aspirin on discharge  · Patient was demonstrated proper injection techniques  Requesting to be discharged today  lovenox sent to patients pharmacy     · Continue outpatient f/u

## 2022-11-12 NOTE — ASSESSMENT & PLAN NOTE
Patient reports SOB and tachycardia at home for the past 3-4d  She is currently 21 weeks pregnant  Reports her HR has improved currently, but still feels SOB  Denies other symptom/complaint at this time       /72 (BP Location: Right arm)   Pulse (!) 107   Temp 98 3 °F (36 8 °C) (Oral)   Resp 18   Ht 5' 4" (1 626 m)   Wt 88 3 kg (194 lb 10 7 oz)   LMP 06/14/2022   SpO2 98%   BMI 33 41 kg/m²     · Reporting SOB and tachycardia at home for the past 3-4d  · D-dimer 2 79  · CTA PE study showing BL PE L>R w/o heart strain   · HR 91; 98%O2 RA  · Started on SubQ Lovenox 1mg/kg BID   · Tele monitoring   · Continuous O2 monitoring

## 2022-11-12 NOTE — ASSESSMENT & PLAN NOTE
· Follows with [facility] as outpatient  ·  4 para 1  · ED spoke with OB who report patient is ok to stay for anticoagulation   · Continue outpatient f/u

## 2022-11-14 PROBLEM — Z34.90 PREGNANCY: Status: RESOLVED | Noted: 2022-08-10 | Resolved: 2022-11-14

## 2022-11-14 NOTE — PATIENT INSTRUCTIONS
[facility]   What changes are happening with my body? Now that you are in your second trimester, you have more energy  You may also be feeling hungrier than usual  You may be gaining about ½ to 1 pound a week, and your pregnancy is beginning to show  You may need to start wearing maternity clothes  As your baby gets larger, you may have other symptoms  These may include body aches or stretch marks on your abdomen, breasts, thighs, or buttocks  How do I care for myself at this stage of my pregnancy? Eat a variety of healthy foods  Healthy foods include fruits, vegetables, whole-grain breads, low-fat dairy foods, beans, lean meats, and fish  Drink liquids as directed  Ask how much liquid to drink each day and which liquids are best for you  Limit caffeine to less than 200 milligrams each day  Limit your intake of fish to 2 servings each week  Choose fish low in mercury such as canned light tuna, shrimp, salmon, cod, or tilapia  Do not  eat fish high in mercury such as swordfish, tilefish, yamilka mackerel, and shark  Take prenatal vitamins as directed  Your need for certain vitamins and minerals, such as folic acid, increases during pregnancy  Prenatal vitamins provide some of the extra vitamins and minerals you need  Prenatal vitamins may also help to decrease the risk of certain birth defects  Talk to your healthcare provider about exercise  Moderate exercise can help you stay fit  Your healthcare provider will help you plan an exercise program that is safe for you during pregnancy  Do not smoke  Smoking increases your risk of a miscarriage and other health problems during your pregnancy  Smoking can cause your baby to be born too early or weigh less at birth  Ask your healthcare provider for information if you need help quitting  Do not drink alcohol  Alcohol passes from your body to your baby through the placenta   It can affect your baby's brain development and cause fetal alcohol syndrome (FAS)  FAS is a group of conditions that causes mental, behavior, and growth problems  Talk to your healthcare provider before you take any medicines  Many medicines may harm your baby if you take them when you are pregnant  Do not take any medicines, vitamins, herbs, or supplements without first talking to your healthcare provider  Never use illegal or street drugs (such as marijuana or cocaine) while you are pregnant  What are some safety tips during pregnancy? Avoid hot tubs and saunas  Do not use a hot tub or sauna while you are pregnant, especially during your first trimester  Hot tubs and saunas may raise your baby's temperature and increase the risk of birth defects  Avoid toxoplasmosis  This is an infection caused by eating raw meat or being around infected cat feces  It can cause birth defects, miscarriages, and other problems  Wash your hands after you touch raw meat  Make sure any meat is well-cooked before you eat it  Avoid raw eggs and unpasteurized milk  Use gloves or ask someone else to clean your cat's litter box while you are pregnant  What changes are happening with my baby? By 22 weeks, your baby is about 8 inches long from the top of the head to the rump (baby's bottom)  Your baby also weighs about 1 pound  Your baby is becoming much more active  You may be able to feel the baby move inside you now  The first movements may not be that noticeable  They may feel like a fluttering sensation  As time goes on, your baby's movements will become stronger and more noticeable  What do I need to know about prenatal care? During the first 28 weeks of your pregnancy, you will see your healthcare provider once a month  Your healthcare provider will check your blood pressure and weight  You may also need the following:  A urine test  may also be done to check for sugar and protein   These can be signs of gestational diabetes or infection  Protein in your urine may also be a sign of preeclampsia  Preeclampsia is a condition that can develop during week 20 or later of your pregnancy  It causes high blood pressure, and it can cause problems with your kidneys and other organs  Fundal height  is a measurement of your uterus to check your baby's growth  This number is usually the same as the number of weeks that you have been pregnant  A fetal ultrasound  shows pictures of your baby inside your uterus  It shows your baby's development  The movement and position of your baby can also be seen  Your healthcare provider may be able to tell you what your baby's gender is during the ultrasound  Your baby's heart rate  will be checked  When should I seek immediate care? You develop a severe headache that does not go away  You have new or increased vision changes, such as blurred or spotted vision  You have new or increased swelling in your face or hands  You have vaginal spotting or bleeding  Your water broke or you feel warm water gushing or trickling from your vagina  When should I call my doctor or obstetrician? You have abdominal cramps, pressure, or tightening  You have a change in vaginal discharge  You cannot keep food or drinks down, and you are losing weight  You have chills or a fever  You have vaginal itching, burning, or pain  You have yellow, green, white, or foul-smelling vaginal discharge  You have pain or burning when you urinate, less urine than usual, or pink or bloody urine  You have questions or concerns about your condition or care  CARE AGREEMENT:   You have the right to help plan your care  Learn about your health condition and how it may be treated  Discuss treatment options with your healthcare providers to decide what care you want to receive  You always have the right to refuse treatment  The above information is an  only   It is not intended as medical advice for individual conditions or treatments  Talk to your doctor, nurse or pharmacist before following any medical regimen to see if it is safe and effective for you  © Copyright [address] 2022 Information is for End User's use only and may not be sold, redistributed or otherwise used for commercial purposes   All illustrations and images included in CareNotes® are the copyrighted property of A D A M , Inc  or [address] [place] [address]

## 2022-11-15 ENCOUNTER — ROUTINE PRENATAL (OUTPATIENT)
Dept: OBGYN CLINIC | Age: 32
End: 2022-11-15

## 2022-11-15 VITALS — BODY MASS INDEX: 33.64 KG/M2 | DIASTOLIC BLOOD PRESSURE: 86 MMHG | SYSTOLIC BLOOD PRESSURE: 114 MMHG | WEIGHT: 196 LBS

## 2022-11-15 DIAGNOSIS — D50.8 OTHER IRON DEFICIENCY ANEMIA: ICD-10-CM

## 2022-11-15 DIAGNOSIS — Z72.0 TOBACCO ABUSE: ICD-10-CM

## 2022-11-15 DIAGNOSIS — Z34.82 PRENATAL CARE, SUBSEQUENT PREGNANCY, SECOND TRIMESTER: Primary | ICD-10-CM

## 2022-11-15 DIAGNOSIS — O99.842 PREGNANCY AFFECTED BY PREVIOUS BARIATRIC SURGERY, CURRENTLY IN SECOND TRIMESTER: ICD-10-CM

## 2022-11-15 DIAGNOSIS — Z3A.21 21 WEEKS GESTATION OF PREGNANCY: ICD-10-CM

## 2022-11-15 DIAGNOSIS — O09.292 PRIOR PERINATAL LOSS IN SECOND TRIMESTER, ANTEPARTUM: ICD-10-CM

## 2022-11-15 DIAGNOSIS — I26.99 ACUTE PULMONARY EMBOLISM, UNSPECIFIED PULMONARY EMBOLISM TYPE, UNSPECIFIED WHETHER ACUTE COR PULMONALE PRESENT (HCC): ICD-10-CM

## 2022-11-15 PROBLEM — K04.7 DENTAL ABSCESS: Status: RESOLVED | Noted: 2022-08-13 | Resolved: 2022-11-15

## 2022-11-15 PROBLEM — G43.709 CHRONIC MIGRAINE WITHOUT AURA WITHOUT STATUS MIGRAINOSUS, NOT INTRACTABLE: Status: RESOLVED | Noted: 2021-11-08 | Resolved: 2022-11-15

## 2022-11-15 PROBLEM — R51.9 HEADACHE: Status: RESOLVED | Noted: 2021-10-31 | Resolved: 2022-11-15

## 2022-11-15 PROBLEM — L03.211 FACIAL CELLULITIS: Status: RESOLVED | Noted: 2022-08-10 | Resolved: 2022-11-15

## 2022-11-15 PROBLEM — D50.0 IRON DEFICIENCY ANEMIA DUE TO CHRONIC BLOOD LOSS: Status: RESOLVED | Noted: 2021-11-08 | Resolved: 2022-11-15

## 2022-11-15 PROBLEM — E87.6 HYPOKALEMIA: Status: RESOLVED | Noted: 2021-10-31 | Resolved: 2022-11-15

## 2022-11-15 LAB
SL AMB  POCT GLUCOSE, UA: ABNORMAL
SL AMB POCT URINE PROTEIN: 1

## 2022-11-15 NOTE — PROGRESS NOTES
Problem   21 Weeks Gestation of Pregnancy    Blood Type: A +  Pap 2/10/22 NILM/neg  GC/CT 9/8/22 neg/neg  PN1 Labs: iron deficiency anemia, low vitamin d  Otherwise unremarkable  1 hr Glucola Normal         [facility] STOPPED D/T ACUTE PE ON 11/11, LOVENOX TO BE STARTED TODAY  ADVISED SMOKING CESSATION! STATES SHE IS ONLY SMOKING 1 PER DAY  HGB 7 7, ADVISED TO START IRON DAILY, DISCUSSED SHE MAY NEED IRON INFUSIONS  WILL ALSO TAKE COLACE WITH THE IRON  NIPT low risk   AFP - low rsk    28 week labs -  COVID vaccine -   TDAP -   Delivery consent -   Yellow folder -     Breast pump -   Pediatrician -   Perineal massage -   GBS -      Dental Abscess (Resolved)   Facial Cellulitis (Resolved)   Chronic Migraine Without Aura Without Status Migrainosus, Not Intractable (Resolved)    Last Assessment & Plan:   Formatting of this note might be different from the original   firocet is effective in treating migraine  Likely from dehydration, however mass on neck that swells may contribute  US soft tissue on neck  F/u if worsening     Iron Deficiency Anemia Due to Chronic Blood Loss (Resolved)    Last Assessment & Plan:   Formatting of this note might be different from the original   Repeat blood work now and in 3 months  Continue iron supp  No constipation  F /u 3 mo     Headache (Resolved)   Hypokalemia (Resolved)     21 weeks gestation of pregnancy  Here with her son  Feels OK  Recently dx with acute PE on 11/11, lovenox started but not picked up yet  Discussed the importance of picking up her lovenox today! Patient verbalized understanding  Will start Iron today also with colace  Denies LOF/CTX/VB  Discussed fetal awareness  Also noted to have very concentrated urine-admits to not enough fluid intake  She will try to improve on this  No other concerns

## 2022-11-15 NOTE — ASSESSMENT & PLAN NOTE
Here with her son  Feels OK  Recently dx with acute PE on 11/11, lovenox started but not picked up yet  Discussed the importance of picking up her lovenox today! Patient verbalized understanding  Will start Iron today also with colace  Denies LOF/CTX/VB  Discussed fetal awareness  Also noted to have very concentrated urine-admits to not enough fluid intake  She will try to improve on this  No other concerns

## 2022-11-15 NOTE — PROGRESS NOTES
Pt is feeling movement no LOF or vaginal bleeding  Has not started her Lovenox as of yet because the pharmacy they sent it to did not have any so now she is wating for it to get filled at a different pharmacy   She is currently smoking with PE   And she wants to start her Nicotine patch

## 2022-11-16 NOTE — ASSESSMENT & PLAN NOTE
Patient reports SOB and tachycardia at home for the past 3-4d  She is currently 21 weeks pregnant  Reports her HR has improved currently, but still feels SOB  Denies other symptom/complaint at this time  /70 (BP Location: Right arm)   Pulse 105   Temp 98 3 °F (36 8 °C) (Oral)   Resp 20   Ht 5' 4" (1 626 m)   Wt 88 3 kg (194 lb 10 7 oz)   LMP 06/14/2022   SpO2 100%   BMI 33 41 kg/m²     · Reporting SOB and tachycardia at home for the past 3-4d  · D-dimer 2 79  · CTA PE study showing BL PE L>R w/o heart strain   · HR 91; 98%O2 RA  · Started on SubQ Lovenox 1mg/kg BID   · Tele monitoring   · Continuous O2 monitoring   · Patient will stop aspirin and continue lovenox-sent to pharmacy  Recommended patient stay for another 24 hours for monitoring, however patient requesting to leave with outpatient followup

## 2022-11-16 NOTE — DISCHARGE SUMMARY
[address] [facility]  Discharge- Adalberto Zamora 1990, 29 y o  female MRN: [id number]  Unit/Bed#: ED 19 Encounter: [id number]  Primary Care Provider: Jelly Salinas MD   Date and time admitted to hospital: 2022  8:48 PM    21 weeks gestation of pregnancy  Assessment & Plan  · Follows with [facility] as outpatient  ·  4 para 1  · Discussed with ob gyn on call, no need for formal consult  Recommend continue lovenox  · Will stop aspirin on discharge  · Patient was demonstrated proper injection techniques  Requesting to be discharged today  lovenox sent to patients pharmacy  · Continue outpatient f/u     Anemia  Assessment & Plan  · HGB 7-8-will need monitoring while on ac outpatient  · Denies s/s of bleeding; monitor  ·     * Pulmonary embolism [facility])  Assessment & Plan  Patient reports SOB and tachycardia at home for the past 3-4d  She is currently 21 weeks pregnant  Reports her HR has improved currently, but still feels SOB  Denies other symptom/complaint at this time  /70 (BP Location: Right arm)   Pulse 105   Temp 98 3 °F (36 8 °C) (Oral)   Resp 20   Ht 5' 4" (1 626 m)   Wt 88 3 kg (194 lb 10 7 oz)   LMP 2022   SpO2 100%   BMI 33 41 kg/m²     · Reporting SOB and tachycardia at home for the past 3-4d  · D-dimer 2 79  · CTA PE study showing BL PE L>R w/o heart strain   · HR 91; 98%O2 RA  · Started on SubQ Lovenox 1mg/kg BID   · Tele monitoring   · Continuous O2 monitoring   · Patient will stop aspirin and continue lovenox-sent to pharmacy  Recommended patient stay for another 24 hours for monitoring, however patient requesting to leave with outpatient followup      Hypokalemia-resolved as of 11/15/2022  Assessment & Plan  ·   · Replete and recheck           Medical Problems     Resolved Problems  Date Reviewed: 2022          Resolved    Hypokalemia 11/15/2022     Resolved by  Cezar Desai        Discharging Physician / Practitioner: Gracie Krueger Joan Cardenas  PCP: Lissa Mcnamara MD  Admission Date:   Admission Orders (From admission, onward)     Ordered        11/12/22 0057  Place in Observation  Once                      Discharge Date: 11/12/22  Consultations During Hospital Stay:  · Ob gyn     Procedures Performed:   · none    Significant Findings / Test Results:   CTA ED chest PE Study   Final Result by Hamilton Dawson DO (11/12 1850)      Bilateral pulmonary emboli, left greater than right, as described, no discrete evidence of right heart strain  Lungs appear grossly clear  Other findings as above  I personally discussed this study with Nataliia Del Rosario on 11/12/2022 at 12:38 AM          Workstation performed: [id number]         ·     Incidental Findings:   ·      Test Results Pending at Discharge (will require follow up):   · none     Outpatient Tests Requested:  · followup cbc    Complications:  pe    Reason for Admission: [address] Course:   Doc Joahna is a 29 y o  female patient who originally presented to the hospital on 11/11/2022 due to sob and tachycardia, found to have pe on cta imaging without right heart strain on echo  Stopped home aspirin, continued on lovenox  Patient was demonstrated and was able to teach back proper administration of lovenox to nursing  I recommended monitoring for another 24 hours, however patient and significant other requested discharge with outpatient followup  Please see above list of diagnoses and related plan for additional information  Condition at Discharge: stable    Discharge Day Visit / Exam:   Subjective:  Patient denies any acute complaints    Vitals: Blood Pressure: 112/70 (11/12/22 1300)  Pulse: 105 (11/12/22 1300)  Temperature: 98 3 °F (36 8 °C) (11/11/22 2035)  Temp Source: Oral (11/11/22 2035)  Respirations: 20 (11/12/22 1300)  Height: 5' 4" (162 6 cm) (11/11/22 2035)  Weight - Scale: 88 3 kg (194 lb 10 7 oz) (11/11/22 2035)  SpO2: 100 % (11/12/22 1300)  Exam: Physical Exam  Vitals and nursing note reviewed  Constitutional:       General: She is not in acute distress  Appearance: She is well-developed  She is not ill-appearing, toxic-appearing or diaphoretic  HENT:      Head: Normocephalic and atraumatic  Eyes:      General: No scleral icterus  Conjunctiva/sclera: Conjunctivae normal    Cardiovascular:      Rate and Rhythm: Regular rhythm  Tachycardia present  Heart sounds: No murmur heard  No friction rub  No gallop  Pulmonary:      Effort: Pulmonary effort is normal  No respiratory distress  Breath sounds: Normal breath sounds  No stridor  No wheezing, rhonchi or rales  Chest:      Chest wall: No tenderness  Abdominal:      General: There is no distension  Palpations: Abdomen is soft  There is no mass  Tenderness: There is no abdominal tenderness  There is no guarding or rebound  Hernia: No hernia is present  Musculoskeletal:         General: No swelling, tenderness, deformity or signs of injury  Cervical back: Neck supple  Skin:     General: Skin is warm and dry  Capillary Refill: Capillary refill takes less than 2 seconds  Coloration: Skin is not jaundiced or pale  Neurological:      Mental Status: She is alert and oriented to person, place, and time  Psychiatric:         Mood and Affect: Mood normal           Discussion with Family: Updated  (significant other) at bedside  Discharge instructions/Information to patient and family:   See after visit summary for information provided to patient and family  Provisions for Follow-Up Care:  See after visit summary for information related to follow-up care and any pertinent home health orders  Disposition:   Home    Planned Readmission: no     Discharge Statement:  I spent 35 minutes discharging the patient  This time was spent on the day of discharge  I had direct contact with the patient on the day of discharge   Greater than 50% of the total time was spent examining patient, answering all patient questions, arranging and discussing plan of care with patient as well as directly providing post-discharge instructions  Additional time then spent on discharge activities  Discharge Medications:  See after visit summary for reconciled discharge medications provided to patient and/or family        **Please Note: This note may have been constructed using a voice recognition system**

## 2022-11-18 ENCOUNTER — TELEPHONE (OUTPATIENT)
Dept: OBGYN CLINIC | Facility: CLINIC | Age: 32
End: 2022-11-18

## 2022-11-18 DIAGNOSIS — O99.019 IRON DEFICIENCY ANEMIA DURING PREGNANCY: Primary | ICD-10-CM

## 2022-11-18 DIAGNOSIS — D50.9 IRON DEFICIENCY ANEMIA DURING PREGNANCY: Primary | ICD-10-CM

## 2022-11-18 DIAGNOSIS — O88.212 PULMONARY EMBOLISM AFFECTING PREGNANCY IN SECOND TRIMESTER: ICD-10-CM

## 2022-11-18 NOTE — TELEPHONE ENCOUNTER
Attempted to call pt x 2 , via number listed in chart & on consent form  My chart message was sent  - will await pt's call back

## 2022-11-18 NOTE — TELEPHONE ENCOUNTER
----- Message from Patsy Nguyen MD sent at 11/18/2022 11:20 AM EST -----  Regarding: [facility] recs  HI all - at recommendation from [facility] please order hemoglobin electrophoresis for this pt and set up iron transfusions  She will also need hematology consult for recently diagnosed pulmonary embolism  Thank you! This patient has a diagnosis of iron deficiency and needs IV iron treatment - please schedule for infusions     Venofer 200 mg in 100 mL sodium chloride 0 9%  twice weekly   Total of five treatments   Please include infusion appointment request, oncology nurse communication, and monitor patient afterwards

## 2022-11-22 ENCOUNTER — TELEPHONE (OUTPATIENT)
Dept: HEMATOLOGY ONCOLOGY | Facility: CLINIC | Age: 32
End: 2022-11-22

## 2022-11-22 ENCOUNTER — ROUTINE PRENATAL (OUTPATIENT)
Dept: PERINATAL CARE | Facility: OTHER | Age: 32
End: 2022-11-22

## 2022-11-22 VITALS
HEART RATE: 88 BPM | BODY MASS INDEX: 34.01 KG/M2 | DIASTOLIC BLOOD PRESSURE: 60 MMHG | SYSTOLIC BLOOD PRESSURE: 110 MMHG | HEIGHT: 64 IN | WEIGHT: 199.2 LBS

## 2022-11-22 DIAGNOSIS — O99.212 MATERNAL OBESITY, ANTEPARTUM, SECOND TRIMESTER: ICD-10-CM

## 2022-11-22 DIAGNOSIS — Z3A.23 23 WEEKS GESTATION OF PREGNANCY: ICD-10-CM

## 2022-11-22 DIAGNOSIS — Z79.01 VISIT FOR MONITORING LOVENOX THERAPY: ICD-10-CM

## 2022-11-22 DIAGNOSIS — O09.892 HISTORY OF PREMATURE DELIVERY, CURRENTLY PREGNANT, SECOND TRIMESTER: Primary | ICD-10-CM

## 2022-11-22 DIAGNOSIS — Z51.81 VISIT FOR MONITORING LOVENOX THERAPY: ICD-10-CM

## 2022-11-22 DIAGNOSIS — O09.292 PRIOR PERINATAL LOSS IN SECOND TRIMESTER, ANTEPARTUM: ICD-10-CM

## 2022-11-22 DIAGNOSIS — O88.212 PULMONARY EMBOLISM AFFECTING PREGNANCY IN SECOND TRIMESTER: ICD-10-CM

## 2022-11-22 DIAGNOSIS — O99.842 PREGNANCY AFFECTED BY PREVIOUS BARIATRIC SURGERY, CURRENTLY IN SECOND TRIMESTER: ICD-10-CM

## 2022-11-22 PROBLEM — D50.8 IRON DEFICIENCY ANEMIA SECONDARY TO INADEQUATE DIETARY IRON INTAKE: Status: ACTIVE | Noted: 2021-10-31

## 2022-11-22 PROBLEM — R65.10 SIRS (SYSTEMIC INFLAMMATORY RESPONSE SYNDROME) (HCC): Status: RESOLVED | Noted: 2021-10-31 | Resolved: 2022-11-22

## 2022-11-22 RX ORDER — B-COMPLEX WITH VITAMIN C
2 TABLET ORAL
Qty: 180 TABLET | Refills: 1 | Status: SHIPPED | OUTPATIENT
Start: 2022-11-22

## 2022-11-22 NOTE — PROGRESS NOTES
Bren Askew  has no complaints today at 23w0d  She reports fetal movements and does not report any vaginal bleeding or signs of labor  Her recently completed fetal testing revealed a     Problem list:  1  16 week  delivery following an episode of bleeding and cramping  She required a postpartum D&E  She is on vaginal progesterone q h s  until 36 weeks and 6 days  2  Significant maternal anemia ( hgb of 7 7 with a ferritin level of 7) Her ob office is setting her up for iron infusions  3  History of a gastric sleeve  4  History of a recent bilateral pulmonary emboli diagnosed at 21 weeks and was started on Lovenox 90 milligrams q 12 hours  5  History of GDM in a prior pregnancy and an early Glucola was normal  6  Pre gravid BMI of 32  7  History of smoking and she stopped when she developed the pulmonary emboli  8  Her mother has a history of 5 strokes around the age of 48  Her mother's name is Ashley Juarez birth date 2071  She gave me permission to review her epic chart  Her blood work shows that she has no evidence for a hypercoagulable state  Other risk factors she has for stroke include her history of hypertension and type 2 diabetes  Ultrasound findings: The ultrasound today shows a normal cervical length and the prior missed anatomy was reviewed and appears normal      Pregnancy ultrasound has limitations and is unable to detect all forms of fetal congenital abnormalities  Specific counseling was provided on the following problems:  1  Recommend a hypercoagulable workup on Nkechi Rahman since she had bilateral PEs diagnosed in pregnancy which was ordered  Since she is on Lovenox I could only order the ones that are not affected by her being on Lovenox which include Factor 5 Leiden, prothrombin gene, protein S, protein C, beta 2 glycoprotein and anticardiolipin     2  Agree with a hemoglobin electrophoresis, a hematology consult and iron infusions which was ordered by her ob provider due to her significant anemia  3  She will continue her therapeutic Lovenox till 6 weeks postpartum  4  She should avoid any estrogen containing birth control for life  5  Recommend offering induction at 39 weeks  She should be off of her Lovenox injections for at least 24 hours prior to her induction  6  She should continue her baby aspirin (to prevent the development of preeclampsia) at 162 milligrams daily till 36 weeks and 0 days unless she develops any episodes of bleeding in this pregnancy  This can be taken in addition to her Lovenox  7  Complications of therapeutic Lovenox during pregnancy can be low platelet counts in the 1st 3 weeks and development of osteoporosis  For this reason will add a CBC with platelet count to her thrombophilia panel and would recommend to [place] start on calcium supplementation a 1000 milligrams daily until she is off Lovenox  8  For any future pregnancy or admission for surgery etc  she should be on prophylactic Lovenox  Follow up recommended:   1  Recommend a follow-up ultrasound for fetal growth at 26 weeks  She also has a repeat growth scheduled at 32 weeks  2  No further transvaginal scans are recommended at this time  Pre visit time reviewing her records   10 minutes  Face to face time 25 minutes  Post visit time on documentation of note, updating her problem list, adding orders and prescriptions 10 minutes  Procedures that were completed today were charged separately  The level of decision making was moderate complexity      Silvia Sanchez MD

## 2022-11-22 NOTE — Clinical Note
Please help see if this patient needs a prior authorization for her hypercoagulable state labs and then please contact her to let her know when she get her blood work drawn    Andriy Diego

## 2022-11-22 NOTE — LETTER
2022     Aishwarya Maddox,  [facility] [address] [facility]  [address] [address]    Patient: Terra Addison   YOB: 1990   Date of Visit: 2022       Dear Dr Shannon Lewis: Thank you for referring Terra Addison to me for evaluation  Below are my notes for this consultation  If you have questions, please do not hesitate to call me  I look forward to following your patient along with you  Sincerely,        Silvia Sanchez MD        CC: No Recipients  Silvia Sanchez MD  2022  8:38 PM  Sign when Signing Visit  Terra Addison  has no complaints today at 23w0d  She reports fetal movements and does not report any vaginal bleeding or signs of labor  Her recently completed fetal testing revealed a     Problem list:  1  16 week  delivery following an episode of bleeding and cramping  She required a postpartum D&E  She is on vaginal progesterone q h s  until 36 weeks and 6 days  2  Significant maternal anemia ( hgb of 7 7 with a ferritin level of 7) Her ob office is setting her up for iron infusions  3  History of a gastric sleeve  4  History of a recent bilateral pulmonary emboli diagnosed at 21 weeks and was started on Lovenox 90 milligrams q 12 hours  5  History of GDM in a prior pregnancy and an early Glucola was normal  6  Pre gravid BMI of 32  7  History of smoking and she stopped when she developed the pulmonary emboli  8  Her mother has a history of 5 strokes around the age of 48  Her mother's name is Eve Chacon birth date 2071  She gave me permission to review her epic chart  Her blood work shows that she has no evidence for a hypercoagulable state  Other risk factors she has for stroke include her history of hypertension and type 2 diabetes  Ultrasound findings:   The ultrasound today shows a normal cervical length and the prior missed anatomy was reviewed and appears normal      Pregnancy ultrasound has limitations and is unable to detect all forms of fetal congenital abnormalities  Specific counseling was provided on the following problems:  1  Recommend a hypercoagulable workup on Justin Jamison since she had bilateral PEs diagnosed in pregnancy which was ordered  Since she is on Lovenox I could only order the ones that are not affected by her being on Lovenox which include Factor 5 Leiden, prothrombin gene, protein S, protein C, beta 2 glycoprotein and anticardiolipin  2  Agree with a hemoglobin electrophoresis, a hematology consult and iron infusions which was ordered by her ob provider due to her significant anemia  3  She will continue her therapeutic Lovenox till 6 weeks postpartum  4  She should avoid any estrogen containing birth control for life  5  Recommend offering induction at 39 weeks  She should be off of her Lovenox injections for at least 24 hours prior to her induction  6  She should continue her baby aspirin (to prevent the development of preeclampsia) at 162 milligrams daily till 36 weeks and 0 days unless she develops any episodes of bleeding in this pregnancy  This can be taken in addition to her Lovenox  7  Complications of therapeutic Lovenox during pregnancy can be low platelet counts in the 1st 3 weeks and development of osteoporosis  For this reason will add a CBC with platelet count to her thrombophilia panel and would recommend to Lashell start on calcium supplementation a 1000 milligrams daily until she is off Lovenox  8  For any future pregnancy or admission for surgery etc  she should be on prophylactic Lovenox  Follow up recommended:   1  Recommend a follow-up ultrasound for fetal growth at 26 weeks  She also has a repeat growth scheduled at 32 weeks  2  No further transvaginal scans are recommended at this time      Pre visit time reviewing her records   10 minutes  Face to face time 25 minutes  Post visit time on documentation of note, updating her problem list, adding orders and prescriptions 10 minutes  Procedures that were completed today were charged separately  The level of decision making was moderate complexity  MD Margaret Garcia  11/22/2022  1:13 PM  Sign when Signing Visit  Ultrasound Probe Disinfection    A transvaginal ultrasound was performed  Prior to use, disinfection was performed with High Level Disinfection Process (Trophon)  Probe serial number [id number]: [id number] was used        Caro Dan  11/22/22  1:13 PM

## 2022-11-22 NOTE — TELEPHONE ENCOUNTER
Made attempt to schedule a new patient appointment with Hematology oncology , no answer, no voicemail to leave a message

## 2022-11-22 NOTE — PROGRESS NOTES
Ultrasound Probe Disinfection    A transvaginal ultrasound was performed  Prior to use, disinfection was performed with High Level Disinfection Process (Trophon)  Probe serial number [id number]: [id number] was used        Caro Dan  11/22/22  1:13 PM

## 2022-11-23 ENCOUNTER — TELEPHONE (OUTPATIENT)
Dept: HEMATOLOGY ONCOLOGY | Facility: CLINIC | Age: 32
End: 2022-11-23

## 2022-11-23 NOTE — TELEPHONE ENCOUNTER
Made attempt to schedule a new patient consultation  Patient’s mailbox is not set up, I was unable to leave a voicemail

## 2022-11-25 DIAGNOSIS — D50.8 IRON DEFICIENCY ANEMIA SECONDARY TO INADEQUATE DIETARY IRON INTAKE: Primary | ICD-10-CM

## 2022-11-25 RX ORDER — SODIUM CHLORIDE 9 MG/ML
20 INJECTION, SOLUTION INTRAVENOUS ONCE
OUTPATIENT
Start: 2022-11-25

## 2022-11-28 ENCOUNTER — TELEPHONE (OUTPATIENT)
Dept: PERINATAL CARE | Facility: CLINIC | Age: 32
End: 2022-11-28

## 2022-11-28 NOTE — TELEPHONE ENCOUNTER
Patient stated she was told to call M to set up iron infusions  Explained her OBGYN placed order for iron infusions and attempted to reach her  Patient states her phone is not working and to call her mother's phone number  Patient asked for her OB's office to call [phone] as her phone is not working

## 2022-11-28 NOTE — TELEPHONE ENCOUNTER
Spoke with Geetha at [facility] ([phone]) in reference to labs that were ordered by Dr Melia oMran  The lab orders requested a prior authorization for Factor V leiden (#40751) and prothrombin gene (#27668)  Per Geetha, no prior authorization is needed for these cpt codes and Dr Melia Moran is within network   Reference # [id number]

## 2022-11-28 NOTE — TELEPHONE ENCOUNTER
First infusion is schedule for 12/5/22 ([address] [place] [address]) at 230 p m  L/M for patient to call back for appt info

## 2022-11-29 ENCOUNTER — TELEPHONE (OUTPATIENT)
Dept: HEMATOLOGY ONCOLOGY | Facility: CLINIC | Age: 32
End: 2022-11-29

## 2022-11-29 NOTE — TELEPHONE ENCOUNTER
Made attempt to schedule a new patient consultation  Patient’s mailbox is not set up, I was unable to leave a voicemail   Referral has been closed and letter has been sent

## 2022-11-29 NOTE — TELEPHONE ENCOUNTER
Unable to reach pt via telephone , L/M via my chart for pt to call with any further questions/concerns regarding her iron infusions

## 2022-12-01 ENCOUNTER — ROUTINE PRENATAL (OUTPATIENT)
Dept: OBGYN CLINIC | Facility: MEDICAL CENTER | Age: 32
End: 2022-12-01

## 2022-12-01 ENCOUNTER — APPOINTMENT (OUTPATIENT)
Dept: LAB | Facility: MEDICAL CENTER | Age: 32
End: 2022-12-01

## 2022-12-01 VITALS
DIASTOLIC BLOOD PRESSURE: 66 MMHG | WEIGHT: 203.4 LBS | SYSTOLIC BLOOD PRESSURE: 118 MMHG | HEIGHT: 64 IN | BODY MASS INDEX: 34.72 KG/M2

## 2022-12-01 DIAGNOSIS — Z34.82 PRENATAL CARE, SUBSEQUENT PREGNANCY, SECOND TRIMESTER: Primary | ICD-10-CM

## 2022-12-01 DIAGNOSIS — D50.8 IRON DEFICIENCY ANEMIA SECONDARY TO INADEQUATE DIETARY IRON INTAKE: ICD-10-CM

## 2022-12-01 DIAGNOSIS — O99.842 PREGNANCY AFFECTED BY PREVIOUS BARIATRIC SURGERY, CURRENTLY IN SECOND TRIMESTER: ICD-10-CM

## 2022-12-01 DIAGNOSIS — Z11.3 SCREEN FOR STD (SEXUALLY TRANSMITTED DISEASE): ICD-10-CM

## 2022-12-01 DIAGNOSIS — O99.019 IRON DEFICIENCY ANEMIA DURING PREGNANCY: ICD-10-CM

## 2022-12-01 DIAGNOSIS — O88.212 PULMONARY EMBOLISM AFFECTING PREGNANCY IN SECOND TRIMESTER: ICD-10-CM

## 2022-12-01 DIAGNOSIS — O99.212 MATERNAL OBESITY, ANTEPARTUM, SECOND TRIMESTER: ICD-10-CM

## 2022-12-01 DIAGNOSIS — D50.9 IRON DEFICIENCY ANEMIA DURING PREGNANCY: ICD-10-CM

## 2022-12-01 DIAGNOSIS — Z3A.17 17 WEEKS GESTATION OF PREGNANCY: ICD-10-CM

## 2022-12-01 LAB
PLATELET # BLD AUTO: 299 THOUSANDS/UL (ref 149–390)
PMV BLD AUTO: 10.4 FL (ref 8.9–12.7)
SL AMB  POCT GLUCOSE, UA: ABNORMAL
SL AMB POCT URINE PROTEIN: 1

## 2022-12-01 RX ORDER — ASPIRIN 81 MG/1
81 TABLET, CHEWABLE ORAL DAILY
Qty: 180 TABLET | Refills: 0 | Status: SHIPPED | OUTPATIENT
Start: 2022-12-01 | End: 2023-03-01

## 2022-12-01 NOTE — PROGRESS NOTES
29 y o  [id number] at 24w2d presents for routine prenatal visit  She denies contractions/leakage of fluid/vaginal bleeding  She feels good fetal movement  Problem List Items Addressed This Visit        Cardiovascular and Mediastinum    Pulmonary embolism affecting pregnancy in second trimester  Continue therapeutic lovenox throughout pregnancy and postpartum  Hematology referral placed - they've been trying to schedule with her  She has a different number - will change in chart         Other    Iron deficiency anemia secondary to inadequate dietary iron intake  venofer scheduled    Pregnancy affected by previous bariatric surgery, currently in second trimester    Maternal obesity, antepartum, second trimester  Weight gain 15# - recommend < 235   Other Visit Diagnoses     Prenatal care, subsequent pregnancy, second trimester    -  Primary  MFM appt 12/16  F/u in 4 wks or prn    Relevant Orders    POCT urine dip    Anemia Panel w/Reflex, OB    CBC and differential    Glucose, 1H PG    Screen for STD (sexually transmitted disease)        Relevant Orders    RPR    17 weeks gestation of pregnancy        Relevant Medications    aspirin 81 mg chewable tablet      '

## 2022-12-01 NOTE — PROGRESS NOTES
Patient presents for a routine prenatal visit    24W2D  Good Fetal Movement  No LOF,bleeding,discharge or cramping  No current complaints at this time  28 week labs ordered today  anatomy u/s:   Hasn't taken baby aspirin for about 2 5 weeks due to moving    Declined flu vaccine    Urine: +1/-

## 2022-12-02 LAB
B2 GLYCOPROT1 IGA SERPL IA-ACNC: 2.5
B2 GLYCOPROT1 IGG SERPL IA-ACNC: <0.8
B2 GLYCOPROT1 IGM SERPL IA-ACNC: 2.4
CARDIOLIPIN IGA SER IA-ACNC: 2.9
CARDIOLIPIN IGG SER IA-ACNC: 2.7
CARDIOLIPIN IGM SER IA-ACNC: 24

## 2022-12-03 LAB
PROT S ACT/NOR PPP: 61 % (ref 61–136)
PROT S PPP-ACNC: 69 % (ref 60–150)

## 2022-12-05 ENCOUNTER — HOSPITAL ENCOUNTER (OUTPATIENT)
Dept: INFUSION CENTER | Facility: CLINIC | Age: 32
Discharge: HOME | End: 2022-12-05

## 2022-12-05 VITALS
SYSTOLIC BLOOD PRESSURE: 140 MMHG | HEART RATE: 93 BPM | TEMPERATURE: 97.4 F | RESPIRATION RATE: 17 BRPM | DIASTOLIC BLOOD PRESSURE: 64 MMHG

## 2022-12-05 DIAGNOSIS — D50.8 IRON DEFICIENCY ANEMIA SECONDARY TO INADEQUATE DIETARY IRON INTAKE: Primary | ICD-10-CM

## 2022-12-05 LAB
PROT C AG ACT/NOR PPP IA: 92 % OF NORMAL (ref 60–150)
PROT S ACT/NOR PPP: 31 % (ref 68–108)

## 2022-12-05 RX ORDER — SODIUM CHLORIDE 9 MG/ML
20 INJECTION, SOLUTION INTRAVENOUS ONCE
Status: COMPLETED | OUTPATIENT
Start: 2022-12-05 | End: 2022-12-05

## 2022-12-05 RX ORDER — SODIUM CHLORIDE 9 MG/ML
20 INJECTION, SOLUTION INTRAVENOUS ONCE
Status: CANCELLED | OUTPATIENT
Start: 2022-12-06

## 2022-12-05 RX ADMIN — SODIUM CHLORIDE 20 ML/HR: 0.9 INJECTION, SOLUTION INTRAVENOUS at 14:55

## 2022-12-05 RX ADMIN — IRON SUCROSE 200 MG: 20 INJECTION, SOLUTION INTRAVENOUS at 15:04

## 2022-12-05 NOTE — PROGRESS NOTES
Patient is here for Venofer  Vitals are stable  Venofer infusing  Patient resting with no issues  Call bell within reach

## 2022-12-06 LAB
HGB A MFR BLD: 2.3 % (ref 1.8–3.2)
HGB A MFR BLD: 97.7 % (ref 96.4–98.8)
HGB F MFR BLD: 0 % (ref 0–2)
HGB FRACT BLD-IMP: NORMAL
HGB S MFR BLD: 0 %

## 2022-12-08 DIAGNOSIS — D50.8 IRON DEFICIENCY ANEMIA SECONDARY TO INADEQUATE DIETARY IRON INTAKE: Primary | ICD-10-CM

## 2022-12-08 LAB
F2 GENE MUT ANL BLD/T: NORMAL
F5 GENE MUT ANL BLD/T: NORMAL

## 2022-12-09 NOTE — RESULT ENCOUNTER NOTE
Lexx Kenyon,    This value may be falsely positive from pregnancy  Recommend a repeat draw 6 weeks after pregnancy is completed and you are no longer on Lovenox      Carmelina Briceño

## 2022-12-14 ENCOUNTER — HOSPITAL ENCOUNTER (OUTPATIENT)
Dept: INFUSION CENTER | Facility: CLINIC | Age: 32
Discharge: HOME | End: 2022-12-14

## 2022-12-14 VITALS
HEART RATE: 99 BPM | SYSTOLIC BLOOD PRESSURE: 109 MMHG | RESPIRATION RATE: 18 BRPM | TEMPERATURE: 97.6 F | DIASTOLIC BLOOD PRESSURE: 64 MMHG

## 2022-12-14 DIAGNOSIS — D50.8 IRON DEFICIENCY ANEMIA SECONDARY TO INADEQUATE DIETARY IRON INTAKE: Primary | ICD-10-CM

## 2022-12-14 RX ORDER — SODIUM CHLORIDE 9 MG/ML
20 INJECTION, SOLUTION INTRAVENOUS ONCE
Status: COMPLETED | OUTPATIENT
Start: 2022-12-14 | End: 2022-12-14

## 2022-12-14 RX ORDER — SODIUM CHLORIDE 9 MG/ML
20 INJECTION, SOLUTION INTRAVENOUS ONCE
Status: CANCELLED | OUTPATIENT
Start: 2022-12-16

## 2022-12-14 RX ADMIN — SODIUM CHLORIDE 20 ML/HR: 0.9 INJECTION, SOLUTION INTRAVENOUS at 14:11

## 2022-12-14 RX ADMIN — IRON SUCROSE 200 MG: 20 INJECTION, SOLUTION INTRAVENOUS at 14:12

## 2022-12-14 NOTE — PROGRESS NOTES
Patient tolerated infusion without incidents  AVS printed and given to patient  Aware of all upcoming appointments

## 2022-12-15 NOTE — PATIENT INSTRUCTIONS
Thank you for choosing us for your  care today  If you have any questions about your ultrasound or care, please do not hesitate to contact us or your primary obstetrician  Some general instructions for your pregnancy are:    Exercise: Aim for 22 minutes per day (150 minutes per week) of regular exercise  Walking is great! Nutrition: Choose healthy sources of calcium, iron, and protein  Learn about Preeclampsia: preeclampsia is a common, serious high blood pressure complication in pregnancy  A blood pressure of 780HJTL (systolic or top number) or 99WOTI (diastolic or bottom number) is not normal and needs evaluation by your doctor  Aspirin is sometimes prescribed in early pregnancy to prevent preeclampsia in women with risk factors - ask your obstetrician if you should be on this medication  If you smoke, try to reduce how many cigarettes you smoke or try to quit completely  Do not vape  Other warning signs to watch out for in pregnancy or postpartum: chest pain, obstructed breathing or shortness of breath, seizures, thoughts of hurting yourself or your baby, bleeding, a painful or swollen leg, fever, or headache (see [employer] POST-BIRTH Warning Signs campaign)  If these happen call 911  Itching is also not normal in pregnancy and if you experience this, especially over your hands and feet, potentially worse at night, notify your doctors

## 2022-12-16 ENCOUNTER — HOSPITAL ENCOUNTER (OUTPATIENT)
Dept: INFUSION CENTER | Facility: CLINIC | Age: 32
End: 2022-12-16

## 2022-12-16 ENCOUNTER — ULTRASOUND (OUTPATIENT)
Dept: PERINATAL CARE | Facility: OTHER | Age: 32
End: 2022-12-16

## 2022-12-16 VITALS
RESPIRATION RATE: 17 BRPM | HEART RATE: 88 BPM | TEMPERATURE: 97.5 F | DIASTOLIC BLOOD PRESSURE: 60 MMHG | SYSTOLIC BLOOD PRESSURE: 130 MMHG

## 2022-12-16 VITALS
HEART RATE: 69 BPM | DIASTOLIC BLOOD PRESSURE: 72 MMHG | WEIGHT: 201 LBS | BODY MASS INDEX: 34.31 KG/M2 | HEIGHT: 64 IN | SYSTOLIC BLOOD PRESSURE: 116 MMHG

## 2022-12-16 DIAGNOSIS — O88.212 PULMONARY EMBOLISM AFFECTING PREGNANCY IN SECOND TRIMESTER: ICD-10-CM

## 2022-12-16 DIAGNOSIS — Z3A.26 26 WEEKS GESTATION OF PREGNANCY: Primary | ICD-10-CM

## 2022-12-16 DIAGNOSIS — O99.842 PREGNANCY AFFECTED BY PREVIOUS BARIATRIC SURGERY, CURRENTLY IN SECOND TRIMESTER: ICD-10-CM

## 2022-12-16 DIAGNOSIS — D50.8 IRON DEFICIENCY ANEMIA SECONDARY TO INADEQUATE DIETARY IRON INTAKE: Primary | ICD-10-CM

## 2022-12-16 DIAGNOSIS — O09.292 PRIOR PERINATAL LOSS IN SECOND TRIMESTER, ANTEPARTUM: ICD-10-CM

## 2022-12-16 DIAGNOSIS — Z36.89 ENCOUNTER FOR ULTRASOUND TO CHECK FETAL GROWTH: ICD-10-CM

## 2022-12-16 DIAGNOSIS — Z3A.17 17 WEEKS GESTATION OF PREGNANCY: ICD-10-CM

## 2022-12-16 DIAGNOSIS — O99.212 MATERNAL OBESITY, ANTEPARTUM, SECOND TRIMESTER: ICD-10-CM

## 2022-12-16 RX ORDER — SODIUM CHLORIDE 9 MG/ML
20 INJECTION, SOLUTION INTRAVENOUS ONCE
Status: COMPLETED | OUTPATIENT
Start: 2022-12-16 | End: 2022-12-16

## 2022-12-16 RX ORDER — SODIUM CHLORIDE 9 MG/ML
20 INJECTION, SOLUTION INTRAVENOUS ONCE
Status: CANCELLED | OUTPATIENT
Start: 2022-12-22

## 2022-12-16 RX ORDER — ASPIRIN 81 MG/1
81 TABLET, CHEWABLE ORAL DAILY
Qty: 90 TABLET | Refills: 0 | Status: SHIPPED | OUTPATIENT
Start: 2022-12-16 | End: 2023-03-16

## 2022-12-16 RX ADMIN — IRON SUCROSE 200 MG: 20 INJECTION, SOLUTION INTRAVENOUS at 14:35

## 2022-12-16 RX ADMIN — SODIUM CHLORIDE 20 ML/HR: 0.9 INJECTION, SOLUTION INTRAVENOUS at 14:35

## 2022-12-16 NOTE — PROGRESS NOTES
Pt here venofer, offering no complaints  Right arm IV placed with positive blood return noted  Tolerated infusion without incident  PIV removed  AVS declined  Walked out in stable condition

## 2022-12-16 NOTE — PROGRESS NOTES
[address]: Ms Lashae Jones was seen today for fetal growth assessment ultrasound  See ultrasound report under "OB Procedures" tab  The time spent on this established patient on the encounter date included 10 minutes previsit service time reviewing records and precharting, 10 minutes face-to-face service time counseling regarding results and coordinating care, and  10 minutes charting, totalling 30 minutes  Please don't hesitate to contact our office with any concerns or questions    Holly Hanson MD

## 2022-12-20 ENCOUNTER — HOSPITAL ENCOUNTER (OUTPATIENT)
Dept: INFUSION CENTER | Facility: CLINIC | Age: 32
Discharge: HOME | End: 2022-12-20

## 2022-12-20 VITALS
TEMPERATURE: 98 F | SYSTOLIC BLOOD PRESSURE: 112 MMHG | HEART RATE: 87 BPM | DIASTOLIC BLOOD PRESSURE: 73 MMHG | RESPIRATION RATE: 16 BRPM

## 2022-12-20 DIAGNOSIS — D50.8 IRON DEFICIENCY ANEMIA SECONDARY TO INADEQUATE DIETARY IRON INTAKE: Primary | ICD-10-CM

## 2022-12-20 RX ORDER — SODIUM CHLORIDE 9 MG/ML
20 INJECTION, SOLUTION INTRAVENOUS ONCE
OUTPATIENT
Start: 2022-12-22

## 2022-12-20 RX ORDER — SODIUM CHLORIDE 9 MG/ML
20 INJECTION, SOLUTION INTRAVENOUS ONCE
Status: COMPLETED | OUTPATIENT
Start: 2022-12-20 | End: 2022-12-20

## 2022-12-20 RX ADMIN — SODIUM CHLORIDE 20 ML/HR: 9 INJECTION, SOLUTION INTRAVENOUS at 10:42

## 2022-12-20 RX ADMIN — SODIUM CHLORIDE 200 MG: 9 INJECTION, SOLUTION INTRAVENOUS at 10:43

## 2022-12-20 NOTE — PROGRESS NOTES
Pt presents for venofer infusion  No new meds or concerns  Pt tolerated infusion without adverse reaction  Future visits discussed  AVS declined

## 2022-12-22 ENCOUNTER — OFFICE VISIT (OUTPATIENT)
Dept: BARIATRICS | Facility: CLINIC | Age: 32
End: 2022-12-22

## 2022-12-22 DIAGNOSIS — O99.842 PREGNANCY AFFECTED BY PREVIOUS BARIATRIC SURGERY, CURRENTLY IN SECOND TRIMESTER: Primary | ICD-10-CM

## 2022-12-22 DIAGNOSIS — E55.9 VITAMIN D DEFICIENCY: ICD-10-CM

## 2022-12-22 DIAGNOSIS — D50.8 IRON DEFICIENCY ANEMIA SECONDARY TO INADEQUATE DIETARY IRON INTAKE: ICD-10-CM

## 2022-12-22 DIAGNOSIS — K91.2 POSTSURGICAL MALABSORPTION: ICD-10-CM

## 2022-12-22 DIAGNOSIS — E53.8 VITAMIN B12 DEFICIENCY: ICD-10-CM

## 2022-12-22 DIAGNOSIS — Z98.84 HISTORY OF BARIATRIC SURGERY: ICD-10-CM

## 2022-12-29 ENCOUNTER — NURSE TRIAGE (OUTPATIENT)
Dept: OTHER | Facility: OTHER | Age: 32
End: 2022-12-29

## 2022-12-29 ENCOUNTER — ROUTINE PRENATAL (OUTPATIENT)
Dept: OBGYN CLINIC | Facility: MEDICAL CENTER | Age: 32
End: 2022-12-29

## 2022-12-29 VITALS — BODY MASS INDEX: 36.22 KG/M2 | DIASTOLIC BLOOD PRESSURE: 62 MMHG | WEIGHT: 211 LBS | SYSTOLIC BLOOD PRESSURE: 118 MMHG

## 2022-12-29 DIAGNOSIS — O88.212 PULMONARY EMBOLISM AFFECTING PREGNANCY IN SECOND TRIMESTER: ICD-10-CM

## 2022-12-29 DIAGNOSIS — Z23 NEED FOR TDAP VACCINATION: ICD-10-CM

## 2022-12-29 DIAGNOSIS — O99.212 MATERNAL OBESITY, ANTEPARTUM, SECOND TRIMESTER: ICD-10-CM

## 2022-12-29 DIAGNOSIS — Z23 NEEDS FLU SHOT: ICD-10-CM

## 2022-12-29 DIAGNOSIS — O09.892 HISTORY OF PREMATURE DELIVERY, CURRENTLY PREGNANT, SECOND TRIMESTER: ICD-10-CM

## 2022-12-29 DIAGNOSIS — Z34.82 PRENATAL CARE, SUBSEQUENT PREGNANCY, SECOND TRIMESTER: Primary | ICD-10-CM

## 2022-12-29 DIAGNOSIS — Z76.81 EXPECTANT PARENT PREBIRTH PEDIATRICIAN VISIT: ICD-10-CM

## 2022-12-29 LAB
DME PARACHUTE DELIVERY DATE REQUESTED: NORMAL
DME PARACHUTE ITEM DESCRIPTION: NORMAL
DME PARACHUTE ORDER STATUS: NORMAL
DME PARACHUTE SUPPLIER NAME: NORMAL
DME PARACHUTE SUPPLIER PHONE: NORMAL
SL AMB  POCT GLUCOSE, UA: ABNORMAL
SL AMB POCT URINE PROTEIN: ABNORMAL

## 2022-12-29 NOTE — PROGRESS NOTES
29 y o  [id number] at 28w2d presents for routine prenatal visit  She denies contractions/leakage of fluid/vaginal bleeding  She feels good fetal movement     Problem List Items Addressed This Visit        Cardiovascular and Mediastinum    Pulmonary embolism affecting pregnancy in second trimester  Continue therapeutic lovenox   Plan for scheduled IOL       Other    History of premature delivery, currently pregnant, second trimester    Maternal obesity, antepartum, second trimester  Weight gain 23# - discussed healthy diet, exercise   Other Visit Diagnoses     Prenatal care, subsequent pregnancy, second trimester    -  Primary  12/16 EFW 16% - repeat growth US scheduled    Relevant Orders    POCT urine dip    Need for Tdap vaccination        Needs flu shot        Expectant parent prebirth pediatrician visit        Relevant Orders    Ambulatory Referral to Pediatrics

## 2022-12-29 NOTE — PROGRESS NOTES
PT here for routine prenatl lvisit  Has no complaints or concerns  Denies LOF,VB,CTX  Good FM  Prenatal labs not completed   GA 28w 2d  RH +   Yellow folder given today  Breast pump ordered today   Peds referral given today   Tdap given next visit   declines Flu

## 2022-12-30 ENCOUNTER — HOSPITAL ENCOUNTER (OUTPATIENT)
Facility: HOSPITAL | Age: 32
Discharge: HOME | End: 2022-12-30
Attending: OBSTETRICS & GYNECOLOGY | Admitting: OBSTETRICS & GYNECOLOGY

## 2022-12-30 VITALS — RESPIRATION RATE: 18 BRPM | HEART RATE: 81 BPM | SYSTOLIC BLOOD PRESSURE: 117 MMHG | DIASTOLIC BLOOD PRESSURE: 64 MMHG

## 2022-12-30 NOTE — TELEPHONE ENCOUNTER
Regardin weeks Mucus plug, bloody clots  ----- Message from Nata Duarte sent at 2022 11:46 PM EST -----  " I went to the bathroom and I think I have passed my mucus plug, it has little blood clots "

## 2022-12-30 NOTE — TELEPHONE ENCOUNTER
Reason for Disposition  • [1] Pregnant 24-36 weeks () AND [2] pinkish or brownish mucous discharge    Answer Assessment - Initial Assessment Questions  1  ONSET: "When did this bleeding start?"         Tonight  2  DESCRIPTION: "Describe the bleeding that you are having " "How much bleeding is there?"     - SPOTTING: spotting, or pinkish / brownish mucous discharge; does not fill panti-liner or pad     - MILD:  less than 1 pad / hour; less than patient's usual menstrual bleeding    - MODERATE: 1-2 pads / hour; 1 menstrual cup every 6 hours; small-medium blood clots (e g , pea, grape, small coin)    - SEVERE: soaking 2 or more pads/hour for 2 or more hours; 1 menstrual cup every 2 hours; bleeding not contained by pads or continuous red blood from vagina; large blood clots (e g , golf ball, large coin)       Moderate, passed small clots and a thin stream of blood  3  ABDOMINAL PAIN SEVERITY: If present, ask: "How bad is it?"  (e g , Scale 1-10; mild, moderate, or severe)    - MILD (1-3): doesn't interfere with normal activities, abdomen soft and not tender to touch     - MODERATE (4-7): interferes with normal activities or awakens from sleep, tender to touch     - SEVERE (8-10): excruciating pain, doubled over, unable to do any normal activities      Denies  4  PREGNANCY: "Do you know how many weeks or months pregnant you are?"       28 weeks  5  OLIVER: "What date are you expecting to deliver?"      3/21  6  FETAL MOVEMENT: "Has the baby's movement decreased or changed significantly from normal?"      Denies  7  HEMODYNAMIC STATUS: "Are you weak or feeling lightheaded?" If Yes, ask: "Can you stand and walk normally?"       Denies  8   OTHER SYMPTOMS: "What other symptoms are you having with the bleeding?" (e g , leaking fluid from vagina, contractions)      Possibly passed mucus plug yesterday    Protocols used: PREGNANCY - VAGINAL BLEEDING GREATER THAN 20 WEEKS [id number]

## 2022-12-30 NOTE — PROGRESS NOTES
L&D Triage Note - OB/GYN  Bethel Heath 29 y o  female MRN: [id number]  Unit/Bed#:  TRIAGE  Encounter: [id number]      ASSESSMENT:    Bethel Heath is a 29 y o  [id number] at 28w3d was evaluated today due to concern for vaginal bleeding as well as  labor  Due to the reassuring work-up described below she does not appear to be actively bleeding or in  labor  Therefore it is determined to be safe to discharge her home  PLAN:    1) Speculum Exam  2) transvaginal ultrasound- cervical length  3) SVE  4) advised patient that if intercourse seems to be correlated to her bleeding, particularly in the setting of being on Lovenox  She should discontinue this behavior for the remainder of her pregnancy  5) Continue routine prenatal care  6) Discharge from [facility] triage with  labor precautions    - Reviewed rupture of membranes, false vs true labor, decreased fetal movement, and vaginal bleeding   - Pt to call provider with any concerns and follow up at her next scheduled prenatal appointment    - Case discussed with Dr Cirilo Faulkner:    Bethel Heath 29 y o  [id number] at 28w3d with an Estimated Date of Delivery: 3/21/23 who presents with a chief complaint of vaginal bleeding for the past 3 to 4 days  Patient denies loss of fluid endorses good fetal movement and denies contractions  She does endorse that she has occasional cramping which has occurred throughout her pregnancy  This pregnancy has been complicated by a pulmonary embolism for which the patient is now on Lovenox  Of note patient last had intercourse approximately 3 days ago  Patient has 1 prior vaginal term delivery  That pregnancy was complicated by gestational diabetes  Patient has a history of 1 ectopic pregnancy as well as 3 AB's             OBJECTIVE:    Vitals:    22 0205   BP: 117/64   Pulse: 81   Resp: 18       ROS:  Constitutional: Negative  Respiratory: Negative  Cardiovascular: Negative Gastrointestinal: Negative    General Physical Exam:  General: in no apparent distress, non-toxic and alert  Cardiovascular: Cor RRR  Lungs: non-labored breathing  Abdomen: abdomen is soft without significant tenderness, masses, organomegaly or guarding  Lower extremeties: nontender    Cervical Exam  Speculum: Cervical os is difficult to visualize due to body habitus, however does not appear to be visually dilated  Moderate amount of pink discharge    With small amount of bright red bleeding  SVE: 0 / 0% / -3    Fetal monitoring:  FHT: 135 bpm/ Moderate 6 - 25 bpm /  10 x 10 accelerations present, no decelerations  [place]: contractions not noted    KOH/WTMT:     Infection:   - no clue cells    - no hyphae   - no trichomonads present    Membrane status   -Negative ferning   -Negative nitrazene   -Negative pooling     Imaging:       TVUS   - Cervical length    - 3 46cm    - 3 10cm    - 3 48cm   - Presentation: vertex         Jessica Tatum MD  OBGYN PGY-1  12/30/2022 4:56 AM

## 2022-12-30 NOTE — DISCHARGE INSTRUCTIONS
Pregnancy at 32 to 30 [address]:   What changes are happening in my body? You may notice new symptoms such as shortness of breath, heartburn, or swelling of your ankles and feet  You may also have trouble sleeping or contractions  How do I care for myself at this stage of my pregnancy? Eat a variety of healthy foods  Healthy foods include fruits, vegetables, whole-grain breads, low-fat dairy foods, beans, lean meats, and fish  Drink liquids as directed  Ask how much liquid to drink each day and which liquids are best for you  Limit caffeine to less than 200 milligrams each day  Limit your intake of fish to 2 servings each week  Choose fish low in mercury such as canned light tuna, shrimp, salmon, cod, or tilapia  Do not  eat fish high in mercury such as swordfish, tilefish, yamilka mackerel, and shark  Manage heartburn  by eating 4 or 5 small meals each day instead of large meals  Avoid spicy food  Manage swelling  by lying down and putting your feet up  Take prenatal vitamins as directed  Your need for certain vitamins and minerals, such as folic acid, increases during pregnancy  Prenatal vitamins provide some of the extra vitamins and minerals you need  Prenatal vitamins may also help to decrease the risk of certain birth defects  Talk to your healthcare provider about exercise  Moderate exercise can help you stay fit  Your healthcare provider will help you plan an exercise program that is safe for you during pregnancy  Do not smoke  Smoking increases your risk of a miscarriage and other health problems during your pregnancy  Smoking can cause your baby to be born too early or weigh less at birth  Ask your healthcare provider for information if you need help quitting  Do not drink alcohol  Alcohol passes from your body to your baby through the placenta  It can affect your baby's brain development and cause fetal alcohol syndrome (FAS)   FAS is a group of conditions that causes mental, behavior, and growth problems  Talk to your healthcare provider before you take any medicines  Many medicines may harm your baby if you take them when you are pregnant  Do not take any medicines, vitamins, herbs, or supplements without first talking to your healthcare provider  Never use illegal or street drugs (such as marijuana or cocaine) while you are pregnant  What are some safety tips during pregnancy? Avoid hot tubs and saunas  Do not use a hot tub or sauna while you are pregnant, especially during your first trimester  Hot tubs and saunas may raise your baby's temperature and increase the risk of birth defects  Avoid toxoplasmosis  This is an infection caused by eating raw meat or being around infected cat feces  It can cause birth defects, miscarriages, and other problems  Wash your hands after you touch raw meat  Make sure any meat is well-cooked before you eat it  Avoid raw eggs and unpasteurized milk  Use gloves or ask someone else to clean your cat's litter box while you are pregnant  What changes are happening with my baby? By 30 weeks, your baby may weigh more than 3 pounds  Your baby may be about 11 inches long from the top of the head to the rump (baby's bottom)  Your baby's eyes open and close now  Your baby's kicks and movements are more forceful at this time  What do I need to know about prenatal care? Your healthcare provider will check your blood pressure and weight  You may also need the following:  Blood tests  may be done to check for anemia or blood type  A urine test  may also be done to check for sugar and protein  These can be signs of gestational diabetes or infection  Protein in your urine may also be a sign of preeclampsia  Preeclampsia is a condition that can develop during week 20 or later of your pregnancy  It causes high blood pressure, and it can cause problems with your kidneys and other organs      A Tdap vaccine and flu vaccine  may be recommended by your healthcare provider  A gestational diabetes screen  may be done  Your healthcare provider may order either a 1-step or 2-step oral glucose tolerance test (OGTT)  1-step OGTT:  Your blood sugar level will be tested after you have not eaten for 8 hours (fasting)  You will then be given a glucose drink  Your level will be tested again 1 hour and 2 hours after you finish the drink  2-step OGTT:  You do not have to fast for the first part of the test  You will have the glucose drink at any time of day  Your blood sugar level will be checked 1 hour later  If your blood sugar is higher than a certain level, another test will be ordered  You will fast and your blood sugar level will be tested  You will have the glucose drink  Your blood will be tested again 1 hour, 2 hours, and 3 hours after you finish the glucose drink  Fundal height  is a measurement of your uterus to check your baby's growth  This number is usually the same as the number of weeks that you have been pregnant  Your healthcare provider may also check your baby's position  Your baby's heart rate  will be checked  When should I seek immediate care? You develop a severe headache that does not go away  You have new or increased vision changes, such as blurred or spotted vision  You have new or increased swelling in your face or hands  You have vaginal spotting or bleeding  Your water broke or you feel warm water gushing or trickling from your vagina  When should I call my doctor or obstetrician? You have more than 5 contractions in 1 hour  You notice any changes in your baby's movements  You have abdominal cramps, pressure, or tightening  You have a change in vaginal discharge  You have chills or a fever  You have vaginal itching, burning, or pain  You have yellow, green, white, or foul-smelling vaginal discharge      You have pain or burning when you urinate, less urine than usual, or pink or bloody urine  You have questions or concerns about your condition or care  CARE AGREEMENT:   You have the right to help plan your care  Learn about your health condition and how it may be treated  Discuss treatment options with your healthcare providers to decide what care you want to receive  You always have the right to refuse treatment  The above information is an  only  It is not intended as medical advice for individual conditions or treatments  Talk to your doctor, nurse or pharmacist before following any medical regimen to see if it is safe and effective for you  © Copyright [employer] 2022 Information is for End User's use only and may not be sold, redistributed or otherwise used for commercial purposes   All illustrations and images included in CareNotes® are the copyrighted property of A D A M , Inc  or [address] [place], [place] [address]

## 2022-12-30 NOTE — PROCEDURES
Griselda Maki, a [id number] at 28w3d with an OLIVER of 3/21/2023, by Last Menstrual Period, was seen at [address] for the following procedure(s): $Procedure Type: US - Transvaginal]                   Ultrasound Other  Fetal Presentation: Vertex  Cervical Length: 3 1  Funnel: No  Debris: No  Placenta Previa: No  Vasa Previa: No             Ultrasound Probe Disinfection    A transvaginal ultrasound was performed  Prior to use, disinfection was performed with High Level Disinfection Process (Trophon)  Probe serial number  [id number] was used      Sangita Colunga MD  12/30/22  5:06 AM

## 2023-01-05 ENCOUNTER — APPOINTMENT (OUTPATIENT)
Dept: LAB | Facility: CLINIC | Age: 33
End: 2023-01-05

## 2023-01-05 DIAGNOSIS — D50.8 IRON DEFICIENCY ANEMIA SECONDARY TO INADEQUATE DIETARY IRON INTAKE: ICD-10-CM

## 2023-01-05 DIAGNOSIS — Z98.84 HISTORY OF BARIATRIC SURGERY: ICD-10-CM

## 2023-01-05 DIAGNOSIS — Z11.3 SCREEN FOR STD (SEXUALLY TRANSMITTED DISEASE): ICD-10-CM

## 2023-01-05 DIAGNOSIS — O99.842 PREGNANCY AFFECTED BY PREVIOUS BARIATRIC SURGERY, CURRENTLY IN SECOND TRIMESTER: ICD-10-CM

## 2023-01-05 DIAGNOSIS — Z34.82 PRENATAL CARE, SUBSEQUENT PREGNANCY, SECOND TRIMESTER: ICD-10-CM

## 2023-01-05 DIAGNOSIS — E53.8 VITAMIN B12 DEFICIENCY: ICD-10-CM

## 2023-01-05 DIAGNOSIS — K91.2 POSTSURGICAL MALABSORPTION: ICD-10-CM

## 2023-01-05 DIAGNOSIS — E55.9 VITAMIN D DEFICIENCY: ICD-10-CM

## 2023-01-05 LAB
25(OH)D3 SERPL-MCNC: 6.9 NG/ML (ref 30–100)
BASOPHILS # BLD AUTO: 0.01 THOUSANDS/ÂΜL (ref 0–0.1)
BASOPHILS NFR BLD AUTO: 0 % (ref 0–1)
EOSINOPHIL # BLD AUTO: 0.01 THOUSAND/ÂΜL (ref 0–0.61)
EOSINOPHIL NFR BLD AUTO: 0 % (ref 0–6)
ERYTHROCYTE [DISTWIDTH] IN BLOOD BY AUTOMATED COUNT: 27.8 % (ref 11.6–15.1)
FERRITIN SERPL-MCNC: 37 NG/ML (ref 8–388)
GLUCOSE 1H P 50 G GLC PO SERPL-MCNC: 137 MG/DL (ref 40–134)
HCT VFR BLD AUTO: 30.6 % (ref 34.8–46.1)
HGB BLD-MCNC: 8.9 G/DL (ref 11.5–15.4)
IMM GRANULOCYTES # BLD AUTO: 0.03 THOUSAND/UL (ref 0–0.2)
IMM GRANULOCYTES NFR BLD AUTO: 1 % (ref 0–2)
LYMPHOCYTES # BLD AUTO: 1.48 THOUSANDS/ÂΜL (ref 0.6–4.47)
LYMPHOCYTES NFR BLD AUTO: 24 % (ref 14–44)
MCH RBC QN AUTO: 22.1 PG (ref 26.8–34.3)
MCHC RBC AUTO-ENTMCNC: 29.1 G/DL (ref 31.4–37.4)
MCV RBC AUTO: 76 FL (ref 82–98)
MONOCYTES # BLD AUTO: 0.35 THOUSAND/ÂΜL (ref 0.17–1.22)
MONOCYTES NFR BLD AUTO: 6 % (ref 4–12)
NEUTROPHILS # BLD AUTO: 4.35 THOUSANDS/ÂΜL (ref 1.85–7.62)
NEUTS SEG NFR BLD AUTO: 69 % (ref 43–75)
NRBC BLD AUTO-RTO: 0 /100 WBCS
PLATELET # BLD AUTO: 274 THOUSANDS/UL (ref 149–390)
PMV BLD AUTO: 9.7 FL (ref 8.9–12.7)
RBC # BLD AUTO: 4.02 MILLION/UL (ref 3.81–5.12)
WBC # BLD AUTO: 6.23 THOUSAND/UL (ref 4.31–10.16)

## 2023-01-06 LAB
ALBUMIN SERPL BCP-MCNC: 2.7 G/DL (ref 3.5–5)
ALP SERPL-CCNC: 58 U/L (ref 46–116)
ALT SERPL W P-5'-P-CCNC: 9 U/L (ref 12–78)
ANION GAP SERPL CALCULATED.3IONS-SCNC: 8 MMOL/L (ref 4–13)
AST SERPL W P-5'-P-CCNC: 10 U/L (ref 5–45)
BILIRUB SERPL-MCNC: 0.28 MG/DL (ref 0.2–1)
BUN SERPL-MCNC: 7 MG/DL (ref 5–25)
CALCIUM ALBUM COR SERPL-MCNC: 9.5 MG/DL (ref 8.3–10.1)
CALCIUM SERPL-MCNC: 8.5 MG/DL (ref 8.3–10.1)
CHLORIDE SERPL-SCNC: 108 MMOL/L (ref 96–108)
CO2 SERPL-SCNC: 23 MMOL/L (ref 21–32)
CREAT SERPL-MCNC: 0.55 MG/DL (ref 0.6–1.3)
FERRITIN SERPL-MCNC: 37 NG/ML (ref 8–388)
FOLATE SERPL-MCNC: 15.3 NG/ML (ref 3.1–17.5)
GFR SERPL CREATININE-BSD FRML MDRD: 126 ML/MIN/1.73SQ M
GLUCOSE P FAST SERPL-MCNC: 66 MG/DL (ref 65–99)
IRON SATN MFR SERPL: 11 % (ref 15–50)
IRON SERPL-MCNC: 40 UG/DL (ref 50–170)
POTASSIUM SERPL-SCNC: 3.4 MMOL/L (ref 3.5–5.3)
PROT SERPL-MCNC: 6.6 G/DL (ref 6.4–8.4)
RPR SER QL: NORMAL
SODIUM SERPL-SCNC: 139 MMOL/L (ref 135–147)
TIBC SERPL-MCNC: 372 UG/DL (ref 250–450)
VIT B12 SERPL-MCNC: 192 PG/ML (ref 100–900)

## 2023-01-09 ENCOUNTER — TELEPHONE (OUTPATIENT)
Dept: OBGYN CLINIC | Facility: CLINIC | Age: 33
End: 2023-01-09

## 2023-01-09 DIAGNOSIS — D50.8 IRON DEFICIENCY ANEMIA SECONDARY TO INADEQUATE DIETARY IRON INTAKE: Primary | ICD-10-CM

## 2023-01-09 DIAGNOSIS — R73.09 IMPAIRED GLUCOSE TOLERANCE TEST: Primary | ICD-10-CM

## 2023-01-09 NOTE — TELEPHONE ENCOUNTER
Called pt- informed pt of elevated one hour Glucose, & need for 3 hr GTT  Advised pt this is a fasting glucose test & to arrive at 0700 at a [facility] lab  Also, informed pt of CS recommendation to continue venofer infusions, as she is still anemic  Pt to call & discuss & schedule with hematology, as they initially scheduled her infusions  Advised to call with any further questions/concerns

## 2023-01-09 NOTE — TELEPHONE ENCOUNTER
----- Message from Misty Roca MD sent at 1/8/2023  9:30 PM EST -----  Please notify elevated 1 hr, needs 3 hrs  Hgb improved but still anemic  Should continue venofer transfusions   Please discuss with pt if we should schedule these or if hematology will schedule

## 2023-01-11 ENCOUNTER — ULTRASOUND (OUTPATIENT)
Dept: PERINATAL CARE | Facility: OTHER | Age: 33
End: 2023-01-11

## 2023-01-11 VITALS
HEIGHT: 64 IN | DIASTOLIC BLOOD PRESSURE: 60 MMHG | WEIGHT: 211.8 LBS | HEART RATE: 85 BPM | SYSTOLIC BLOOD PRESSURE: 110 MMHG | BODY MASS INDEX: 36.16 KG/M2

## 2023-01-11 DIAGNOSIS — O99.212 MATERNAL OBESITY, ANTEPARTUM, SECOND TRIMESTER: ICD-10-CM

## 2023-01-11 DIAGNOSIS — Z86.32 HISTORY OF GESTATIONAL DIABETES: ICD-10-CM

## 2023-01-11 DIAGNOSIS — O09.292 PRIOR PERINATAL LOSS IN SECOND TRIMESTER, ANTEPARTUM: ICD-10-CM

## 2023-01-11 DIAGNOSIS — Z98.84 HISTORY OF BARIATRIC SURGERY: ICD-10-CM

## 2023-01-11 DIAGNOSIS — Z3A.30 30 WEEKS GESTATION OF PREGNANCY: ICD-10-CM

## 2023-01-11 DIAGNOSIS — O09.893 HISTORY OF PREMATURE DELIVERY, CURRENTLY PREGNANT, THIRD TRIMESTER: ICD-10-CM

## 2023-01-11 DIAGNOSIS — Z71.85 VACCINE COUNSELING: ICD-10-CM

## 2023-01-11 DIAGNOSIS — O88.213 PULMONARY EMBOLISM AFFECTING PREGNANCY IN THIRD TRIMESTER: Primary | ICD-10-CM

## 2023-01-11 LAB
VIT A SERPL-MCNC: 11.4 UG/DL (ref 18.9–57.3)
VIT B1 BLD-SCNC: 75.3 NMOL/L (ref 66.5–200)
ZINC SERPL-MCNC: 53 UG/DL (ref 44–115)

## 2023-01-11 NOTE — LETTER
January 11, 2023     Aníbal Cooper, [address] [facility] [address]   [address] [address]    Patient: Debbi Morales   YOB: 1990   Date of Visit: 1/11/2023       Dear Dr Maile Phoenix: Thank you for referring Debbi Morales to me for evaluation  Below are my notes for this consultation  If you have questions, please do not hesitate to call me  I look forward to following your patient along with you  Sincerely,        Angeles Mancini MD        CC: No Recipients  Angeles Mancini MD  1/11/2023 10:33 AM  Sign when Signing Visit  A fetal ultrasound was completed  See Ob procedures in Epic for an interpretation and recommendations  Do not hesitate to contact us in [facility] if you have questions  Jesusita Puna Doree Goltz MD, [address] [place] [address]  Maternal Fetal Medicine

## 2023-01-11 NOTE — PROGRESS NOTES
A fetal ultrasound was completed  See Ob procedures in Epic for an interpretation and recommendations  Do not hesitate to contact us in [facility] if you have questions  Michelle Leslie MD, [address] [facility]  Maternal Fetal Medicine

## 2023-01-12 ENCOUNTER — ROUTINE PRENATAL (OUTPATIENT)
Dept: OBGYN CLINIC | Facility: CLINIC | Age: 33
End: 2023-01-12

## 2023-01-12 VITALS — DIASTOLIC BLOOD PRESSURE: 60 MMHG | WEIGHT: 210.2 LBS | SYSTOLIC BLOOD PRESSURE: 102 MMHG | BODY MASS INDEX: 36.08 KG/M2

## 2023-01-12 DIAGNOSIS — O99.843 PREGNANCY AFFECTED BY PREVIOUS BARIATRIC SURGERY, CURRENTLY IN THIRD TRIMESTER: ICD-10-CM

## 2023-01-12 DIAGNOSIS — O09.893 HISTORY OF PREMATURE DELIVERY, CURRENTLY PREGNANT, THIRD TRIMESTER: ICD-10-CM

## 2023-01-12 DIAGNOSIS — O09.292 PRIOR PERINATAL LOSS IN SECOND TRIMESTER, ANTEPARTUM: ICD-10-CM

## 2023-01-12 DIAGNOSIS — Z34.83 PRENATAL CARE, SUBSEQUENT PREGNANCY, THIRD TRIMESTER: Primary | ICD-10-CM

## 2023-01-12 DIAGNOSIS — Z87.59 HISTORY OF POSTPARTUM DEPRESSION: ICD-10-CM

## 2023-01-12 DIAGNOSIS — O88.213 PULMONARY EMBOLISM AFFECTING PREGNANCY IN THIRD TRIMESTER: ICD-10-CM

## 2023-01-12 DIAGNOSIS — D50.8 IRON DEFICIENCY ANEMIA SECONDARY TO INADEQUATE DIETARY IRON INTAKE: ICD-10-CM

## 2023-01-12 DIAGNOSIS — O99.213 MATERNAL OBESITY, ANTEPARTUM, THIRD TRIMESTER: ICD-10-CM

## 2023-01-12 DIAGNOSIS — Z72.0 TOBACCO ABUSE: ICD-10-CM

## 2023-01-12 DIAGNOSIS — Z86.59 HISTORY OF POSTPARTUM DEPRESSION: ICD-10-CM

## 2023-01-12 DIAGNOSIS — Z86.32 HISTORY OF GESTATIONAL DIABETES: ICD-10-CM

## 2023-01-12 DIAGNOSIS — Z23 NEED FOR TDAP VACCINATION: ICD-10-CM

## 2023-01-12 DIAGNOSIS — Z71.85 VACCINE COUNSELING: ICD-10-CM

## 2023-01-12 DIAGNOSIS — Z3A.30 30 WEEKS GESTATION OF PREGNANCY: ICD-10-CM

## 2023-01-12 NOTE — ASSESSMENT & PLAN NOTE
Referral to hem onc printed and contact information given  Stressed importance of correction of anemia prior to delivery

## 2023-01-12 NOTE — PROGRESS NOTES
Problem List Items Addressed This Visit        Cardiovascular and Mediastinum    Pulmonary embolism affecting pregnancy in third trimester       Other    Tobacco abuse    Iron deficiency anemia secondary to inadequate dietary iron intake     Referral to hem onc printed and contact information given  Stressed importance of correction of anemia prior to delivery  Relevant Orders    Ambulatory Referral to Hematology / Oncology    30 weeks gestation of pregnancy     Joe Atkins  is a 29 y o  [id number] @30w2d who presents for routine prenatal visit  28 wk labs - abn 1 hr (137) has order for 3 hour and was encouraged to complete  Reviewed importance of dx of and management of GDM  CBC shows persistent anemia, recently completed Venofer  Previously referred to hem onc but did not follow up as she did not  Have number and was unable to be reached  Referral and contact information given today  Neg RPR  Growth scan - 23   TDAP given today  Flu vaccine counseling also given  Has yellow packet  Reports good fetal movement  Denies LOF, vaginal bleeding, regular uterine contractions, cramping, headaches or visual changes  Reviewed PTL precautions and FKC  Prior  loss in second trimester, antepartum    Pregnancy affected by previous bariatric surgery, currently in third trimester     Advised to schedule f/u with bariatric surgery to review recent labs  History of postpartum depression    History of gestational diabetes    History of premature delivery, currently pregnant, third trimester    Maternal obesity, antepartum, third trimester     Repeat growth 23  Weekly NST at 36 weeks            Vaccine counseling   Other Visit Diagnoses     Prenatal care, subsequent pregnancy, third trimester    -  Primary    Need for Tdap vaccination        Relevant Orders    TDAP VACCINE GREATER THAN OR EQUAL TO 8YO IM (Completed)

## 2023-01-12 NOTE — ASSESSMENT & PLAN NOTE
Pancho Mcnamara  is a 29 y o  [id number] @30w2d who presents for routine prenatal visit  28 wk labs - abn 1 hr (137) has order for 3 hour and was encouraged to complete  Reviewed importance of dx of and management of GDM  CBC shows persistent anemia, recently completed Venofer  Previously referred to hem onc but did not follow up as she did not  Have number and was unable to be reached  Referral and contact information given today  Neg RPR  Growth scan - 1/27/23   TDAP given today  Flu vaccine counseling also given  Has yellow packet  Reports good fetal movement  Denies LOF, vaginal bleeding, regular uterine contractions, cramping, headaches or visual changes  Reviewed PTL precautions and FKC

## 2023-01-12 NOTE — PROGRESS NOTES
Patient here for PN visit  She denies spotting, cramping or LOF  Good fetal movement  28 week labs completed  She had US on 1/10/23  Tdap administered in right deltoid today, tolerated well  VIS given  She was not able to provide a urine specimen today

## 2023-01-17 ENCOUNTER — DOCUMENTATION (OUTPATIENT)
Dept: BARIATRICS | Facility: CLINIC | Age: 33
End: 2023-01-17

## 2023-01-17 NOTE — PROGRESS NOTES
Pt did not show for RD appointment on December 22nd  Lab slip was mailed to patient home for patient to complete prior to appointment  Labs were completed and results were forwarded to our program   Since she is not currently an established patient with our program,  recommendations for repletion of nutritional deficiencies were forwarded  to her OB providers

## 2023-01-18 ENCOUNTER — TELEPHONE (OUTPATIENT)
Dept: OBGYN CLINIC | Facility: CLINIC | Age: 33
End: 2023-01-18

## 2023-01-18 DIAGNOSIS — E50.9 VITAMIN A DEFICIENCY: ICD-10-CM

## 2023-01-18 DIAGNOSIS — E55.9 VITAMIN D DEFICIENCY: Primary | ICD-10-CM

## 2023-01-18 RX ORDER — ERGOCALCIFEROL 1.25 MG/1
50000 CAPSULE ORAL 2 TIMES WEEKLY
Qty: 24 CAPSULE | Refills: 0 | Status: SHIPPED | OUTPATIENT
Start: 2023-01-19 | End: 2023-04-11

## 2023-01-18 NOTE — TELEPHONE ENCOUNTER
Please give Lori Vega a call to relay the following:    She missed her follow up appt with bariatrics  This is especially important given her hx of bariatric surgery and we would still recommend she see them going froward  Her Vitamin D is very low  This is concerning as it is needed for hardening of the baby's skeleton (bones) and for prevention of her own bone loss  Her vitamin A is also low  Low vitamin A can affect the development of the baby's eyes  Bariatrics has recommended we initiate 50,000 IU of ergocalciferol (vitamin D) to be taken twice weekly for 12 weeks to bring the levels up and to begin taking Vitamin A (retinyl acetate or retinyl palmitate) 5000 IU (1500 mcg) per day  I ordered the Vitamin D, however, am unable to find the vitamin a dose separate from a combination pill  Will you check with her pharmacy to see if this is available or can be ordered? If not I can send in a combination  Since Vitamin A at high levels can be teratogenic, would like to order this or ensure she has correct dose  They also recommended she continue her venofer  Please let me know if she has questions      Thanks,  Chilango Davalos

## 2023-01-19 ENCOUNTER — TELEPHONE (OUTPATIENT)
Dept: OBGYN CLINIC | Facility: CLINIC | Age: 33
End: 2023-01-19

## 2023-01-19 RX ORDER — VITAMINS A AND D
1 CAPSULE ORAL DAILY
Qty: 60 CAPSULE | Refills: 1 | Status: SHIPPED | OUTPATIENT
Start: 2023-01-19 | End: 2023-01-19

## 2023-01-19 NOTE — TELEPHONE ENCOUNTER
Eve Carvajal called pharmacist and vitamin A does not come alone in pill form  In a pn tablet, the vit A is 92 % of what is needed in pregnancy - so possibly she has enough in her pn vitamin  I did not call pt as yet   Thank you

## 2023-01-19 NOTE — TELEPHONE ENCOUNTER
Called pt, per PENNY Shin, informed pt she can take Vit A 5,000 Units daily , & can purchase this OTC at any drugstore/pharmacy  Advised pt that an order for Vit D was placed to her pharmacy, per [facility]  Pt verbalized understanding

## 2023-01-19 NOTE — TELEPHONE ENCOUNTER
Thank you for calling the pharmacy! I sent in the combo pill with 5000 IU , which also contains additional Vitamin D  I would like her to take the twice weekly vitamin D supplement (sent yesterday) and the combo vitamin A supplement daily (sent today)  She is likely not absorbing enough of both of these from her PNV due to her malabsorption from her bariatric surgery      Please let me know if she has any questions,  [facility]

## 2023-01-19 NOTE — TELEPHONE ENCOUNTER
Spoke with [facility]  She can take Vitamin A 5000 IU over the counter   I sent in Vitamin D 50,000 units twice weekly

## 2023-01-23 NOTE — PATIENT INSTRUCTIONS
Thank you for choosing us for your  care today  If you have any questions about your ultrasound or care, please do not hesitate to contact us or your primary obstetrician  Some general instructions for your pregnancy are:    Exercise: Aim for 22 minutes per day (150 minutes per week) of regular exercise  Walking is great! Nutrition: Choose healthy sources of calcium, iron, and protein  Learn about Preeclampsia: preeclampsia is a common, serious high blood pressure complication in pregnancy  A blood pressure of 889WDYB (systolic or top number) or 04KHBT (diastolic or bottom number) is not normal and needs evaluation by your doctor  Aspirin is sometimes prescribed in early pregnancy to prevent preeclampsia in women with risk factors - ask your obstetrician if you should be on this medication  For more resources, visit:  https://mothertobaby org/fact-sheets/low-dose-aspirin/  PlannerBlog com cy  https://www highriskpregnancyinfo org/preeclampsia  If you smoke, try to reduce how many cigarettes you smoke or try to quit completely  Do not vape  Other warning signs to watch out for in pregnancy or postpartum: chest pain, obstructed breathing or shortness of breath, seizures, thoughts of hurting yourself or your baby, bleeding, a painful or swollen leg, fever, or headache (see [employer] POST-BIRTH Warning Signs campaign)  If these happen call 911  Itching is also not normal in pregnancy and if you experience this, especially over your hands and feet, potentially worse at night, notify your doctors

## 2023-01-24 ENCOUNTER — TELEPHONE (OUTPATIENT)
Dept: PERINATAL CARE | Facility: CLINIC | Age: 33
End: 2023-01-24

## 2023-01-24 ENCOUNTER — TELEPHONE (OUTPATIENT)
Dept: OTHER | Facility: OTHER | Age: 33
End: 2023-01-24

## 2023-01-24 NOTE — TELEPHONE ENCOUNTER
Spoke with patient and confirmed her MFM appointment had to be rescheduled  Patient verbalized understanding of new time, date and location of appointment - 2/15/23  1:00  [facility]  Patient denies further questions

## 2023-01-24 NOTE — TELEPHONE ENCOUNTER
Per patient's phone call, she would like a call back to schedule physical  She just moved to [facility]

## 2023-01-26 ENCOUNTER — TELEPHONE (OUTPATIENT)
Dept: OBGYN CLINIC | Facility: CLINIC | Age: 33
End: 2023-01-26

## 2023-01-26 ENCOUNTER — ROUTINE PRENATAL (OUTPATIENT)
Dept: OBGYN CLINIC | Facility: CLINIC | Age: 33
End: 2023-01-26

## 2023-01-26 VITALS
SYSTOLIC BLOOD PRESSURE: 112 MMHG | HEIGHT: 64 IN | DIASTOLIC BLOOD PRESSURE: 66 MMHG | WEIGHT: 210 LBS | BODY MASS INDEX: 35.85 KG/M2

## 2023-01-26 DIAGNOSIS — Z3A.32 32 WEEKS GESTATION OF PREGNANCY: ICD-10-CM

## 2023-01-26 DIAGNOSIS — Z86.32 HISTORY OF GESTATIONAL DIABETES: ICD-10-CM

## 2023-01-26 DIAGNOSIS — O99.213 MATERNAL OBESITY, ANTEPARTUM, THIRD TRIMESTER: ICD-10-CM

## 2023-01-26 DIAGNOSIS — D50.8 IRON DEFICIENCY ANEMIA SECONDARY TO INADEQUATE DIETARY IRON INTAKE: ICD-10-CM

## 2023-01-26 DIAGNOSIS — Z34.83 PRENATAL CARE, SUBSEQUENT PREGNANCY, THIRD TRIMESTER: ICD-10-CM

## 2023-01-26 DIAGNOSIS — O88.213 PULMONARY EMBOLISM AFFECTING PREGNANCY IN THIRD TRIMESTER: Primary | ICD-10-CM

## 2023-01-26 PROBLEM — Z79.01 VISIT FOR MONITORING LOVENOX THERAPY: Status: RESOLVED | Noted: 2022-11-22 | Resolved: 2023-01-26

## 2023-01-26 PROBLEM — Z51.81 VISIT FOR MONITORING LOVENOX THERAPY: Status: RESOLVED | Noted: 2022-11-22 | Resolved: 2023-01-26

## 2023-01-26 PROBLEM — E55.9 VITAMIN D DEFICIENCY: Status: RESOLVED | Noted: 2020-05-12 | Resolved: 2023-01-26

## 2023-01-26 PROBLEM — Z72.0 TOBACCO ABUSE: Status: RESOLVED | Noted: 2018-03-15 | Resolved: 2023-01-26

## 2023-01-26 LAB
SL AMB  POCT GLUCOSE, UA: NEGATIVE
SL AMB POCT URINE PROTEIN: POSITIVE

## 2023-01-26 NOTE — ASSESSMENT & PLAN NOTE
Continue Lovenox per M  Will need transition off at the time of her induction and then resumption postpartum  Patient is concerned about the possible need to deliver early based on the size of her baby  Discussed recommendations for that would come from [facility]  Otherwise, will plan for induction at 39 weeks  Can be scheduled at next visit pending the growth scan tomorrow

## 2023-01-26 NOTE — PROGRESS NOTES
Prenatal visit at 28 2/7wks  Denies ctxs, VB, LOF  Good FM  No concerns at this time    Problem List Items Addressed This Visit        Cardiovascular and Mediastinum    Pulmonary embolism affecting pregnancy in third trimester     Continue Lovenox per MFM  Will need transition off at the time of her induction and then resumption postpartum  Patient is concerned about the possible need to deliver early based on the size of her baby  Discussed recommendations for that would come from [facility]  Otherwise, will plan for induction at 39 weeks  Can be scheduled at next visit pending the growth scan tomorrow  Other    Iron deficiency anemia secondary to inadequate dietary iron intake     Has appointment on 2/10 with heme-onc to discuss iron infusions  Stressed importance  32 weeks gestation of pregnancy     Tdap up to date; declined flu vaccine  Delivery consent previously signed  Feels well overall  History of gestational diabetes     1h GCT elevated and has not gone for 3h GTT yet  Stressed importance ASAP to ensure that we will be able to get her glucose under control prior to delivery, particularly if she would delivery early  Patient states she will go this weekend  Maternal obesity, antepartum, third trimester     TWG 22#  Has growth scan with [facility] tomorrow           Other Visit Diagnoses     Prenatal care, subsequent pregnancy, third trimester    -  Primary    Relevant Orders    POCT urine dip (Completed)

## 2023-01-26 NOTE — ASSESSMENT & PLAN NOTE
-- Message is from the [facility]--    Patient is requesting a medication refill - medication is on active medication list    Patient is currently OUT of the requested medication.    RX Name and Dose:  (copy from med list)  Pantoprazole Sodium 40 MG Oral Tablet Delayed Release New/90 pills/ once daily  Duration: 90 days    Pharmacy  [facility] [address] [place] [address]    Caller Information       Type Contact Phone    12/19/2018 01:53 PM Phone (Incoming) [facility] [address] (Pharmacy) [phone]          Alternative phone number: n/a    Turnaround time given to caller:   \"This message will be sent to [state Provider's name]. The clinical team will fulfill your request as soon as they review your message when the office opens tomorrow.\"   Tdap up to date; declined flu vaccine  Delivery consent previously signed  Feels well overall

## 2023-01-27 ENCOUNTER — ULTRASOUND (OUTPATIENT)
Dept: PERINATAL CARE | Facility: OTHER | Age: 33
End: 2023-01-27

## 2023-01-27 VITALS
HEART RATE: 84 BPM | SYSTOLIC BLOOD PRESSURE: 92 MMHG | HEIGHT: 64 IN | DIASTOLIC BLOOD PRESSURE: 60 MMHG | BODY MASS INDEX: 36.12 KG/M2 | WEIGHT: 211.6 LBS

## 2023-01-27 DIAGNOSIS — O09.292 PRIOR PERINATAL LOSS IN SECOND TRIMESTER, ANTEPARTUM: ICD-10-CM

## 2023-01-27 DIAGNOSIS — O99.843 PREGNANCY AFFECTED BY PREVIOUS BARIATRIC SURGERY, CURRENTLY IN THIRD TRIMESTER: ICD-10-CM

## 2023-01-27 DIAGNOSIS — Z36.89 ENCOUNTER FOR ULTRASOUND TO CHECK FETAL GROWTH: Primary | ICD-10-CM

## 2023-01-27 DIAGNOSIS — O88.213 PULMONARY EMBOLISM AFFECTING PREGNANCY IN THIRD TRIMESTER: ICD-10-CM

## 2023-01-27 DIAGNOSIS — O99.213 MATERNAL OBESITY, ANTEPARTUM, THIRD TRIMESTER: ICD-10-CM

## 2023-01-27 NOTE — TELEPHONE ENCOUNTER
If pt calls back can speak to anyone in triage  Lm to pt to inquire why PCP will not offer test or alt lab

## 2023-01-27 NOTE — TELEPHONE ENCOUNTER
Spoke to pt and PCP and they are in [place] but she lives in [facility]  Looking for PCP in [facility]- said she needs a physical before she do a PPD  She must be overdue for annual     She has a new job starting 2/1 and needs it by then  She is 32wks  Not sure if a walk in or health [facility] would order with her being pregnant  Had M appt today for growth scan- said the report should be ready around 1pm for MARLINGA to review and decide on delivery time frame  She is on lovenox  So the timing of when sh needs to stop it needs to be reviewed

## 2023-01-27 NOTE — TELEPHONE ENCOUNTER
At this point delivery at 39 wks recommended  She needs to complete her 3 hr gtt so that we know if she is a diabetic in this pregnancy  That may affect her delivery timing

## 2023-01-27 NOTE — LETTER
Date: 2023    Michelle Figueroa MD  [address] [place] [address] [address]    Patient: Rah Crawford   YOB: 1990   Date of Visit: 2023   Gestational age Juan Ramon Patches of this communication: Routine       Dear Chuy Fritz and Lynda,    This patient was seen recently in our  office  Please see ultrasound report under "OB Procedures" tab  Please don't hesitate to contact our office with any concerns or questions        Sincerely,      Eli Barrientos MD  Attending Physician, Aline

## 2023-01-27 NOTE — TELEPHONE ENCOUNTER
Spoke to pt and her 3hr is tomorrow   Will check with new employer and see if they recommend anyone for PPD

## 2023-01-30 ENCOUNTER — APPOINTMENT (OUTPATIENT)
Dept: LAB | Facility: HOSPITAL | Age: 33
End: 2023-01-30

## 2023-01-30 DIAGNOSIS — R73.09 IMPAIRED GLUCOSE TOLERANCE TEST: ICD-10-CM

## 2023-01-30 LAB
GLUCOSE 1H P 100 G GLC PO SERPL-MCNC: 189 MG/DL (ref 70–183)
GLUCOSE 2H P 100 G GLC PO SERPL-MCNC: 97 MG/DL (ref 70–155)
GLUCOSE 3H P 100 G GLC PO SERPL-MCNC: 43 MG/DL (ref 70–140)
GLUCOSE P FAST SERPL-MCNC: 80 MG/DL (ref 65–94)

## 2023-01-31 LAB
DME PARACHUTE DELIVERY DATE REQUESTED: NORMAL
DME PARACHUTE ITEM DESCRIPTION: NORMAL
DME PARACHUTE ORDER STATUS: NORMAL
DME PARACHUTE SUPPLIER NAME: NORMAL
DME PARACHUTE SUPPLIER PHONE: NORMAL

## 2023-02-08 ENCOUNTER — TELEPHONE (OUTPATIENT)
Dept: OBGYN CLINIC | Facility: CLINIC | Age: 33
End: 2023-02-08

## 2023-02-08 ENCOUNTER — ROUTINE PRENATAL (OUTPATIENT)
Dept: OBGYN CLINIC | Facility: CLINIC | Age: 33
End: 2023-02-08

## 2023-02-08 VITALS
DIASTOLIC BLOOD PRESSURE: 68 MMHG | HEIGHT: 64 IN | SYSTOLIC BLOOD PRESSURE: 98 MMHG | WEIGHT: 215 LBS | BODY MASS INDEX: 36.7 KG/M2

## 2023-02-08 DIAGNOSIS — Z3A.34 34 WEEKS GESTATION OF PREGNANCY: Primary | ICD-10-CM

## 2023-02-08 DIAGNOSIS — Z86.32 HISTORY OF GESTATIONAL DIABETES: ICD-10-CM

## 2023-02-08 DIAGNOSIS — O88.213 PULMONARY EMBOLISM AFFECTING PREGNANCY IN THIRD TRIMESTER: ICD-10-CM

## 2023-02-08 LAB
SL AMB  POCT GLUCOSE, UA: NEGATIVE
SL AMB POCT URINE PROTEIN: POSITIVE

## 2023-02-08 NOTE — TELEPHONE ENCOUNTER
I called to set up iol for 3/14 at 8 pm  Pt is medical iol for anticoag planning for pe in pregnancy  Dr Monsivais Media notified   Pt aware

## 2023-02-08 NOTE — ASSESSMENT & PLAN NOTE
Not taking lovenox secondary to some spotting  Discussed importance of taking today  Clarified real risk of massive PE, stroke, death  Discussed normalcy of spotting in pregnancy, emphasized need to take Lovenox  She agrees, will restart  She also agrees to call with any further concerns or questions  - discussed need for 39 week IOL  Will initiate scheduling   Given above, consent not completed today

## 2023-02-08 NOTE — TELEPHONE ENCOUNTER
----- Message from Tigre Jimenez MD sent at 2/8/2023 11:16 AM EST -----  Regarding: Scheduling  Procedure to be scheduled: IOL  OLIVER: Estimated Date of Delivery: Estimated Date of Delivery: 3/21/23  Indication for delivery: medical - anticoagulation planning for PE in pregnancy  Requested date (s) of delivery: 39 weeks         If requested date is unavailable, is there a date by which the pt must be delivered?   Physician preference: no     If IOL, anticipated method: cytotec/quiroz

## 2023-02-08 NOTE — PROGRESS NOTES
Pt is here for routine ob visit   No concerns at this time  Pt stopped lovenox injections due to vaginal bleeding   Urine +1 protein/neg glucose   No LOF,VB  +[place] Christy Contractions  +FM   Delivery Consent signed at previous visit   UTNAVEEN tdap  Declined flu

## 2023-02-08 NOTE — PROGRESS NOTES
34 y o  [id number] at 34w1d, here for routine OB visit  Feeling well overall, but having some light vaginal spotting within discharge  Therefore, stopped lovenox about 2 weeks ago  Did not think to call the office to discuss  Good FM  Denies LOF, having intermittent BH contractions, nothing regular/painful  Denies dysuria, hematuria  No problems with activity  Problem   Pulmonary Embolism Affecting Pregnancy in Third Trimester    1  She will continue her therapeutic Lovenox till 6 weeks postpartum  2  She should avoid any estrogen containing birth control for life  3  Recommend offering induction at 39 weeks  She should be off of her Lovenox injections for at least 24 hours prior to her induction  4  She should continue her baby aspirin (to prevent the development of preeclampsia) at 162 milligrams daily till 36 weeks and 0 days unless she develops any episodes of bleeding in this pregnancy  This can be taken in addition to her Lovenox  5  Complications of therapeutic Lovenox during pregnancy can be low platelet counts in the 1st 3 weeks and development of osteoporosis  For this reason will check a platelet count  6  Recommend to [place] start on calcium supplementation a 1000 milligrams daily until she is off Lovenox  7  Recommend a thrombophilia panel of the deficiencies that are not affected by Lovenox-Factor 5 Leiden, prothrombin gene, protein S, protein C, beta 2 glycoprotein and anticardiolipin- nml except acl igm 24 low positive and protein s activity of 31  Free and total protein S nml  Both abnormalities can be false + of pregnancy  Should repeat both at 6 weeks pp after off of lovenox along with drvvt and antithrombin III  8  For any future pregnancy or admission for surgery etc  she should be on prophylactic Lovenox          History of Gestational Diabetes    3hr wnl     34 Weeks Gestation of Pregnancy    Blood Type: A +  Pap 2/10/22 NILM/neg  GC/CT 9/8/22 neg/neg  28 week labs - wnl with 3hr wnl  - delivery consent signed, iol scheduling initiated         Problem List Items Addressed This Visit        Cardiovascular and Mediastinum    Pulmonary embolism affecting pregnancy in third trimester     Not taking lovenox secondary to some spotting  Discussed importance of taking today  Clarified real risk of massive PE, stroke, death  Discussed normalcy of spotting in pregnancy, emphasized need to take Lovenox  She agrees, will restart  She also agrees to call with any further concerns or questions  - discussed need for 39 week IOL  Will initiate scheduling   Given above, consent not completed today            Other    34 weeks gestation of pregnancy - Primary    Relevant Orders    POCT urine dip (Completed)    History of gestational diabetes

## 2023-02-10 ENCOUNTER — CONSULT (OUTPATIENT)
Dept: HEMATOLOGY ONCOLOGY | Facility: CLINIC | Age: 33
End: 2023-02-10

## 2023-02-10 ENCOUNTER — TELEPHONE (OUTPATIENT)
Dept: HEMATOLOGY ONCOLOGY | Facility: CLINIC | Age: 33
End: 2023-02-10

## 2023-02-10 VITALS
HEART RATE: 107 BPM | OXYGEN SATURATION: 98 % | DIASTOLIC BLOOD PRESSURE: 66 MMHG | WEIGHT: 211 LBS | RESPIRATION RATE: 18 BRPM | SYSTOLIC BLOOD PRESSURE: 100 MMHG | TEMPERATURE: 97.9 F | HEIGHT: 64 IN | BODY MASS INDEX: 36.02 KG/M2

## 2023-02-10 DIAGNOSIS — O88.213 PULMONARY EMBOLISM AFFECTING PREGNANCY IN THIRD TRIMESTER: ICD-10-CM

## 2023-02-10 DIAGNOSIS — D50.8 IRON DEFICIENCY ANEMIA SECONDARY TO INADEQUATE DIETARY IRON INTAKE: ICD-10-CM

## 2023-02-10 DIAGNOSIS — Z98.84 HISTORY OF BARIATRIC SURGERY: ICD-10-CM

## 2023-02-10 DIAGNOSIS — E53.8 B12 DEFICIENCY: ICD-10-CM

## 2023-02-10 DIAGNOSIS — D50.9 MICROCYTIC ANEMIA: Primary | ICD-10-CM

## 2023-02-10 NOTE — PROGRESS NOTES
Hematology/Oncology Outpatient Consultation  Tamiko Harrell 34 y o  female 1990 [id number]    Date:  2/10/2023      Assessment and Plan:  1  Microcytic anemia  Patient was recently found to have significant microcytic anemia/iron deficiency during her pregnancy  She is currently 34 weeks pregnant with estimated due date 3/21/2023  She did have mild improvement of her anemia after completing 4 doses of IV Venofer December 2022  She still continues to be pretty anemic hemoglobin 8 9 with iron deficiency iron saturation 11%, ferritin 37 as well as B12 deficiency 195  Did recommend completing another course of IV iron before she delivers Venofer 200 mg twice a week for 5 sessions total   We will also give her a vitamin B12 injection with each dose of IV iron since she is pretty B12 deficient  Advised her to also resume her B12 supplements that she has available at home  Etiology of her iron deficiency is likely malabsorption from her prior bariatric surgery along with increased demand during her pregnancy  Request she follow-up again with repeat labs in about 3 months from now or sooner should the need arise  We will also pursue some additional anemia work-up to rule out other etiologies before her next office visit  - CBC and differential; Future  - Comprehensive metabolic panel; Future  - C-reactive protein; Future  - Sedimentation rate, automated; Future  - Reticulocytes; Future  - IgG, IgA, IgM; Future  - Protein electrophoresis, serum; Future  - Iron Panel (Includes Ferritin, Iron Sat%, Iron, and TIBC); Future  - Ferritin; Future  - Folate; Future  - LD,Blood; Future  - Vitamin B12; Future  - Hgb Fractionation Cascade; Future  - Folate; Future    2  Iron deficiency anemia secondary to inadequate dietary iron intake  - CBC and differential; Future  - Comprehensive metabolic panel; Future  - C-reactive protein; Future  - Sedimentation rate, automated;  Future  - Iron Panel (Includes Ferritin, Iron Sat%, Iron, and TIBC); Future  - Ferritin; Future  - LD,Blood; Future  - Vitamin B12; Future    3  Pulmonary embolism affecting pregnancy in third trimester  Patient was diagnosed with bilateral PE without heart strain 11/11/2022 when she was 21 weeks pregnant  She was started on Lovenox injections 1 mg/kg twice a day which she continues without interruption  Likely provoked due to pregnancy  She does report family history of thrombosis her mother had several thrombolic strokes  Would recommend that she continue the Lovenox injections/anticoagulation for at least 6 months total and will definitely need 6 weeks postpartum  She should continue Lovenox as long as she is pregnant/breast-feeding; could consider changing to a DOAC postpartum if she is not breast-feeding  Was told that we will pursue limited hypercoagulable work-up before her next office visit  We will finalize the decision regarding continuing versus discontinuing anticoagulation at her next office visit  - Beta-2 glycoprotein antibodies; Future  - Cardiolipin antibody; Future  - Prothrombin gene mutation; Future  - Factor 5 leiden; Future    4  History of bariatric surgery  Status post gastric sleeve 2016    5  B12 deficiency      HPI:  Patient presents today for further evaluation/treatment of her microcytic anemia/iron deficiency accompanied by her mother  She has past medical history of bariatric surgery gastric sleeve 2016, obstructive sleep apnea which resolved after bariatric surgery, and is a former smoker  She states that she has 1 child and had 1 miscarriage in the past   Is currently 34 weeks pregnant with estimated due date 3/21/2023  She also was diagnosed recently with pulmonary embolism 11/11/2022 when she was 21 weeks gestation after she reported to the hospital with complaints of dyspnea and tachycardia    She was started on low molecular weight heparin Lovenox 1 mg/kg twice a day which she continues without interruption  She does have family history of thrombosis her mother has had multiple thrombolic CVAs in the past   No other family history to her knowledge  She did complete a short course of IV iron recently Venofer times 2022  Today she does report fatigue  Admits to leg cramps and pica for ice  Does report some mild dyspnea but no more than her baseline  Admits to occasional reflux  Is having some nausea today but this has not been a persistent issue  She denies any obvious bleeding from any site  Her most recent laboratory studies from 2022 showed normal white cells and platelets, she did have some mild improvement of her anemia but continues to have microcytic hypochromic anemia H&H 8  6, MCV 76, MCH 22 1  She continues to be iron deficient iron saturation 11%, TIBC 372, serum iron 40, ferritin 37  Folic acid is appropriate  Vitamin B12 is low 192  ROS: Review of Systems   Constitutional: Positive for fatigue  Gastrointestinal: Positive for abdominal distention (pregnant), constipation and nausea  Musculoskeletal: Positive for arthralgias and myalgias  Psychiatric/Behavioral: Positive for sleep disturbance  All other systems reviewed and are negative        Past Medical History:   Diagnosis Date   • Anemia    • Gastric bypass status for obesity 2016    sleeve   • Post partum depression    • Sleep apnea, obstructive     prior to weight loss surg       Past Surgical History:   Procedure Laterality Date   • DILATION AND EVACUATION  2019       • GASTRIC RESTRICTION SURGERY  2015    gastric sleeve   • INCISION AND DRAINAGE OF WOUND Right 2022    Procedure: INCISION AND DRAINAGE (I&D) HEAD/FACE;  Surgeon: Pastora Hinkle DMD;  Location: [facility];  Service: Maxillofacial   • MULTIPLE TOOTH EXTRACTIONS Right 2022    Procedure: EXTRACTION TEETH MULTIPLE;  Surgeon: Pastora Hinkle DMD;  Location: [facility];  Service: Maxillofacial   • OPEN ANTERIOR SHOULDER RECONSTRUCTION Left    • OTHER SURGICAL HISTORY      sweat gland removal   • RETAINED PLACENTA REMOVAL N/A 2020    Procedure: EXTRACTION OF EMMA,YKPWID;  Surgeon: Ramila Lobo MD;  Location: [facility] ;  Service: Obstetrics       Social History     Socioeconomic History   • Marital status: Single     Spouse name: None   • Number of children: None   • Years of education: None   • Highest education level: None   Occupational History   • None   Tobacco Use   • Smoking status: Former     Packs/day: 0 25     Years: 10 00     Pack years: 2 50     Types: Cigarettes     Quit date: 2020     Years since quittin 8   • Smokeless tobacco: Former   • Tobacco comments:     still smoking 3 day   Vaping Use   • Vaping Use: Never used   Substance and Sexual Activity   • Alcohol use: Not Currently     Comment: social   • Drug use: Never   • Sexual activity: Yes     Partners: Male     Birth control/protection: None   Other Topics Concern   • None   Social History Narrative   • None     Social Determinants of Health     Financial Resource Strain: Not on file   Food Insecurity: No Food Insecurity   • Worried About Running Out of Food in the Last Year: Never true   • Ran Out of Food in the Last Year: Never true   Transportation Needs: Unknown   • Lack of Transportation (Medical): Not on file   • Lack of Transportation (Non-Medical):  No   Physical Activity: Not on file   Stress: Not on file   Social Connections: Not on file   Intimate Partner Violence: Not on file   Housing Stability: Low Risk    • Unable to Pay for Housing in the Last Year: No   • Number of Places Lived in the Last Year: 1   • Unstable Housing in the Last Year: No       Family History   Problem Relation Age of Onset   • Stroke Mother    • Heart disease Mother    • Seizures Mother    • Hypertension Mother    • Anxiety disorder Mother    • Bipolar disorder Mother    • Kidney disease Mother    • Diabetes Mother    • Sudden death Father    • No Known Problems Sister    • No Known Problems Sister    • No Known Problems Brother    • No Known Problems Brother    • No Known Problems Brother    • Sudden death Brother 21        MVA   • No Known Problems Son    • Pancreatic cancer Maternal Grandmother    • Seizures Maternal Grandmother    • Diabetes Maternal Grandmother        No Known Allergies      Current Outpatient Medications:   •  aspirin 81 mg chewable tablet, Chew 1 tablet (81 mg total) daily, Disp: 90 tablet, Rfl: 0  •  enoxaparin (LOVENOX) 100 mg/mL, Inject 0 9 mL (90 mg total) under the skin every 12 (twelve) hours, Disp: 60 mL, Rfl: 2  •  ergocalciferol (VITAMIN D2) 50,000 units, Take 1 capsule (50,000 Units total) by mouth 2 (two) times a week for 24 doses, Disp: 24 capsule, Rfl: 0  •  Prenatal MV & Min w/FA-DHA ([employer] PRENATAL GUMMY PO), Take by mouth, Disp: , Rfl:   •  progesterone 200 mg vaginal suppository, INSERT ONE PER VAGINA AT BEDTIME, Disp: , Rfl:   •  vitamin A 3 MG (03053 UT) capsule, Take 5,000 Units by mouth daily, Disp: , Rfl:   •  calcium carbonate-vitamin D 500 mg-5 mcg per tablet, Take 2 tablets by mouth daily with breakfast (Patient not taking: Reported on 2/8/2023), Disp: 180 tablet, Rfl: 1  •  vitamin B-12 (VITAMIN B-12) 1,000 mcg tablet, Take 1 tablet (1,000 mcg total) by mouth daily (Patient not taking: Reported on 12/1/2022), Disp: 90 tablet, Rfl: 1      Physical Exam:  /66 (BP Location: Left arm, Patient Position: Sitting, Cuff Size: Adult)   Pulse (!) 107   Temp 97 9 °F (36 6 °C)   Resp 18   Ht 5' 4" (1 626 m)   Wt 95 7 kg (211 lb)   LMP 06/14/2022   SpO2 98%   BMI 36 22 kg/m²     Physical Exam  Vitals reviewed  Constitutional:       General: She is not in acute distress  Appearance: She is well-developed  She is not diaphoretic  HENT:      Head: Normocephalic and atraumatic  Eyes:      General: No scleral icterus       Conjunctiva/sclera: Conjunctivae normal       Pupils: Pupils are equal, round, and reactive to light  Neck:      Thyroid: No thyromegaly  Cardiovascular:      Rate and Rhythm: Normal rate and regular rhythm  Heart sounds: Normal heart sounds  No murmur heard  Pulmonary:      Effort: Pulmonary effort is normal  No respiratory distress  Breath sounds: Normal breath sounds  Abdominal:      General: There is distension (pregnant)  Palpations: Abdomen is soft  There is no hepatomegaly or splenomegaly  Tenderness: There is no abdominal tenderness  Musculoskeletal:         General: No swelling  Normal range of motion  Cervical back: Normal range of motion and neck supple  Lymphadenopathy:      Cervical: No cervical adenopathy  Upper Body:      Right upper body: No axillary adenopathy  Left upper body: No axillary adenopathy  Skin:     General: Skin is warm and dry  Findings: No erythema or rash  Neurological:      General: No focal deficit present  Mental Status: She is alert and oriented to person, place, and time  Psychiatric:         Mood and Affect: Mood and affect normal          Behavior: Behavior normal  Behavior is cooperative  Thought Content: Thought content normal          Judgment: Judgment normal            Labs:  Lab Results   Component Value Date    WBC 6 23 01/05/2023    HGB 8 9 (L) 01/05/2023    HCT 30 6 (L) 01/05/2023    MCV 76 (L) 01/05/2023     01/05/2023          Patient voiced understanding and agreement in the above discussion  Aware to contact our office with questions/symptoms in the interim  This note has been generated by voice recognition software system  Therefore, there may be spelling, grammar, and or syntax errors  Please contact if questions arise

## 2023-02-13 DIAGNOSIS — O09.893 HISTORY OF PREMATURE DELIVERY, CURRENTLY PREGNANT, THIRD TRIMESTER: ICD-10-CM

## 2023-02-13 DIAGNOSIS — D50.8 IRON DEFICIENCY ANEMIA SECONDARY TO INADEQUATE DIETARY IRON INTAKE: ICD-10-CM

## 2023-02-13 DIAGNOSIS — E53.8 B12 DEFICIENCY: Primary | ICD-10-CM

## 2023-02-13 RX ORDER — SODIUM CHLORIDE 9 MG/ML
20 INJECTION, SOLUTION INTRAVENOUS ONCE
Status: CANCELLED | OUTPATIENT
Start: 2023-02-13

## 2023-02-13 RX ORDER — CYANOCOBALAMIN 1000 UG/ML
1000 INJECTION, SOLUTION INTRAMUSCULAR; SUBCUTANEOUS ONCE
Status: CANCELLED | OUTPATIENT
Start: 2023-02-13 | End: 2023-02-13

## 2023-02-21 ENCOUNTER — ULTRASOUND (OUTPATIENT)
Dept: PERINATAL CARE | Facility: OTHER | Age: 33
End: 2023-02-21

## 2023-02-21 VITALS
HEART RATE: 86 BPM | SYSTOLIC BLOOD PRESSURE: 110 MMHG | WEIGHT: 220.6 LBS | HEIGHT: 64 IN | BODY MASS INDEX: 37.66 KG/M2 | DIASTOLIC BLOOD PRESSURE: 74 MMHG

## 2023-02-21 DIAGNOSIS — O99.213 MATERNAL OBESITY, ANTEPARTUM, THIRD TRIMESTER: ICD-10-CM

## 2023-02-21 DIAGNOSIS — O99.843 BARIATRIC SURGERY STATUS COMPLICATING PREGNANCY, THIRD TRIMESTER: ICD-10-CM

## 2023-02-21 DIAGNOSIS — O09.893 HISTORY OF PREMATURE DELIVERY, CURRENTLY PREGNANT, THIRD TRIMESTER: Primary | ICD-10-CM

## 2023-02-21 DIAGNOSIS — Z3A.36 36 WEEKS GESTATION OF PREGNANCY: ICD-10-CM

## 2023-02-21 NOTE — LETTER
February 21, 2023     [facility]  [address]    Patient: Red Mccarthy   YOB: 1990   Date of Visit: 2/21/2023       Dear Dr Navid Diaz: Thank you for referring Red Mccarthy to me for evaluation  Below are my notes for this consultation  If you have questions, please do not hesitate to call me  I look forward to following your patient along with you           Sincerely,        Sree Sweeney MD        CC: No Recipients  Sree Sweeney MD  2/21/2023  2:17 PM  Sign when Signing Visit  Please refer to the [facility] ultrasound report in Ob Procedures for additional information regarding today's visit

## 2023-02-21 NOTE — PATIENT INSTRUCTIONS
Kick Counts in Pregnancy   WHAT YOU NEED TO KNOW:   Kick counts measure how much your baby is moving in your womb  A kick from your baby can be felt as a twist, turn, swish, roll, or jab  It is common to feel your baby kicking at 26 to 28 weeks of pregnancy  You may feel your baby kick as early as 20 weeks of pregnancy  You may want to start counting at 28 weeks  DISCHARGE INSTRUCTIONS:   Contact your doctor immediately if:   You feel a change in the number of kicks or movements of your baby  You feel fewer than 10 kicks within 2 hours  You have questions or concerns about your baby's movements  Why measure kick counts:  Your baby's movement may provide information about your baby's health  He or she may move less, or not at all, if there are problems  Your baby may move less if he or she is not getting enough oxygen or nutrition from the placenta  Do not smoke while you are pregnant  Smoking decreases the amount of oxygen that gets to your baby  Talk to your healthcare provider if you need help to quit smoking  Tell your healthcare provider as soon as you feel a change in your baby's movements  When to measure kick counts:   Measure kick counts at the same time every day  Measure kick counts when your baby is awake and most active  Your baby may be most active in the evening  How to measure kick counts:  Check that your baby is awake before you measure kick counts  You can wake up your baby by lightly pushing on your belly, walking, or drinking something cold  Your healthcare provider may tell you different ways to measure kick counts  You may be told to do the following:  Use a chart or clock to keep track of the time you start and finish counting  Sit in a chair or lie on your left side  Place your hands on the largest part of your belly  Count until you reach 10 kicks  Write down how much time it takes to count 10 kicks  It may take 30 minutes to 2 hours to count 10 kicks  It should not take more than 2 hours to count 10 kicks  Follow up with your doctor as directed:  Write down your questions so you remember to ask them during your visits  © Copyright [employer]y [employer] 2022 Information is for End User's use only and may not be sold, redistributed or otherwise used for commercial purposes  The above information is an  only  It is not intended as medical advice for individual conditions or treatments  Talk to your doctor, nurse or pharmacist before following any medical regimen to see if it is safe and effective for you

## 2023-02-21 NOTE — PROGRESS NOTES
Please refer to the [facility] ultrasound report in Ob Procedures for additional information regarding today's visit

## 2023-02-23 ENCOUNTER — ROUTINE PRENATAL (OUTPATIENT)
Dept: OBGYN CLINIC | Facility: CLINIC | Age: 33
End: 2023-02-23

## 2023-02-23 ENCOUNTER — HOSPITAL ENCOUNTER (OUTPATIENT)
Dept: INFUSION CENTER | Facility: HOSPITAL | Age: 33
End: 2023-02-23
Attending: INTERNAL MEDICINE

## 2023-02-23 VITALS
OXYGEN SATURATION: 99 % | HEART RATE: 84 BPM | RESPIRATION RATE: 18 BRPM | TEMPERATURE: 97.6 F | SYSTOLIC BLOOD PRESSURE: 110 MMHG | DIASTOLIC BLOOD PRESSURE: 72 MMHG

## 2023-02-23 VITALS
HEART RATE: 82 BPM | BODY MASS INDEX: 37.42 KG/M2 | WEIGHT: 218 LBS | SYSTOLIC BLOOD PRESSURE: 126 MMHG | DIASTOLIC BLOOD PRESSURE: 70 MMHG

## 2023-02-23 DIAGNOSIS — Z34.83 PRENATAL CARE, SUBSEQUENT PREGNANCY, THIRD TRIMESTER: Primary | ICD-10-CM

## 2023-02-23 DIAGNOSIS — O88.213 PULMONARY EMBOLISM AFFECTING PREGNANCY IN THIRD TRIMESTER: ICD-10-CM

## 2023-02-23 DIAGNOSIS — D50.8 IRON DEFICIENCY ANEMIA SECONDARY TO INADEQUATE DIETARY IRON INTAKE: Primary | ICD-10-CM

## 2023-02-23 DIAGNOSIS — O09.893 HISTORY OF PREMATURE DELIVERY, CURRENTLY PREGNANT, THIRD TRIMESTER: ICD-10-CM

## 2023-02-23 DIAGNOSIS — Z3A.36 36 WEEKS GESTATION OF PREGNANCY: ICD-10-CM

## 2023-02-23 DIAGNOSIS — O99.213 MATERNAL OBESITY, ANTEPARTUM, THIRD TRIMESTER: ICD-10-CM

## 2023-02-23 DIAGNOSIS — E53.8 B12 DEFICIENCY: ICD-10-CM

## 2023-02-23 DIAGNOSIS — Z86.32 HISTORY OF GESTATIONAL DIABETES: ICD-10-CM

## 2023-02-23 DIAGNOSIS — D50.8 IRON DEFICIENCY ANEMIA SECONDARY TO INADEQUATE DIETARY IRON INTAKE: ICD-10-CM

## 2023-02-23 LAB
SL AMB  POCT GLUCOSE, UA: NEGATIVE
SL AMB POCT URINE PROTEIN: NEGATIVE

## 2023-02-23 RX ORDER — SODIUM CHLORIDE 9 MG/ML
20 INJECTION, SOLUTION INTRAVENOUS ONCE
Status: CANCELLED | OUTPATIENT
Start: 2023-02-28

## 2023-02-23 RX ORDER — CYANOCOBALAMIN 1000 UG/ML
1000 INJECTION, SOLUTION INTRAMUSCULAR; SUBCUTANEOUS ONCE
Status: CANCELLED | OUTPATIENT
Start: 2023-02-28 | End: 2023-02-25

## 2023-02-23 RX ORDER — SODIUM CHLORIDE 9 MG/ML
20 INJECTION, SOLUTION INTRAVENOUS ONCE
Status: COMPLETED | OUTPATIENT
Start: 2023-02-23 | End: 2023-02-23

## 2023-02-23 RX ORDER — CYANOCOBALAMIN 1000 UG/ML
1000 INJECTION, SOLUTION INTRAMUSCULAR; SUBCUTANEOUS ONCE
Status: COMPLETED | OUTPATIENT
Start: 2023-02-23 | End: 2023-02-23

## 2023-02-23 RX ADMIN — IRON SUCROSE 200 MG: 20 INJECTION, SOLUTION INTRAVENOUS at 13:44

## 2023-02-23 RX ADMIN — CYANOCOBALAMIN 1000 MCG: 1000 INJECTION, SOLUTION INTRAMUSCULAR; SUBCUTANEOUS at 13:59

## 2023-02-23 RX ADMIN — SODIUM CHLORIDE 20 ML/HR: 9 INJECTION, SOLUTION INTRAVENOUS at 13:40

## 2023-02-23 NOTE — PROGRESS NOTES
Pt tolerated treatment today with no adverse reactions  B12 given IM in the right deltoid  Declined AVS uses My chart  Left unit ambulatory with a steady gait

## 2023-02-23 NOTE — ASSESSMENT & PLAN NOTE
Patient stopped ASA  Encouraged patient to get ready at home  Growth scan on 2/21 with EFW 20%  Further growth scans as clinically indicated  GBS done today

## 2023-02-23 NOTE — ASSESSMENT & PLAN NOTE
Patient restarted Lovenox after last visit and discussion with Dr Vijaya Jimenez  39 week induction scheduled  Will sign consent at 38 week visit after discussion of method of induction  STRICT labor precautions given

## 2023-02-23 NOTE — ASSESSMENT & PLAN NOTE
S/p 4 iron infusions with minimal increase in hemoglobin  Saw heme/onc and has more scheduled (including one later today)  Advised continue compliance  She has labs that were ordered by them to be done prior to her next visit - she is unclear if she should do them now or just prior to visit  Encouraged her to call to ask

## 2023-02-23 NOTE — PROGRESS NOTES
Prenatal visit at 39 1/7wks  Denies ctxs, VB, LOF  Good FM  Patient is taking her Lovenox  IOL scheduled 3/14/23 @ 8pm  Cervical exam today - difficult secondary to posterior cervical position  Patient advised may be slightly open but unclear and given that it will not  today, exam stopped for patient comfort  Problem List Items Addressed This Visit        Cardiovascular and Mediastinum    Pulmonary embolism affecting pregnancy in third trimester     Patient restarted Lovenox after last visit and discussion with Dr Carole Estrada  39 week induction scheduled  Will sign consent at 38 week visit after discussion of method of induction  STRICT labor precautions given  Other    Iron deficiency anemia secondary to inadequate dietary iron intake     S/p 4 iron infusions with minimal increase in hemoglobin  Saw heme/onc and has more scheduled (including one later today)  Advised continue compliance  She has labs that were ordered by them to be done prior to her next visit - she is unclear if she should do them now or just prior to visit  Encouraged her to call to ask  36 weeks gestation of pregnancy     Patient stopped ASA  Encouraged patient to get ready at home  Growth scan on 2/21 with EFW 20%  Further growth scans as clinically indicated  GBS done today  History of gestational diabetes     Elevated 1h GCT with only 1 elevated value on 3h GTT  Continue to monitor for glucosuria  History of premature delivery, currently pregnant, third trimester     Advised she can discontinue progesterone suppositories  Maternal obesity, antepartum, third trimester     TWG 30#           Other Visit Diagnoses     Prenatal care, subsequent pregnancy, third trimester    -  Primary    Relevant Orders    Strep B DNA probe, amplification    POCT urine dip (Completed)

## 2023-02-25 LAB — GP B STREP DNA SPEC QL NAA+PROBE: NEGATIVE

## 2023-02-28 ENCOUNTER — HOSPITAL ENCOUNTER (OUTPATIENT)
Dept: INFUSION CENTER | Facility: HOSPITAL | Age: 33
Discharge: HOME | End: 2023-02-28
Attending: INTERNAL MEDICINE

## 2023-02-28 VITALS
TEMPERATURE: 98.3 F | HEART RATE: 93 BPM | RESPIRATION RATE: 18 BRPM | DIASTOLIC BLOOD PRESSURE: 77 MMHG | SYSTOLIC BLOOD PRESSURE: 122 MMHG | OXYGEN SATURATION: 99 %

## 2023-02-28 DIAGNOSIS — E53.8 B12 DEFICIENCY: ICD-10-CM

## 2023-02-28 DIAGNOSIS — D50.8 IRON DEFICIENCY ANEMIA SECONDARY TO INADEQUATE DIETARY IRON INTAKE: Primary | ICD-10-CM

## 2023-02-28 DIAGNOSIS — O09.893 HISTORY OF PREMATURE DELIVERY, CURRENTLY PREGNANT, THIRD TRIMESTER: ICD-10-CM

## 2023-02-28 RX ORDER — CYANOCOBALAMIN 1000 UG/ML
1000 INJECTION, SOLUTION INTRAMUSCULAR; SUBCUTANEOUS ONCE
Status: COMPLETED | OUTPATIENT
Start: 2023-02-28 | End: 2023-02-28

## 2023-02-28 RX ORDER — CYANOCOBALAMIN 1000 UG/ML
1000 INJECTION, SOLUTION INTRAMUSCULAR; SUBCUTANEOUS ONCE
Status: CANCELLED | OUTPATIENT
Start: 2023-03-02 | End: 2023-03-02

## 2023-02-28 RX ORDER — SODIUM CHLORIDE 9 MG/ML
20 INJECTION, SOLUTION INTRAVENOUS ONCE
Status: CANCELLED | OUTPATIENT
Start: 2023-03-02

## 2023-02-28 RX ORDER — SODIUM CHLORIDE 9 MG/ML
20 INJECTION, SOLUTION INTRAVENOUS ONCE
Status: COMPLETED | OUTPATIENT
Start: 2023-02-28 | End: 2023-02-28

## 2023-02-28 RX ADMIN — SODIUM CHLORIDE 20 ML/HR: 0.9 INJECTION, SOLUTION INTRAVENOUS at 13:28

## 2023-02-28 RX ADMIN — CYANOCOBALAMIN 1000 MCG: 1000 INJECTION, SOLUTION INTRAMUSCULAR; SUBCUTANEOUS at 13:32

## 2023-02-28 RX ADMIN — IRON SUCROSE 200 MG: 20 INJECTION, SOLUTION INTRAVENOUS at 13:30

## 2023-03-02 ENCOUNTER — HOSPITAL ENCOUNTER (OUTPATIENT)
Dept: INFUSION CENTER | Facility: HOSPITAL | Age: 33
Discharge: HOME | End: 2023-03-02
Attending: INTERNAL MEDICINE

## 2023-03-03 ENCOUNTER — ROUTINE PRENATAL (OUTPATIENT)
Dept: OBGYN CLINIC | Facility: CLINIC | Age: 33
End: 2023-03-03

## 2023-03-03 ENCOUNTER — HOSPITAL ENCOUNTER (OUTPATIENT)
Dept: INFUSION CENTER | Facility: HOSPITAL | Age: 33
End: 2023-03-03
Attending: INTERNAL MEDICINE

## 2023-03-03 VITALS
HEART RATE: 85 BPM | SYSTOLIC BLOOD PRESSURE: 128 MMHG | BODY MASS INDEX: 37.35 KG/M2 | WEIGHT: 217.6 LBS | DIASTOLIC BLOOD PRESSURE: 70 MMHG | OXYGEN SATURATION: 95 %

## 2023-03-03 VITALS
HEART RATE: 76 BPM | OXYGEN SATURATION: 99 % | TEMPERATURE: 98.9 F | RESPIRATION RATE: 18 BRPM | SYSTOLIC BLOOD PRESSURE: 130 MMHG | DIASTOLIC BLOOD PRESSURE: 79 MMHG

## 2023-03-03 DIAGNOSIS — O09.893 HISTORY OF PREMATURE DELIVERY, CURRENTLY PREGNANT, THIRD TRIMESTER: ICD-10-CM

## 2023-03-03 DIAGNOSIS — D50.8 IRON DEFICIENCY ANEMIA SECONDARY TO INADEQUATE DIETARY IRON INTAKE: Primary | ICD-10-CM

## 2023-03-03 DIAGNOSIS — O88.213 PULMONARY EMBOLISM AFFECTING PREGNANCY IN THIRD TRIMESTER: ICD-10-CM

## 2023-03-03 DIAGNOSIS — Z34.83 PRENATAL CARE, SUBSEQUENT PREGNANCY, THIRD TRIMESTER: Primary | ICD-10-CM

## 2023-03-03 DIAGNOSIS — Z3A.37 37 WEEKS GESTATION OF PREGNANCY: ICD-10-CM

## 2023-03-03 DIAGNOSIS — E53.8 B12 DEFICIENCY: ICD-10-CM

## 2023-03-03 LAB
SL AMB  POCT GLUCOSE, UA: NORMAL
SL AMB POCT URINE PROTEIN: NORMAL

## 2023-03-03 RX ORDER — SODIUM CHLORIDE 9 MG/ML
20 INJECTION, SOLUTION INTRAVENOUS ONCE
Status: CANCELLED | OUTPATIENT
Start: 2023-03-07

## 2023-03-03 RX ORDER — CYANOCOBALAMIN 1000 UG/ML
1000 INJECTION, SOLUTION INTRAMUSCULAR; SUBCUTANEOUS ONCE
Status: COMPLETED | OUTPATIENT
Start: 2023-03-03 | End: 2023-03-03

## 2023-03-03 RX ORDER — CYANOCOBALAMIN 1000 UG/ML
1000 INJECTION, SOLUTION INTRAMUSCULAR; SUBCUTANEOUS ONCE
Status: CANCELLED | OUTPATIENT
Start: 2023-03-07 | End: 2023-03-07

## 2023-03-03 RX ORDER — SODIUM CHLORIDE 9 MG/ML
20 INJECTION, SOLUTION INTRAVENOUS ONCE
Status: COMPLETED | OUTPATIENT
Start: 2023-03-03 | End: 2023-03-03

## 2023-03-03 RX ADMIN — CYANOCOBALAMIN 1000 MCG: 1000 INJECTION, SOLUTION INTRAMUSCULAR; SUBCUTANEOUS at 10:22

## 2023-03-03 RX ADMIN — IRON SUCROSE 200 MG: 20 INJECTION, SOLUTION INTRAVENOUS at 10:31

## 2023-03-03 RX ADMIN — SODIUM CHLORIDE 20 ML/HR: 9 INJECTION, SOLUTION INTRAVENOUS at 10:31

## 2023-03-03 NOTE — PROGRESS NOTES
Pt received B12 IM inj in right deltoid & venofer infusion  Tolerated well w/out any adverse effects, offers no complaints   Confirmed next appt, declined AVS

## 2023-03-03 NOTE — ASSESSMENT & PLAN NOTE
Discussed timing of Lovenox discontinuation prior to IOL  Reviewed holding Lovenox dose if she thinks she is in labor

## 2023-03-03 NOTE — PROGRESS NOTES
37 weeks gestation of pregnancy  Good FM  Some 's  Denies LOF or VB  IOL scheduled  Check cervix next visit  Birth plan returned  Continue [facility]'s    Pulmonary embolism affecting pregnancy in third trimester  Discussed timing of Lovenox discontinuation prior to IOL  Reviewed holding Lovenox dose if she thinks she is in labor

## 2023-03-03 NOTE — ASSESSMENT & PLAN NOTE
Good FM  Some 's  Denies LOF or VB  IOL scheduled  Check cervix next visit  Birth plan returned  Continue [facility]

## 2023-03-07 ENCOUNTER — HOSPITAL ENCOUNTER (OUTPATIENT)
Dept: INFUSION CENTER | Facility: HOSPITAL | Age: 33
Discharge: HOME | End: 2023-03-07
Attending: INTERNAL MEDICINE

## 2023-03-07 VITALS
RESPIRATION RATE: 18 BRPM | TEMPERATURE: 97.8 F | OXYGEN SATURATION: 99 % | SYSTOLIC BLOOD PRESSURE: 107 MMHG | DIASTOLIC BLOOD PRESSURE: 72 MMHG | HEART RATE: 98 BPM

## 2023-03-07 DIAGNOSIS — O09.893 HISTORY OF PREMATURE DELIVERY, CURRENTLY PREGNANT, THIRD TRIMESTER: ICD-10-CM

## 2023-03-07 DIAGNOSIS — E53.8 B12 DEFICIENCY: ICD-10-CM

## 2023-03-07 DIAGNOSIS — D50.8 IRON DEFICIENCY ANEMIA SECONDARY TO INADEQUATE DIETARY IRON INTAKE: Primary | ICD-10-CM

## 2023-03-07 RX ORDER — CYANOCOBALAMIN 1000 UG/ML
1000 INJECTION, SOLUTION INTRAMUSCULAR; SUBCUTANEOUS ONCE
Status: COMPLETED | OUTPATIENT
Start: 2023-03-07 | End: 2023-03-07

## 2023-03-07 RX ORDER — SODIUM CHLORIDE 9 MG/ML
20 INJECTION, SOLUTION INTRAVENOUS ONCE
Status: CANCELLED | OUTPATIENT
Start: 2023-03-09

## 2023-03-07 RX ORDER — CYANOCOBALAMIN 1000 UG/ML
1000 INJECTION, SOLUTION INTRAMUSCULAR; SUBCUTANEOUS ONCE
Status: CANCELLED | OUTPATIENT
Start: 2023-03-09 | End: 2023-03-09

## 2023-03-07 RX ORDER — SODIUM CHLORIDE 9 MG/ML
20 INJECTION, SOLUTION INTRAVENOUS ONCE
Status: COMPLETED | OUTPATIENT
Start: 2023-03-07 | End: 2023-03-07

## 2023-03-07 RX ADMIN — IRON SUCROSE 200 MG: 20 INJECTION, SOLUTION INTRAVENOUS at 13:13

## 2023-03-07 RX ADMIN — CYANOCOBALAMIN 1000 MCG: 1000 INJECTION INTRAMUSCULAR; SUBCUTANEOUS at 13:44

## 2023-03-07 RX ADMIN — SODIUM CHLORIDE 20 ML/HR: 0.9 INJECTION, SOLUTION INTRAVENOUS at 13:13

## 2023-03-07 NOTE — PROGRESS NOTES
Pt tolerated treatment today with no adverse reactions  B12 given in the right deltoid  Left unit ambulatory with a steady gait

## 2023-03-09 ENCOUNTER — HOSPITAL ENCOUNTER (OUTPATIENT)
Dept: INFUSION CENTER | Facility: HOSPITAL | Age: 33
End: 2023-03-09
Attending: INTERNAL MEDICINE

## 2023-03-09 VITALS
RESPIRATION RATE: 18 BRPM | DIASTOLIC BLOOD PRESSURE: 77 MMHG | HEART RATE: 97 BPM | SYSTOLIC BLOOD PRESSURE: 123 MMHG | TEMPERATURE: 97.2 F

## 2023-03-09 DIAGNOSIS — D50.8 IRON DEFICIENCY ANEMIA SECONDARY TO INADEQUATE DIETARY IRON INTAKE: Primary | ICD-10-CM

## 2023-03-09 DIAGNOSIS — E53.8 B12 DEFICIENCY: ICD-10-CM

## 2023-03-09 DIAGNOSIS — O09.893 HISTORY OF PREMATURE DELIVERY, CURRENTLY PREGNANT, THIRD TRIMESTER: ICD-10-CM

## 2023-03-09 RX ORDER — CYANOCOBALAMIN 1000 UG/ML
1000 INJECTION, SOLUTION INTRAMUSCULAR; SUBCUTANEOUS ONCE
Status: CANCELLED | OUTPATIENT
Start: 2023-03-14 | End: 2023-03-14

## 2023-03-09 RX ORDER — SODIUM CHLORIDE 9 MG/ML
20 INJECTION, SOLUTION INTRAVENOUS ONCE
Status: COMPLETED | OUTPATIENT
Start: 2023-03-09 | End: 2023-03-09

## 2023-03-09 RX ORDER — SODIUM CHLORIDE 9 MG/ML
20 INJECTION, SOLUTION INTRAVENOUS ONCE
Status: CANCELLED | OUTPATIENT
Start: 2023-03-14

## 2023-03-09 RX ORDER — CYANOCOBALAMIN 1000 UG/ML
1000 INJECTION, SOLUTION INTRAMUSCULAR; SUBCUTANEOUS ONCE
Status: COMPLETED | OUTPATIENT
Start: 2023-03-09 | End: 2023-03-09

## 2023-03-09 RX ADMIN — IRON SUCROSE 200 MG: 20 INJECTION, SOLUTION INTRAVENOUS at 13:58

## 2023-03-09 RX ADMIN — SODIUM CHLORIDE 20 ML/HR: 9 INJECTION, SOLUTION INTRAVENOUS at 13:59

## 2023-03-09 RX ADMIN — CYANOCOBALAMIN 1000 MCG: 1000 INJECTION, SOLUTION INTRAMUSCULAR; SUBCUTANEOUS at 13:58

## 2023-03-09 NOTE — PROGRESS NOTES
Patient tolerated IV venofer and R arm b12 injection without issue  F/u appt discussed, AVS declined

## 2023-03-10 ENCOUNTER — TELEPHONE (OUTPATIENT)
Dept: OBGYN CLINIC | Facility: CLINIC | Age: 33
End: 2023-03-10

## 2023-03-10 ENCOUNTER — ROUTINE PRENATAL (OUTPATIENT)
Dept: OBGYN CLINIC | Facility: CLINIC | Age: 33
End: 2023-03-10

## 2023-03-10 VITALS — SYSTOLIC BLOOD PRESSURE: 130 MMHG | DIASTOLIC BLOOD PRESSURE: 76 MMHG | BODY MASS INDEX: 38.04 KG/M2 | WEIGHT: 221.6 LBS

## 2023-03-10 DIAGNOSIS — O09.893 HISTORY OF PREMATURE DELIVERY, CURRENTLY PREGNANT, THIRD TRIMESTER: ICD-10-CM

## 2023-03-10 DIAGNOSIS — Z71.85 VACCINE COUNSELING: ICD-10-CM

## 2023-03-10 DIAGNOSIS — O88.213 PULMONARY EMBOLISM AFFECTING PREGNANCY IN THIRD TRIMESTER: ICD-10-CM

## 2023-03-10 DIAGNOSIS — Z3A.38 38 WEEKS GESTATION OF PREGNANCY: ICD-10-CM

## 2023-03-10 DIAGNOSIS — Z86.59 HISTORY OF POSTPARTUM DEPRESSION: ICD-10-CM

## 2023-03-10 DIAGNOSIS — E55.9 VITAMIN D DEFICIENCY: ICD-10-CM

## 2023-03-10 DIAGNOSIS — O99.213 MATERNAL OBESITY, ANTEPARTUM, THIRD TRIMESTER: ICD-10-CM

## 2023-03-10 DIAGNOSIS — O99.843 PREGNANCY AFFECTED BY PREVIOUS BARIATRIC SURGERY, CURRENTLY IN THIRD TRIMESTER: ICD-10-CM

## 2023-03-10 DIAGNOSIS — Z87.59 HISTORY OF POSTPARTUM DEPRESSION: ICD-10-CM

## 2023-03-10 DIAGNOSIS — Z34.83 ENCOUNTER FOR SUPERVISION OF OTHER NORMAL PREGNANCY IN THIRD TRIMESTER: Primary | ICD-10-CM

## 2023-03-10 DIAGNOSIS — E50.9 VITAMIN A DEFICIENCY: ICD-10-CM

## 2023-03-10 DIAGNOSIS — Z86.32 HISTORY OF GESTATIONAL DIABETES: ICD-10-CM

## 2023-03-10 LAB
SL AMB  POCT GLUCOSE, UA: NORMAL
SL AMB POCT URINE PROTEIN: NORMAL

## 2023-03-10 NOTE — TELEPHONE ENCOUNTER
----- Message from Remy Khalil MD sent at 3/10/2023  2:35 PM EST -----  Regarding: Cancel cervical ripening and confirm morning induction time on previously scheduled induction  Jasmin Anne has an induction scheduled 3/14 at 8pm, but she no longer needs cervical ripening  Can you call L+D to cancel that portion and then confirm what time she should report on 3/15? Please then call Jasmin Anne to let her know       Thanks!!

## 2023-03-10 NOTE — ASSESSMENT & PLAN NOTE
Bags packed  Car seat delivered today  Strict labor precautions given  Method of induction discussed in detail including risks/benefits/alternatives  Consent signed  Will cancel ripening - office to call her to confirm when she is due on L+D on 3/15

## 2023-03-10 NOTE — ASSESSMENT & PLAN NOTE
Continues on Lovenox q12h  Aware to stop Lovenox with 1am dose on 3/14 so she is 24 hours off Lovenox when she arrives for her morning induction on 3/15  Patient aware to call if she has any signs of labor sooner to discuss when/how to stop Lovenox

## 2023-03-10 NOTE — PROGRESS NOTES
Prenatal visit at 45 3/7wks  Some BH ctxs but nothing that she thinks is labor  Denies VB, LOF  Good FM  Feels well overall  Problem List Items Addressed This Visit        Cardiovascular and Mediastinum    Pulmonary embolism affecting pregnancy in third trimester     Continues on Lovenox q12h  Aware to stop Lovenox with 1am dose on 3/14 so she is 24 hours off Lovenox when she arrives for her morning induction on 3/15  Patient aware to call if she has any signs of labor sooner to discuss when/how to stop Lovenox  Other    38 weeks gestation of pregnancy - Primary     Bags packed  Car seat delivered today  Strict labor precautions given  Method of induction discussed in detail including risks/benefits/alternatives  Consent signed  Will cancel ripening - office to call her to confirm when she is due on L+D on 3/15            Relevant Orders    POCT urine dip (Completed)    Pregnancy affected by previous bariatric surgery, currently in third trimester    History of postpartum depression    History of gestational diabetes    History of premature delivery, currently pregnant, third trimester    Maternal obesity, antepartum, third trimester    Vaccine counseling    Vitamin D deficiency    Vitamin A deficiency

## 2023-03-14 ENCOUNTER — TELEPHONE (OUTPATIENT)
Dept: OBGYN CLINIC | Facility: CLINIC | Age: 33
End: 2023-03-14

## 2023-03-14 ENCOUNTER — ANESTHESIA (INPATIENT)
Dept: ANESTHESIOLOGY | Facility: HOSPITAL | Age: 33
End: 2023-03-14

## 2023-03-14 ENCOUNTER — HOSPITAL ENCOUNTER (INPATIENT)
Facility: HOSPITAL | Age: 33
LOS: 2 days | Discharge: HOME | End: 2023-03-16
Attending: OBSTETRICS & GYNECOLOGY | Admitting: OBSTETRICS & GYNECOLOGY

## 2023-03-14 ENCOUNTER — ANESTHESIA EVENT (INPATIENT)
Dept: ANESTHESIOLOGY | Facility: HOSPITAL | Age: 33
End: 2023-03-14

## 2023-03-14 DIAGNOSIS — O88.213 PULMONARY EMBOLISM AFFECTING PREGNANCY IN THIRD TRIMESTER: ICD-10-CM

## 2023-03-14 DIAGNOSIS — Z3A.39 39 WEEKS GESTATION OF PREGNANCY: ICD-10-CM

## 2023-03-14 LAB
ABO GROUP BLD: NORMAL
APTT PPP: 29 SECONDS (ref 23–37)
BLD GP AB SCN SERPL QL: NEGATIVE
ERYTHROCYTE [DISTWIDTH] IN BLOOD BY AUTOMATED COUNT: 26.3 % (ref 11.6–15.1)
FIBRINOGEN PPP-MCNC: 781 MG/DL (ref 227–495)
HCT VFR BLD AUTO: 34.8 % (ref 34.8–46.1)
HGB BLD-MCNC: 10.5 G/DL (ref 11.5–15.4)
HOLD SPECIMEN: NORMAL
INR PPP: 1.05 (ref 0.84–1.19)
MCH RBC QN AUTO: 23.4 PG (ref 26.8–34.3)
MCHC RBC AUTO-ENTMCNC: 30.2 G/DL (ref 31.4–37.4)
MCV RBC AUTO: 78 FL (ref 82–98)
PLATELET # BLD AUTO: 232 THOUSANDS/UL (ref 149–390)
PMV BLD AUTO: 9.7 FL (ref 8.9–12.7)
PROTHROMBIN TIME: 14 SECONDS (ref 11.6–14.5)
RBC # BLD AUTO: 4.49 MILLION/UL (ref 3.81–5.12)
RH BLD: POSITIVE
SPECIMEN EXPIRATION DATE: NORMAL
WBC # BLD AUTO: 7.74 THOUSAND/UL (ref 4.31–10.16)

## 2023-03-14 PROCEDURE — 10907ZC DRAINAGE OF AMNIOTIC FLUID, THERAPEUTIC FROM PRODUCTS OF CONCEPTION, VIA NATURAL OR ARTIFICIAL OPENING: ICD-10-PCS | Performed by: OBSTETRICS & GYNECOLOGY

## 2023-03-14 PROCEDURE — 4A1HXCZ MONITORING OF PRODUCTS OF CONCEPTION, CARDIAC RATE, EXTERNAL APPROACH: ICD-10-PCS | Performed by: OBSTETRICS & GYNECOLOGY

## 2023-03-14 RX ORDER — LIDOCAINE HYDROCHLORIDE AND EPINEPHRINE 20; 5 MG/ML; UG/ML
INJECTION, SOLUTION EPIDURAL; INFILTRATION; INTRACAUDAL; PERINEURAL AS NEEDED
Status: DISCONTINUED | OUTPATIENT
Start: 2023-03-14 | End: 2023-03-18 | Stop reason: HOSPADM

## 2023-03-14 RX ORDER — OXYTOCIN/RINGER'S LACTATE 30/500 ML
1-30 PLASTIC BAG, INJECTION (ML) INTRAVENOUS
Status: DISCONTINUED | OUTPATIENT
Start: 2023-03-14 | End: 2023-03-15

## 2023-03-14 RX ORDER — FENTANYL CITRATE 50 UG/ML
INJECTION, SOLUTION INTRAMUSCULAR; INTRAVENOUS AS NEEDED
Status: DISCONTINUED | OUTPATIENT
Start: 2023-03-14 | End: 2023-03-18 | Stop reason: HOSPADM

## 2023-03-14 RX ORDER — BUPIVACAINE HYDROCHLORIDE 2.5 MG/ML
30 INJECTION, SOLUTION EPIDURAL; INFILTRATION; INTRACAUDAL ONCE AS NEEDED
Status: DISCONTINUED | OUTPATIENT
Start: 2023-03-14 | End: 2023-03-15

## 2023-03-14 RX ORDER — SODIUM CHLORIDE, SODIUM LACTATE, POTASSIUM CHLORIDE, CALCIUM CHLORIDE 600; 310; 30; 20 MG/100ML; MG/100ML; MG/100ML; MG/100ML
125 INJECTION, SOLUTION INTRAVENOUS CONTINUOUS
Status: DISCONTINUED | OUTPATIENT
Start: 2023-03-14 | End: 2023-03-16 | Stop reason: HOSPADM

## 2023-03-14 RX ORDER — LIDOCAINE HYDROCHLORIDE AND EPINEPHRINE 15; 5 MG/ML; UG/ML
INJECTION, SOLUTION EPIDURAL AS NEEDED
Status: DISCONTINUED | OUTPATIENT
Start: 2023-03-14 | End: 2023-03-18 | Stop reason: HOSPADM

## 2023-03-14 RX ADMIN — Medication 2 MILLI-UNITS/MIN: at 15:46

## 2023-03-14 RX ADMIN — LIDOCAINE HYDROCHLORIDE AND EPINEPHRINE 5 ML: 15; 5 INJECTION, SOLUTION EPIDURAL at 19:20

## 2023-03-14 RX ADMIN — SODIUM CHLORIDE, SODIUM LACTATE, POTASSIUM CHLORIDE, AND CALCIUM CHLORIDE 125 ML/HR: .6; .31; .03; .02 INJECTION, SOLUTION INTRAVENOUS at 19:54

## 2023-03-14 RX ADMIN — LIDOCAINE HYDROCHLORIDE,EPINEPHRINE BITARTRATE 4 ML: 20; .005 INJECTION, SOLUTION EPIDURAL; INFILTRATION; INTRACAUDAL; PERINEURAL at 22:46

## 2023-03-14 RX ADMIN — Medication: at 23:00

## 2023-03-14 RX ADMIN — FENTANYL CITRATE 100 MCG: 50 INJECTION INTRAMUSCULAR; INTRAVENOUS at 22:46

## 2023-03-14 RX ADMIN — SODIUM CHLORIDE, SODIUM LACTATE, POTASSIUM CHLORIDE, AND CALCIUM CHLORIDE 125 ML/HR: .6; .31; .03; .02 INJECTION, SOLUTION INTRAVENOUS at 16:55

## 2023-03-14 RX ADMIN — SODIUM CHLORIDE, SODIUM LACTATE, POTASSIUM CHLORIDE, AND CALCIUM CHLORIDE 125 ML/HR: .6; .31; .03; .02 INJECTION, SOLUTION INTRAVENOUS at 13:05

## 2023-03-14 RX ADMIN — ROPIVACAINE HYDROCHLORIDE: 2 INJECTION, SOLUTION EPIDURAL; INFILTRATION at 19:50

## 2023-03-14 RX ADMIN — LIDOCAINE HYDROCHLORIDE,EPINEPHRINE BITARTRATE 5 ML: 20; .005 INJECTION, SOLUTION EPIDURAL; INFILTRATION; INTRACAUDAL; PERINEURAL at 19:24

## 2023-03-14 NOTE — ANESTHESIA PREPROCEDURE EVALUATION
Procedure:  LABOR ANALGESIA    Relevant Problems   GI/HEPATIC   (+) History of bariatric surgery      GYN   (+) 38 weeks gestation of pregnancy   (+) 39 weeks gestation of pregnancy   (+) History of premature delivery, currently pregnant, third trimester      HEMATOLOGY   (+) Iron deficiency anemia secondary to inadequate dietary iron intake      NEURO/PSYCH   (+) History of gestational diabetes   (+) History of postpartum depression        Physical Exam    Airway    Mallampati score:  I  TM Distance: >3 FB  Neck ROM: full     Dental       Cardiovascular  Cardiovascular exam normal    Pulmonary  Pulmonary exam normal     Other Findings        Anesthesia Plan  ASA Score- 2     Anesthesia Type-     Plan Factors-    Induction-     Postoperative Plan-     Informed Consent-

## 2023-03-14 NOTE — OB LABOR/OXYTOCIN SAFETY PROGRESS
Labor Progress Note - Rah Crawford 34 y o  female MRN: [id number]    Unit/Bed#: -01 Encounter: [id number]       Contraction Frequency (minutes): 6 5-7  Contraction Quality: Mild  Tachysystole: No   Cervical Dilation: 5        Cervical Effacement: 70  Fetal Station: -2  Baseline Rate: 140 bpm  Fetal Heart Rate: 158 BPM  FHR Category: Category I               Vital Signs:   Vitals:    03/14/23 1355   BP: 128/75   Pulse: 79   Resp: 18   Temp: 98 2 °F (36 8 °C)   SpO2: 100%       Notes/comments:   SVE as above    Patient is currently comfortable without epidural  Continue expectant management vs start   Plan for pitocin  Discussed with Dr Olga Lidia Lopez MD 3/14/2023 3:28 PM

## 2023-03-14 NOTE — ANESTHESIA PROCEDURE NOTES
Epidural Block    Patient location during procedure: OB  Reason for block: procedure for pain and at surgeon's request  Staffing  Performed: CRNA   Preanesthetic Checklist  Completed: patient identified, IV checked, site marked, risks and benefits discussed, surgical consent, monitors and equipment checked, pre-op evaluation and timeout performed  Epidural  Patient position: sitting  Prep: ChloraPrep  Patient monitoring: cardiac monitor and frequent blood pressure checks  Approach: midline  Location: lumbar  Injection technique: FAVIOLA air  Needle  Needle type: Tuohy   Needle gauge: 18 G  Catheter type: side hole  Catheter size: 20 G  Catheter at skin depth: 7 cm  Catheter securement method: stabilization device  Test dose: negative  Assessment  Sensory level: T10  Number of attempts: 2negative aspiration for CSF, negative aspiration for heme and no paresthesia on injection  patient tolerated the procedure well with no immediate complications

## 2023-03-14 NOTE — TELEPHONE ENCOUNTER
Pt is 39w,0d, a pos, was to be induced tomorrow  Began with contract 5 min apart last night, concepcion lastinbg 1 min and and strong  No lof  Or bleeding  tt to [employer] to rr

## 2023-03-14 NOTE — ASSESSMENT & PLAN NOTE
Lovenox for 6 weeks postpartum; plan for 1mg kg BID (100mg BID)    Coags collected on admission and wnl    SCDs while admitted    Patient has had thrombophilia panel to assess deficiencies not affected by Lovenox  - Factor 5 Leiden, prothrombin gene, protein S, protein C, beta 2 glycoprotein and anticardiolipin: normal   - Except:   - Anticardiolipin IgM 24 (which is a low positive result)   - Low protein S activity of 31       - Free and total protein S are normal levels   - Both abnormalities can be false + of pregnancy    Plan to have Ob provider or PCP repeat both at 6 weeks postpartum after off of lovenox along with other labs of drvvt and antithrombin III

## 2023-03-14 NOTE — H&P
[address] [facility] [place] 34 y o  female MRN: [id number]  Unit/Bed#: LD TRIAGE - Encounter: [id number]    Assessment: 34 y o  [id number] at 39w0d admitted for labor   SVE: 4 /-2  Clinical EFW: 20th percentile ; Cephalic confirmed by ultrasound  GBS status: negative    Postpartum contraception plan: undecided     Plan:   Pulmonary embolism affecting pregnancy in third trimester  Assessment & Plan  22  Was placed on Lovenox  Discontinued 3 days ago as she has been intermittently trace since 3/10/22  To restart Lovenox for 6 weeks postpartum   Coags collected on admission and pending  * 39 weeks gestation of pregnancy  Assessment & Plan  Admit to OBN   Clear liquid diet   F/u T&S, CBC, RPR   IVF LR 125cc/hr   Continuous fetal monitoring and tocometry   Analgesia at maternal request   Vertex by TAUS  Expectant Management           Discussed case and plan w/ Dr Patricia Last      Chief Complaint: contractions    HPI: Radha Bajwa is a 34 y o  [id number] with an OLIVRE of 3/21/2023, by Last Menstrual Period at 39w0d who is being admitted for labor   She complains of uterine contractions, occurring every 5 minutes, has no LOF, and reports no VB  She states she has felt good FM      Patient Active Problem List   Diagnosis   • History of bariatric surgery   • Iron deficiency anemia secondary to inadequate dietary iron intake   • Frequent UTI   • OAB (overactive bladder)   • 38 weeks gestation of pregnancy   • Prior  loss in second trimester, antepartum   • Pregnancy affected by previous bariatric surgery, currently in third trimester   • History of postpartum depression   • History of gestational diabetes   • Pulmonary embolism affecting pregnancy in third trimester   • History of premature delivery, currently pregnant, third trimester   • Maternal obesity, antepartum, third trimester   • Vaccine counseling   • Vitamin D deficiency   • Vitamin A deficiency   • B12 deficiency   • 39 weeks gestation of pregnancy       Baby complications/comments: none    Review of Systems   Constitutional: Negative for chills and fever  Respiratory: Negative for cough, shortness of breath and wheezing  Cardiovascular: Negative for chest pain and leg swelling  Gastrointestinal: Negative for abdominal pain, diarrhea, nausea and vomiting  Genitourinary: Negative for pelvic pain, vaginal bleeding and vaginal discharge  Musculoskeletal: Negative for back pain  Neurological: Negative for weakness, light-headedness and headaches         OB Hx:  OB History    Para Term  AB Living   6 1 1 0 4 1   SAB IAB Ectopic Multiple Live Births   2 1 1 0 1      # Outcome Date GA Lbr Jun/2nd Weight Sex Delivery Anes PTL Lv   6 Current            5 2020 16w0d          4 IAB 2019 10w0d          3 Term 14 37w0d  2948 g (6 lb 8 oz) M Vag-Spont   SONAM      Complications: Gestational diabetes   2 SAB            1 Ectopic                Past Medical Hx:  Past Medical History:   Diagnosis Date   • Anemia    • Gastric bypass status for obesity 2016    sleeve   • Post partum depression    • Sleep apnea, obstructive     prior to weight loss surg       Past Surgical hx:  Past Surgical History:   Procedure Laterality Date   • DILATION AND EVACUATION  2019       • GASTRIC RESTRICTION SURGERY  2015    gastric sleeve   • INCISION AND DRAINAGE OF WOUND Right 2022    Procedure: INCISION AND DRAINAGE (I&D) HEAD/FACE;  Surgeon: Estuardo Bran DMD;  Location: [facility];  Service: Maxillofacial   • MULTIPLE TOOTH EXTRACTIONS Right 2022    Procedure: EXTRACTION TEETH MULTIPLE;  Surgeon: Estuardo Bran DMD;  Location: [facility];  Service: Maxillofacial   • OPEN ANTERIOR SHOULDER RECONSTRUCTION Left    • OTHER SURGICAL HISTORY      sweat gland removal   • RETAINED PLACENTA REMOVAL N/A 2020    Procedure: EXTRACTION OF TBRRTSHM,UJEDKA;  Surgeon: Deyanira Loza MD;  Location: [facility] ; Service: Obstetrics       Social Hx:  Alcohol use: denies  Tobacco use: denies  Other substance use: denies    No Known Allergies    Medications Prior to Admission   Medication   • enoxaparin (LOVENOX) 100 mg/mL   • ergocalciferol (VITAMIN D2) 50,000 units   • Prenatal MV & Min w/FA-DHA ([facility] PRENATAL GUMMY PO)   • vitamin A 3 MG (08828 UT) capsule   • calcium carbonate-vitamin D 500 mg-5 mcg per tablet   • vitamin B-12 (VITAMIN B-12) 1,000 mcg tablet       Objective:  Temp:  [98 1 °F (36 7 °C)] 98 1 °F (36 7 °C)  HR:  [80] 80  Resp:  [18] 18  BP: (123)/(73) 123/73  Body mass index is 38 04 kg/m²       Physical Exam:  OBGyn Exam       FHT:       TOCO:        Lab Results   Component Value Date    WBC 6 23 01/05/2023    HGB 8 9 (L) 01/05/2023    HCT 30 6 (L) 01/05/2023     01/05/2023     Lab Results   Component Value Date    K 3 4 (L) 01/05/2023     01/05/2023    CO2 23 01/05/2023    BUN 7 01/05/2023    CREATININE 0 55 (L) 01/05/2023    AST 10 01/05/2023    ALT 9 (L) 01/05/2023     Prenatal Labs: Reviewed      Blood type: A+  Antibody: Negative  GBS: Negative  HIV: Non-reactive   Rubella: Immune  Syphilis IgM/IgG: Negative  HBsAg: Non-reactive  HCAb: Non-reactive  Chlamydia: Negative  Gonorrhea: Negative  Diabetes 1 hour screen: 137  3 hour glucose: 961,52,67  Platelets: 475  Hgb: 8 9  >2 Midnights  INPATIENT     Signature/Title: Vicki West MD  Date: 3/14/2023  Time: 1:07 PM

## 2023-03-14 NOTE — PLAN OF CARE
Problem: PAIN - ADULT  Goal: Verbalizes/displays adequate comfort level or baseline comfort level  Description: Interventions:  - Encourage patient to monitor pain and request assistance  - Assess pain using appropriate pain scale  - Administer analgesics based on type and severity of pain and evaluate response  - Implement non-pharmacological measures as appropriate and evaluate response  - Consider cultural and social influences on pain and pain management  - Notify physician/advanced practitioner if interventions unsuccessful or patient reports new pain  Outcome: Progressing     Problem: INFECTION - ADULT  Goal: Absence or prevention of progression during hospitalization  Description: INTERVENTIONS:  - Assess and monitor for signs and symptoms of infection  - Monitor lab/diagnostic results  - Monitor all insertion sites, i e  indwelling lines, tubes, and drains  - Monitor endotracheal if appropriate and nasal secretions for changes in amount and color  - [place] appropriate cooling/warming therapies per order  - Administer medications as ordered  - Instruct and encourage patient and family to use good hand hygiene technique  - Identify and instruct in appropriate isolation precautions for identified infection/condition  Outcome: Progressing  Goal: Absence of fever/infection during neutropenic period  Description: INTERVENTIONS:  - Monitor WBC    Outcome: Progressing     Problem: SAFETY ADULT  Goal: Patient will remain free of falls  Description: INTERVENTIONS:  - Educate patient/family on patient safety including physical limitations  - Instruct patient to call for assistance with activity   - Consult OT/PT to assist with strengthening/mobility   - Keep Call bell within reach  - Keep bed low and locked with side rails adjusted as appropriate  - Keep care items and personal belongings within reach  - Initiate and maintain comfort rounds  - Make Fall Risk Sign visible to staff  - Offer Toileting every  Hours, in advance of need  - Initiate/Maintain alarm  - Obtain necessary fall risk management equipment:   - Apply yellow socks and bracelet for high fall risk patients  - Consider moving patient to room near nurses station  Outcome: Progressing  Goal: Maintain or return to baseline ADL function  Description: INTERVENTIONS:  -  Assess patient's ability to carry out ADLs; assess patient's baseline for ADL function and identify physical deficits which impact ability to perform ADLs (bathing, care of mouth/teeth, toileting, grooming, dressing, etc )  - Assess/evaluate cause of self-care deficits   - Assess range of motion  - Assess patient's mobility; develop plan if impaired  - Assess patient's need for assistive devices and provide as appropriate  - Encourage maximum independence but intervene and supervise when necessary  - Involve family in performance of ADLs  - Assess for home care needs following discharge   - Consider OT consult to assist with ADL evaluation and planning for discharge  - Provide patient education as appropriate  Outcome: Progressing  Goal: Maintains/Returns to pre admission functional level  Description: INTERVENTIONS:  - Perform BMAT or MOVE assessment daily    - Set and communicate daily mobility goal to care team and patient/family/caregiver  - Collaborate with rehabilitation services on mobility goals if consulted  - Perform Range of Motion  times a day  - Reposition patient every  hours    - Dangle patient  times a day  - Stand patient times a day  - Ambulate patient times a day  - Out of bed to chair  times a day   - Out of bed for meals  times a day  - Out of bed for toileting  - Record patient progress and toleration of activity level   Outcome: Progressing     Problem: DISCHARGE PLANNING  Goal: Discharge to home or other facility with appropriate resources  Description: INTERVENTIONS:  - Identify barriers to discharge w/patient and caregiver  - Arrange for needed discharge resources and transportation as appropriate  - Identify discharge learning needs (meds, wound care, etc )  - Arrange for interpretive services to assist at discharge as needed  - Refer to Case Management Department for coordinating discharge planning if the patient needs post-hospital services based on physician/advanced practitioner order or complex needs related to functional status, cognitive ability, or social support system  Outcome: Progressing     Problem: Knowledge Deficit  Goal: Patient/family/caregiver demonstrates understanding of disease process, treatment plan, medications, and discharge instructions  Description: Complete learning assessment and assess knowledge base    Interventions:  - Provide teaching at level of understanding  - Provide teaching via preferred learning methods  Outcome: Progressing     Problem: ANTEPARTUM  Goal: Maintain pregnancy as long as maternal and/or fetal condition is stable  Description: INTERVENTIONS:  - Maternal surveillance  - Fetal surveillance  - Monitor uterine activity  - Medications as ordered  - Bedrest  Outcome: Progressing     Problem: BIRTH - VAGINAL/ SECTION  Goal: Fetal and maternal status remain reassuring during the birth process  Description: INTERVENTIONS:  - Monitor vital signs  - Monitor fetal heart rate  - Monitor uterine activity  - Monitor labor progression (vaginal delivery)  - DVT prophylaxis  - Antibiotic prophylaxis  Outcome: Progressing  Goal: Emotionally satisfying birthing experience for mother/fetus  Description: Interventions:  - Assess, plan, implement and evaluate the nursing care given to the patient in labor  - Advocate the philosophy that each childbirth experience is a unique experience and support the family's chosen level of involvement and control during the labor process   - Actively participate in both the patient's and family's teaching of the birth process  - Consider cultural, Sabianist and age-specific factors and plan care for the patient in labor  Outcome: Progressing

## 2023-03-15 LAB
BASE EXCESS BLDCOA CALC-SCNC: -3 MMOL/L (ref 3–11)
BASE EXCESS BLDCOV CALC-SCNC: -3.6 MMOL/L (ref 1–9)
HCO3 BLDCOA-SCNC: 23.5 MMOL/L (ref 17.3–27.3)
HCO3 BLDCOV-SCNC: 21.9 MMOL/L (ref 12.2–28.6)
O2 CT VFR BLDCOA CALC: 4.7 ML/DL
OXYHGB MFR BLDCOA: 23.8 %
OXYHGB MFR BLDCOV: 40.6 %
PCO2 BLDCOA: 47.8 MM[HG] (ref 30–60)
PCO2 BLDCOV: 41.4 MM HG (ref 27–43)
PH BLDCOA: 7.31 [PH] (ref 7.23–7.43)
PH BLDCOV: 7.34 [PH] (ref 7.19–7.49)
PO2 BLDCOA: 13.6 MM HG (ref 5–25)
PO2 BLDCOV: 17.6 MM HG (ref 15–45)
SAO2 % BLDCOV: 8.5 ML/DL
TREPONEMA PALLIDUM IGG+IGM AB [PRESENCE] IN SERUM OR PLASMA BY IMMUNOASSAY: NORMAL

## 2023-03-15 PROCEDURE — 0KQM0ZZ REPAIR PERINEUM MUSCLE, OPEN APPROACH: ICD-10-PCS | Performed by: OBSTETRICS & GYNECOLOGY

## 2023-03-15 RX ORDER — DIPHENHYDRAMINE HCL 25 MG
25 TABLET ORAL EVERY 6 HOURS PRN
Status: DISCONTINUED | OUTPATIENT
Start: 2023-03-15 | End: 2023-03-16 | Stop reason: HOSPADM

## 2023-03-15 RX ORDER — SENNOSIDES 8.6 MG
1 TABLET ORAL DAILY
Status: DISCONTINUED | OUTPATIENT
Start: 2023-03-15 | End: 2023-03-16 | Stop reason: HOSPADM

## 2023-03-15 RX ORDER — ONDANSETRON 2 MG/ML
4 INJECTION INTRAMUSCULAR; INTRAVENOUS EVERY 8 HOURS PRN
Status: DISCONTINUED | OUTPATIENT
Start: 2023-03-15 | End: 2023-03-16 | Stop reason: HOSPADM

## 2023-03-15 RX ORDER — DOCUSATE SODIUM 100 MG/1
100 CAPSULE, LIQUID FILLED ORAL 2 TIMES DAILY
Status: DISCONTINUED | OUTPATIENT
Start: 2023-03-15 | End: 2023-03-16 | Stop reason: HOSPADM

## 2023-03-15 RX ORDER — SIMETHICONE 80 MG
80 TABLET,CHEWABLE ORAL 4 TIMES DAILY PRN
Status: DISCONTINUED | OUTPATIENT
Start: 2023-03-15 | End: 2023-03-16 | Stop reason: HOSPADM

## 2023-03-15 RX ORDER — IBUPROFEN 600 MG/1
600 TABLET ORAL EVERY 6 HOURS SCHEDULED
Status: DISCONTINUED | OUTPATIENT
Start: 2023-03-15 | End: 2023-03-15

## 2023-03-15 RX ORDER — MAGNESIUM HYDROXIDE/ALUMINUM HYDROXICE/SIMETHICONE 120; 1200; 1200 MG/30ML; MG/30ML; MG/30ML
15 SUSPENSION ORAL EVERY 6 HOURS PRN
Status: DISCONTINUED | OUTPATIENT
Start: 2023-03-15 | End: 2023-03-16 | Stop reason: HOSPADM

## 2023-03-15 RX ORDER — CALCIUM CARBONATE 200(500)MG
1000 TABLET,CHEWABLE ORAL 3 TIMES DAILY PRN
Status: DISCONTINUED | OUTPATIENT
Start: 2023-03-15 | End: 2023-03-16 | Stop reason: HOSPADM

## 2023-03-15 RX ORDER — OXYTOCIN/RINGER'S LACTATE 30/500 ML
250 PLASTIC BAG, INJECTION (ML) INTRAVENOUS CONTINUOUS
Status: ACTIVE | OUTPATIENT
Start: 2023-03-15 | End: 2023-03-15

## 2023-03-15 RX ORDER — DIAPER,BRIEF,INFANT-TODD,DISP
1 EACH MISCELLANEOUS DAILY PRN
Status: DISCONTINUED | OUTPATIENT
Start: 2023-03-15 | End: 2023-03-16 | Stop reason: HOSPADM

## 2023-03-15 RX ORDER — OXYCODONE HYDROCHLORIDE 5 MG/1
5 TABLET ORAL EVERY 4 HOURS PRN
Status: DISCONTINUED | OUTPATIENT
Start: 2023-03-15 | End: 2023-03-16 | Stop reason: HOSPADM

## 2023-03-15 RX ORDER — ACETAMINOPHEN 325 MG/1
975 TABLET ORAL EVERY 6 HOURS SCHEDULED
Status: DISCONTINUED | OUTPATIENT
Start: 2023-03-15 | End: 2023-03-16 | Stop reason: HOSPADM

## 2023-03-15 RX ORDER — ENOXAPARIN SODIUM 100 MG/ML
1 INJECTION SUBCUTANEOUS EVERY 12 HOURS
Status: DISCONTINUED | OUTPATIENT
Start: 2023-03-15 | End: 2023-03-16 | Stop reason: HOSPADM

## 2023-03-15 RX ORDER — OXYCODONE HYDROCHLORIDE 5 MG/1
5 TABLET ORAL ONCE
Status: COMPLETED | OUTPATIENT
Start: 2023-03-15 | End: 2023-03-15

## 2023-03-15 RX ADMIN — DOCUSATE SODIUM 100 MG: 100 CAPSULE, LIQUID FILLED ORAL at 18:41

## 2023-03-15 RX ADMIN — Medication 1 APPLICATION.: at 03:08

## 2023-03-15 RX ADMIN — ENOXAPARIN SODIUM 100 MG: 100 INJECTION SUBCUTANEOUS at 22:28

## 2023-03-15 RX ADMIN — OXYCODONE HYDROCHLORIDE 5 MG: 5 TABLET ORAL at 11:59

## 2023-03-15 RX ADMIN — ACETAMINOPHEN 975 MG: 325 TABLET ORAL at 11:39

## 2023-03-15 RX ADMIN — HYDROCORTISONE 1 APPLICATION.: 10 CREAM TOPICAL at 03:08

## 2023-03-15 RX ADMIN — DOCUSATE SODIUM 100 MG: 100 CAPSULE, LIQUID FILLED ORAL at 09:37

## 2023-03-15 RX ADMIN — OXYCODONE HYDROCHLORIDE 5 MG: 5 TABLET ORAL at 20:47

## 2023-03-15 RX ADMIN — SENNOSIDES 8.6 MG: 8.6 TABLET, FILM COATED ORAL at 09:36

## 2023-03-15 RX ADMIN — ACETAMINOPHEN 975 MG: 325 TABLET ORAL at 05:23

## 2023-03-15 RX ADMIN — OXYCODONE HYDROCHLORIDE 5 MG: 5 TABLET ORAL at 05:23

## 2023-03-15 RX ADMIN — WITCH HAZEL 1 PAD.: 500 SOLUTION RECTAL; TOPICAL at 03:08

## 2023-03-15 RX ADMIN — ACETAMINOPHEN 975 MG: 325 TABLET ORAL at 18:41

## 2023-03-15 NOTE — PLAN OF CARE
Problem: PAIN - ADULT  Goal: Verbalizes/displays adequate comfort level or baseline comfort level  Description: Interventions:  - Encourage patient to monitor pain and request assistance  - Assess pain using appropriate pain scale  - Administer analgesics based on type and severity of pain and evaluate response  - Implement non-pharmacological measures as appropriate and evaluate response  - Consider cultural and social influences on pain and pain management  - Notify physician/advanced practitioner if interventions unsuccessful or patient reports new pain  Outcome: Progressing     Problem: INFECTION - ADULT  Goal: Absence or prevention of progression during hospitalization  Description: INTERVENTIONS:  - Assess and monitor for signs and symptoms of infection  - Monitor lab/diagnostic results  - Monitor all insertion sites, i e  indwelling lines, tubes, and drains  - Monitor endotracheal if appropriate and nasal secretions for changes in amount and color  - [place] appropriate cooling/warming therapies per order  - Administer medications as ordered  - Instruct and encourage patient and family to use good hand hygiene technique  - Identify and instruct in appropriate isolation precautions for identified infection/condition  Outcome: Progressing  Goal: Absence of fever/infection during neutropenic period  Description: INTERVENTIONS:  - Monitor WBC    Outcome: Progressing     Problem: SAFETY ADULT  Goal: Patient will remain free of falls  Description: INTERVENTIONS:  - Educate patient/family on patient safety including physical limitations  - Instruct patient to call for assistance with activity   - Consult OT/PT to assist with strengthening/mobility   - Keep Call bell within reach  - Keep bed low and locked with side rails adjusted as appropriate  - Keep care items and personal belongings within reach  - Initiate and maintain comfort rounds  - Make Fall Risk Sign visible to staff  - Apply yellow socks and bracelet for high fall risk patients  - Consider moving patient to room near nurses station  Outcome: Progressing  Goal: Maintain or return to baseline ADL function  Description: INTERVENTIONS:  -  Assess patient's ability to carry out ADLs; assess patient's baseline for ADL function and identify physical deficits which impact ability to perform ADLs (bathing, care of mouth/teeth, toileting, grooming, dressing, etc )  - Assess/evaluate cause of self-care deficits   - Assess range of motion  - Assess patient's mobility; develop plan if impaired  - Assess patient's need for assistive devices and provide as appropriate  - Encourage maximum independence but intervene and supervise when necessary  - Involve family in performance of ADLs  - Assess for home care needs following discharge   - Consider OT consult to assist with ADL evaluation and planning for discharge  - Provide patient education as appropriate  Outcome: Progressing  Goal: Maintains/Returns to pre admission functional level  Description: INTERVENTIONS:  - Perform BMAT or MOVE assessment daily    - Set and communicate daily mobility goal to care team and patient/family/caregiver     - Collaborate with rehabilitation services on mobility goals if consulted  - Out of bed for toileting  - Record patient progress and toleration of activity level   Outcome: Progressing     Problem: DISCHARGE PLANNING  Goal: Discharge to home or other facility with appropriate resources  Description: INTERVENTIONS:  - Identify barriers to discharge w/patient and caregiver  - Arrange for needed discharge resources and transportation as appropriate  - Identify discharge learning needs (meds, wound care, etc )  - Arrange for interpretive services to assist at discharge as needed  - Refer to Case Management Department for coordinating discharge planning if the patient needs post-hospital services based on physician/advanced practitioner order or complex needs related to functional status, cognitive ability, or social support system  Outcome: Progressing     Problem: Knowledge Deficit  Goal: Patient/family/caregiver demonstrates understanding of disease process, treatment plan, medications, and discharge instructions  Description: Complete learning assessment and assess knowledge base    Interventions:  - Provide teaching at level of understanding  - Provide teaching via preferred learning methods  Outcome: Progressing     Problem: ANTEPARTUM  Goal: Maintain pregnancy as long as maternal and/or fetal condition is stable  Description: INTERVENTIONS:  - Maternal surveillance  - Fetal surveillance  - Monitor uterine activity  - Medications as ordered  - Bedrest  Outcome: Progressing     Problem: BIRTH - VAGINAL/ SECTION  Goal: Fetal and maternal status remain reassuring during the birth process  Description: INTERVENTIONS:  - Monitor vital signs  - Monitor fetal heart rate  - Monitor uterine activity  - Monitor labor progression (vaginal delivery)  - DVT prophylaxis  - Antibiotic prophylaxis  Outcome: Progressing  Goal: Emotionally satisfying birthing experience for mother/fetus  Description: Interventions:  - Assess, plan, implement and evaluate the nursing care given to the patient in labor  - Advocate the philosophy that each childbirth experience is a unique experience and support the family's chosen level of involvement and control during the labor process   - Actively participate in both the patient's and family's teaching of the birth process  - Consider cultural, Advent and age-specific factors and plan care for the patient in labor  Outcome: Progressing

## 2023-03-15 NOTE — DISCHARGE INSTRUCTIONS

## 2023-03-15 NOTE — ANESTHESIA POSTPROCEDURE EVALUATION
Post-Op Assessment Note    CV Status:  Stable  Pain Score: 0    Pain management: adequate     Mental Status:  Alert and awake   Hydration Status:  Euvolemic   PONV Controlled:  Controlled   Airway Patency:  Patent      Post Op Vitals Reviewed: Yes      Staff: CRNA     Post-op block assessment: catheter intact and no complications      No notable events documented      BP      Temp      Pulse     Resp      SpO2

## 2023-03-15 NOTE — DISCHARGE SUMMARY
Discharge Summary - OB/GYN  Molly Jacobo 34 y o  female MRN: [id number]  Unit/Bed#: -01 Encounter: [id number]    Admission Date: 3/14/2023     Discharge Date: 3/16/2023    Admitting Attending: Jay Hector MD    Delivering Attending: Dr Reshma Clements    Discharging Attending: Dr Shasta Berrios    Diagnosis:   1  Pregnancy at 39 weeks 1 day  2  History of pulmonary embolism in pregnancy  3  History of bariatric surgery  4  Iron deficiency anemia    Procedures: spontaneous vaginal delivery    Anesthesia: epidural    Hospital course: Ms Molly Jacobo is a 34 y o  [id number] at 39wk1d  She presented to labor and delivery in labor  On exam in triage she was noted to be 4 5/70/-2; Her FHT was reactive  Pregnancy was complicated by history of pulmonary embolism, for which she was treated with Lovenox in pregnancy  She had self-discontinued 3 days prior to arrival as she had been intermittently trace and felt as though she may progress to labor  Plan in place to re-start Lovenox 24 hours after epidural was given; this will be continued at 100 mg twice daily for 6 weeks postpartum  She continued to make spontaneous change and received an epidural for pain control  Artificial amniotomy was performed for clear, blood-tinged fluid  He progressed to complete and began pushing  She delivered a viable female  on 3/15/2023 at 12  Weight 6 lbs 8 9 oz via normal spontaneous vaginal delivery  She sustained 3, small right vaginal wall lacerations during delivery which were adequately repaired  Apgars were 9 (1 min) and 9 (5 min)   was transferred to  nursery  Patient tolerated the procedure well  Her post-delivery course was uncomplicated  Her postpartum pain was well controlled with oral analgesics  On day of discharge, she was ambulating and able to reasonably perform all ADLs  She was voiding and had appropriate bowel function  Pain was well controlled   She was discharged home on postpartum day #1 without complications  Patient was instructed to follow up with her OB as an outpatient and was given appropriate warnings to call provider if she develops signs of infection or uncontrolled pain  Complications: none apparent    Condition at discharge: good     Discharge instructions/medications/information to patient and family:   See after visit summary for information provided to patient and family  Provisions for Follow-Up Care:  See after visit summary for information related to follow-up care and any pertinent home health orders  Disposition: See After Visit Summary for discharge disposition information      Planned Readmission: No

## 2023-03-15 NOTE — OB LABOR/OXYTOCIN SAFETY PROGRESS
Oxytocin Safety Progress Check Note - Marguerite Jackson 34 y o  female MRN: [id number]    Unit/Bed#: -01 Encounter: [id number]    Dose (brandi-units/min) Oxytocin: 8 brandi-units/min  Contraction Frequency (minutes): 2  Contraction Quality: Moderate  Tachysystole: No   Cervical Dilation: 6-7        Cervical Effacement: 90  Fetal Station: -1  Baseline Rate: 140 bpm  Fetal Heart Rate: 140 BPM  FHR Category: Category I               Vital Signs:   Vitals:    03/14/23 2151   BP: 126/77   Pulse: 77   Resp:    Temp:    SpO2:        Notes/comments: SVE as above  FHT Cat I  Continue to monitor           Naif Moody MD 3/14/2023 10:36 PM

## 2023-03-15 NOTE — LACTATION NOTE
This note was copied from a baby's chart  CONSULT - LACTATION  Baby Girl Farhat Pryor) Justin 0 days female MRN: [id number]    [facility] NURSERY Room / Bed: (N)/(N) Encounter: [id number]    Maternal Information     MOTHER:  Gladys Shay  Maternal Age: 34 y o    OB History: # 1 - Date: None, Sex: None, Weight: None, GA: None, Delivery: None, Apgar1: None, Apgar5: None, Living: None, Birth Comments: None    # 2 - Date: None, Sex: None, Weight: None, GA: None, Delivery: None, Apgar1: None, Apgar5: None, Living: None, Birth Comments: None    # 3 - Date: 14, Sex: Male, Weight: 2948 g (6 lb 8 oz), GA: 37w0d, Delivery: Vaginal, Spontaneous, Apgar1: None, Apgar5: None, Living: Living, Birth Comments: None    # 4 - Date: 2019, Sex: None, Weight: None, GA: 10w0d, Delivery: None, Apgar1: None, Apgar5: None, Living: None, Birth Comments: None    # 5 - Date: , Sex: None, Weight: None, GA: 16w0d, Delivery: None, Apgar1: None, Apgar5: None, Living: None, Birth Comments: None    # 6 - Date: 03/15/23, Sex: Female, Weight: 2975 g (6 lb 8 9 oz), GA: 39w1d, Delivery: Vaginal, Spontaneous, Apgar1: 9, Apgar5: 9, Living: Living, Birth Comments: None   Previouse breast reduction surgery?  No    Lactation history:   Has patient previously breast fed: No   How long had patient previously breast fed:     Previous breast feeding complications:       Past Surgical History:   Procedure Laterality Date   • DILATION AND EVACUATION  2019       • GASTRIC RESTRICTION SURGERY  2015    gastric sleeve   • INCISION AND DRAINAGE OF WOUND Right 2022    Procedure: INCISION AND DRAINAGE (I&D) HEAD/FACE;  Surgeon: Jamie Nance DMD;  Location: [facility];  Service: Maxillofacial   • MULTIPLE TOOTH EXTRACTIONS Right 2022    Procedure: EXTRACTION TEETH MULTIPLE;  Surgeon: Jamie Drivers, DMD;  Location: [facility];  Service: Maxillofacial   • OPEN ANTERIOR SHOULDER RECONSTRUCTION Left 2018   • OTHER SURGICAL HISTORY      sweat gland removal   • RETAINED PLACENTA REMOVAL N/A 6/2/2020    Procedure: EXTRACTION OF PHQLGAMC,SGYASHAN;  Surgeon: Jose Cruz Leon MD;  Location: [facility] ;  Service: Obstetrics        Birth information:  YOB: 2021   Time of birth: 1:25 AM   Sex: female   Delivery type: Vaginal, Spontaneous   Birth Weight: 2975 g (6 lb 8 9 oz)   Percent of Weight Change: 0%     Gestational Age: 36w3d   [unfilled]    Assessment       Feeding recommendations:  breast feed on demand     Met with mother  Provided mother with Ready, Set, Baby booklet  Discussed Skin to Skin contact an benefits to mom and baby  Talked about the delay of the first bath until baby has adjusted  Spoke about the benefits of rooming in  Feeding on cue and what that means for recognizing infant's hunger  Avoidance of pacifiers for the first month discussed  Talked about exclusive breastfeeding for the first 6 months  Positioning and latch reviewed as well as showing images of other feeding positions  Discussed the properties of a good latch in any position  Reviewed hand/manual expression  Discussed s/s that baby is getting enough milk and some s/s that breastfeeding dyad may need further help  Gave information on common concerns, what to expect the first few weeks after delivery, preparing for other caregivers, and how partners can help  Resources for support also provided  Information on hand expression given  Discussed benefits of knowing how to manually express breast including stimulating milk supply, softening nipple for latch and evacuating breast in the event of engorgement  Discussed 2nd night syndrome and ways to calm infant  Hand out given  Provided DC booklet at this time, enc family to review and prepare questions for day of DC  This is her first child she is breastfeeding, states she is latching well  Enc demand feedings, and to call for [facility] support as needed  Consult placed for Zomee pump       Josie Conner RN 3/15/2023 6:24 PM

## 2023-03-15 NOTE — L&D DELIVERY NOTE
Vaginal Delivery Summary - OB/GYN   Marguerite Jackson 34 y o  female MRN: [id number]  Unit/Bed#: -01 Encounter: [id number]          Pre-delivery Diagnosis:   1  Pregnancy at 39 weeks 1 day  2  History of pulmonary embolism in pregnancy  3  History of bariatric surgery  4  Iron deficiency anemia    Post-delivery Diagnosis: same, delivered    Procedure: Spontaneous Vaginal Delivery    Attending: Dr French Fallon    Assistant(s): Dr Gonzalez Late    Anesthesia: Epidural    EBL: 023VC    Complications: none apparent    Specimens:   1  Arterial and venous cord gases  2  Cord blood  3  Segment of umbilical cord  4  Placenta to storage     Findings:  1  Viable female on 3/15/2023 at 0125, with APGARS of 9 and 9 at 1 and 5 minutes respectively,  2  Spontaneous delivery of intact placenta at 0129  3  Three punctate lacerations of R vaginal wall; repaired with 4-0 Vicryl rapide  4  Umbilical Cord Venous Blood Gas:  Results from last 7 days   Lab Units 03/15/23  0135   PH COV  7 342   PCO2 COV mm HG 41 4   HCO3 COV mmol/L 21 9   BASE EXC COV mmol/L -3 6*   O2 CT CD VB mL/dL 8 5   O2 HGB, VENOUS CORD % 40 6     5  Umbilical Cord Arterial Blood Gas:  Results from last 7 days   Lab Units 03/15/23  0135   PH COA  7 310   PCO2 COA  47 8   PO2 COA mm HG 13 6   HCO3 COA mmol/L 23 5   BASE EXC COA mmol/L -3 0*   O2 CONTENT CORD ART ml/dl 4 7   O2 HGB, ARTERIAL CORD % 23 8         Disposition:  Patient tolerated the procedure well and was recovering in labor and delivery room  Brief history and labor course:  Ms Marguerite Jackson is a 34 y o  [id number] at 39wk1d  She presented to labor and delivery in labor  On exam in triage she was noted to be 4 5/70/-2; Her FHT was reactive  Pregnancy was complicated by history of pulmonary embolism, for which she was treated with Lovenox in pregnancy  She had self-discontinued 3 days prior to arrival as she had been intermittently trace and felt as though she may progress to labor    Plan in place to re-start Lovenox 24 hours after epidural was given; this will be continued at 100 mg twice daily for 6 weeks postpartum  She continued to make spontaneous change and received an epidural for pain control  Artificial amniotomy was performed for clear, blood-tinged fluid  He progressed to complete and began pushing  Description of procedure:    After pushing for 5 minutes, at [id number] [facility] patient delivered a viable female , wt pending, apgars of 9 (1 min) and 9 (5 min)  The fetal vertex delivered spontaneously  There was no nuchal cord  Baby was OA, restituted to MERARI  The right anterior shoulder delivered atraumatically with maternal expulsive forces and the assistance of gentle downward traction  The left posterior shoulder delivered with maternal expulsive forces and the assistance of gentle upward traction  The remainder of the fetus delivered spontaneously  Upon delivery, the infant was placed on the mothers abdomen and the cord was clamped and cut  The infant was noted to cry spontaneously and was moving all extremities appropriately  There was no evidence for injury  Awaiting nurse resuscitators evaluated the   Arterial and venous cord blood gases and cord blood was collected for analysis  These were promptly sent to the lab  In the immediate post-partum, 30 units of IV pitocin was administered, and the uterus was noted to contract down well with massage and pitocin  The placenta delivered spontaneously at [address] and was noted to have an eccentrally inserted 3 vessel cord  The vagina, cervix, perineum, and rectum were inspected and there were noted to be 3 punctate lacerations on her right vaginal wall  Laceration Repair  Patient was comfortable with epidural at that time  3 small punctate lacerations were identified on her right vaginal wall; these required repair  Each was repaired with a figure-of-8 suture of 4-0 Vicryl    Excellent reapproximation and hemostasis were confirmed at the conclusion of this procedure  At the conclusion of the procedure, all needle, sponge, and instrument counts were noted to be correct  Patient tolerated the procedure well and was allowed to recover in labor and delivery room with family and  before being transferred to the post-partum floor  Dr Kunal Almanza was present and participated in all key portions of the case        Nancy Keita MD  OB/GYN PGY-3  3/15/2023  1:56 AM 98

## 2023-03-15 NOTE — OB LABOR/OXYTOCIN SAFETY PROGRESS
Oxytocin Safety Progress Check Note - Faisal Estes 34 y o  female MRN: [id number]    Unit/Bed#: -01 Encounter: [id number]    Dose (brandi-units/min) Oxytocin: 6 brandi-units/min  Contraction Frequency (minutes): 3-3 5  Contraction Quality: Moderate  Tachysystole: No   Cervical Dilation: 5        Cervical Effacement: 70  Fetal Station: -2  Baseline Rate: 140 bpm  Fetal Heart Rate: 142 BPM  FHR Category: Category II               Vital Signs:   Vitals:    03/14/23 1851   BP: 128/67   Pulse: 69   Resp:    Temp:    SpO2:        Notes/comments: SVE unchanged  Amniotomy for clear, blood-tinged fluid  FHT cat I  Continue to titrate pitocin           Sloane Khan MD 3/14/2023 8:19 PM

## 2023-03-15 NOTE — OB LABOR/OXYTOCIN SAFETY PROGRESS
Oxytocin Safety Progress Check Note - Carlin Valverde 34 y o  female MRN: [id number]    Unit/Bed#: -01 Encounter: [id number]    Dose (brandi-units/min) Oxytocin: 8 brandi-units/min  Contraction Frequency (minutes): 2  Contraction Quality: Moderate  Tachysystole: No     Cervical Dilation: 10  Dilation Complete Date: 03/15/23  Dilation Complete Time: 0113  Cervical Effacement: 100  Fetal Station: 1     Baseline Rate: 150 bpm  Fetal Heart Rate: 150 BPM  FHR Category: Category I    Pt feeling rectal pressure  Complete on exam  Will start pushing, anticipate       Vital Signs:   Vitals:    03/15/23 0022   BP: 130/75   Pulse: 93   Resp:    Temp:    SpO2:      D/w Dr Chato Patterson MD 3/15/2023 1:17 AM

## 2023-03-16 VITALS
SYSTOLIC BLOOD PRESSURE: 118 MMHG | BODY MASS INDEX: 37.83 KG/M2 | TEMPERATURE: 97.6 F | OXYGEN SATURATION: 96 % | HEART RATE: 59 BPM | RESPIRATION RATE: 20 BRPM | DIASTOLIC BLOOD PRESSURE: 62 MMHG | HEIGHT: 64 IN | WEIGHT: 221.6 LBS

## 2023-03-16 RX ORDER — ACETAMINOPHEN 325 MG/1
975 TABLET ORAL EVERY 6 HOURS SCHEDULED
Refills: 0
Start: 2023-03-16 | End: 2023-03-24

## 2023-03-16 RX ORDER — DOCUSATE SODIUM 100 MG/1
100 CAPSULE, LIQUID FILLED ORAL 2 TIMES DAILY
Refills: 0
Start: 2023-03-16

## 2023-03-16 RX ORDER — ENOXAPARIN SODIUM 100 MG/ML
1 INJECTION SUBCUTANEOUS EVERY 12 HOURS
Qty: 84 ML | Refills: 0 | Status: SHIPPED | OUTPATIENT
Start: 2023-03-16 | End: 2023-04-27

## 2023-03-16 RX ADMIN — ACETAMINOPHEN 975 MG: 325 TABLET ORAL at 00:49

## 2023-03-16 RX ADMIN — ACETAMINOPHEN 975 MG: 325 TABLET ORAL at 06:24

## 2023-03-16 RX ADMIN — ENOXAPARIN SODIUM 100 MG: 100 INJECTION SUBCUTANEOUS at 08:12

## 2023-03-16 RX ADMIN — SENNOSIDES 8.6 MG: 8.6 TABLET, FILM COATED ORAL at 08:12

## 2023-03-16 RX ADMIN — DOCUSATE SODIUM 100 MG: 100 CAPSULE, LIQUID FILLED ORAL at 08:12

## 2023-03-16 NOTE — PLAN OF CARE
Problem: PAIN - ADULT  Goal: Verbalizes/displays adequate comfort level or baseline comfort level  Description: Interventions:  - Encourage patient to monitor pain and request assistance  - Assess pain using appropriate pain scale  - Administer analgesics based on type and severity of pain and evaluate response  - Implement non-pharmacological measures as appropriate and evaluate response  - Consider cultural and social influences on pain and pain management  - Notify physician/advanced practitioner if interventions unsuccessful or patient reports new pain  Outcome: Progressing     Problem: INFECTION - ADULT  Goal: Absence or prevention of progression during hospitalization  Description: INTERVENTIONS:  - Assess and monitor for signs and symptoms of infection  - Monitor lab/diagnostic results  - Monitor all insertion sites, i e  indwelling lines, tubes, and drains  - Monitor endotracheal if appropriate and nasal secretions for changes in amount and color  - Cord appropriate cooling/warming therapies per order  - Administer medications as ordered  - Instruct and encourage patient and family to use good hand hygiene technique  - Identify and instruct in appropriate isolation precautions for identified infection/condition  Outcome: Progressing  Goal: Absence of fever/infection during neutropenic period  Description: INTERVENTIONS:  - Monitor WBC    Outcome: Progressing     Problem: SAFETY ADULT  Goal: Patient will remain free of falls  Description: INTERVENTIONS:  - Educate patient/family on patient safety including physical limitations  - Instruct patient to call for assistance with activity   - Consult OT/PT to assist with strengthening/mobility   - Keep Call bell within reach  - Keep bed low and locked with side rails adjusted as appropriate  - Keep care items and personal belongings within reach  - Initiate and maintain comfort rounds  - Make Fall Risk Sign visible to staff  -- Apply yellow socks and bracelet for high fall risk patients  - Consider moving patient to room near nurses station  Outcome: Progressing  Goal: Maintain or return to baseline ADL function  Description: INTERVENTIONS:  -  Assess patient's ability to carry out ADLs; assess patient's baseline for ADL function and identify physical deficits which impact ability to perform ADLs (bathing, care of mouth/teeth, toileting, grooming, dressing, etc )  - Assess/evaluate cause of self-care deficits   - Assess range of motion  - Assess patient's mobility; develop plan if impaired  - Assess patient's need for assistive devices and provide as appropriate  - Encourage maximum independence but intervene and supervise when necessary  - Involve family in performance of ADLs  - Assess for home care needs following discharge   - Consider OT consult to assist with ADL evaluation and planning for discharge  - Provide patient education as appropriate  Outcome: Progressing  Goal: Maintains/Returns to pre admission functional level  Description: INTERVENTIONS:  - Perform BMAT or MOVE assessment daily    - Set and communicate daily mobility goal to care team and patient/family/caregiver     - Collaborate with rehabilitation services on mobility goals if consulted  - Out of bed for toileting  - Record patient progress and toleration of activity level   Outcome: Progressing     Problem: DISCHARGE PLANNING  Goal: Discharge to home or other facility with appropriate resources  Description: INTERVENTIONS:  - Identify barriers to discharge w/patient and caregiver  - Arrange for needed discharge resources and transportation as appropriate  - Identify discharge learning needs (meds, wound care, etc )  - Arrange for interpretive services to assist at discharge as needed  - Refer to Case Management Department for coordinating discharge planning if the patient needs post-hospital services based on physician/advanced practitioner order or complex needs related to functional status, cognitive ability, or social support system  Outcome: Progressing     Problem: Knowledge Deficit  Goal: Patient/family/caregiver demonstrates understanding of disease process, treatment plan, medications, and discharge instructions  Description: Complete learning assessment and assess knowledge base    Interventions:  - Provide teaching at level of understanding  - Provide teaching via preferred learning methods  Outcome: Progressing     Problem: POSTPARTUM  Goal: Experiences normal postpartum course  Description: INTERVENTIONS:  - Monitor maternal vital signs  - Assess uterine involution and lochia  Outcome: Progressing  Goal: Appropriate maternal -  bonding  Description: INTERVENTIONS:  - Identify family support  - Assess for appropriate maternal/infant bonding   -Encourage maternal/infant bonding opportunities  - Referral to  or  as needed  Outcome: Progressing  Goal: Establishment of infant feeding pattern  Description: INTERVENTIONS:  - Assess breast/bottle feeding  - Refer to lactation as needed  Outcome: Progressing  Goal: Incision(s), wounds(s) or drain site(s) healing without S/S of infection  Description: INTERVENTIONS  - Assess and document dressing, incision, wound bed, drain sites and surrounding tissue  - Provide patient and family education  Outcome: Progressing

## 2023-03-16 NOTE — PROGRESS NOTES
Progress Note - OB/GYN  Torsten Kim 34 y o  female MRN: [id number]  Unit/Bed#: -01 Encounter: [id number]    Assessment and Plan     Torsten Kim is a patient of: [facility]  She is PPD# 1 s/p  spontaneous vaginal delivery  Recovering well and is stable       Pulmonary embolism affecting pregnancy in third trimester  Assessment & Plan  Lovenox for 6 weeks postpartum; plan for 1mg kg BID (100mg BID)    Coags collected on admission and wnl  SCDs while admitted    Patient has had thrombophilia panel to assess deficiencies not affected by Lovenox  - Factor 5 Leiden, prothrombin gene, protein S, protein C, beta 2 glycoprotein and anticardiolipin: normal   - Except:   - Anticardiolipin IgM 24 (which is a low positive result)   - Low protein S activity of 31       - Free and total protein S are normal levels   - Both abnormalities can be false + of pregnancy    Plan to have Ob provider or PCP repeat both at 6 weeks postpartum after off of lovenox along with other labs of drvvt and antithrombin III    *  (spontaneous vaginal delivery)  Assessment & Plan  Lochia WNL   Recovering well   Appropriate bowel and bladder function   Pain well controlled   Tolerating diet   Breastfeeding   Ambulating without issues   No lower extremity tenderness  GBS neg   Rh A pos         Disposition    - Anticipate discharge home today      Subjective/Objective     Chief Complaint: Postpartum State     Subjective:    Torsten Kim is PPD#1 s/p  spontaneous vaginal delivery  She has no current complaints  Pain is well controlled  Patient is currently voiding  She is ambulating  Patient is currently passing flatus and has had no bowel movement  She is tolerating PO, and denies nausea or vomitting  Patient denies fever, chills, chest pain, shortness of breath, or calf tenderness  Lochia is normal  She is  Breastfeeding  She is recovering well and is stable         Vitals:   /62   Pulse 59   Temp 97 6 °F (36 4 °C) (Oral)   Resp 20   Ht 5' 4" (1 626 m)   Wt 101 kg (221 lb 9 6 oz)   LMP 06/14/2022   SpO2 96%   Breastfeeding Yes   BMI 38 04 kg/m²     No intake or output data in the 24 hours ending 03/16/23 0922    Invasive Devices     Peripheral Intravenous Line  Duration           Peripheral IV 03/14/23 Right;Dorsal (posterior) Forearm 1 day                Physical Exam:   GEN: Denson Schilder [place] appears well, alert, pleasant and cooperative   CARDIO: RRR  RESP:  Normal respiratory effort  ABDOMEN: Soft, no tenderness, no distention, fundus firm  EXTREMITIES: Non tender, no erythema, b/l Itzel's sign negative      Labs:     Hemoglobin   Date Value Ref Range Status   03/14/2023 10 5 (L) 11 5 - 15 4 g/dL Final   01/05/2023 8 9 (L) 11 5 - 15 4 g/dL Final     WBC   Date Value Ref Range Status   03/14/2023 7 74 4 31 - 10 16 Thousand/uL Final   01/05/2023 6 23 4 31 - 10 16 Thousand/uL Final     Platelets   Date Value Ref Range Status   03/14/2023 232 149 - 390 Thousands/uL Final   01/05/2023 274 149 - 390 Thousands/uL Final     Creatinine   Date Value Ref Range Status   01/05/2023 0 55 (L) 0 60 - 1 30 mg/dL Final     Comment:     Standardized to IDMS reference method   11/12/2022 0 46 (L) 0 60 - 1 30 mg/dL Final     Comment:     Standardized to IDMS reference method     AST   Date Value Ref Range Status   01/05/2023 10 5 - 45 U/L Final     Comment:     Specimen collection should occur prior to Sulfasalazine administration due to the potential for falsely depressed results  11/11/2022 9 5 - 45 U/L Final     Comment:     Specimen collection should occur prior to Sulfasalazine administration due to the potential for falsely depressed results  ALT   Date Value Ref Range Status   01/05/2023 9 (L) 12 - 78 U/L Final     Comment:     Specimen collection should occur prior to Sulfasalazine and/or Sulfapyridine administration due to the potential for falsely depressed results      11/11/2022 13 12 - 78 U/L Final Comment:     Specimen collection should occur prior to Sulfasalazine administration due to the potential for falsely depressed results             Sarwat Ford MD    3/16/2023  9:22 AM

## 2023-03-16 NOTE — ASSESSMENT & PLAN NOTE
Lochia WNL   Recovering well   Appropriate bowel and bladder function   Pain well controlled   Tolerating diet   Breastfeeding   Ambulating without issues   No lower extremity tenderness  GBS neg   Rh A pos

## 2023-03-16 NOTE — UTILIZATION REVIEW
NOTIFICATION OF INPATIENT ADMISSION   MATERNITY/DELIVERY AUTHORIZATION REQUEST   SERVICING FACILITY:   [facility] - L&D, [place], NICU  Geena Morales [place] [address] [address] [place] [address]  Tax ID: [id number]  NPI: [id number]   ATTENDING PROVIDER:  Attending Name and NPI#: Ava Cooper Md [[id number]]  Address: [address] [place] [address] [address]  Phone: [phone]   ADMISSION INFORMATION:  Place of Service: Inpatient [address] [facility]  [address]  Place of Service Code: 21  Inpatient Admission Date/Time: 3/14/23 12:41 PM  Discharge Date/Time: 3/16/2023  2:08 PM  Admitting Diagnosis Code/Description:  Uterine contractions during pregnancy [O47 9]  39 weeks gestation of pregnancy [Z3A 39]  Encounter for full-term uncomplicated delivery [M92]     Mother: Cirilo Alvarado 1990 Estimated Date of Delivery: 3/21/23  Delivering clinician: Ava Cooper    OB History        6    Para   2    Term   2       0    AB   4    Living   2       SAB   2    IAB   1    Ectopic   1    Multiple   0    Live Births   2                Name & MRN:   Information for the patient's :  Perez Deems Girl Shubham Owen) [[id number]]      Delivery Information:  Sex: female  Delivered 3/15/2023 1:25 AM by Vaginal, Spontaneous; Gestational Age: 36w3d    [place] Measurements:  Weight: 6 lb 8 9 oz (2975 g); Height: 18 5"    APGAR 1 minute 5 minutes 10 minutes   Totals: 9 9      [place] Birth Information: 34 y o  female MRN: [id number] Unit/Bed#: -01   Birthweight: No birth weight on file  Gestational Age: <None> Delivery Type:    APGARS Totals:        UTILIZATION REVIEW CONTACT:  Butch Kelley, Utilization   Network Utilization Review Department  Phone: [phone]  Fax [phone]  Email: [email or web address]  Contact for approvals/pending authorizations, clinical reviews, and discharge       PHYSICIAN ADVISORY SERVICES:  Medical Necessity Denial & Lbts-uw-Vjcg Review  Phone: [phone]  Fax: [phone]  Email: [email or web address]

## 2023-03-16 NOTE — LACTATION NOTE
This note was copied from a baby's chart  CONSULT - LACTATION  Baby Girl Jn Song) Justin 1 days female MRN: [id number]    [facility] NURSERY Room / Bed: (N)/(N) Encounter: [id number]    Maternal Information     MOTHER:  Virgilio Alexandra  Maternal Age: 34 y o    OB History: # 1 - Date: None, Sex: None, Weight: None, GA: None, Delivery: None, Apgar1: None, Apgar5: None, Living: None, Birth Comments: None    # 2 - Date: None, Sex: None, Weight: None, GA: None, Delivery: None, Apgar1: None, Apgar5: None, Living: None, Birth Comments: None    # 3 - Date: 14, Sex: Male, Weight: 2948 g (6 lb 8 oz), GA: 37w0d, Delivery: Vaginal, Spontaneous, Apgar1: None, Apgar5: None, Living: Living, Birth Comments: None    # 4 - Date: 2019, Sex: None, Weight: None, GA: 10w0d, Delivery: None, Apgar1: None, Apgar5: None, Living: None, Birth Comments: None    # 5 - Date: , Sex: None, Weight: None, GA: 16w0d, Delivery: None, Apgar1: None, Apgar5: None, Living: None, Birth Comments: None    # 6 - Date: 03/15/23, Sex: Female, Weight: 2975 g (6 lb 8 9 oz), GA: 39w1d, Delivery: Vaginal, Spontaneous, Apgar1: 9, Apgar5: 9, Living: Living, Birth Comments: None   Previouse breast reduction surgery?  No    Lactation history:   Has patient previously breast fed: No   How long had patient previously breast fed:     Previous breast feeding complications:       Past Surgical History:   Procedure Laterality Date   • DILATION AND EVACUATION  2019       • GASTRIC RESTRICTION SURGERY  2015    gastric sleeve   • INCISION AND DRAINAGE OF WOUND Right 2022    Procedure: INCISION AND DRAINAGE (I&D) HEAD/FACE;  Surgeon: Pastora Hinkle DMD;  Location: [facility] OR;  Service: Maxillofacial   • MULTIPLE TOOTH EXTRACTIONS Right 2022    Procedure: EXTRACTION TEETH MULTIPLE;  Surgeon: Pastora Hinkle DMD;  Location: [facility] OR;  Service: Maxillofacial   • OPEN ANTERIOR SHOULDER RECONSTRUCTION Left 2018   • OTHER SURGICAL HISTORY      sweat gland removal   • RETAINED PLACENTA REMOVAL N/A 2020    Procedure: EXTRACTION OF AKZYFLACO,IKXGUREGINA;  Surgeon: Kelly Shah MD;  Location: [facility] ;  Service: Obstetrics        Birth information:  YOB: 2021   Time of birth: 1:25 AM   Sex: female   Delivery type: Vaginal, Spontaneous   Birth Weight: 2975 g (6 lb 8 9 oz)   Percent of Weight Change: -7%     Gestational Age: 36w3d   [unfilled]    Assessment     Breast and nipple assessment: flat nipple, large breast and bifurcated 24 mm diameter nipples    [place] Assessment: sleepy    Feeding assessment: no latch  LATCH:  Latch: Repeated attempts, hold nipple in mouth, stimulate to suck   Audible Swallowing: A few with stimulation   Type of Nipple: Flat   Comfort (Breast/Nipple): Soft/non-tender   Hold (Positioning): Full assist, staff holds infant at breast   LATCH Score: 5         Having latch problems? (Sleepy, had formula 4 hours ago)   Breasts/Nipples   Left Breast Soft   Right Breast Soft   Left Nipple Everted   Right Nipple Everted   Intervention Hand expression  (effective for some large drops, thick colostrum)   Breastfeeding Status Yes   Breastfeeding Progress Not yet established   Patient Follow-Up   Lactation Consult Status 2   Other OB Lactation Documentation    Additional Problem Noted Reviewed RSB and D/C booklets with Kahlil Da  Attempted to latch baby to the breast, but too sleepy at this time with regurge of formula from past feeding  Feeding recommendations:  breast feed on demand     Kahlil Roberson would like follow up at [facility]  Communicated information with front office staff  Met with mother  Provided mother with Ready, Set, Baby booklet which contained information on:  Hand expression with access to QR codes to review hand expression  Positioning and latch reviewed as well as showing images of other feeding positions  Discussed the properties of a good latch in any position  Feeding on cue and what that means for recognizing infant's hunger, s/s that baby is getting enough milk and some s/s that breastfeeding dyad may need further help  Skin to Skin contact an benefits to mom and baby  Avoidance of pacifiers for the first month discussed  Gave information on common concerns, what to expect the first few weeks after delivery, preparing for other caregivers, and how partners can help  Resources for support also provided  Information on hand expression given  Discussed benefits of knowing how to manually express breast including stimulating milk supply, softening nipple for latch and evacuating breast in the event of engorgement  Reviewed how to bring baby to the breast so that her lower lip and chin touch the breast with her nose just above the nipple to encourage a wider, more asymmetric latch  Met with mother to go over discharge breastfeeding booklet including the feeding log  Emphasized 8 or more (12) feedings in a 24 hour period, what to expect for the number of diapers per day of life and the progression of properties of the  stooling pattern  Reviewed breastfeeding and your lifestyle, storage and preparation of breast milk, how to keep you breast pump clean, the employed breastfeeding mother and paced bottle feeding handouts  Booklet included Breastfeeding Resources for after discharge including access to the number for the [address]  Discussed s/s engorgement, blocked milk ducts, and mastitis  Discussed how to remedy at home and when to contact physician  Encouraged parents to call for assistance, questions, and concerns about breastfeeding  Extension provided      Anjel Kang RN 3/16/2023 11:51 AM

## 2023-03-16 NOTE — CASE MANAGEMENT
Case Management Progress Note    Patient name Rah Crawford  Location /-01 MRN [id number]  : 1990 Date 3/16/2023       LOS (days): 2  Geometric Mean LOS (GMLOS) (days):   Days to GMLOS:        OBJECTIVE:        Current admission status: Inpatient  Preferred Pharmacy:   [address] [place] [address]  Phone: [phone] Fax: [phone]    Primary Care Provider: Param Chavez MD    Primary Insurance: [facility]  Secondary Insurance:     PROGRESS NOTE:    Cm received consult for MOB needing Lovenox at d/c  Cm reviewed script sent to [facility] this AM  CM s/w Virgie in [facility] who states medication approved for 30 days with $0 copay  CM notified RN Michelle Cardenas of same and MOB can  medication at d/c  Cm inquired about need for Lovenox teaching and per RN, patient was on lovenox prior to delivery

## 2023-03-21 LAB — PLACENTA IN STORAGE: NORMAL

## 2023-03-23 ENCOUNTER — POSTPARTUM VISIT (OUTPATIENT)
Dept: OBGYN CLINIC | Facility: CLINIC | Age: 33
End: 2023-03-23

## 2023-03-23 ENCOUNTER — TELEPHONE (OUTPATIENT)
Dept: OBGYN CLINIC | Facility: CLINIC | Age: 33
End: 2023-03-23

## 2023-03-23 VITALS — DIASTOLIC BLOOD PRESSURE: 70 MMHG | WEIGHT: 206.4 LBS | SYSTOLIC BLOOD PRESSURE: 130 MMHG | BODY MASS INDEX: 35.43 KG/M2

## 2023-03-23 DIAGNOSIS — R10.84 GENERALIZED ABDOMINAL PAIN: Primary | ICD-10-CM

## 2023-03-23 NOTE — TELEPHONE ENCOUNTER
Patient delivered last week on 3/15 - she is now having excruciating pain in her stomach to the point she cannot stand up - asked to speak to nurse

## 2023-03-23 NOTE — TELEPHONE ENCOUNTER
"Returned pt's p.c.- pt states she delivered vaginally 1 wk ago & has extreme continuous pelvic pain - scale of 10 out of 10.  Has sharp shooting pains intermittently, as well.  States tylenol is not helping. Denies fever.  States \"blding is normal with occas.  sm clots. \" Is breast feeding & feels cramps , but current pelvic pain is different.\"  TT sent to OB , on-call , Dr Miller.   "

## 2023-03-24 NOTE — PROGRESS NOTES
Assessment/Plan:      Diagnoses and all orders for this visit:    Generalized abdominal pain      Differential diagnosis discussed, but given benign abdominal exam and history of irregular BMs, suspect constipation  Advised aggressive bowel regimen with colace/dulcolax and miralax if dulcolax does not effect large BM  Call or proceed to ED with worsening pain or other associated symptoms  Subjective:     Patient ID: Bess Calderón is a 34 y o  female  Patient presents to the office today for evaluation of generalized abdominal pain that has been present for the last few days  She notes that initially after her delivery on 3/15/2023  She did note significant cramping with breastfeeding but nothing unexpected  Over the past few days, she reports lower abdominal and pelvic pain that radiates to her rectum at times  It is worse with BM or urination  At times, it gets to 10/10 but mostly is constant and at 5/10  She denies fevers/chills  She reports normal lochia without odor  She does note that her BM are usually irregular so while she does not feel that it is out of the ordinary for her, she denies regular BMs since delivery  Review of Systems      Objective:     Physical Exam  Abdominal:      General: There is no distension  Palpations: Abdomen is soft  There is no mass  Tenderness: There is no abdominal tenderness

## 2023-05-11 ENCOUNTER — TELEPHONE (OUTPATIENT)
Dept: HEMATOLOGY ONCOLOGY | Facility: CLINIC | Age: 33
End: 2023-05-11

## 2023-05-11 NOTE — TELEPHONE ENCOUNTER
LVM with labs and appointment reminder  Active lab orders on file  Lab hours and [facility] number provided for possible call back, [phone]

## 2023-05-12 ENCOUNTER — TELEPHONE (OUTPATIENT)
Dept: HEMATOLOGY ONCOLOGY | Facility: CLINIC | Age: 33
End: 2023-05-12

## 2023-05-12 NOTE — TELEPHONE ENCOUNTER
Spoke to patient about her missed f/u appt with Ria  I stated for her to get her labs done and to call back to rs her appt

## 2023-06-23 ENCOUNTER — OFFICE VISIT (OUTPATIENT)
Dept: FAMILY MEDICINE CLINIC | Facility: CLINIC | Age: 33
End: 2023-06-23

## 2023-06-23 VITALS
TEMPERATURE: 97.8 F | RESPIRATION RATE: 18 BRPM | BODY MASS INDEX: 35.34 KG/M2 | OXYGEN SATURATION: 98 % | SYSTOLIC BLOOD PRESSURE: 126 MMHG | DIASTOLIC BLOOD PRESSURE: 78 MMHG | WEIGHT: 207 LBS | HEIGHT: 64 IN | HEART RATE: 78 BPM

## 2023-06-23 DIAGNOSIS — B35.1 TOENAIL FUNGUS: Primary | ICD-10-CM

## 2023-06-23 PROCEDURE — 99202 OFFICE O/P NEW SF 15 MIN: CPT | Performed by: NURSE PRACTITIONER

## 2023-06-23 NOTE — PROGRESS NOTES
Name: Rakel Humphries      : 1990      MRN: [id number]  Encounter Provider: [address] [place] [address]  Encounter Date: 2023   Encounter department: [facility]     1  Toenail fungus  -     ciclopirox (PENLAC) 8 % solution; Apply topically daily at bedtime           Subjective     35 yo female here for same day visit  Patient reports change of toenail color on the right foot several months ago after a visit to the nail salon  Initially just to the big toe but now has spread to all of the toes  She has not tried any treatment  Review of Systems   Constitutional: Negative for chills and fever  HENT: Negative for ear pain and sore throat  Eyes: Negative for pain and visual disturbance  Respiratory: Negative for cough and shortness of breath  Cardiovascular: Negative for chest pain and palpitations  Gastrointestinal: Negative for abdominal pain and vomiting  Genitourinary: Negative for dysuria and hematuria  Musculoskeletal: Negative for arthralgias and back pain  Skin: Positive for color change  Negative for rash  Neurological: Negative for seizures and syncope  All other systems reviewed and are negative        Past Medical History:   Diagnosis Date   • Anemia    • Gastric bypass status for obesity 2016    sleeve   • Post partum depression    • Sleep apnea, obstructive     prior to weight loss surg     Past Surgical History:   Procedure Laterality Date   • DILATION AND EVACUATION  2019       • GASTRIC RESTRICTION SURGERY  2015    gastric sleeve   • INCISION AND DRAINAGE OF WOUND Right 2022    Procedure: INCISION AND DRAINAGE (I&D) HEAD/FACE;  Surgeon: Robbie Bruno DMD;  [facility];  Service: Maxillofacial   • MULTIPLE TOOTH EXTRACTIONS Right 2022    Procedure: EXTRACTION TEETH MULTIPLE;  Surgeon: Robbie Bruno DMD;  [facility];  Service: Maxillofacial   • OPEN ANTERIOR SHOULDER RECONSTRUCTION Left 2018   • OTHER SURGICAL HISTORY      sweat gland removal   • RETAINED PLACENTA REMOVAL N/A 6/2/2020    Procedure: EXTRACTION OF CJPIGQAY,TFQWSV;  Surgeon: Carol Maddox MD;  [facility] ;  Service: Obstetrics     Family History   Problem Relation Age of Onset   • Stroke Mother    • Heart disease Mother    • Seizures Mother    • Hypertension Mother    • Anxiety disorder Mother    • Bipolar disorder Mother    • Kidney disease Mother    • Diabetes Mother    • Sudden death Father    • No Known Problems Sister    • No Known Problems Sister    • No Known Problems Brother    • No Known Problems Brother    • No Known Problems Brother    • Sudden death Brother 21        MVA   • No Known Problems Son    • Pancreatic cancer Maternal Grandmother    • Seizures Maternal Grandmother    • Diabetes Maternal Grandmother      Social History     Socioeconomic History   • Marital status: Single     Spouse name: None   • Number of children: None   • Years of education: None   • Highest education level: None   Occupational History   • None   Tobacco Use   • Smoking status: Former     Packs/day: 0 25     Years: 10 00     Total pack years: 2 50     Types: Cigarettes     Quit date: 4/9/2020     Years since quitting: 3 2     Passive exposure: Past   • Smokeless tobacco: Former   • Tobacco comments:     still smoking 3 day   Vaping Use   • Vaping Use: Never used   Substance and Sexual Activity   • Alcohol use: Not Currently     Comment: social   • Drug use: Never   • Sexual activity: Yes     Partners: Male     Birth control/protection: None   Other Topics Concern   • None   Social History Narrative   • None     Social Determinants of Health     Financial Resource Strain: Not on file   Food Insecurity: No Food Insecurity (8/10/2022)    Hunger Vital Sign    • Worried About Running Out of Food in the Last Year: Never true    • Ran Out of Food in the Last Year: Never true   Transportation Needs: "Unknown (8/10/2022)    PRAPARE - Transportation    • Lack of Transportation (Medical): Not on file    • Lack of Transportation (Non-Medical): No   Physical Activity: Not on file   Stress: Not on file   Social Connections: Not on file   Intimate Partner Violence: Not on file   Housing Stability: Low Risk  (8/10/2022)    Housing Stability Vital Sign    • Unable to Pay for Housing in the Last Year: No    • Number of Places Lived in the Last Year: 1    • Unstable Housing in the Last Year: No     Current Outpatient Medications on File Prior to Visit   Medication Sig   • calcium carbonate-vitamin D 500 mg-5 mcg per tablet Take 2 tablets by mouth daily with breakfast (Patient not taking: Reported on 3/10/2023)   • docusate sodium (COLACE) 100 mg capsule Take 1 capsule (100 mg total) by mouth 2 (two) times a day (Patient not taking: Reported on 3/23/2023)   • enoxaparin (LOVENOX) 100 mg/mL Inject 1 mL (100 mg total) under the skin every 12 (twelve) hours     No Known Allergies  Immunization History   Administered Date(s) Administered   • HPV Quadrivalent 06/25/2007   • HPV9 02/10/2022   • Tdap 06/25/2007, 07/14/2014, 01/12/2023, 01/12/2023   • Tuberculin Skin Test-PPD Intradermal 03/26/2015, 04/23/2021, 05/11/2021       Objective     /78 (BP Location: Left arm, Patient Position: Sitting, Cuff Size: Large)   Pulse 78   Temp 97 8 °F (36 6 °C) (Temporal)   Resp 18   Ht 5' 4\" (1 626 m)   Wt 93 9 kg (207 lb)   LMP 05/23/2023 (Approximate)   SpO2 98%   Breastfeeding No   BMI 35 53 kg/m²     Physical Exam  Vitals and nursing note reviewed  Constitutional:       General: She is not in acute distress  Appearance: She is well-developed  She is not diaphoretic  HENT:      Head: Normocephalic and atraumatic  Eyes:      Pupils: Pupils are equal, round, and reactive to light  Cardiovascular:      Rate and Rhythm: Normal rate and regular rhythm     Pulmonary:      Effort: Pulmonary effort is normal  No " respiratory distress  Breath sounds: Normal breath sounds  No wheezing  Musculoskeletal:         General: Normal range of motion  Cervical back: Normal range of motion and neck supple  Feet:      Right foot:      Toenail Condition: Right toenails are abnormally thick  Fungal disease present  Lymphadenopathy:      Cervical: No cervical adenopathy  Skin:     General: Skin is warm and dry  Capillary Refill: Capillary refill takes less than 2 seconds  Neurological:      Mental Status: She is alert and oriented to person, place, and time     Psychiatric:         Behavior: Behavior normal        [address] [place] [address]

## 2023-08-17 ENCOUNTER — APPOINTMENT (EMERGENCY)
Dept: ULTRASOUND IMAGING | Facility: HOSPITAL | Age: 33
End: 2023-08-17
Payer: COMMERCIAL

## 2023-08-17 ENCOUNTER — HOSPITAL ENCOUNTER (EMERGENCY)
Facility: HOSPITAL | Age: 33
Discharge: HOME | End: 2023-08-17
Attending: EMERGENCY MEDICINE
Payer: COMMERCIAL

## 2023-08-17 VITALS
HEART RATE: 70 BPM | RESPIRATION RATE: 16 BRPM | BODY MASS INDEX: 37.01 KG/M2 | TEMPERATURE: 98.3 F | OXYGEN SATURATION: 100 % | DIASTOLIC BLOOD PRESSURE: 76 MMHG | SYSTOLIC BLOOD PRESSURE: 126 MMHG | WEIGHT: 215.61 LBS

## 2023-08-17 DIAGNOSIS — L02.91 ABSCESS: ICD-10-CM

## 2023-08-17 DIAGNOSIS — O03.4 RETAINED PRODUCTS OF CONCEPTION AFTER MISCARRIAGE: Primary | ICD-10-CM

## 2023-08-17 PROBLEM — O03.9 SPONTANEOUS ABORTION: Status: ACTIVE | Noted: 2023-08-17

## 2023-08-17 LAB
ABO GROUP BLD: NORMAL
ANION GAP SERPL CALCULATED.3IONS-SCNC: 7 MMOL/L
APTT PPP: 32 SECONDS (ref 23–37)
B-HCG SERPL-ACNC: 821 MIU/ML (ref 0–5)
BACTERIA UR QL AUTO: ABNORMAL /HPF
BASOPHILS # BLD AUTO: 0 THOUSANDS/ÂΜL (ref 0–0.1)
BASOPHILS NFR BLD AUTO: 0 % (ref 0–1)
BILIRUB UR QL STRIP: NEGATIVE
BLD GP AB SCN SERPL QL: NEGATIVE
BUN SERPL-MCNC: 15 MG/DL (ref 5–25)
CALCIUM SERPL-MCNC: 8.9 MG/DL (ref 8.4–10.2)
CHLORIDE SERPL-SCNC: 103 MMOL/L (ref 96–108)
CLARITY UR: CLEAR
CO2 SERPL-SCNC: 27 MMOL/L (ref 21–32)
COLOR UR: YELLOW
CREAT SERPL-MCNC: 0.76 MG/DL (ref 0.6–1.3)
EOSINOPHIL # BLD AUTO: 0.06 THOUSAND/ÂΜL (ref 0–0.61)
EOSINOPHIL NFR BLD AUTO: 1 % (ref 0–6)
ERYTHROCYTE [DISTWIDTH] IN BLOOD BY AUTOMATED COUNT: 12.7 % (ref 11.6–15.1)
EXT PREGNANCY TEST URINE: POSITIVE
EXT. CONTROL: ABNORMAL
GFR SERPL CREATININE-BSD FRML MDRD: 104 ML/MIN/1.73SQ M
GLUCOSE SERPL-MCNC: 89 MG/DL (ref 65–140)
GLUCOSE UR STRIP-MCNC: NEGATIVE MG/DL
HCT VFR BLD AUTO: 37.2 % (ref 34.8–46.1)
HGB BLD-MCNC: 11.6 G/DL (ref 11.5–15.4)
HGB UR QL STRIP.AUTO: ABNORMAL
HYALINE CASTS #/AREA URNS LPF: ABNORMAL /LPF
IMM GRANULOCYTES # BLD AUTO: 0.02 THOUSAND/UL (ref 0–0.2)
IMM GRANULOCYTES NFR BLD AUTO: 0 % (ref 0–2)
INR PPP: 1.06 (ref 0.84–1.19)
KETONES UR STRIP-MCNC: NEGATIVE MG/DL
LEUKOCYTE ESTERASE UR QL STRIP: NEGATIVE
LYMPHOCYTES # BLD AUTO: 3.42 THOUSANDS/ÂΜL (ref 0.6–4.47)
LYMPHOCYTES NFR BLD AUTO: 52 % (ref 14–44)
MCH RBC QN AUTO: 25.7 PG (ref 26.8–34.3)
MCHC RBC AUTO-ENTMCNC: 31.2 G/DL (ref 31.4–37.4)
MCV RBC AUTO: 83 FL (ref 82–98)
MONOCYTES # BLD AUTO: 0.32 THOUSAND/ÂΜL (ref 0.17–1.22)
MONOCYTES NFR BLD AUTO: 5 % (ref 4–12)
MUCOUS THREADS UR QL AUTO: ABNORMAL
NEUTROPHILS # BLD AUTO: 2.73 THOUSANDS/ÂΜL (ref 1.85–7.62)
NEUTS SEG NFR BLD AUTO: 42 % (ref 43–75)
NITRITE UR QL STRIP: NEGATIVE
NON-SQ EPI CELLS URNS QL MICRO: ABNORMAL /HPF
NRBC BLD AUTO-RTO: 0 /100 WBCS
PH UR STRIP.AUTO: 6 [PH]
PLATELET # BLD AUTO: 233 THOUSANDS/UL (ref 149–390)
PMV BLD AUTO: 9.9 FL (ref 8.9–12.7)
POTASSIUM SERPL-SCNC: 3.5 MMOL/L (ref 3.5–5.3)
PROT UR STRIP-MCNC: ABNORMAL MG/DL
PROTHROMBIN TIME: 13.8 SECONDS (ref 11.6–14.5)
RBC # BLD AUTO: 4.51 MILLION/UL (ref 3.81–5.12)
RBC #/AREA URNS AUTO: ABNORMAL /HPF
RH BLD: POSITIVE
SODIUM SERPL-SCNC: 137 MMOL/L (ref 135–147)
SP GR UR STRIP.AUTO: 1.03 (ref 1–1.03)
SPECIMEN EXPIRATION DATE: NORMAL
UROBILINOGEN UR STRIP-ACNC: 4 MG/DL
WBC # BLD AUTO: 6.55 THOUSAND/UL (ref 4.31–10.16)
WBC #/AREA URNS AUTO: ABNORMAL /HPF

## 2023-08-17 PROCEDURE — 86900 BLOOD TYPING SEROLOGIC ABO: CPT

## 2023-08-17 PROCEDURE — 85610 PROTHROMBIN TIME: CPT

## 2023-08-17 PROCEDURE — 76815 OB US LIMITED FETUS(S): CPT

## 2023-08-17 PROCEDURE — 81025 URINE PREGNANCY TEST: CPT

## 2023-08-17 PROCEDURE — 99285 EMERGENCY DEPT VISIT HI MDM: CPT

## 2023-08-17 PROCEDURE — 84702 CHORIONIC GONADOTROPIN TEST: CPT

## 2023-08-17 PROCEDURE — 85730 THROMBOPLASTIN TIME PARTIAL: CPT

## 2023-08-17 PROCEDURE — 85025 COMPLETE CBC W/AUTO DIFF WBC: CPT

## 2023-08-17 PROCEDURE — NC001 PR NO CHARGE

## 2023-08-17 PROCEDURE — 81001 URINALYSIS AUTO W/SCOPE: CPT

## 2023-08-17 PROCEDURE — 86850 RBC ANTIBODY SCREEN: CPT

## 2023-08-17 PROCEDURE — NC001 PR NO CHARGE: Performed by: OBSTETRICS & GYNECOLOGY

## 2023-08-17 PROCEDURE — 99284 EMERGENCY DEPT VISIT MOD MDM: CPT

## 2023-08-17 PROCEDURE — 80048 BASIC METABOLIC PNL TOTAL CA: CPT

## 2023-08-17 PROCEDURE — 86901 BLOOD TYPING SEROLOGIC RH(D): CPT

## 2023-08-17 PROCEDURE — 36415 COLL VENOUS BLD VENIPUNCTURE: CPT

## 2023-08-17 RX ORDER — MISOPROSTOL 200 UG/1
800 TABLET ORAL ONCE
Status: COMPLETED | OUTPATIENT
Start: 2023-08-17 | End: 2023-08-17

## 2023-08-17 RX ORDER — SULFAMETHOXAZOLE AND TRIMETHOPRIM 800; 160 MG/1; MG/1
1 TABLET ORAL 2 TIMES DAILY
Qty: 14 TABLET | Refills: 0 | Status: SHIPPED | OUTPATIENT
Start: 2023-08-17 | End: 2023-08-24

## 2023-08-17 RX ADMIN — MISOPROSTOL 800 MCG: 200 TABLET ORAL at 04:46

## 2023-08-17 NOTE — CONSULTS
.  Consult - OB/GYN   Sawyer Moon 34 y.o. female MRN: [id number]  Unit/Bed#: ED-13 Encounter: [id number]    Assessment:   34 y.o. [id number]  sexually active reproductive aged female with an SAB now complicated by retained products of conception. Plan:   Spontaneous   Assessment & Plan  Patient with 12-day history of vaginal bleeding following a positive pregnancy test with ultrasound suspicious for retained products of conception. Patient is currently hemodynamically stable with normal vital signs and hemoglobin of 11.6, therefore suitable for outpatient management with close follow-up. Patient received 800 mg of Cytotec in the emergency room orally. Instructed to call her OB/GYN office in the morning for close follow-up for further management. Discussed case and plan w/ Dr. Margaret Myers    HPI:  Patient presented today with a chief complaint of vaginal bleeding. She has an LMP of 2023. Patient states that she took a home pregnancy test several weeks ago and that it was positive. She says that beginning on  she began to have heavy vaginal bleeding that has continued. She says that she is often passing large clots but is unsure if she has passed any tissue. Denies any foul-smelling vaginal discharge, abdominal pain, fever or chills. No other complaints at this time. On evaluation in the emergency room patient was noted to have a beta-hCG of 812. Ultrasound was performed which showed what highly vascular tissue within the endometrial cavity suspicious for retained products of conception".         Active Problems:  Patient Active Problem List   Diagnosis   • History of bariatric surgery   • Iron deficiency anemia secondary to inadequate dietary iron intake   • Frequent UTI   • OAB (overactive bladder)   • 38 weeks gestation of pregnancy   • Prior  loss in second trimester, antepartum   • Pregnancy affected by previous bariatric surgery, currently in third trimester • History of postpartum depression   • History of gestational diabetes   • Pulmonary embolism affecting pregnancy in third trimester   • History of premature delivery, currently pregnant, third trimester   • Maternal obesity, antepartum, third trimester   • Vaccine counseling   • Vitamin D deficiency   • Vitamin A deficiency   • B12 deficiency   • 39 weeks gestation of pregnancy   •  (spontaneous vaginal delivery)   • Spontaneous          PMH:  Past Medical History:   Diagnosis Date   • Anemia    • Gastric bypass status for obesity 2016    sleeve   • Post partum depression    • Sleep apnea, obstructive     prior to weight loss surg       PSH:  Past Surgical History:   Procedure Laterality Date   • DILATION AND EVACUATION  2019       • GASTRIC RESTRICTION SURGERY  2015    gastric sleeve   • INCISION AND DRAINAGE OF WOUND Right 2022    Procedure: INCISION AND DRAINAGE (I&D) HEAD/FACE;  Surgeon: Gera Morrell DMD;  [facility];  Service: Maxillofacial   • MULTIPLE TOOTH EXTRACTIONS Right 2022    Procedure: EXTRACTION TEETH MULTIPLE;  Surgeon: Gera Morrell DMD;  [facility];  Service: Maxillofacial   • OPEN ANTERIOR SHOULDER RECONSTRUCTION Left    • OTHER SURGICAL HISTORY      sweat gland removal   • RETAINED PLACENTA REMOVAL N/A 2020    Procedure: EXTRACTION OF [id number];  Surgeon: Poncho Wright MD;  [facility] ;  Service: Obstetrics       OB History  OB History    Para Term  AB Living   7 2 2 0 5 2   SAB IAB Ectopic Multiple Live Births   2 1 1 0 2      # Outcome Date GA Lbr Jun/2nd Weight Sex Delivery Anes PTL Lv   7 AB 23     SAB      6 Term 03/15/23 39w1d / 00:12 2975 g (6 lb 8.9 oz) F Vag-Spont EPI N SONAM   5 2020 16w0d          4 IAB 2019 10w0d          3 Term 14 37w0d  2948 g (6 lb 8 oz) M Vag-Spont   SONAM      Complications: Gestational diabetes   2 SAB            1 Ectopic                Social Hx:      Meds:  No current facility-administered medications on file prior to encounter. Current Outpatient Medications on File Prior to Encounter   Medication Sig Dispense Refill   • calcium carbonate-vitamin D 500 mg-5 mcg per tablet Take 2 tablets by mouth daily with breakfast (Patient not taking: Reported on 3/10/2023) 180 tablet 1   • ciclopirox (PENLAC) 8 % solution Apply topically daily at bedtime 6.6 mL 0   • docusate sodium (COLACE) 100 mg capsule Take 1 capsule (100 mg total) by mouth 2 (two) times a day (Patient not taking: Reported on 3/23/2023)  0   • enoxaparin (LOVENOX) 100 mg/mL Inject 1 mL (100 mg total) under the skin every 12 (twelve) hours 84 mL 0       Allergies:  No Known Allergies    Physical Exam:  /70 (BP Location: Right arm)   Pulse 74   Temp 98.3 °F (36.8 °C) (Oral)   Resp 16   Wt 97.8 kg (215 lb 9.8 oz)   LMP 06/29/2023 (Exact Date)   SpO2 100%   BMI 37.01 kg/m²     Physical Exam  Constitutional:       General: She is not in acute distress. Appearance: Normal appearance. HENT:      Head: Normocephalic and atraumatic. Cardiovascular:      Rate and Rhythm: Normal rate. Pulses: Normal pulses. Pulmonary:      Effort: Pulmonary effort is normal. No respiratory distress. Breath sounds: Normal breath sounds. Abdominal:      General: Abdomen is flat. Palpations: Abdomen is soft. Genitourinary:     Vagina: Bleeding present. Cervix: Erythema and cervical bleeding present. Comments: Moderate amount of dark appearing blood present in the vaginal vault, appears cervical/uterine in nature  Skin:     General: Skin is warm and dry. Neurological:      General: No focal deficit present. Mental Status: She is alert.    Psychiatric:         Mood and Affect: Mood normal.         Behavior: Behavior normal.

## 2023-08-17 NOTE — ED NOTES
Pt returned from Office [facility], [address] [place] [address]  08/17/23 2920

## 2023-08-17 NOTE — ASSESSMENT & PLAN NOTE
Patient with 12-day history of vaginal bleeding following a positive pregnancy test with ultrasound suspicious for retained products of conception. Patient is currently hemodynamically stable with normal vital signs and hemoglobin of 11.6, therefore suitable for outpatient management with close follow-up. Patient received 800 mg of Cytotec in the emergency room orally. Instructed to call her OB/GYN office in the morning for close follow-up for further management.

## 2023-08-17 NOTE — DISCHARGE INSTRUCTIONS
Follow up with OB/ASAP as discussed    Return to the ER for fever, bleeding > 1 pad an hour, passing out/lightheadedness, severe abdominal pain, or any other new/concerning symptoms

## 2023-08-17 NOTE — ED PROVIDER NOTES
History  Chief Complaint   Patient presents with   • Vaginal Bleeding - Pregnant     Pt states "I took 5 home pregnancy tests a few weeks ago and they were all positive, and since 8/5 I have been having bleeding everyday. It is only heavy when I use the bathroom otherwise there is just usually a steak on my pad". The patient is a Case Santos, [id number],  whose LMP was June 29th who presents to the ED for for approximately 2 weeks of vaginal bleeding. She also noted having taken 5 pregnancy tests at home, which were positive. She describes her vaginal bleeding as similar to spotting, light in nature, however is heavier when she urinates. She reports having experienced occasional abdominal pain, but is not currently having any pain. She also notes painful lump to her right buttock that has been present x 1 week. Denies drainage. She reports history of axillary abscesses, states this is similar in nature. She otherwise denies fever, chills, chest pain, dyspnea, dysuria, back pain, flank pain. Prior to Admission Medications   Prescriptions Last Dose Informant Patient Reported?  Taking?   calcium carbonate-vitamin D 500 mg-5 mcg per tablet  Self No No   Sig: Take 2 tablets by mouth daily with breakfast   Patient not taking: Reported on 3/10/2023   ciclopirox (PENLAC) 8 % solution   No No   Sig: Apply topically daily at bedtime   docusate sodium (COLACE) 100 mg capsule   No No   Sig: Take 1 capsule (100 mg total) by mouth 2 (two) times a day   Patient not taking: Reported on 3/23/2023   enoxaparin (LOVENOX) 100 mg/mL   No No   Sig: Inject 1 mL (100 mg total) under the skin every 12 (twelve) hours      Facility-Administered Medications: None       Past Medical History:   Diagnosis Date   • Anemia    • Gastric bypass status for obesity 2016    sleeve   • Post partum depression 2014   • Sleep apnea, obstructive     prior to weight loss surg       Past Surgical History:   Procedure Laterality Date   • DILATION AND EVACUATION  2019       • GASTRIC RESTRICTION SURGERY  2015    gastric sleeve   • INCISION AND DRAINAGE OF WOUND Right 2022    Procedure: INCISION AND DRAINAGE (I&D) HEAD/FACE;  Surgeon: Angel Gaffney DMD;  Location: [facility];  Service: Maxillofacial   • MULTIPLE TOOTH EXTRACTIONS Right 2022    Procedure: EXTRACTION TEETH MULTIPLE;  Surgeon: Angel Gaffney DMD;  Location: [facility];  Service: Maxillofacial   • OPEN ANTERIOR SHOULDER RECONSTRUCTION Left 2018   • OTHER SURGICAL HISTORY      sweat gland removal   • RETAINED PLACENTA REMOVAL N/A 2020    Procedure: EXTRACTION OF YHGEBDTT,TLGAEX;  Surgeon: Bhargavi Desai MD;  Location: [facility] ;  Service: Obstetrics       Family History   Problem Relation Age of Onset   • Stroke Mother    • Heart disease Mother    • Seizures Mother    • Hypertension Mother    • Anxiety disorder Mother    • Bipolar disorder Mother    • Kidney disease Mother    • Diabetes Mother    • Sudden death Father    • No Known Problems Sister    • No Known Problems Sister    • No Known Problems Brother    • No Known Problems Brother    • No Known Problems Brother    • Sudden death Brother 21        MVA   • No Known Problems Son    • Pancreatic cancer Maternal Grandmother    • Seizures Maternal Grandmother    • Diabetes Maternal Grandmother      I have reviewed and agree with the history as documented.     E-Cigarette/Vaping   • E-Cigarette Use Never User      E-Cigarette/Vaping Substances   • Nicotine No    • THC No    • CBD No    • Flavoring No    • Other No    • Unknown No      Social History     Tobacco Use   • Smoking status: Former     Packs/day: 0.25     Years: 10.00     Total pack years: 2.50     Types: Cigarettes     Quit date: 2020     Years since quitting: 3.3     Passive exposure: Past   • Smokeless tobacco: Former   • Tobacco comments:     still smoking 3 day   Vaping Use   • Vaping Use: Never used   Substance Use Topics   • Alcohol use: Not Currently Comment: social   • Drug use: Never       Review of Systems   Constitutional: Negative for chills and fever. HENT: Negative for congestion and rhinorrhea. Respiratory: Negative for cough and shortness of breath. Cardiovascular: Negative for chest pain and leg swelling. Gastrointestinal: Positive for abdominal pain. Negative for constipation, diarrhea, nausea and vomiting. Genitourinary: Positive for vaginal bleeding. Negative for dysuria and flank pain. Musculoskeletal: Negative for arthralgias and myalgias. Skin: Positive for rash. Negative for wound. Neurological: Negative for dizziness, weakness, numbness and headaches. Psychiatric/Behavioral: Negative for behavioral problems. Physical Exam  Physical Exam  Vitals and nursing note reviewed. Constitutional:       General: She is not in acute distress. Appearance: She is well-developed. She is not ill-appearing or toxic-appearing. HENT:      Head: Normocephalic and atraumatic. Eyes:      Conjunctiva/sclera: Conjunctivae normal.   Cardiovascular:      Rate and Rhythm: Normal rate and regular rhythm. Heart sounds: No murmur heard. Pulmonary:      Effort: Pulmonary effort is normal. No respiratory distress. Breath sounds: Normal breath sounds. Abdominal:      Palpations: Abdomen is soft. Tenderness: There is no abdominal tenderness. There is no right CVA tenderness, left CVA tenderness, guarding or rebound. Musculoskeletal:         General: No swelling. Cervical back: Neck supple. Skin:     General: Skin is warm and dry. Capillary Refill: Capillary refill takes less than 2 seconds. Comments: 2 cm area of erythema and fluctuance to right buttock. No perirectal involvement. No drainage. TTP. No crepitus. Neurological:      Mental Status: She is alert.    Psychiatric:         Mood and Affect: Mood normal.         Vital Signs  ED Triage Vitals   Temperature Pulse Respirations Blood Pressure SpO2   08/17/23 0033 08/17/23 0034 08/17/23 0034 08/17/23 0034 08/17/23 0034   98.3 °F (36.8 °C) 78 17 124/76 100 %      Temp Source Heart Rate Source Patient Position - Orthostatic VS BP Location FiO2 (%)   08/17/23 0033 08/17/23 0034 08/17/23 0034 08/17/23 0034 --   Oral Monitor Sitting Right arm       Pain Score       --                  Vitals:    08/17/23 0034 08/17/23 0301   BP: 124/76 122/70   Pulse: 78 74   Patient Position - Orthostatic VS: Sitting Lying         Visual Acuity      ED Medications  Medications   miSOPROStol (Cytotec) tablet 800 mcg (800 mcg Oral Given 8/17/23 0446)       Diagnostic Studies  Results Reviewed     Procedure Component Value Units Date/Time    Urine Microscopic [[id number]]  (Abnormal) Collected: 08/17/23 0124    Lab Status: Final result Specimen: Urine Updated: 08/17/23 0158     RBC, UA 10-20 /hpf      WBC, UA 4-10 /hpf      Epithelial Cells Occasional /hpf      Bacteria, UA Moderate /hpf      MUCUS THREADS Moderate     Hyaline Casts, UA 0-3 /lpf     hCG, quantitative [[id number]]  (Abnormal) Collected: 08/17/23 0121    Lab Status: Final result Specimen: Blood from Arm, Right Updated: 08/17/23 0151     HCG, Quant 821 mIU/mL     Narrative:       Expected Ranges:    HCG results between 5 and 25 mIU/mL may be indicative of early pregnancy but should be interpreted in light of the total clinical presentation. HCG can rise to detectable levels in britt and post menopausal women (0-11.6 mIU/mL).      Approximate               Approximate HCG  Gestation age          Concentration ( mIU/mL)  _____________          ______________________   Onita Richard                      HCG values  0.2-1                       5-50  1-2                           2-3                         100-5000  3-4                         500-55822  4-5                         1000-40445  5-6                         01015-420135  6-8                         [id number]  8-12                        [id number] Basic metabolic panel [[id number]] Collected: 08/17/23 0121    Lab Status: Final result Specimen: Blood from Arm, Right Updated: 08/17/23 0145     Sodium 137 mmol/L      Potassium 3.5 mmol/L      Chloride 103 mmol/L      CO2 27 mmol/L      ANION GAP 7 mmol/L      BUN 15 mg/dL      Creatinine 0.76 mg/dL      Glucose 89 mg/dL      Calcium 8.9 mg/dL      eGFR 104 ml/min/1.73sq m     Narrative:      [place] guidelines for Chronic Kidney Disease (CKD):   •  Stage 1 with normal or high GFR (GFR > 90 mL/min/1.73 square meters)  •  Stage 2 Mild CKD (GFR = 60-89 mL/min/1.73 square meters)  •  Stage 3A Moderate CKD (GFR = 45-59 mL/min/1.73 square meters)  •  Stage 3B Moderate CKD (GFR = 30-44 mL/min/1.73 square meters)  •  Stage 4 Severe CKD (GFR = 15-29 mL/min/1.73 square meters)  •  Stage 5 End Stage CKD (GFR <15 mL/min/1.73 square meters)  Note: GFR calculation is accurate only with a steady state creatinine    Protime-INR [[id number]]  (Normal) Collected: 08/17/23 0121    Lab Status: Final result Specimen: Blood from Arm, Right Updated: 08/17/23 0139     Protime 13.8 seconds      INR 1.06    APTT [[id number]]  (Normal) Collected: 08/17/23 0121    Lab Status: Final result Specimen: Blood from Arm, Right Updated: 08/17/23 0139     PTT 32 seconds     UA w Reflex to Microscopic w Reflex to Culture [[id number]]  (Abnormal) Collected: 08/17/23 0124    Lab Status: Final result Specimen: Urine Updated: 08/17/23 0132     Color, UA Yellow     Clarity, UA Clear     Specific Gravity, UA 1.032     pH, UA 6.0     Leukocytes, UA Negative     Nitrite, UA Negative     Protein, UA Trace mg/dl      Glucose, UA Negative mg/dl      Ketones, UA Negative mg/dl      Urobilinogen, UA 4.0 mg/dl      Bilirubin, UA Negative     Occult Blood, UA Large    CBC and differential [[id number]]  (Abnormal) Collected: 08/17/23 0121    Lab Status: Final result Specimen: Blood from Arm, Right Updated: 08/17/23 0126     WBC 6.55 Thousand/uL      RBC 4.51 Million/uL      Hemoglobin 11.6 g/dL      Hematocrit 37.2 %      MCV 83 fL      MCH 25.7 pg      MCHC 31.2 g/dL      RDW 12.7 %      MPV 9.9 fL      Platelets 164 Thousands/uL      nRBC 0 /100 WBCs      Neutrophils Relative 42 %      Immat GRANS % 0 %      Lymphocytes Relative 52 %      Monocytes Relative 5 %      Eosinophils Relative 1 %      Basophils Relative 0 %      Neutrophils Absolute 2.73 Thousands/µL      Immature Grans Absolute 0.02 Thousand/uL      Lymphocytes Absolute 3.42 Thousands/µL      Monocytes Absolute 0.32 Thousand/µL      Eosinophils Absolute 0.06 Thousand/µL      Basophils Absolute 0.00 Thousands/µL     POCT pregnancy, urine [[id number]]  (Abnormal) Resulted: 08/17/23 0056    Lab Status: Final result Updated: 08/17/23 0056     EXT Preg Test, Ur Positive     Control Valid                 US OB pregnancy limited with transvaginal   Final Result by Kalyan Pittman MD (08/17 0320)      Highly vascular tissue within the endometrial cavity, suspicious for retained products of conception      I personally discussed this study with Opal Dwyer at 8/17/23 3:20 AM               Workstation performed: [id number]                    Procedures  Procedures         ED Course  ED Course as of 08/17/23 0533   Thu Aug 17, 2023   0132 Blood, UA(!): Large   0158 Bacteria, UA(!): Moderate   0158 WBC, UA(!): 4-10   0158 Leukocytes, UA: Negative   0158 Nitrite, UA: Negative   0233 Rh Factor: Positive   0233 WBC: 6.55   0233 Hemoglobin: 11.6   0339 US OB pregnancy limited with transvaginal  FINDINGS:     UTERUS:  11.5 x 4.9 x 6.9  cm. Contour and echotexture appear normal.  The cervix shows no suspicious abnormality.     ENDOMETRIUM:  12.0 mm  Contains heterogenous material towards the fundus which is highly vascular. No similar finding was present on the prior ultrasound     OVARIES/ADNEXA:  Right ovary: 2.7 x 1.3 x 5.3 cm  No suspicious ovarian abnormality.   Doppler flow within normal limits.     Left ovary: 2.9 x 1.3 x 1.2 cm  No suspicious ovarian abnormality. Doppler flow within normal limits.     No suspicious adnexal mass or loculated collections. There is no free fluid.     IMPRESSION:     Highly vascular tissue within the endometrial cavity, suspicious for retained products of conception     I personally discussed this study with Yfn Orellana at 23 3:20 AM      0350 Case discussed with OB/GYN Resident Dr. Benjie Weiss. Given patient is stable at this time, she can follow-up with OB/GYN outpatient promptly. They will reach out to the patient's office. Can offer patient Cytotec.   1215 Discussed conversation with OB/GYN with patient. Offered Cytotec, which patient does request.  Discussed with OB. Will give 800 mg.   [facility] evaluated patient at bedside. Cytotec given. Patient stable for discharge       Medical Decision Making  DDx including but not limited to: threatened , missed , inevitable , retained POC, ectopic pregnancy, anemia, UTI. DDX including but not limited to: cellulitis, abscess. Offered I&D for abscess. Pt declined, would like to trial PO antibiotics. U preg positive. Will obtain UA to evaluate for UTI. Will obtain coags, type and screen in case of surgical obstetric emergency. Will obtain CBC to evaluate for leukocytosis, anemia. Will obtain CMP to evaluate kidney function, for electrolyte disturbance. Will obtain beta HCG. Retained POC noted on US. Findings discussed with OB/GYN and patient. OB will see patient at bedside. OB saw at bedside. Will d/c with close OB follow up. Will rx Bactrim for abscess as patient continues to decline I&D. At the time of discharge, the patient is in no acute distress.  I discussed with the patient the diagnosis, treatment plan, follow-up, return precautions, and discharge instructions; they were given the opportunity to ask questions and verbalized understanding. Abscess: acute illness or injury  Retained products of conception after miscarriage: acute illness or injury  Amount and/or Complexity of Data Reviewed  External Data Reviewed: notes. Labs: ordered. Decision-making details documented in ED Course. Radiology: ordered. Decision-making details documented in ED Course. Risk  Prescription drug management. Disposition  Final diagnoses:   Retained products of conception after miscarriage   Abscess     Time reflects when diagnosis was documented in both MDM as applicable and the Disposition within this note     Time User Action Codes Description Comment    8/17/2023  4:16 AM Riya Savory Add [O03.4] Retained products of conception after miscarriage     8/17/2023  5:20 AM Riya Savory Add [L02.91] Abscess       ED Disposition     ED Disposition   Discharge    Condition   Stable    Date/Time   Thu Aug 17, 2023  5:17 AM    Comment   Eligio Rene discharge to home/self care. Follow-up Information     Follow up With Specialties Details Why Contact Info Additional Information    [facility] Follow up Call in AM and make appt [address], [address] [phone]          Patient's Medications   Discharge Prescriptions    SULFAMETHOXAZOLE-TRIMETHOPRIM (BACTRIM DS) 800-160 MG PER TABLET    Take 1 tablet by mouth 2 (two) times a day for 7 days smx-tmp DS (BACTRIM) 800-160 mg tabs (1tab q12 D10)       Start Date: 8/17/2023 End Date: 8/24/2023       Order Dose: 1 tablet       Quantity: 14 tablet    Refills: 0       No discharge procedures on file.     PDMP Review     None          ED Provider  Electronically Signed by           Everardo Zacarias PA-C  08/17/23 0622

## 2023-08-20 PROBLEM — Z86.711 HISTORY OF PULMONARY EMBOLISM: Status: ACTIVE | Noted: 2023-08-20

## 2023-08-20 PROBLEM — Z72.0 TOBACCO ABUSE: Status: ACTIVE | Noted: 2023-08-20

## 2023-08-20 NOTE — PROGRESS NOTES
Assessment/Plan:  Persistent bleeding following Cytotec , began   Rec US  Declines more effective contraception. Suggested adding Semicid to current Withdrawal  Possible need for D&E explained    23 US    Highly vascular tissue within the endometrial cavity, suspicious for retained products of conception    Diagnoses and all orders for this visit:    Retained products of conception after miscarriage  -     US pelvis complete non OB; Future    Spontaneous   -     US pelvis complete non OB; Future    History of bariatric surgery    History of gestational diabetes    History of pulmonary embolism    Tobacco abuse    Other orders  -     terbinafine (LamISIL) 250 mg tablet; Take 250 mg by mouth daily (Patient not taking: Reported on 2023)              Subjective:        Patient ID: Maksim Quintero is a 34 y.o. female. Ms. Aman Mcnamara presents today for an emergency room follow-up from . She has been bleeding since . She had performed 5 pregnancy test all of which were positive. She is sexually active using withdrawal for contraception. She recently had a delivery on March 15. Her last menstrual period was . While in the emergency room an ultrasound was performed which was compatible with retained products of conception. A beta subunit was elevated at 821. She had a normal CBC. She was given 800 mcg orally of Cytotec. Shortly thereafter she noticed some cramping which lasted only 10 minutes. There was no real change in the bleeding pattern after the administration. She believes it is slightly diminished but it is persistent. She denies any fever or chills. There is no pain present. She was also found to have a gluteal abscess and has been taking Bactrim DS. She does have a history of a spontaneous miscarriage 4 months EGA with retained products of conception requiring a D&C in 2020.       The following portions of the patient's history were reviewed and updated as appropriate: She  has a past medical history of Anemia, Gastric bypass status for obesity (), Post partum depression (), and Sleep apnea, obstructive. Patient Active Problem List    Diagnosis Date Noted   • Tobacco abuse 2023   • History of pulmonary embolism 2023   • Spontaneous  2023   •  (spontaneous vaginal delivery) 03/15/2023   • 39 weeks gestation of pregnancy 2023   • B12 deficiency 02/10/2023   • Vitamin D deficiency 2023   • Vitamin A deficiency 2023   • Vaccine counseling 2023   • Pulmonary embolism affecting pregnancy in third trimester 2022   • History of premature delivery, currently pregnant, third trimester 2022   • Maternal obesity, antepartum, third trimester 2022   • Prior  loss in second trimester, antepartum 10/04/2022   • Pregnancy affected by previous bariatric surgery, currently in third trimester 10/04/2022   • History of postpartum depression 10/04/2022   • History of gestational diabetes 10/04/2022   • 38 weeks gestation of pregnancy 2022   • Frequent UTI 2022   • OAB (overactive bladder) 2022   • Iron deficiency anemia secondary to inadequate dietary iron intake 10/31/2021   • History of bariatric surgery 03/15/2018     She  has a past surgical history that includes Open anterior shoulder reconstruction (Left, ); Other surgical history; Gastric restriction surgery (); Dilation and evacuation (2019); Retained placenta removal (N/A, 2020); Multiple tooth extractions (Right, 2022); and Incision and drainage of wound (Right, 2022). Her family history includes Anxiety disorder in her mother; Bipolar disorder in her mother; Diabetes in her maternal grandmother and mother; Heart disease in her mother; Hypertension in her mother; Kidney disease in her mother; No Known Problems in her brother, brother, brother, sister, sister, and son;  Pancreatic cancer in her maternal grandmother; Seizures in her maternal grandmother and mother; Stroke in her mother; Sudden death in her father; Sudden death (age of onset: 21) in her brother. She  reports that she quit smoking about 3 years ago. Her smoking use included cigarettes. She has a 2.50 pack-year smoking history. She has been exposed to tobacco smoke. She has quit using smokeless tobacco. She reports that she does not currently use alcohol. She reports that she does not use drugs. Current Outpatient Medications   Medication Sig Dispense Refill   • ciclopirox (PENLAC) 8 % solution Apply topically daily at bedtime (Patient not taking: Reported on 8/21/2023) 6.6 mL 0   • enoxaparin (LOVENOX) 100 mg/mL Inject 1 mL (100 mg total) under the skin every 12 (twelve) hours 84 mL 0   • sulfamethoxazole-trimethoprim (BACTRIM DS) 800-160 mg per tablet Take 1 tablet by mouth 2 (two) times a day for 7 days smx-tmp DS (BACTRIM) 800-160 mg tabs (1tab q12 D10) 14 tablet 0   • terbinafine (LamISIL) 250 mg tablet Take 250 mg by mouth daily (Patient not taking: Reported on 8/21/2023)       No current facility-administered medications for this visit.      Current Outpatient Medications on File Prior to Visit   Medication Sig   • ciclopirox (PENLAC) 8 % solution Apply topically daily at bedtime (Patient not taking: Reported on 8/21/2023)   • enoxaparin (LOVENOX) 100 mg/mL Inject 1 mL (100 mg total) under the skin every 12 (twelve) hours   • sulfamethoxazole-trimethoprim (BACTRIM DS) 800-160 mg per tablet Take 1 tablet by mouth 2 (two) times a day for 7 days smx-tmp DS (BACTRIM) 800-160 mg tabs (1tab q12 D10)   • terbinafine (LamISIL) 250 mg tablet Take 250 mg by mouth daily (Patient not taking: Reported on 8/21/2023)   • [DISCONTINUED] calcium carbonate-vitamin D 500 mg-5 mcg per tablet Take 2 tablets by mouth daily with breakfast (Patient not taking: Reported on 3/10/2023)   • [DISCONTINUED] docusate sodium (COLACE) 100 mg capsule Take 1 capsule (100 mg total) by mouth 2 (two) times a day (Patient not taking: Reported on 3/23/2023)     No current facility-administered medications on file prior to visit. She has No Known Allergies. .    Review of Systems   Constitutional: Negative. Negative for chills and fever. Respiratory: Negative for shortness of breath. Cardiovascular: Negative for chest pain. Gastrointestinal: Negative for abdominal pain, nausea and vomiting. Genitourinary: Positive for vaginal bleeding. Negative for difficulty urinating, dysuria, flank pain, frequency and pelvic pain. Objective:    Vitals:    08/21/23 1137   BP: 102/70   BP Location: Left arm   Patient Position: Sitting   Cuff Size: Standard   Weight: 96.8 kg (213 lb 6.4 oz)            Physical Exam  Vitals and nursing note reviewed. Exam conducted with a chaperone present. Constitutional:       Appearance: Normal appearance. She is obese. HENT:      Head: Normocephalic. Eyes:      General: No scleral icterus. Right eye: No discharge. Left eye: No discharge. Extraocular Movements: Extraocular movements intact. Pulmonary:      Effort: Pulmonary effort is normal. No respiratory distress. Abdominal:      General: Abdomen is flat. There is no distension. Palpations: Abdomen is soft. Tenderness: There is no abdominal tenderness. There is no right CVA tenderness, left CVA tenderness, guarding or rebound. Genitourinary:     General: Normal vulva. Comments: Rectum full of stool pushing the vagina anteriorly. Like vaginal bleeding. The cervix is closed. No cervical motion tenderness. Uterus is mid plane to retroverted. Difficult to fully palpate the stool. Normal to top normal size. Skin:     General: Skin is warm and dry. Neurological:      Mental Status: She is alert and oriented to person, place, and time.    Psychiatric:         Mood and Affect: Mood normal.         Behavior: Behavior normal. Thought Content:  Thought content normal.         Judgment: Judgment normal.

## 2023-08-21 ENCOUNTER — OFFICE VISIT (OUTPATIENT)
Dept: OBGYN CLINIC | Facility: CLINIC | Age: 33
End: 2023-08-21
Payer: COMMERCIAL

## 2023-08-21 VITALS — DIASTOLIC BLOOD PRESSURE: 70 MMHG | BODY MASS INDEX: 36.63 KG/M2 | SYSTOLIC BLOOD PRESSURE: 102 MMHG | WEIGHT: 213.4 LBS

## 2023-08-21 DIAGNOSIS — Z86.711 HISTORY OF PULMONARY EMBOLISM: ICD-10-CM

## 2023-08-21 DIAGNOSIS — Z72.0 TOBACCO ABUSE: ICD-10-CM

## 2023-08-21 DIAGNOSIS — Z86.32 HISTORY OF GESTATIONAL DIABETES: ICD-10-CM

## 2023-08-21 DIAGNOSIS — O03.9 SPONTANEOUS ABORTION: ICD-10-CM

## 2023-08-21 DIAGNOSIS — O03.4 RETAINED PRODUCTS OF CONCEPTION AFTER MISCARRIAGE: Primary | ICD-10-CM

## 2023-08-21 DIAGNOSIS — Z98.84 HISTORY OF BARIATRIC SURGERY: ICD-10-CM

## 2023-08-21 PROCEDURE — 99213 OFFICE O/P EST LOW 20 MIN: CPT | Performed by: OBSTETRICS & GYNECOLOGY

## 2023-08-21 RX ORDER — TERBINAFINE HYDROCHLORIDE 250 MG/1
250 TABLET ORAL DAILY
COMMUNITY
Start: 2023-08-08 | End: 2023-11-06

## 2023-08-25 ENCOUNTER — HOSPITAL ENCOUNTER (OUTPATIENT)
Dept: ULTRASOUND IMAGING | Facility: HOSPITAL | Age: 33
End: 2023-08-25
Payer: COMMERCIAL

## 2023-08-25 DIAGNOSIS — O03.4 RETAINED PRODUCTS OF CONCEPTION AFTER MISCARRIAGE: ICD-10-CM

## 2023-08-25 DIAGNOSIS — O03.9 SPONTANEOUS ABORTION: ICD-10-CM

## 2023-08-25 PROCEDURE — 76830 TRANSVAGINAL US NON-OB: CPT

## 2023-08-25 PROCEDURE — 76856 US EXAM PELVIC COMPLETE: CPT

## 2023-08-25 NOTE — RESULT ENCOUNTER NOTE
The ultrasound is similar to August 17 and indicates the presence of retained products of conception. The overall Cytotec did not work. The patient most likely needs a D&E. I have no surgical openings for a month and this should be taken care of within a week. Please work with management in getting this patient promptly seen and scheduled.

## 2023-08-28 ENCOUNTER — TELEPHONE (OUTPATIENT)
Dept: OBGYN CLINIC | Facility: CLINIC | Age: 33
End: 2023-08-28

## 2023-08-28 NOTE — TELEPHONE ENCOUNTER
Dr. Juvenal Cummings-     is there any way we can get her in this week? She did stop bleeding. Looks like you have a OR opening on 9/6 but I can't get her in until 9/5. She wants to know since she stopped bleeding can that be a indication she may not need a D&E? I told her I'll call her back.

## 2023-08-28 NOTE — TELEPHONE ENCOUNTER
t has more questions I couldn't answer, would like to speak to you dr Tamanna Jain, I will send to Dr Noa Mo and Cirilo Ruiz as per umang she has opening for d&E 9/6

## 2023-08-28 NOTE — TELEPHONE ENCOUNTER
Unfortunately, I can't get her on my schedule until 9/29. Couldn't someone on call add her to the schedule this week?

## 2023-08-28 NOTE — TELEPHONE ENCOUNTER
----- Message from Dary De La Vega MD sent at 8/25/2023  4:46 PM EDT -----  The ultrasound is similar to August 17 and indicates the presence of retained products of conception. The overall Cytotec did not work. The patient most likely needs a D&E. I have no surgical openings for a month and this should be taken care of within a week. Please work with management in getting this patient promptly seen and scheduled.

## 2023-08-29 NOTE — TELEPHONE ENCOUNTER
Looks like I have a cancellation on Thursday. Can you add her into the hold slot? Her surgery should be for 2pm on the 6th.

## 2023-08-30 ENCOUNTER — OFFICE VISIT (OUTPATIENT)
Dept: OBGYN CLINIC | Facility: CLINIC | Age: 33
End: 2023-08-30
Payer: COMMERCIAL

## 2023-08-30 VITALS
DIASTOLIC BLOOD PRESSURE: 60 MMHG | BODY MASS INDEX: 36.26 KG/M2 | WEIGHT: 212.4 LBS | HEIGHT: 64 IN | SYSTOLIC BLOOD PRESSURE: 110 MMHG

## 2023-08-30 DIAGNOSIS — O03.4 RETAINED PRODUCTS OF CONCEPTION AFTER MISCARRIAGE: Primary | ICD-10-CM

## 2023-08-30 PROCEDURE — 99214 OFFICE O/P EST MOD 30 MIN: CPT | Performed by: OBSTETRICS & GYNECOLOGY

## 2023-08-30 NOTE — H&P (VIEW-ONLY)
A/P: Patient is 34 y.o. yo female with an incomplete  in the first trimester. For dilation and evacuation pending ultrasound results. - Surgical coordinator to preauthorize and schedule. - Bleeding precautions reviewed. S: Patient is a 34 y.o. yo female who was diagnosed with an early pregnancy loss on ultrasound in the [facility] ED on 2023. She had not realized she was pregnant (though she was not preventing pregnancy) until she went to the ED with bleeding that day. The ultrasound showed a 16mm endometrium consistent with retained POCs but no definitive IUP was ever seen on ultrasound. She should have been 6 weeks at that time. Her hcg was 821. She was offered her options of expectant management, medical management with cytotec and surgical management with dilation and evacuation. She opted for cytotec at that time given a high likelihood for success. However, a repeat US on  showed persistence of this finding despite ongoing bleeding. She was advised to have a suction D+E. Since soon after that ultrasound, she reports her bleeding has resolved. She denies passage of tissue but is wondering whether she needs the surgery. We discussed options and will repeat the US to see if we can avoid surgery. If not, we will proceed to the OR for dilation and evacuation as planned. She has a history of PE in 2022 and was told she could stop her anticoagulation at 6 weeks postpartum. She still has to see hematology and have some blood work done. Discussed early ambulation to prevent VTE perioperatively as well as SCDs while under anesthesia.     Past Medical History:   Diagnosis Date   • Anemia    • Gastric bypass status for obesity 2016    sleeve   • Post partum depression    • Sleep apnea, obstructive     prior to weight loss surg      Past Surgical History:   Procedure Laterality Date   • DILATION AND EVACUATION  2019       • GASTRIC RESTRICTION SURGERY   gastric sleeve   • INCISION AND DRAINAGE OF WOUND Right 8/12/2022    Procedure: INCISION AND DRAINAGE (I&D) HEAD/FACE;  Surgeon: Gera Morrell DMD;  Location: [facility];  Service: Maxillofacial   • MULTIPLE TOOTH EXTRACTIONS Right 8/12/2022    Procedure: EXTRACTION TEETH MULTIPLE;  Surgeon: Gera Morrell DMD;  Location: [facility];  Service: Maxillofacial   • OPEN ANTERIOR SHOULDER RECONSTRUCTION Left 2018   • OTHER SURGICAL HISTORY      sweat gland removal   • RETAINED PLACENTA REMOVAL N/A 6/2/2020    Procedure: EXTRACTION OF DFOXKMMY,UTRFNI;  Surgeon: Poncho Wright MD;  Location: [facility] ;  Service: Obstetrics     Social History     Socioeconomic History   • Marital status: Single     Spouse name: Not on file   • Number of children: Not on file   • Years of education: Not on file   • Highest education level: Not on file   Occupational History   • Not on file   Tobacco Use   • Smoking status: Former     Packs/day: 0.25     Years: 10.00     Total pack years: 2.50     Types: Cigarettes     Quit date: 4/9/2020     Years since quitting: 3.3     Passive exposure: Past   • Smokeless tobacco: Former   • Tobacco comments:     still smoking 3 day   Vaping Use   • Vaping Use: Never used   Substance and Sexual Activity   • Alcohol use: Not Currently     Comment: social   • Drug use: Never   • Sexual activity: Yes     Partners: Male     Birth control/protection: None   Other Topics Concern   • Not on file   Social History Narrative   • Not on file     Social Determinants of Health     Financial Resource Strain: Not on file   Food Insecurity: No Food Insecurity (8/10/2022)    Hunger Vital Sign    • Worried About Running Out of Food in the Last Year: Never true    • Ran Out of Food in the Last Year: Never true   Transportation Needs: Unknown (8/10/2022)    PRAPARE - Transportation    • Lack of Transportation (Medical): Not on file    • Lack of Transportation (Non-Medical):  No   Physical Activity: Not on file   Stress: Not on file   Social Connections: Not on file   Intimate Partner Violence: Not on file   Housing Stability: Low Risk  (8/10/2022)    Housing Stability Vital Sign    • Unable to Pay for Housing in the Last Year: No    • Number of Places Lived in the Last Year: 1    • Unstable Housing in the Last Year: No        Current Outpatient Medications:   •  ciclopirox (PENLAC) 8 % solution, Apply topically daily at bedtime (Patient not taking: Reported on 2023), Disp: 6.6 mL, Rfl: 0  •  enoxaparin (LOVENOX) 100 mg/mL, Inject 1 mL (100 mg total) under the skin every 12 (twelve) hours, Disp: 84 mL, Rfl: 0  •  terbinafine (LamISIL) 250 mg tablet, Take 250 mg by mouth daily (Patient not taking: Reported on 2023), Disp: , Rfl:     O:  Blood pressure 110/60, height 5' 3.7" (1.618 m), weight 96.3 kg (212 lb 6.4 oz), last menstrual period 2023, not currently breastfeeding. Patient appears well and is not in distress  Abdomen is soft and nontender without masses. External genitals are normal without lesions or rashes. Vagina is normal without discharge or bleeding. Cervix is normal without discharge or lesion. Uterus is dayna in size at 4-6 wks, nontender. Adnexa are normal, nontender, without palpable mass. We reviewed the options of expectant management, medical management with cytotec and surgical management with dilation and evacuation. We reviewed the pros/cons of each of these approaches. We discussed the risks of medical management including uncontrolled bleeding or incomplete  with possible need for surgical evacuation, transfusion, infection. We discussed the risks of surgical management including bleeding, transfusion, infection, uterine perforation with risk of injury to surrounding organs including bowel/bladder, need for laparoscopy, Asherman's syndrome. All questions were answered, and consent was signed. She wishes hospital disposition for the products of conception.

## 2023-08-31 NOTE — TELEPHONE ENCOUNTER
Talked to patient she is scheduled for her surgical procedure on 9/6/2023 in the [facility] OR with Dr. Gisele Guillory

## 2023-09-02 ENCOUNTER — HOSPITAL ENCOUNTER (OUTPATIENT)
Dept: ULTRASOUND IMAGING | Facility: HOSPITAL | Age: 33
Discharge: HOME | End: 2023-09-02
Attending: OBSTETRICS & GYNECOLOGY
Payer: COMMERCIAL

## 2023-09-02 DIAGNOSIS — O03.4 RETAINED PRODUCTS OF CONCEPTION AFTER MISCARRIAGE: ICD-10-CM

## 2023-09-02 PROCEDURE — 76830 TRANSVAGINAL US NON-OB: CPT

## 2023-09-02 PROCEDURE — 76856 US EXAM PELVIC COMPLETE: CPT

## 2023-09-05 RX ORDER — ACETAMINOPHEN 325 MG/1
650 TABLET ORAL EVERY 6 HOURS PRN
COMMUNITY

## 2023-09-05 NOTE — PRE-PROCEDURE INSTRUCTIONS
Pre-Surgery Instructions:   Medication Instructions   • acetaminophen (TYLENOL) 325 mg tablet Uses PRN- OK to take day of surgery    Medication instructions for day surgery reviewed. Please use only a sip of water to take your instructed medications. Avoid all aspirin and over the counter vitamins, supplements and NSAIDS for one week prior to surgery per anesthesia guidelines. Tylenol is ok to take as needed. You will receive a call one business day prior to surgery with an arrival time and hospital directions. If your surgery is scheduled on a Monday, the hospital will be calling you on the Friday prior to your surgery. If you have not heard from anyone by 8pm, please call the hospital supervisor through the hospital  at [phone]. Do not eat or drink anything after midnight the night before your surgery, including candy, mints, lifesavers, or chewing gum. Do not drink alcohol 24hrs before your surgery. Try not to smoke at least 24hrs before your surgery. Follow the pre surgery showering instructions as listed in the [facility] Surgical Experience Booklet” or otherwise provided by your surgeon's office. Pt states sensitive to all antibacterial soaps, like Dial, so will be using Dove for her preop showers. Do not shave the surgical area 24 hours before surgery. Do not apply any lotions, creams, including makeup, cologne, deodorant, or perfumes after showering on the day of your surgery. No contact lenses, eye make-up, or artificial eyelashes. Remove nail polish, including gel polish, and any artificial, gel, or acrylic nails if possible. Remove all jewelry including rings and body piercing jewelry. Wear causal clothing that is easy to take on and off. Consider your type of surgery. Keep any valuables, jewelry, piercings at home. Please bring any specially ordered equipment (sling, braces) if indicated.     Arrange for a responsible person to drive you to and from the hospital on the day of your surgery. Visitor Guidelines discussed. Call the surgeon's office with any new illnesses, exposures, or additional questions prior to surgery. Please reference your [facility] Surgical Experience Booklet” for additional information to prepare for your upcoming surgery.

## 2023-09-06 ENCOUNTER — HOSPITAL ENCOUNTER (OUTPATIENT)
Facility: HOSPITAL | Age: 33
Setting detail: OUTPATIENT SURGERY
Discharge: HOME | End: 2023-09-06
Attending: OBSTETRICS & GYNECOLOGY | Admitting: OBSTETRICS & GYNECOLOGY
Payer: COMMERCIAL

## 2023-09-06 ENCOUNTER — ANESTHESIA (OUTPATIENT)
Dept: PERIOP | Facility: HOSPITAL | Age: 33
End: 2023-09-06
Payer: COMMERCIAL

## 2023-09-06 ENCOUNTER — ANESTHESIA EVENT (OUTPATIENT)
Dept: PERIOP | Facility: HOSPITAL | Age: 33
End: 2023-09-06
Payer: COMMERCIAL

## 2023-09-06 VITALS
DIASTOLIC BLOOD PRESSURE: 73 MMHG | BODY MASS INDEX: 37.56 KG/M2 | SYSTOLIC BLOOD PRESSURE: 126 MMHG | HEIGHT: 63 IN | TEMPERATURE: 97.8 F | RESPIRATION RATE: 18 BRPM | HEART RATE: 84 BPM | WEIGHT: 212 LBS | OXYGEN SATURATION: 100 %

## 2023-09-06 DIAGNOSIS — O03.4 RETAINED PRODUCTS OF CONCEPTION AFTER MISCARRIAGE: ICD-10-CM

## 2023-09-06 PROCEDURE — 88305 TISSUE EXAM BY PATHOLOGIST: CPT | Performed by: PATHOLOGY

## 2023-09-06 PROCEDURE — 59812 TREATMENT OF MISCARRIAGE: CPT | Performed by: OBSTETRICS & GYNECOLOGY

## 2023-09-06 RX ORDER — SODIUM CHLORIDE, SODIUM LACTATE, POTASSIUM CHLORIDE, CALCIUM CHLORIDE 600; 310; 30; 20 MG/100ML; MG/100ML; MG/100ML; MG/100ML
INJECTION, SOLUTION INTRAVENOUS CONTINUOUS PRN
Status: DISCONTINUED | OUTPATIENT
Start: 2023-09-06 | End: 2023-09-06

## 2023-09-06 RX ORDER — DEXAMETHASONE SODIUM PHOSPHATE 10 MG/ML
INJECTION, SOLUTION INTRAMUSCULAR; INTRAVENOUS AS NEEDED
Status: DISCONTINUED | OUTPATIENT
Start: 2023-09-06 | End: 2023-09-06

## 2023-09-06 RX ORDER — ONDANSETRON 2 MG/ML
INJECTION INTRAMUSCULAR; INTRAVENOUS AS NEEDED
Status: DISCONTINUED | OUTPATIENT
Start: 2023-09-06 | End: 2023-09-06

## 2023-09-06 RX ORDER — KETOROLAC TROMETHAMINE 30 MG/ML
INJECTION, SOLUTION INTRAMUSCULAR; INTRAVENOUS AS NEEDED
Status: DISCONTINUED | OUTPATIENT
Start: 2023-09-06 | End: 2023-09-06

## 2023-09-06 RX ORDER — ONDANSETRON 2 MG/ML
4 INJECTION INTRAMUSCULAR; INTRAVENOUS ONCE AS NEEDED
Status: DISCONTINUED | OUTPATIENT
Start: 2023-09-06 | End: 2023-09-06 | Stop reason: HOSPADM

## 2023-09-06 RX ORDER — SODIUM CHLORIDE, SODIUM LACTATE, POTASSIUM CHLORIDE, CALCIUM CHLORIDE 600; 310; 30; 20 MG/100ML; MG/100ML; MG/100ML; MG/100ML
125 INJECTION, SOLUTION INTRAVENOUS CONTINUOUS
Status: DISCONTINUED | OUTPATIENT
Start: 2023-09-06 | End: 2023-09-06 | Stop reason: HOSPADM

## 2023-09-06 RX ORDER — LIDOCAINE HYDROCHLORIDE 10 MG/ML
INJECTION, SOLUTION EPIDURAL; INFILTRATION; INTRACAUDAL; PERINEURAL
Status: DISCONTINUED
Start: 2023-09-06 | End: 2023-09-06 | Stop reason: HOSPADM

## 2023-09-06 RX ORDER — FENTANYL CITRATE/PF 50 MCG/ML
25 SYRINGE (ML) INJECTION
Status: DISCONTINUED | OUTPATIENT
Start: 2023-09-06 | End: 2023-09-06 | Stop reason: HOSPADM

## 2023-09-06 RX ORDER — MIDAZOLAM HYDROCHLORIDE 2 MG/2ML
INJECTION, SOLUTION INTRAMUSCULAR; INTRAVENOUS AS NEEDED
Status: DISCONTINUED | OUTPATIENT
Start: 2023-09-06 | End: 2023-09-06

## 2023-09-06 RX ORDER — SUCCINYLCHOLINE/SOD CL,ISO/PF 100 MG/5ML
SYRINGE (ML) INTRAVENOUS AS NEEDED
Status: DISCONTINUED | OUTPATIENT
Start: 2023-09-06 | End: 2023-09-06

## 2023-09-06 RX ORDER — LIDOCAINE HYDROCHLORIDE 10 MG/ML
0.5 INJECTION, SOLUTION EPIDURAL; INFILTRATION; INTRACAUDAL; PERINEURAL ONCE AS NEEDED
Status: COMPLETED | OUTPATIENT
Start: 2023-09-06 | End: 2023-09-06

## 2023-09-06 RX ORDER — FENTANYL CITRATE 50 UG/ML
INJECTION, SOLUTION INTRAMUSCULAR; INTRAVENOUS AS NEEDED
Status: DISCONTINUED | OUTPATIENT
Start: 2023-09-06 | End: 2023-09-06

## 2023-09-06 RX ORDER — ACETAMINOPHEN 325 MG/1
975 TABLET ORAL EVERY 6 HOURS PRN
Status: CANCELLED | OUTPATIENT
Start: 2023-09-06

## 2023-09-06 RX ORDER — PROPOFOL 10 MG/ML
INJECTION, EMULSION INTRAVENOUS AS NEEDED
Status: DISCONTINUED | OUTPATIENT
Start: 2023-09-06 | End: 2023-09-06

## 2023-09-06 RX ORDER — ONDANSETRON 2 MG/ML
4 INJECTION INTRAMUSCULAR; INTRAVENOUS EVERY 6 HOURS PRN
Status: CANCELLED | OUTPATIENT
Start: 2023-09-06

## 2023-09-06 RX ORDER — IBUPROFEN 600 MG/1
600 TABLET ORAL EVERY 6 HOURS PRN
Status: CANCELLED | OUTPATIENT
Start: 2023-09-06

## 2023-09-06 RX ORDER — METOCLOPRAMIDE HYDROCHLORIDE 5 MG/ML
10 INJECTION INTRAMUSCULAR; INTRAVENOUS ONCE AS NEEDED
Status: DISCONTINUED | OUTPATIENT
Start: 2023-09-06 | End: 2023-09-06 | Stop reason: HOSPADM

## 2023-09-06 RX ORDER — LIDOCAINE HYDROCHLORIDE 10 MG/ML
INJECTION, SOLUTION EPIDURAL; INFILTRATION; INTRACAUDAL; PERINEURAL AS NEEDED
Status: DISCONTINUED | OUTPATIENT
Start: 2023-09-06 | End: 2023-09-06

## 2023-09-06 RX ORDER — GLYCOPYRROLATE 0.2 MG/ML
INJECTION INTRAMUSCULAR; INTRAVENOUS AS NEEDED
Status: DISCONTINUED | OUTPATIENT
Start: 2023-09-06 | End: 2023-09-06

## 2023-09-06 RX ADMIN — FENTANYL CITRATE 50 MCG: 50 INJECTION, SOLUTION INTRAMUSCULAR; INTRAVENOUS at 14:00

## 2023-09-06 RX ADMIN — Medication 100 MG: at 14:05

## 2023-09-06 RX ADMIN — FENTANYL CITRATE 50 MCG: 50 INJECTION, SOLUTION INTRAMUSCULAR; INTRAVENOUS at 15:03

## 2023-09-06 RX ADMIN — SODIUM CHLORIDE, SODIUM LACTATE, POTASSIUM CHLORIDE, AND CALCIUM CHLORIDE 125 ML/HR: .6; .31; .03; .02 INJECTION, SOLUTION INTRAVENOUS at 13:47

## 2023-09-06 RX ADMIN — LIDOCAINE HYDROCHLORIDE 0.5 ML: 10 INJECTION, SOLUTION EPIDURAL; INFILTRATION; INTRACAUDAL; PERINEURAL at 13:47

## 2023-09-06 RX ADMIN — LIDOCAINE HYDROCHLORIDE 50 MG: 10 INJECTION, SOLUTION EPIDURAL; INFILTRATION; INTRACAUDAL; PERINEURAL at 14:05

## 2023-09-06 RX ADMIN — SODIUM CHLORIDE, SODIUM LACTATE, POTASSIUM CHLORIDE, AND CALCIUM CHLORIDE: .6; .31; .03; .02 INJECTION, SOLUTION INTRAVENOUS at 13:04

## 2023-09-06 RX ADMIN — FENTANYL CITRATE 50 MCG: 50 INJECTION, SOLUTION INTRAMUSCULAR; INTRAVENOUS at 14:53

## 2023-09-06 RX ADMIN — SODIUM CHLORIDE 200 MG: 9 INJECTION, SOLUTION INTRAVENOUS at 15:12

## 2023-09-06 RX ADMIN — DEXAMETHASONE SODIUM PHOSPHATE 10 MG: 10 INJECTION, SOLUTION INTRAMUSCULAR; INTRAVENOUS at 14:17

## 2023-09-06 RX ADMIN — MIDAZOLAM 2 MG: 1 INJECTION INTRAMUSCULAR; INTRAVENOUS at 13:54

## 2023-09-06 RX ADMIN — ONDANSETRON 4 MG: 2 INJECTION INTRAMUSCULAR; INTRAVENOUS at 13:54

## 2023-09-06 RX ADMIN — KETOROLAC TROMETHAMINE 30 MG: 30 INJECTION, SOLUTION INTRAMUSCULAR at 14:58

## 2023-09-06 RX ADMIN — FENTANYL CITRATE 50 MCG: 50 INJECTION, SOLUTION INTRAMUSCULAR; INTRAVENOUS at 14:05

## 2023-09-06 RX ADMIN — GLYCOPYRROLATE 0.1 MG: 0.2 INJECTION, SOLUTION INTRAMUSCULAR; INTRAVENOUS at 13:54

## 2023-09-06 RX ADMIN — PROPOFOL 200 MG: 10 INJECTION, EMULSION INTRAVENOUS at 14:05

## 2023-09-06 NOTE — ANESTHESIA PREPROCEDURE EVALUATION
Procedure:  DILATATION AND EVACUATION (D&E) (Uterus)    Relevant Problems   GYN   (+) 38 weeks gestation of pregnancy   (+) 39 weeks gestation of pregnancy   (+) History of premature delivery, currently pregnant, third trimester      HEMATOLOGY   (+) Iron deficiency anemia secondary to inadequate dietary iron intake        Physical Exam    Airway    Mallampati score: I  TM Distance: >3 FB  Neck ROM: full     Dental   No notable dental hx     Cardiovascular  Cardiovascular exam normal    Pulmonary  Pulmonary exam normal     Other Findings        Anesthesia Plan  ASA Score- 2     Anesthesia Type- general with ASA Monitors. Additional Monitors:   Airway Plan: LMA. Comment: No issues with prev GA. Plan Factors-    Chart reviewed. Existing labs reviewed. Patient summary reviewed. Patient is not a current smoker. Induction- intravenous. Postoperative Plan- Plan for postoperative opioid use. Informed Consent- Anesthetic plan and risks discussed with patient. I personally reviewed this patient with the CRNA. Discussed and agreed on the Anesthesia Plan with the CRNA. Rossana Devine

## 2023-09-06 NOTE — INTERVAL H&P NOTE
H&P reviewed. After examining the patient I find no changes in the patients condition since the H&P had been written.   Head: normocephalic, atraumatic  CV: regular rate and rhythm  Lungs: clear bilaterally  Abdomen: soft, nontender  Extremities: no pain/swelling b/l LE    Vitals:    09/06/23 1316   BP: 119/73   Pulse: 66   Resp: 18   Temp: 97.6 °F (36.4 °C)   SpO2: 99%

## 2023-09-06 NOTE — ANESTHESIA POSTPROCEDURE EVALUATION
Post-Op Assessment Note    CV Status:  Stable  Pain Score: 0    Pain management: adequate     Mental Status:  Awake   Hydration Status:  Stable   PONV Controlled:  None   Airway Patency:  Patent      Post Op Vitals Reviewed: Yes      Staff: CRNA         No notable events documented.     /53 (09/06/23 1506)    Temp (!) 97.2 °F (36.2 °C) (09/06/23 1506)    Pulse 83 (09/06/23 1506)   Resp 20 (09/06/23 1506)    SpO2 100 % (09/06/23 1506)

## 2023-09-06 NOTE — OP NOTE
OPERATIVE REPORT  PATIENT NAME: Maksim Quintero    :  1990  MRN: [id number]  Pt Location: [facility] OR ROOM 07    SURGERY DATE: 2023    Surgeon(s) and Role:     * Angélica Morris MD - Primary     * Anna Dover MD - Assisting    Preop Diagnosis:  Retained products of conception after miscarriage [O03.4]    Post-Op Diagnosis Codes:     * Retained products of conception after miscarriage [O03.4]    Procedure(s):  DILATATION AND EVACUATION (D&E)    Specimen(s):  ID Type Source Tests Collected by Time Destination   1 : gestational weeks= unknown Tissue Products of Conception TISSUE EXAM Angélica Morris MD 2023 1441        Estimated Blood Loss:   Minimal    Drains:  None    Anesthesia Type:   General    Operative Indications:  Retained products of conception after miscarriage [O03.4]      Operative Findings:  1. External genitalia grossly normal in appearance. No ulcerations, no lacerations, no lesions. Bimanual exam revealed anteverted uterus with normal contours and freely mobile. No adnexal masses palpated bilaterally. 2.  Vagina and cervix were  grossly normal in appearance without any lacerations or lesions. Complications:   None appreciated    Procedure and Technique:  The patient was taken to the operating room where she was properly identified. She was placed under general anesthesia and intubated without difficulty. She was prepped and draped in the normal sterile fashion in the dorsal lithotomy position using the stirrups. Care was taken to avoid excessive flexion or extension of the patients hips and knees or pressure on her extremities. A time out was performed and everyone was in agreement. The patient received IV doxycyline. The bladder was drained with a straight cath for 100cc of clear urine. A bimanual exam was performed and the uterus was approximately 6 weeks in size.      A Chin retractor and nguyễn retractor were placed in the patient's vagina and the anterior lip of the cervix was grasped with a ringed forceps single tooth tenaculum. The cervix was serially dilated to 25 [place] using Dobson dilators for introduction of the curved tip. A 8 cm curved tip was selected. This was advanced to the fundus and  suction was activated. The products of conception were collected in the suction trap. 4 additional passes were made with the suction curette. Gentle sharp curretting was then performed. At this time, products were felt to be adequately evacuated. The uterus was felt to clamp down. The uterus was confirmed to have minimal bleeding. The single toothed tenaculum was removed from the anterior lip of the cervix. Good hemostasis was confirmed at the tenaculum puncture sites. All instruments were removed from the patient's vagina. The patient tolerated the procedure well. Sponge, instrument and needle counts were correct times 2. The patient was awakened from anesthesia and taken to the recovery room in stable condition. Dr. Noa Mo was present for the entire procedure.      Patient Disposition:  PACU         SIGNATURE: Archana Clay MD  DATE: September 6, 2023  TIME: 3:02 PM

## 2023-09-11 PROCEDURE — 88305 TISSUE EXAM BY PATHOLOGIST: CPT | Performed by: PATHOLOGY

## 2023-10-18 ENCOUNTER — TELEPHONE (OUTPATIENT)
Dept: OBGYN CLINIC | Facility: CLINIC | Age: 33
End: 2023-10-18

## 2023-10-18 NOTE — TELEPHONE ENCOUNTER
Pt had D and E on 9/6 for retained products of conception. She had a pos preg test yesterday. She did not have sex for 1 week ff her surgery. Pt probably 5 weeks along.  Danis Starks will call and give pt an appt

## 2023-10-18 NOTE — TELEPHONE ENCOUNTER
Pt called and stated she had a positive pregnancy test. She was unsure of last lmp because she did have a miscarriage and d&e and was bleeding from it. Please advise.

## 2023-11-14 ENCOUNTER — ULTRASOUND (OUTPATIENT)
Dept: OBGYN CLINIC | Facility: CLINIC | Age: 33
End: 2023-11-14
Payer: COMMERCIAL

## 2023-11-14 VITALS — SYSTOLIC BLOOD PRESSURE: 102 MMHG | WEIGHT: 220.8 LBS | DIASTOLIC BLOOD PRESSURE: 64 MMHG | BODY MASS INDEX: 39.11 KG/M2

## 2023-11-14 DIAGNOSIS — N91.1 SECONDARY AMENORRHEA: ICD-10-CM

## 2023-11-14 DIAGNOSIS — Z86.711 HISTORY OF PULMONARY EMBOLISM: ICD-10-CM

## 2023-11-14 DIAGNOSIS — Z34.90 EARLY STAGE OF PREGNANCY: Primary | ICD-10-CM

## 2023-11-14 PROBLEM — Z3A.39 39 WEEKS GESTATION OF PREGNANCY: Status: RESOLVED | Noted: 2023-03-14 | Resolved: 2023-11-14

## 2023-11-14 PROBLEM — O99.843 PREGNANCY AFFECTED BY PREVIOUS BARIATRIC SURGERY, CURRENTLY IN THIRD TRIMESTER: Status: RESOLVED | Noted: 2022-10-04 | Resolved: 2023-11-14

## 2023-11-14 PROBLEM — Z3A.38 38 WEEKS GESTATION OF PREGNANCY: Status: RESOLVED | Noted: 2022-09-06 | Resolved: 2023-11-14

## 2023-11-14 PROBLEM — O03.9 SPONTANEOUS ABORTION: Status: RESOLVED | Noted: 2023-08-17 | Resolved: 2023-11-14

## 2023-11-14 PROBLEM — O88.213 PULMONARY EMBOLISM AFFECTING PREGNANCY IN THIRD TRIMESTER: Status: RESOLVED | Noted: 2022-11-12 | Resolved: 2023-11-14

## 2023-11-14 PROCEDURE — 99214 OFFICE O/P EST MOD 30 MIN: CPT | Performed by: STUDENT IN AN ORGANIZED HEALTH CARE EDUCATION/TRAINING PROGRAM

## 2023-11-14 PROCEDURE — 76817 TRANSVAGINAL US OBSTETRIC: CPT | Performed by: STUDENT IN AN ORGANIZED HEALTH CARE EDUCATION/TRAINING PROGRAM

## 2023-11-14 RX ORDER — ENOXAPARIN SODIUM 100 MG/ML
40 INJECTION SUBCUTANEOUS DAILY
Qty: 12 ML | Refills: 3 | Status: SHIPPED | OUTPATIENT
Start: 2023-11-14 | End: 2024-03-13

## 2023-11-14 NOTE — ASSESSMENT & PLAN NOTE
27yo [id number] at 9.2wks by BSUS today, OLIVER 6/16/24    Pt denies pelvic cramping, vaginal bleeding, nausea, vomiting.     -continue PNVs   -bleeding precautions provided   -return for M US and initial prenatal visit

## 2023-11-14 NOTE — ASSESSMENT & PLAN NOTE
-negative APS and thrombophilia workup, will need antithrombin III genetic testing   -lovenox 40mg daily ordered  -precautions provided

## 2023-11-14 NOTE — PROGRESS NOTES
Ultrasound Probe Disinfection    A transvaginal ultrasound was performed. Prior to use, disinfection was performed with High Level Disinfection Process ([email or web address]). Probe serial number. Probe serial number: [id number]     Early stage of pregnancy  29yo [id number] at 9.2wks by BSUS today, OLIVER 6/16/24    Pt denies pelvic cramping, vaginal bleeding, nausea, vomiting.     -continue PNVs   -bleeding precautions provided   -return for [facility] US and initial prenatal visit     History of pulmonary embolism  -negative APS and thrombophilia workup, will need antithrombin III genetic testing   -lovenox 40mg daily ordered  -precautions provided     ROS: denies headaches, changes in vision, chest pain, palpitations, shortness of breath, nausea, vomiting, fevers, chills     PE:  General: alert and oriented, resting comfortably, no acute distress  Cardiac: regular rate  Pulmonary: no respiratory distress  Abdomen: soft, nondistended, no rebound or guarding, nontender   : external vulva without lesions, redness, tenderness   Extremities: no edema or calf tenderness bilaterally

## 2023-11-17 NOTE — PATIENT INSTRUCTIONS
Congratulations!! Please review our Pregnancy Essential Guide and [facility] L&D Virtual tour from our [employer]. . [place] Pregnancy Essentials Guide  [facility] ([address] [facility])     [address] [facility] ([employer])     [address] [facility] ([facility].[employer]) (Lab Locations)

## 2023-11-20 ENCOUNTER — INITIAL PRENATAL (OUTPATIENT)
Dept: OBGYN CLINIC | Facility: CLINIC | Age: 33
End: 2023-11-20

## 2023-11-20 VITALS — WEIGHT: 220 LBS | HEIGHT: 63 IN | BODY MASS INDEX: 38.98 KG/M2

## 2023-11-20 DIAGNOSIS — Z34.81 PRENATAL CARE, SUBSEQUENT PREGNANCY, FIRST TRIMESTER: Primary | ICD-10-CM

## 2023-11-20 PROCEDURE — OBC: Performed by: STUDENT IN AN ORGANIZED HEALTH CARE EDUCATION/TRAINING PROGRAM

## 2023-11-20 NOTE — PROGRESS NOTES
OB INTAKE INTERVIEW  Patient is 33 y.o.y.o. who presents for OB intake at 10wks  She is accompanied by FOB during this encounter  The father of her baby Violette Benítez) is involved in the pregnancy and is 35years old.   Last Menstrual Period: unsure  Ultrasound: Measured 9 weeks 2 days on 2023 by Dr Boris Restrepo  Estimated Date of Delivery: 2024 changed by 9 week US    Signs/Symptoms of Pregnancy  Current pregnancy symptoms: none  Constipation no  Headaches no  Cramping/spotting no  PICA cravings no    Diabetes-  Body mass index is 38.97 kg/m². If patient has 1 or more, please order early 1 hour GTT  History of GDM YESx1 preg  BMI >35 YES  History of PCOS or current metformin use no  History of LGA/macrosomic infant (4000g/9lbs) no    If patient has 2 or more, please order early 1 hour GTT  BMI>30 YES  AMA no  First degree relative with type 2 diabetes no  History of chronic HTN, hyperlipidemia, elevated A1C no  High risk race (, , ,  or ) YES    Hypertension- if you answer yes, please order preeclampsia labs (cbc, comprehensive metabolic panel, urine protein creatinine ratio, uric acid)  History of of chronic HTN no  History of gestational HTN no  History of preeclampsia, eclampsia, or HELLP syndrome no  History of diabetes no  History of lupus, autoimmune disease, kidney disease no    Thyroid- if yes order TSH with reflex T4  History of thyroid disease no    Bleeding Disorder or Hx of DVT-patient or first degree relative with history of. Order the following if not done previously.    (Factor V, antithrombin III, prothrombin gene mutation, protein C and S Ag, lupus anticoagulant, anticardiolipin, beta-2 glycoprotein)   no    OB/GYN-  History of abnormal pap smear no       Date of last pap smear 2/10/2022  History of HPV no  History of Herpes/HSV no  History of other STI (gonorrhea, chlamydia, trich) no  History of prior  YESx2  History of prior  no  History of  delivery prior to 36 weeks 6 days no  History of blood transfusion YES after her one m/c  Ok for blood transfusion YES    Substance screening- if yes outside of tobacco for her or anyone in her home-order urine drug screen  History of tobacco use YES quit 2020  Currently using tobacco no  Currently using alcohol no  Presently using drugs no  Past drug use  no  IV drug use- no  Partner drug use no  Parent/Family drug use no    MRSA Screening-   Does the pt have a hx of MRSA? no  If yes- please follow MRSA protocol and obtain a nasal swab for MRSA culture    Immunizations:  Influenza vaccine given this season Not planning  Discussed Tdap vaccine YES  Discussed COVID Vaccine No    Genetic/MFM-  Do you or your partner have a history of any of the following in yourselves or first degree relatives? Cystic fibrosis no  Spinal muscular atrophy no  Hemoglobinopathy/Sickle Cell/Thalassemia no  Fragile X Intellectual Disability no    If yes, discuss Carrier Screening and recommend consultation with MFM/genetic counseling and place specific [facility] Referral for. If no, discuss Carrier Screening being completed once in a lifetime as a standard of care lab test along with Nuchal Ultrasound.  Place orders for [id number] and [id number] along with [facility] Referral.  Patient interested unsure    Appointment at [facility] made [address] Pregnancy Essentials Book reviewed, discussed and attached to their AVS YES    Nurse/Family Partnership- patient may qualify NO; referral placed NO    Prenatal lab work scripts YES  Extra labs ordered:  Bypass labs    Aspirin/Preeclampsia Screen    Risk Level Risk Factor Recommendation   LOW Prior Uncomplicated full-term delivery YES No Aspirin recommendation        MODERATE Nulliparity no Recommend low-dose aspirin if     BMI>30 YES 2 or more moderate risk factors    Family History Preeclampsia (mother/sister) no     35yr old or greater no      or Low Socioeconomic YES     IVF Pregnancy  no     Personal History Risks (low birth weight, prior adverse preg outcome, >10yr preg interval) YES Hx GDM and PE        HIGH History of Preeclampsia no Recommend low-dose aspirin if     Multifetal gestation no 1 or more high risk factors    Chronic HTN no     Type 1 or 2 Diabetes no     Renal Disease no     Autoimmune Disease  no      Contraindications to ASA therapy:  NSAID/ ASA allergy: no  Nasal polyps: no  Asthma with history of ASA induced bronchospasm: no  Relative contraindications:  History of GI bleed: no  Active peptic ulcer disease: no  Severe hepatic dysfunction: no    Patient should be recommended to take ASA 162mg during this pregnancy from 12-36wks to lower her risk of preeclampsia: Dr Antoine Guillaume ordered Lovenox but she wants to conform she needs to start this first. Wants to know if any tests/ imaging to be ordered. Patient seemed reluctant to start this unless needed. The patient has a history now or in prior pregnancy notable for:  gestational DM and hx, transfusion,  hx PE x1, short interval ( 8 mo ago) bypass surgery, on no PNV, didn't start Lovenox      Details that I feel the provider should be aware of: Holly Daniel and Darlin Aguilar are expecting their 3rd baby! Previously delivered ( her last baby) with SLOGA. She is doing great, no issues. her 7 mo old daughter keeping her very busy. She plan to try to breastfeed again. She requested Alba Guillaume re: the Lovenox ( has not started yet). PN1 visit scheduled. The patient was oriented to our practice, reviewed delivering physicians and [employer] for Delivery. All questions were answered.     Interviewed by: Dilcia Miller MA

## 2023-11-26 ENCOUNTER — HOSPITAL ENCOUNTER (EMERGENCY)
Facility: HOSPITAL | Age: 33
Discharge: HOME | End: 2023-11-26
Attending: EMERGENCY MEDICINE
Payer: COMMERCIAL

## 2023-11-26 VITALS
RESPIRATION RATE: 18 BRPM | SYSTOLIC BLOOD PRESSURE: 113 MMHG | DIASTOLIC BLOOD PRESSURE: 78 MMHG | TEMPERATURE: 98.4 F | OXYGEN SATURATION: 98 % | HEART RATE: 84 BPM

## 2023-11-26 DIAGNOSIS — K08.89 PAIN, DENTAL: Primary | ICD-10-CM

## 2023-11-26 PROCEDURE — 99284 EMERGENCY DEPT VISIT MOD MDM: CPT | Performed by: EMERGENCY MEDICINE

## 2023-11-26 PROCEDURE — 99282 EMERGENCY DEPT VISIT SF MDM: CPT

## 2023-11-26 RX ORDER — ACETAMINOPHEN 325 MG/1
975 TABLET ORAL ONCE
Status: COMPLETED | OUTPATIENT
Start: 2023-11-26 | End: 2023-11-26

## 2023-11-26 RX ORDER — ACETAMINOPHEN 325 MG/1
650 TABLET ORAL EVERY 4 HOURS PRN
Qty: 30 TABLET | Refills: 0 | Status: SHIPPED | OUTPATIENT
Start: 2023-11-26

## 2023-11-26 RX ORDER — AMOXICILLIN 500 MG/1
500 TABLET, FILM COATED ORAL 2 TIMES DAILY
Qty: 20 TABLET | Refills: 0 | Status: SHIPPED | OUTPATIENT
Start: 2023-11-26 | End: 2023-12-06

## 2023-11-26 RX ADMIN — ACETAMINOPHEN 325MG 975 MG: 325 TABLET ORAL at 12:02

## 2023-11-26 NOTE — DISCHARGE INSTRUCTIONS
DISCHARGE INSTRUCTIONS:    FOLLOW UP WITH YOUR PRIMARY CARE PROVIDER OR THE [address]. MAKE AN APPOINTMENT TO BE SEEN. TAKE MEDICATION AS PRESCRIBED. IF RASH, SHORTNESS OF BREATH OR TROUBLE SWALLOWING, STOP TAKING THE MEDICATION AND BE SEEN. REST. FOLLOW UP WITH DENTIST. IF SYMPTOMS WORSEN OR NEW SYMPTOMS ARISE, RETURN TO THE ER TO BE SEEN.

## 2023-11-26 NOTE — ED PROVIDER NOTES
History  Chief Complaint   Patient presents with    Dental Pain     Pt reports filling fell out on bottom right side of teeth, pt reports nerve exposed with extreme pain 10/10. No meds pta. Pt 10 weeks pregnant.      33y. o female with PMH of anemia, gastric bypass, history of transfusion, post partum depression, PE and sleep apnea presents to the ER for right lower dental pain for 1 week. Patient states her filling fell out. She denies taking any medication for pain. She describes her pain as sharp and throbbing. Pain radiates into her face and into her TMJ. Symptoms are constant. She does have a dentist but only has seen them once. She reports having a fever last night but none today. She denies chills, URI symptoms, chest pain, dyspnea, N/V/D, abdominal pain, weakness or paresthesias. Patient is 10 weeks pregnant. History provided by:  Patient   used: No        Prior to Admission Medications   Prescriptions Last Dose Informant Patient Reported?  Taking?   acetaminophen (TYLENOL) 325 mg tablet Not Taking  Yes No   Sig: Take 650 mg by mouth every 6 (six) hours as needed for mild pain   Patient not taking: Reported on 2023   enoxaparin (Lovenox) 40 mg/0.4 mL Not Taking  No No   Sig: Inject 0.4 mL (40 mg total) under the skin in the morning   Patient not taking: Reported on 2023      Facility-Administered Medications: None       Past Medical History:   Diagnosis Date    Anemia     Gastric bypass status for obesity 2016    sleeve    History of transfusion     states no transfusion rxn    Personal history of COVID-19     recovered at home    Post partum depression 2014    Pulmonary embolus ([address]) 2022    approx, during pregnancy    Sleep apnea, obstructive     prior to weight loss surg       Past Surgical History:   Procedure Laterality Date    DILATION AND EVACUATION  2019        GASTRIC RESTRICTION SURGERY  2015    gastric sleeve    INCISION AND DRAINAGE OF WOUND Right 2022    Procedure: INCISION AND DRAINAGE (I&D) HEAD/FACE;  Surgeon: Rodolfo Flor DMD;  [facility];  Service: Maxillofacial    MULTIPLE TOOTH EXTRACTIONS Right 2022    Procedure: EXTRACTION TEETH MULTIPLE;  Surgeon: Rodolfo Flor DMD;  [facility];  Service: Maxillofacial    OPEN ANTERIOR SHOULDER RECONSTRUCTION Left 2018    OTHER SURGICAL HISTORY      sweat gland removal    TX TX MISSED  FIRST TRIMESTER SURGICAL N/A 2023    Procedure: DILATATION AND EVACUATION (D&E); Surgeon: Desiree Morrow MD;  [facility];  Service: Gynecology    RETAINED PLACENTA REMOVAL N/A 2020    Procedure: EXTRACTION OF PRLUEAEM,VLGWGH;  Surgeon: Odalys Cole MD;  [facility];  Service: Obstetrics       Family History   Problem Relation Age of Onset    Stroke Mother     Heart disease Mother     Seizures Mother     Hypertension Mother     Anxiety disorder Mother     Bipolar disorder Mother     Kidney disease Mother     Sudden death Father     No Known Problems Sister     No Known Problems Sister     No Known Problems Brother     No Known Problems Brother     No Known Problems Brother     Sudden death Brother 21        MVA    No Known Problems Son     Pancreatic cancer Maternal Grandmother     Seizures Maternal Grandmother     Diabetes Maternal Grandmother     No Known Problems Daughter      I have reviewed and agree with the history as documented.     E-Cigarette/Vaping    E-Cigarette Use Never User      E-Cigarette/Vaping Substances    Nicotine No     THC No     CBD No     Flavoring No     Other No     Unknown No      Social History     Tobacco Use    Smoking status: Former     Packs/day: 0.25     Years: 10.00     Total pack years: 2.50     Types: Cigarettes     Quit date: 2020     Years since quitting: 3.6     Passive exposure: Past    Smokeless tobacco: Former   Vaping Use    Vaping Use: Never used   Substance Use Topics    Alcohol use: Not Currently Comment: social    Drug use: Never       Review of Systems   Constitutional:  Positive for fever (last night but not today). Negative for activity change, appetite change and chills. HENT:  Positive for dental problem. Negative for congestion, drooling, ear discharge, ear pain, facial swelling, rhinorrhea and sore throat. Eyes:  Negative for redness. Respiratory:  Negative for cough and shortness of breath. Cardiovascular:  Negative for chest pain. Gastrointestinal:  Negative for abdominal pain, diarrhea, nausea and vomiting. Musculoskeletal:  Negative for neck stiffness. Skin:  Negative for rash. Allergic/Immunologic: Negative for food allergies. Neurological:  Negative for weakness and numbness. Physical Exam  Physical Exam  Vitals and nursing note reviewed. Constitutional:       General: She is not in acute distress. Appearance: She is not toxic-appearing. HENT:      Head: Normocephalic and atraumatic. Right Ear: Tympanic membrane, ear canal and external ear normal. No drainage, swelling or tenderness. No foreign body. No hemotympanum. Tympanic membrane is not erythematous. Left Ear: External ear normal.      Nose: Nose normal.      Mouth/Throat:      Lips: Pink. No lesions. Mouth: Mucous membranes are moist.      Dentition: Abnormal dentition. Dental tenderness present. No gingival swelling or dental abscesses. Pharynx: Oropharynx is clear. Uvula midline. No pharyngeal swelling, oropharyngeal exudate, posterior oropharyngeal erythema or uvula swelling. Tonsils: No tonsillar exudate or tonsillar abscesses. Eyes:      Conjunctiva/sclera: Conjunctivae normal.   Neck:      Trachea: Phonation normal. No tracheal deviation. Cardiovascular:      Rate and Rhythm: Normal rate. Pulmonary:      Effort: Pulmonary effort is normal. No respiratory distress. Abdominal:      General: There is no distension.    Musculoskeletal:      Cervical back: Normal range of motion and neck supple. Skin:     General: Skin is warm and dry. Findings: No rash. Neurological:      Mental Status: She is alert. GCS: GCS eye subscore is 4. GCS verbal subscore is 5. GCS motor subscore is 6. Psychiatric:         Mood and Affect: Mood normal.         Vital Signs  ED Triage Vitals [11/26/23 1112]   Temperature Pulse Respirations Blood Pressure SpO2   98.4 °F (36.9 °C) 84 18 113/78 98 %      Temp Source Heart Rate Source Patient Position - Orthostatic VS BP Location FiO2 (%)   Oral Monitor Lying Right arm --      Pain Score       10 - Worst Possible Pain           Vitals:    11/26/23 1112   BP: 113/78   Pulse: 84   Patient Position - Orthostatic VS: Lying         Visual Acuity      ED Medications  Medications   acetaminophen (TYLENOL) tablet 975 mg (has no administration in time range)       Diagnostic Studies  Results Reviewed       None                   No orders to display              Procedures  Procedures         ED Course                               SBIRT 20yo+      Flowsheet Row Most Recent Value   Initial Alcohol Screen: US AUDIT-C     1. How often do you have a drink containing alcohol? 0 Filed at: 11/26/2023 1128   2. How many drinks containing alcohol do you have on a typical day you are drinking? 0 Filed at: 11/26/2023 1128   3a. Male UNDER 65: How often do you have five or more drinks on one occasion? 0 Filed at: 11/26/2023 1128   3b. FEMALE Any Age, or MALE 65+: How often do you have 4 or more drinks on one occassion? 0 Filed at: 11/26/2023 1128   Audit-C Score 0 Filed at: 11/26/2023 1128   HELEN: How many times in the past year have you. .. Used an illegal drug or used a prescription medication for non-medical reasons? Never Filed at: 11/26/2023 1128                      Medical Decision Making  33y. o female presents to the ER for right lower dental pain for 1 week. Vitals are stable. Patient is in no acute distress. On exam, no signs of right ear infection. Right lower tooth is broken. No erythema, swelling, abscess or drainage seen. Area is tender to palpation. No trismus. No signs of throat infection. No abscess seen. No trouble swallowing or handling secretions. No neck swelling. Heart is regular rate. Breathing is non-labored. Abdomen is not distended. Will treat with Tylenol and Amoxicillin and follow up with dental clinic. The management plan was discussed in detail with the patient at bedside and all questions were answered. Prior to discharge, we provided both verbal and written instructions. We discussed with the patient the signs and symptoms for which to return to the emergency department. All questions were answered and patient was comfortable with the plan of care and discharged to home. Instructed the patient to follow up with the primary care provider and/or specialist provided and their written instructions. The patient verbalized understanding of our discussion and plan of care, and agrees to return to the Emergency Department for concerns and progression of illness. At discharge, I instructed the patient to:  -follow up with pcp  -take Tylenol and Amoxicillin as prescribed  -rest and drink plenty of fluids  -follow up with dentist  -return to the ER if symptoms worsened or new symptoms arose  Patient agreed to this plan and was stable at time of discharge. Problems Addressed:  Pain, dental: acute illness or injury    Amount and/or Complexity of Data Reviewed  Independent Historian:      Details: Patient is historian    Risk  OTC drugs. Prescription drug management.              Disposition  Final diagnoses:   Pain, dental     Time reflects when diagnosis was documented in both MDM as applicable and the Disposition within this note       Time User Action Codes Description Comment    11/26/2023 11:29 AM Harjit ZHANG Add [K08.89] Pain, dental           ED Disposition       ED Disposition   Discharge    Condition   Stable    Date/Time Benjamin Jimenez Nov 26, 2023 1129    Comment   Charlotte Saini discharge to home/self care. Follow-up Information       Follow up With Specialties Details Why Contact Info    Jaylen Mora MD Family Medicine Schedule an appointment as soon as possible for a visit   [facility] [address] [facility] [facility]      [address] [facility]  Schedule an appointment as soon as possible for a visit   [address] [facility] [address]  [place] [address] [facility]  Schedule an appointment as soon as possible for a visit   [address] [facility]            Patient's Medications   Discharge Prescriptions    ACETAMINOPHEN (TYLENOL) 325 MG TABLET    Take 2 tablets (650 mg total) by mouth every 4 (four) hours as needed for mild pain or moderate pain       Start Date: 11/26/2023End Date: --       Order Dose: 650 mg       Quantity: 30 tablet    Refills: 0    AMOXICILLIN (AMOXIL) 500 MG TABLET    Take 1 tablet (500 mg total) by mouth 2 (two) times a day for 10 days       Start Date: 11/26/2023End Date: 12/6/2023       Order Dose: 500 mg       Quantity: 20 tablet    Refills: 0       No discharge procedures on file.     PDMP Review       None            ED Provider  Electronically Signed by             Oscar Gonzalez PA-C  11/26/23 1551

## 2023-11-28 ENCOUNTER — HOSPITAL ENCOUNTER (EMERGENCY)
Facility: HOSPITAL | Age: 33
Discharge: HOME | End: 2023-11-28
Attending: EMERGENCY MEDICINE
Payer: COMMERCIAL

## 2023-11-28 VITALS
TEMPERATURE: 98.3 F | SYSTOLIC BLOOD PRESSURE: 143 MMHG | BODY MASS INDEX: 38.94 KG/M2 | RESPIRATION RATE: 18 BRPM | HEART RATE: 87 BPM | WEIGHT: 219.8 LBS | DIASTOLIC BLOOD PRESSURE: 63 MMHG | OXYGEN SATURATION: 99 %

## 2023-11-28 DIAGNOSIS — S02.5XXA TOOTH FRACTURE: Primary | ICD-10-CM

## 2023-11-28 DIAGNOSIS — K08.89 DENTALGIA: ICD-10-CM

## 2023-11-28 PROCEDURE — 99284 EMERGENCY DEPT VISIT MOD MDM: CPT | Performed by: EMERGENCY MEDICINE

## 2023-11-28 PROCEDURE — 99282 EMERGENCY DEPT VISIT SF MDM: CPT

## 2023-11-28 RX ORDER — LIDOCAINE HYDROCHLORIDE 20 MG/ML
15 SOLUTION OROPHARYNGEAL ONCE
Status: COMPLETED | OUTPATIENT
Start: 2023-11-28 | End: 2023-11-28

## 2023-11-28 RX ORDER — CHLORHEXIDINE GLUCONATE ORAL RINSE 1.2 MG/ML
15 SOLUTION DENTAL EVERY 12 HOURS SCHEDULED
Status: DISCONTINUED | OUTPATIENT
Start: 2023-11-28 | End: 2023-11-28 | Stop reason: HOSPADM

## 2023-11-28 RX ORDER — LIDOCAINE HYDROCHLORIDE 20 MG/ML
15 SOLUTION OROPHARYNGEAL 4 TIMES DAILY PRN
Qty: 100 ML | Refills: 0 | Status: SHIPPED | OUTPATIENT
Start: 2023-11-28 | End: 2023-12-14

## 2023-11-28 RX ADMIN — CHLORHEXIDINE GLUCONATE 15 ML: 1.2 RINSE ORAL at 01:22

## 2023-11-28 RX ADMIN — LIDOCAINE HYDROCHLORIDE 15 ML: 20 SOLUTION ORAL; TOPICAL at 01:22

## 2023-11-28 NOTE — ED PROVIDER NOTES
History  Chief Complaint   Patient presents with    Dental Pain     Pt reports dental pain. Pt seen here recently for same, prescribed amoxicillin and tylenol. Pt also 11 weeks pregnant. 33-year-old female presents with right lower molar pain x2 weeks. Pain started after she cracked her tooth. Denies fever, headache, trismus, or neck stiffness. Patient has an appointment scheduled with [facility]. Prior to Admission Medications   Prescriptions Last Dose Informant Patient Reported?  Taking?   acetaminophen (TYLENOL) 325 mg tablet   Yes No   Sig: Take 650 mg by mouth every 6 (six) hours as needed for mild pain   Patient not taking: Reported on 2023   acetaminophen (TYLENOL) 325 mg tablet   No No   Sig: Take 2 tablets (650 mg total) by mouth every 4 (four) hours as needed for mild pain or moderate pain   amoxicillin (AMOXIL) 500 MG tablet   No No   Sig: Take 1 tablet (500 mg total) by mouth 2 (two) times a day for 10 days   enoxaparin (Lovenox) 40 mg/0.4 mL   No No   Sig: Inject 0.4 mL (40 mg total) under the skin in the morning   Patient not taking: Reported on 2023      Facility-Administered Medications: None       Past Medical History:   Diagnosis Date    Anemia     Gastric bypass status for obesity 2016    sleeve    History of transfusion     states no transfusion rxn    Personal history of COVID-19     recovered at home    Post partum depression 2014    Pulmonary embolus ([address]) 2022    approx, during pregnancy    Sleep apnea, obstructive     prior to weight loss surg       Past Surgical History:   Procedure Laterality Date    DILATION AND EVACUATION  2019        GASTRIC RESTRICTION SURGERY  2015    gastric sleeve    INCISION AND DRAINAGE OF WOUND Right 2022    Procedure: INCISION AND DRAINAGE (I&D) HEAD/FACE;  Surgeon: Wilfredo Quiroz DMD;  [facility];  Service: Maxillofacial    MULTIPLE TOOTH EXTRACTIONS Right 2022    Procedure: EXTRACTION TEETH MULTIPLE; Surgeon: Kya Weber DMD;  [facility];  Service: Maxillofacial    OPEN ANTERIOR SHOULDER RECONSTRUCTION Left 2018    OTHER SURGICAL HISTORY      sweat gland removal    AL TX MISSED  FIRST TRIMESTER SURGICAL N/A 2023    Procedure: DILATATION AND EVACUATION (D&E); Surgeon: Piedad Rosas MD;  [facility];  Service: Gynecology    RETAINED PLACENTA REMOVAL N/A 2020    Procedure: EXTRACTION OF ELANRKXV,FFNKOB;  Surgeon: Dary Grullon MD;  [facility];  Service: Obstetrics       Family History   Problem Relation Age of Onset    Stroke Mother     Heart disease Mother     Seizures Mother     Hypertension Mother     Anxiety disorder Mother     Bipolar disorder Mother     Kidney disease Mother     Sudden death Father     No Known Problems Sister     No Known Problems Sister     No Known Problems Brother     No Known Problems Brother     No Known Problems Brother     Sudden death Brother 21        MVA    No Known Problems Son     Pancreatic cancer Maternal Grandmother     Seizures Maternal Grandmother     Diabetes Maternal Grandmother     No Known Problems Daughter      I have reviewed and agree with the history as documented. E-Cigarette/Vaping    E-Cigarette Use Never User      E-Cigarette/Vaping Substances    Nicotine No     THC No     CBD No     Flavoring No     Other No     Unknown No      Social History     Tobacco Use    Smoking status: Former     Packs/day: 0.25     Years: 10.00     Total pack years: 2.50     Types: Cigarettes     Quit date: 2020     Years since quitting: 3.6     Passive exposure: Past    Smokeless tobacco: Former   Vaping Use    Vaping Use: Never used   Substance Use Topics    Alcohol use: Not Currently     Comment: social    Drug use: Never        Review of Systems   Constitutional:  Negative for chills and fever. HENT:  Negative for ear pain and sore throat. Eyes:  Negative for pain and visual disturbance.    Respiratory:  Negative for cough and shortness of breath. Cardiovascular:  Negative for chest pain and palpitations. Gastrointestinal:  Negative for abdominal pain and vomiting. Genitourinary:  Negative for dysuria and hematuria. Musculoskeletal:  Negative for arthralgias and back pain. Skin:  Negative for color change and rash. Neurological:  Negative for seizures and syncope. All other systems reviewed and are negative. Physical Exam  ED Triage Vitals [11/28/23 0102]   Temperature Pulse Respirations Blood Pressure SpO2   98.3 °F (36.8 °C) 87 18 143/63 99 %      Temp Source Heart Rate Source Patient Position - Orthostatic VS BP Location FiO2 (%)   Oral Monitor Sitting Right arm --      Pain Score       10 - Worst Possible Pain             Orthostatic Vital Signs  Vitals:    11/28/23 0102   BP: 143/63   Pulse: 87   Patient Position - Orthostatic VS: Sitting       Physical Exam  Vitals and nursing note reviewed. Constitutional:       General: She is in acute distress. Appearance: Normal appearance. She is normal weight. She is not ill-appearing, toxic-appearing or diaphoretic. HENT:      Head: Normocephalic and atraumatic. Right Ear: External ear normal.      Left Ear: External ear normal.      Nose: Nose normal.      Mouth/Throat:      Mouth: Mucous membranes are moist.      Pharynx: Oropharynx is clear. Eyes:      General:         Right eye: No discharge. Left eye: No discharge. Conjunctiva/sclera: Conjunctivae normal.   Cardiovascular:      Rate and Rhythm: Normal rate. Pulmonary:      Effort: Pulmonary effort is normal. No respiratory distress. Musculoskeletal:      Cervical back: Normal range of motion and neck supple. No rigidity. Skin:     General: Skin is warm and dry. Capillary Refill: Capillary refill takes less than 2 seconds. Coloration: Skin is not pale. Neurological:      Mental Status: She is alert and oriented to person, place, and time.    Psychiatric: Mood and Affect: Mood normal.         Behavior: Behavior normal.         ED Medications  Medications   Lidocaine Viscous HCl (XYLOCAINE) 2 % mucosal solution 15 mL (15 mL Swish & Spit Given 11/28/23 0122)       Diagnostic Studies  Results Reviewed       None                   No orders to display         Procedures  Procedures      ED Course                             SBIRT 22yo+      Flowsheet Row Most Recent Value   Initial Alcohol Screen: US AUDIT-C     1. How often do you have a drink containing alcohol? 0 Filed at: 11/28/2023 0127   2. How many drinks containing alcohol do you have on a typical day you are drinking? 0 Filed at: 11/28/2023 0127   3a. Male UNDER 65: How often do you have five or more drinks on one occasion? 0 Filed at: 11/28/2023 0127   3b. FEMALE Any Age, or MALE 65+: How often do you have 4 or more drinks on one occassion? 0 Filed at: 11/28/2023 0127   Audit-C Score 0 Filed at: 11/28/2023 0127   HELEN: How many times in the past year have you. .. Used an illegal drug or used a prescription medication for non-medical reasons? Never Filed at: 11/28/2023 0127                  Medical Decision Making  30-year-old female presents with molar fracture. No findings to suggest dental infection, Britton's angina. Offered patient nerve block for analgesia. Patient prefers oral analgesia. Risk  Prescription drug management. Disposition  Final diagnoses:   Tooth fracture   Dentalgia     Time reflects when diagnosis was documented in both MDM as applicable and the Disposition within this note       Time User Action Codes Description Comment    11/28/2023  1:31 AM Cydney Courtney Add [S02. 5XXA] Tooth fracture     11/28/2023  1:31 AM Cydney Courtney Add [Q62.68] ION SANTILLAN [facility]           ED Disposition       ED Disposition   Discharge    Condition   Stable    Date/Time   Tue Nov 28, 2023 Griffin Anderson discharge to home/self care.                    Follow-up Information    None Discharge Medication List as of 11/28/2023  1:34 AM        START taking these medications    Details   Lidocaine Viscous HCl (XYLOCAINE) 2 % mucosal solution Swish and spit 15 mL 4 (four) times a day as needed for mouth pain or discomfort, Starting Tue 11/28/2023, Normal           CONTINUE these medications which have NOT CHANGED    Details   !! acetaminophen (TYLENOL) 325 mg tablet Take 650 mg by mouth every 6 (six) hours as needed for mild pain, Historical Med      !! acetaminophen (TYLENOL) 325 mg tablet Take 2 tablets (650 mg total) by mouth every 4 (four) hours as needed for mild pain or moderate pain, Starting Sun 11/26/2023, Normal      amoxicillin (AMOXIL) 500 MG tablet Take 1 tablet (500 mg total) by mouth 2 (two) times a day for 10 days, Starting Sun 11/26/2023, Until Wed 12/6/2023, Normal      enoxaparin (Lovenox) 40 mg/0.4 mL Inject 0.4 mL (40 mg total) under the skin in the morning, Starting Tue 11/14/2023, Until Wed 3/13/2024, Normal       !! - Potential duplicate medications found. Please discuss with provider. No discharge procedures on file. PDMP Review       None             ED Provider  Attending physically available and evaluated [facility]. I managed the patient along with the ED Attending.     Electronically Signed by           Samara Lay MD  11/29/23 1290

## 2023-11-28 NOTE — ED ATTENDING ATTESTATION
11/28/2023  I, Paige Verma DO, saw and evaluated the patient. I have discussed the patient with the resident/non-physician practitioner and agree with the resident's/non-physician practitioner's findings, Plan of Care, and MDM as documented in the resident's/non-physician practitioner's note, except where noted. All available labs and Radiology studies were reviewed. I was present for key portions of any procedure(s) performed by the resident/non-physician practitioner and I was immediately available to provide assistance. At this point I agree with the current assessment done in the Emergency Department. I have conducted an independent evaluation of this patient a history and physical is as follows:    35 yo F presenting with dental pain  R lower molar pain for 2 weeks, but got significantly worse the past 2 days. Filling fell out.    Has [facility] appt scheduled, but not for a few more weeks  Seen in ED 2 days ago and started on abx    Denies fevers, chills, difficulty swallowing, CP, SOB, abdominal pain, vaginal bleeding/discharge    MDM; 35 yo F with dental pain- pain control, offered dental block, but pt declined      ED Course         Critical Care Time  Procedures

## 2023-12-14 ENCOUNTER — ROUTINE PRENATAL (OUTPATIENT)
Age: 33
End: 2023-12-14
Payer: COMMERCIAL

## 2023-12-14 ENCOUNTER — INITIAL PRENATAL (OUTPATIENT)
Dept: OBGYN CLINIC | Facility: CLINIC | Age: 33
End: 2023-12-14

## 2023-12-14 ENCOUNTER — APPOINTMENT (OUTPATIENT)
Dept: LAB | Facility: HOSPITAL | Age: 33
End: 2023-12-14
Payer: COMMERCIAL

## 2023-12-14 VITALS
BODY MASS INDEX: 38.13 KG/M2 | WEIGHT: 215.2 LBS | DIASTOLIC BLOOD PRESSURE: 62 MMHG | HEIGHT: 63 IN | HEART RATE: 84 BPM | SYSTOLIC BLOOD PRESSURE: 102 MMHG

## 2023-12-14 VITALS
DIASTOLIC BLOOD PRESSURE: 72 MMHG | BODY MASS INDEX: 35.79 KG/M2 | WEIGHT: 214.8 LBS | SYSTOLIC BLOOD PRESSURE: 110 MMHG | HEIGHT: 65 IN

## 2023-12-14 DIAGNOSIS — Z3A.13 13 WEEKS GESTATION OF PREGNANCY: ICD-10-CM

## 2023-12-14 DIAGNOSIS — Z86.711 HISTORY OF PULMONARY EMBOLISM: Primary | ICD-10-CM

## 2023-12-14 DIAGNOSIS — Z36.9 UNSPECIFIED ANTENATAL SCREENING: ICD-10-CM

## 2023-12-14 DIAGNOSIS — K95.89 IRON DEFICIENCY ANEMIA FOLLOWING BARIATRIC SURGERY: ICD-10-CM

## 2023-12-14 DIAGNOSIS — D50.8 IRON DEFICIENCY ANEMIA AFTER GASTRECTOMY: ICD-10-CM

## 2023-12-14 DIAGNOSIS — D50.8 IRON DEFICIENCY ANEMIA FOLLOWING BARIATRIC SURGERY: ICD-10-CM

## 2023-12-14 DIAGNOSIS — O09.292 PRIOR PERINATAL LOSS IN SECOND TRIMESTER, ANTEPARTUM: ICD-10-CM

## 2023-12-14 DIAGNOSIS — Z34.81 PRENATAL CARE, SUBSEQUENT PREGNANCY, FIRST TRIMESTER: Primary | ICD-10-CM

## 2023-12-14 DIAGNOSIS — O09.891 SHORT INTERVAL BETWEEN PREGNANCIES AFFECTING PREGNANCY IN FIRST TRIMESTER, ANTEPARTUM: ICD-10-CM

## 2023-12-14 DIAGNOSIS — D50.9 MICROCYTIC ANEMIA: ICD-10-CM

## 2023-12-14 DIAGNOSIS — K90.9 VITAMIN B12 DEFICIENCY DUE TO INTESTINAL MALABSORPTION: ICD-10-CM

## 2023-12-14 DIAGNOSIS — Z86.32 HISTORY OF GESTATIONAL DIABETES: ICD-10-CM

## 2023-12-14 DIAGNOSIS — K91.89 IRON DEFICIENCY ANEMIA AFTER GASTRECTOMY: ICD-10-CM

## 2023-12-14 DIAGNOSIS — O99.841 BARIATRIC SURGERY STATUS COMPLICATING PREGNANCY, FIRST TRIMESTER: ICD-10-CM

## 2023-12-14 DIAGNOSIS — Z34.81 PRENATAL CARE, SUBSEQUENT PREGNANCY, FIRST TRIMESTER: ICD-10-CM

## 2023-12-14 DIAGNOSIS — E53.8 VITAMIN B12 DEFICIENCY DUE TO INTESTINAL MALABSORPTION: ICD-10-CM

## 2023-12-14 DIAGNOSIS — D50.8 IRON DEFICIENCY ANEMIA SECONDARY TO INADEQUATE DIETARY IRON INTAKE: ICD-10-CM

## 2023-12-14 DIAGNOSIS — Z86.711 HISTORY OF PULMONARY EMBOLISM: ICD-10-CM

## 2023-12-14 DIAGNOSIS — O88.213 PULMONARY EMBOLISM AFFECTING PREGNANCY IN THIRD TRIMESTER: ICD-10-CM

## 2023-12-14 DIAGNOSIS — O99.210 MATERNAL OBESITY, ANTEPARTUM: ICD-10-CM

## 2023-12-14 DIAGNOSIS — Z36.82 NUCHAL TRANSLUCENCY OF FETUS ON PRENATAL ULTRASOUND: ICD-10-CM

## 2023-12-14 PROBLEM — Z34.90 EARLY STAGE OF PREGNANCY: Status: RESOLVED | Noted: 2023-11-14 | Resolved: 2023-12-14

## 2023-12-14 LAB
ABO GROUP BLD: NORMAL
ALBUMIN SERPL BCP-MCNC: 3.9 G/DL (ref 3.5–5)
ALP SERPL-CCNC: 43 U/L (ref 34–104)
ALT SERPL W P-5'-P-CCNC: 6 U/L (ref 7–52)
ANION GAP SERPL CALCULATED.3IONS-SCNC: 6 MMOL/L
AST SERPL W P-5'-P-CCNC: 11 U/L (ref 13–39)
BACTERIA UR QL AUTO: ABNORMAL /HPF
BASOPHILS # BLD AUTO: 0.01 THOUSANDS/ÂΜL (ref 0–0.1)
BASOPHILS NFR BLD AUTO: 0 % (ref 0–1)
BILIRUB SERPL-MCNC: 0.28 MG/DL (ref 0.2–1)
BILIRUB UR QL STRIP: NEGATIVE
BLD GP AB SCN SERPL QL: NEGATIVE
BUN SERPL-MCNC: 7 MG/DL (ref 5–25)
CALCIUM SERPL-MCNC: 9.2 MG/DL (ref 8.4–10.2)
CHLORIDE SERPL-SCNC: 104 MMOL/L (ref 96–108)
CLARITY UR: ABNORMAL
CO2 SERPL-SCNC: 24 MMOL/L (ref 21–32)
COLOR UR: YELLOW
CREAT SERPL-MCNC: 0.64 MG/DL (ref 0.6–1.3)
CRP SERPL QL: 8.2 MG/L
EOSINOPHIL # BLD AUTO: 0.01 THOUSAND/ÂΜL (ref 0–0.61)
EOSINOPHIL NFR BLD AUTO: 0 % (ref 0–6)
ERYTHROCYTE [DISTWIDTH] IN BLOOD BY AUTOMATED COUNT: 13.2 % (ref 11.6–15.1)
ERYTHROCYTE [SEDIMENTATION RATE] IN BLOOD: 88 MM/HOUR (ref 0–19)
EST. AVERAGE GLUCOSE BLD GHB EST-MCNC: 114 MG/DL
FERRITIN SERPL-MCNC: 14 NG/ML (ref 11–307)
FOLATE SERPL-MCNC: 17.7 NG/ML
GFR SERPL CREATININE-BSD FRML MDRD: 119 ML/MIN/1.73SQ M
GLUCOSE P FAST SERPL-MCNC: 92 MG/DL (ref 65–99)
GLUCOSE UR STRIP-MCNC: NEGATIVE MG/DL
HBA1C MFR BLD: 5.6 %
HBV SURFACE AG SER QL: NORMAL
HCT VFR BLD AUTO: 37.3 % (ref 34.8–46.1)
HCV AB SER QL: NORMAL
HGB BLD-MCNC: 12.1 G/DL (ref 11.5–15.4)
HGB UR QL STRIP.AUTO: NEGATIVE
HIV 1+2 AB+HIV1 P24 AG SERPL QL IA: NORMAL
HIV 2 AB SERPL QL IA: NORMAL
HIV1 AB SERPL QL IA: NORMAL
HIV1 P24 AG SERPL QL IA: NORMAL
IGA SERPL-MCNC: 235 MG/DL (ref 66–433)
IGG SERPL-MCNC: 1136 MG/DL (ref 635–1741)
IGM SERPL-MCNC: 131 MG/DL (ref 45–281)
IMM GRANULOCYTES # BLD AUTO: 0.02 THOUSAND/UL (ref 0–0.2)
IMM GRANULOCYTES NFR BLD AUTO: 0 % (ref 0–2)
IRON SATN MFR SERPL: 15 % (ref 15–50)
IRON SERPL-MCNC: 49 UG/DL (ref 50–212)
KETONES UR STRIP-MCNC: NEGATIVE MG/DL
LDH SERPL-CCNC: 111 U/L (ref 140–271)
LEUKOCYTE ESTERASE UR QL STRIP: NEGATIVE
LYMPHOCYTES # BLD AUTO: 2.34 THOUSANDS/ÂΜL (ref 0.6–4.47)
LYMPHOCYTES NFR BLD AUTO: 42 % (ref 14–44)
MCH RBC QN AUTO: 25.5 PG (ref 26.8–34.3)
MCHC RBC AUTO-ENTMCNC: 32.4 G/DL (ref 31.4–37.4)
MCV RBC AUTO: 79 FL (ref 82–98)
MONOCYTES # BLD AUTO: 0.28 THOUSAND/ÂΜL (ref 0.17–1.22)
MONOCYTES NFR BLD AUTO: 5 % (ref 4–12)
MUCOUS THREADS UR QL AUTO: ABNORMAL
NEUTROPHILS # BLD AUTO: 2.92 THOUSANDS/ÂΜL (ref 1.85–7.62)
NEUTS SEG NFR BLD AUTO: 53 % (ref 43–75)
NITRITE UR QL STRIP: NEGATIVE
NON-SQ EPI CELLS URNS QL MICRO: ABNORMAL /HPF
NRBC BLD AUTO-RTO: 0 /100 WBCS
PH UR STRIP.AUTO: 8 [PH]
PLATELET # BLD AUTO: 245 THOUSANDS/UL (ref 149–390)
PMV BLD AUTO: 9.7 FL (ref 8.9–12.7)
POTASSIUM SERPL-SCNC: 3.8 MMOL/L (ref 3.5–5.3)
PROT SERPL-MCNC: 7.6 G/DL (ref 6.4–8.4)
PROT UR STRIP-MCNC: ABNORMAL MG/DL
RBC # BLD AUTO: 4.75 MILLION/UL (ref 3.81–5.12)
RBC #/AREA URNS AUTO: ABNORMAL /HPF
RETICS # AUTO: NORMAL 10*3/UL (ref 14097–95744)
RETICS # CALC: 1.16 % (ref 0.37–1.87)
RH BLD: POSITIVE
RUBV IGG SERPL IA-ACNC: 26.2 IU/ML
SL AMB  POCT GLUCOSE, UA: NORMAL
SL AMB POCT URINE PROTEIN: NORMAL
SODIUM SERPL-SCNC: 134 MMOL/L (ref 135–147)
SP GR UR STRIP.AUTO: 1.03 (ref 1–1.03)
TIBC SERPL-MCNC: 328 UG/DL (ref 250–450)
TREPONEMA PALLIDUM IGG+IGM AB [PRESENCE] IN SERUM OR PLASMA BY IMMUNOASSAY: NORMAL
TSH SERPL DL<=0.05 MIU/L-ACNC: 1.19 UIU/ML (ref 0.45–4.5)
UIBC SERPL-MCNC: 279 UG/DL (ref 155–355)
UROBILINOGEN UR STRIP-ACNC: 4 MG/DL
VIT B12 SERPL-MCNC: 165 PG/ML (ref 180–914)
WBC # BLD AUTO: 5.58 THOUSAND/UL (ref 4.31–10.16)
WBC #/AREA URNS AUTO: ABNORMAL /HPF

## 2023-12-14 PROCEDURE — 87340 HEPATITIS B SURFACE AG IA: CPT

## 2023-12-14 PROCEDURE — 86850 RBC ANTIBODY SCREEN: CPT

## 2023-12-14 PROCEDURE — 83020 HEMOGLOBIN ELECTROPHORESIS: CPT

## 2023-12-14 PROCEDURE — 86147 CARDIOLIPIN ANTIBODY EA IG: CPT

## 2023-12-14 PROCEDURE — 85652 RBC SED RATE AUTOMATED: CPT

## 2023-12-14 PROCEDURE — 86334 IMMUNOFIX E-PHORESIS SERUM: CPT

## 2023-12-14 PROCEDURE — 86803 HEPATITIS C AB TEST: CPT

## 2023-12-14 PROCEDURE — 83615 LACTATE (LD) (LDH) ENZYME: CPT

## 2023-12-14 PROCEDURE — 82746 ASSAY OF FOLIC ACID SERUM: CPT

## 2023-12-14 PROCEDURE — 86140 C-REACTIVE PROTEIN: CPT

## 2023-12-14 PROCEDURE — 83036 HEMOGLOBIN GLYCOSYLATED A1C: CPT

## 2023-12-14 PROCEDURE — 81001 URINALYSIS AUTO W/SCOPE: CPT

## 2023-12-14 PROCEDURE — 87389 HIV-1 AG W/HIV-1&-2 AB AG IA: CPT

## 2023-12-14 PROCEDURE — 84443 ASSAY THYROID STIM HORMONE: CPT

## 2023-12-14 PROCEDURE — 76813 OB US NUCHAL MEAS 1 GEST: CPT | Performed by: OBSTETRICS & GYNECOLOGY

## 2023-12-14 PROCEDURE — 36415 COLL VENOUS BLD VENIPUNCTURE: CPT | Performed by: OBSTETRICS & GYNECOLOGY

## 2023-12-14 PROCEDURE — 86780 TREPONEMA PALLIDUM: CPT

## 2023-12-14 PROCEDURE — 82784 ASSAY IGA/IGD/IGG/IGM EACH: CPT

## 2023-12-14 PROCEDURE — 76801 OB US < 14 WKS SINGLE FETUS: CPT | Performed by: OBSTETRICS & GYNECOLOGY

## 2023-12-14 PROCEDURE — 87591 N.GONORRHOEAE DNA AMP PROB: CPT | Performed by: STUDENT IN AN ORGANIZED HEALTH CARE EDUCATION/TRAINING PROGRAM

## 2023-12-14 PROCEDURE — 86146 BETA-2 GLYCOPROTEIN ANTIBODY: CPT

## 2023-12-14 PROCEDURE — 82607 VITAMIN B-12: CPT

## 2023-12-14 PROCEDURE — 85025 COMPLETE CBC W/AUTO DIFF WBC: CPT

## 2023-12-14 PROCEDURE — 36415 COLL VENOUS BLD VENIPUNCTURE: CPT

## 2023-12-14 PROCEDURE — 87491 CHLMYD TRACH DNA AMP PROBE: CPT | Performed by: STUDENT IN AN ORGANIZED HEALTH CARE EDUCATION/TRAINING PROGRAM

## 2023-12-14 PROCEDURE — 83540 ASSAY OF IRON: CPT

## 2023-12-14 PROCEDURE — 99204 OFFICE O/P NEW MOD 45 MIN: CPT | Performed by: OBSTETRICS & GYNECOLOGY

## 2023-12-14 PROCEDURE — 86901 BLOOD TYPING SEROLOGIC RH(D): CPT

## 2023-12-14 PROCEDURE — 82728 ASSAY OF FERRITIN: CPT

## 2023-12-14 PROCEDURE — 86762 RUBELLA ANTIBODY: CPT

## 2023-12-14 PROCEDURE — 80053 COMPREHEN METABOLIC PANEL: CPT

## 2023-12-14 PROCEDURE — 85045 AUTOMATED RETICULOCYTE COUNT: CPT

## 2023-12-14 PROCEDURE — 86900 BLOOD TYPING SEROLOGIC ABO: CPT

## 2023-12-14 PROCEDURE — 87086 URINE CULTURE/COLONY COUNT: CPT

## 2023-12-14 PROCEDURE — 84165 PROTEIN E-PHORESIS SERUM: CPT

## 2023-12-14 PROCEDURE — 83550 IRON BINDING TEST: CPT

## 2023-12-14 NOTE — ASSESSMENT & PLAN NOTE
33 yo [id number] at 13.4wks presents for initial prenatal visit     Pt denies pelvic cramping, bleeding, nausea, vomiting, vaginal irritation.     -continue PNVs   -AFP ordered today   -GCCT collected today   -bleeding precautions provided   -return in 4 weeks

## 2023-12-14 NOTE — ASSESSMENT & PLAN NOTE
-8 months between last delivery and conception of current pregnancy   -cervical length surveillance per MFM

## 2023-12-14 NOTE — PROGRESS NOTES
OFFICE CONSULT  Referring physician:   Cal Monge, Do  [address],  [address] [place] [address]      Dear Dr. Ruslan Montejo      Thank you for requesting a  consultation on your patient Ms. Qi Valenzuela for the following indications:  Genetic screening        History  Medications: Lovenox 40 mg subcu that she has not started yet, prenatal vitamins daily  Allergies to medications: None  Past medical history: History of a pulmonary embolus in her 4th pregnancy, obesity based on a pregravid BMI of 37, anemia and her last pregnancy  Past surgical history: D&E x 2, tooth extraction, incision and drainage of wound, retained placental removal, anterior shoulder reconstruction and gastric sleeve  Past obstetrical history:   2014 at 37 weeks she had a 6 pound 8 ounce baby boy vaginally  2019 10-week VIP   16-week  delivery following an episode of bleeding  3/15/2023 at 39 weeks and 1 day she had a 6 pound 8 ounce baby girl vaginally. Her pregnancy was complicated by gestational diabetes, pulmonary embolus and significant anemia requiring IV infusions of iron  Social history: Smoker and denies any substance use currently  First generation family history: Her mother has a history of stroke secondary to hypertension and heart disease    Ultrasound findings: The ultrasound shows a fetus concordant with dates. The nasal bone and nuchal translucency appears normal. No malformations are seen on today's early ultrasound. The patient was informed of the findings and counseled about the limitations of the exam in detecting all forms of fetal congenital abnormalities. She does not report any vaginal bleeding or uterine cramping or contractions. Specific counseling was provided on the following problems:   We discussed the options for genetic screening which include invasive testing on the fetal placenta or on fetal skin cells within the amniotic fluid and compared this to noninvasive testing which includes cell free DNA screening. We reviewed the risks, the benefits and the limitations of each. In the end patient chose to complete the cell free DNA screen. 16-week  delivery following an episode of bleeding and cramping and she required a postpartum D&E. Recommend serial cervical lengths from 16-23w6d. She developed significant maternal anemia from malabsorption in her last pregnancy with a hemoglobin of 7.7 and a ferritin level of 7 and required iron infusions and vit b12 injections and hematology consult  Hemoglobin at the start of this pregnancy is 12.1 Ferritin  is low normal at 14 and vitamin B12l low at 165 which should be repleted so she does not develop anemia this pregnancy. Recommend referral to hematology. Hx of a Gastric sleeve  History of a bilateral pulmonary emboli diagnosed at 21 weeks in her last pregnancy and required therapeutic Lovenox. Thrombophilia lab work shows a negative factor V Leiden, prothrombin gene, beta-2 glycoprotein, negative protein C, protein S activity of 31 with normal free and total protein S levels in the late second trimester. .  Anticardiolipin IgM  was weakly positive at 24 last pregnancy. Antithrombin III and lupus anticoagulant were not drawn previously as she was on lovenox. She has not started her Lovenox yet would recommend she have Antithrombin III, lupus anticoagulant, anticardiolipin and protein S activity levels redrawn. Long-term use of Lovenox is associated with an increased risk for osteoporosis so recommend supplementing with calcium 1200 milligrams daily was ordered. History of GDM in a prior pregnancy and her hemoglobin A1c was 5.6 and fasting was 92 this pregnancy as a screen for early onset gdm. She reports she is able to tolerated Glucola which can be used for her repeat diabetes screen at 28 weeks. Short interval pregnancy  Pregravid BMI of 38       Future tests recommended:   The results of her NIPT will return in 7-10 days. Screening for spina bifida with an MSAFP screen is a future test that can be prescribed through her OB office. This blood work should be drawn preferably at 16 to 18 weeks so that the results return prior to her next scan. The test though can be run until 21 weeks and 6 days if needed. Recommend an Antithrombin III level and lupus anticoagulant and repeat anticardiolipin and protein S  activity, free and total and then would have her start on Lovenox 40 mg daily subcutaneously daily    Future ultrasounds ordered today:   Fetal Level II ultrasound imaging is recommended at 19-20 weeks' gestation. Serial TVS ordered today. Pre visit time reviewing her records   10 minutes  Face to face time 15 minutes  Post visit time on documentation of note, updating her problem list, adding orders and prescriptions 20 minutes. Procedures that were completed today were charged separately. The level of decision making was moderate complexity.     Phill Clark MD

## 2023-12-14 NOTE — PROGRESS NOTES
13 weeks gestation of pregnancy  35 yo  at 13.4wks presents for initial prenatal visit     Pt denies pelvic cramping, bleeding, nausea, vomiting, vaginal irritation.     -continue PNVs   -AFP ordered today   -GCCT collected today   -bleeding precautions provided   -return in 4 weeks     History of pulmonary embolism  -diagnosed at 21 wks in prior pregnancy requiring therapeutic lovenox   -prior ASP and thrombophilia workup negative   -will repeat APS workup and order antithrombin III and protein S per MFM   -Lovenox 40mg daily for prophylaxis previously ordered. Encouraged patient to start taking to decrease risk of morbidity/mortality. Pt verbalized understanding and will start taking today. Prior  loss in second trimester, antepartum  - 16wk vaginal delivery with subsequent D&E for retained placenta. Pt required a blood transfusion at this time.    -APS workup negative, repeat APS workup ordered     History of gestational diabetes  -early 1hr GTT ordered today     Short interval between pregnancies affecting pregnancy in first trimester, antepartum  -8 months between last delivery and conception of current pregnancy   -cervical length surveillance per MFM     History of bariatric surgery  -reports history of gastric sleeve   -able to tolerate 1hr GTT, ordered

## 2023-12-14 NOTE — ASSESSMENT & PLAN NOTE
-diagnosed at 21 wks in prior pregnancy requiring therapeutic lovenox   -prior ASP and thrombophilia workup negative   -will repeat APS workup and order antithrombin III and protein S per MFM   -Lovenox 40mg daily for prophylaxis previously ordered. Encouraged patient to start taking to decrease risk of morbidity/mortality. Pt verbalized understanding and will start taking today.

## 2023-12-14 NOTE — ASSESSMENT & PLAN NOTE
-2020 16wk vaginal delivery with subsequent D&E for retained placenta. Pt required a blood transfusion at this time.    -APS workup negative, repeat APS workup ordered

## 2023-12-14 NOTE — LETTER
December 15, 2023     Garrett Smith, [address] [place], [place] [address]    Patient: Gladys Amaya   YOB: 1990   Date of Visit: 2023       Dear Dr. Carlos Michele: Thank you for referring Gladys Amaya to me for evaluation. Below are my notes for this consultation. If you have questions, please do not hesitate to call me. I look forward to following your patient along with you. Sincerely,        Kitty Lpoez MD        CC: No Recipients    Kitty Lopez MD  12/15/2023  2:27 AM  Sign when Signing Visit  OFFICE CONSULT  Referring physician:   Garrett Smith, Do  [address] [place] [address],  [address] [place] [address] [place]      Dear Dr. Carlos Michele      Thank you for requesting a  consultation on your patient Ms. Gladys Amaya for the following indications:  Genetic screening        History  Medications: Lovenox 40 mg subcu that she has not started yet, prenatal vitamins daily  Allergies to medications: None  Past medical history: History of a pulmonary embolus in her 4th pregnancy, obesity based on a pregravid BMI of 37, anemia and her last pregnancy  Past surgical history: D&E x 2, tooth extraction, incision and drainage of wound, retained placental removal, anterior shoulder reconstruction and gastric sleeve  Past obstetrical history:   2014 at 37 weeks she had a 6 pound 8 ounce baby boy vaginally  2019 10-week VIP  2020 16-week  delivery following an episode of bleeding  3/15/2023 at 39 weeks and 1 day she had a 6 pound 8 ounce baby girl vaginally. Her pregnancy was complicated by gestational diabetes, pulmonary embolus and significant anemia requiring IV infusions of iron  Social history: Smoker and denies any substance use currently  First generation family history: Her mother has a history of stroke secondary to hypertension and heart disease    Ultrasound findings: The ultrasound shows a fetus concordant with dates.  The nasal bone and nuchal translucency appears normal. No malformations are seen on today's early ultrasound. The patient was informed of the findings and counseled about the limitations of the exam in detecting all forms of fetal congenital abnormalities. She does not report any vaginal bleeding or uterine cramping or contractions. Specific counseling was provided on the following problems: We discussed the options for genetic screening which include invasive testing on the fetal placenta or on fetal skin cells within the amniotic fluid and compared this to noninvasive testing which includes cell free DNA screening. We reviewed the risks, the benefits and the limitations of each. In the end patient chose to complete the cell free DNA screen. 16-week  delivery following an episode of bleeding and cramping and she required a postpartum D&E. Recommend serial cervical lengths from 16-23w6d. She developed significant maternal anemia from malabsorption in her last pregnancy with a hemoglobin of 7.7 and a ferritin level of 7 and required iron infusions and vit b12 injections and hematology consult  Hemoglobin at the start of this pregnancy is 12.1 Ferritin  is low normal at 14 and vitamin B12l low at 165 which should be repleted so she does not develop anemia this pregnancy. Recommend referral to hematology. Hx of a Gastric sleeve  History of a bilateral pulmonary emboli diagnosed at 21 weeks in her last pregnancy and required therapeutic Lovenox. Thrombophilia lab work shows a negative factor V Leiden, prothrombin gene, beta-2 glycoprotein, negative protein C, protein S activity of 31 with normal free and total protein S levels in the late second trimester. .  Anticardiolipin IgM  was weakly positive at 24 last pregnancy. Antithrombin III and lupus anticoagulant were not drawn previously as she was on lovenox.   She has not started her Lovenox yet would recommend she have Antithrombin III, lupus anticoagulant, anticardiolipin and protein S activity levels redrawn. Long-term use of Lovenox is associated with an increased risk for osteoporosis so recommend supplementing with calcium 1200 milligrams daily was ordered. History of GDM in a prior pregnancy and her hemoglobin A1c was 5.6 and fasting was 92 this pregnancy as a screen for early onset gdm. She reports she is able to tolerated Glucola which can be used for her repeat diabetes screen at 28 weeks. Short interval pregnancy  Pregravid BMI of 38       Future tests recommended: The results of her NIPT will return in 7-10 days. Screening for spina bifida with an MSAFP screen is a future test that can be prescribed through her OB office. This blood work should be drawn preferably at 16 to 18 weeks so that the results return prior to her next scan. The test though can be run until 21 weeks and 6 days if needed. Recommend an Antithrombin III level and lupus anticoagulant and repeat anticardiolipin and protein S  activity, free and total and then would have her start on Lovenox 40 mg daily subcutaneously daily    Future ultrasounds ordered today:   Fetal Level II ultrasound imaging is recommended at 19-20 weeks' gestation. Serial TVS ordered today. Pre visit time reviewing her records   10 minutes  Face to face time 15 minutes  Post visit time on documentation of note, updating her problem list, adding orders and prescriptions 20 minutes. Procedures that were completed today were charged separately. The level of decision making was moderate complexity.     Brooklynn Felton MD

## 2023-12-14 NOTE — PROGRESS NOTES
Patient chose to have [facility] Non-invasive Prenatal Screen with fetal sex. Patient choose billed through insurance. Patient assistance program (PAP) application provided to patient no. PAP sent with specimen No.     Patient given brochure and is aware [facility] will contact their insurance and coordinate coverage. Patient made aware she will receive a text message and e-mail from [employer]. Patient informed text/phone call message will come from area code  "415". Provided [employer] # [phone] and web site at [employer].   "[place] your test online" card with barcode and test tube ID provided to patient. Reviewed [employer]'s web site states 5-7 business days for results via their portal.   "[employer]" message will be sent to patient when M receives results /provider reviews. 2 vials of blood drawn from  right arm by Lily Blunt. Patient tolerated blood draw without difficulty. Specimens labeled with patient identifiers (name, date of birth, specimen collection date), order and specimen were verified with patient, packed and sent via [address]. [employer]  tracking #  [id number]. Copy of lab order scanned to Epic media. [facility] will have results in approximately 7-10 business days and will call patient or notify via [address] [place] [address]. Patient aware viewing lab result online will reveal fetal sex if ordered. Patient verbalized understanding of all instructions and no questions at this time.

## 2023-12-15 ENCOUNTER — TELEPHONE (OUTPATIENT)
Dept: HEMATOLOGY ONCOLOGY | Facility: CLINIC | Age: 33
End: 2023-12-15

## 2023-12-15 PROBLEM — O99.841 BARIATRIC SURGERY STATUS COMPLICATING PREGNANCY, FIRST TRIMESTER: Status: ACTIVE | Noted: 2018-03-15

## 2023-12-15 LAB
C TRACH DNA SPEC QL NAA+PROBE: NEGATIVE
N GONORRHOEA DNA SPEC QL NAA+PROBE: NEGATIVE

## 2023-12-15 RX ORDER — ENOXAPARIN SODIUM 100 MG/ML
40 INJECTION SUBCUTANEOUS DAILY
Qty: 12 ML | Refills: 3 | Status: SHIPPED | OUTPATIENT
Start: 2023-12-15 | End: 2024-04-13

## 2023-12-15 RX ORDER — PHENOL 1.4 %
600 AEROSOL, SPRAY (ML) MUCOUS MEMBRANE 2 TIMES DAILY WITH MEALS
Qty: 180 TABLET | Refills: 2 | Status: SHIPPED | OUTPATIENT
Start: 2023-12-15

## 2023-12-15 NOTE — TELEPHONE ENCOUNTER
I called Georgina Galeano in response to a referral that was received for patient to establish care with Hematology. Outreach was made to schedule follow up with [facility] as she use to see Jacquelyn Hale. I left a voicemail explaining the reason for my call and advised patient to call [facility] at [phone]. The referral has been closed.

## 2023-12-16 LAB
ALBUMIN SERPL ELPH-MCNC: 3.52 G/DL (ref 3.2–5.1)
ALBUMIN SERPL ELPH-MCNC: 52.5 % (ref 48–70)
ALPHA1 GLOB SERPL ELPH-MCNC: 0.25 G/DL (ref 0.15–0.47)
ALPHA1 GLOB SERPL ELPH-MCNC: 3.7 % (ref 1.8–7)
ALPHA2 GLOB SERPL ELPH-MCNC: 0.86 G/DL (ref 0.42–1.04)
ALPHA2 GLOB SERPL ELPH-MCNC: 12.9 % (ref 5.9–14.9)
BACTERIA UR CULT: NORMAL
BETA GLOB ABNORMAL SERPL ELPH-MCNC: 0.49 G/DL (ref 0.31–0.57)
BETA1 GLOB SERPL ELPH-MCNC: 7.3 % (ref 4.7–7.7)
BETA2 GLOB SERPL ELPH-MCNC: 6.6 % (ref 3.1–7.9)
BETA2+GAMMA GLOB SERPL ELPH-MCNC: 0.44 G/DL (ref 0.2–0.58)
GAMMA GLOB ABNORMAL SERPL ELPH-MCNC: 1.14 G/DL (ref 0.4–1.66)
GAMMA GLOB SERPL ELPH-MCNC: 17 % (ref 6.9–22.3)
IGG/ALB SER: 1.11 {RATIO} (ref 1.1–1.8)
INTERPRETATION UR IFE-IMP: NORMAL
PROT PATTERN SERPL ELPH-IMP: NORMAL
PROT SERPL-MCNC: 6.7 G/DL (ref 6.4–8.2)

## 2023-12-16 PROCEDURE — 84165 PROTEIN E-PHORESIS SERUM: CPT | Performed by: STUDENT IN AN ORGANIZED HEALTH CARE EDUCATION/TRAINING PROGRAM

## 2023-12-16 PROCEDURE — 86334 IMMUNOFIX E-PHORESIS SERUM: CPT | Performed by: STUDENT IN AN ORGANIZED HEALTH CARE EDUCATION/TRAINING PROGRAM

## 2023-12-18 ENCOUNTER — TELEPHONE (OUTPATIENT)
Facility: HOSPITAL | Age: 33
End: 2023-12-18

## 2023-12-18 NOTE — TELEPHONE ENCOUNTER
----- Message from Melissa Anthony MD sent at 12/15/2023  2:34 AM EST -----  Please call patient to schedule serial cervical lengths at 16-18-20-22 weeks.    Melissa

## 2023-12-19 LAB
HGB A MFR BLD: 2.6 % (ref 1.8–3.2)
HGB A MFR BLD: 97.4 % (ref 96.4–98.8)
HGB F MFR BLD: 0 % (ref 0–2)
HGB FRACT BLD-IMP: NORMAL
HGB S MFR BLD: 0 %

## 2023-12-21 ENCOUNTER — TELEPHONE (OUTPATIENT)
Age: 33
End: 2023-12-21

## 2023-12-21 NOTE — TELEPHONE ENCOUNTER
----- Message from Melissa Anthony MD sent at 12/20/2023  4:33 PM EST -----  Your Cell free DNA screening returned as normal. If you are interested in knowing what the baby's sex is, than you will need to open the lab result to see it.  Your obstetrician at your next OB visit should offer screening for spina bifida that can be completed between 16 and 18 weeks and utilizes the blood test called MSAFP. This lab is to see if your baby is at increased risk to have spinal defect.  Melissa Anthony MD

## 2023-12-21 NOTE — TELEPHONE ENCOUNTER
Left voicemail for Angelia Anderson reviewing her non invasive prenatal screening (NIPS) results screened low risk, fetal sex not revealed. Explained if Angelia Anderson would like to know the fetal sex of her baby, encouraged to review test results in mychart. If patient wishes not want to know fetal sex, advised to not open her test result in mychart. Advised there is routine blood screening for spina bifida that is recommended to complete in the second trimester (16-18 weeks) that will be completed through your obstetrician's office. Contact the  office at [phone] with any questions.

## 2023-12-22 LAB
B2 GLYCOPROT1 IGA SERPL IA-ACNC: 1.9
B2 GLYCOPROT1 IGG SERPL IA-ACNC: 0.9
B2 GLYCOPROT1 IGM SERPL IA-ACNC: 6
CARDIOLIPIN IGA SER IA-ACNC: 3.7
CARDIOLIPIN IGG SER IA-ACNC: 2.8
CARDIOLIPIN IGM SER IA-ACNC: 19

## 2023-12-29 ENCOUNTER — APPOINTMENT (OUTPATIENT)
Dept: LAB | Facility: HOSPITAL | Age: 33
End: 2023-12-29
Payer: COMMERCIAL

## 2023-12-29 DIAGNOSIS — Z3A.13 13 WEEKS GESTATION OF PREGNANCY: ICD-10-CM

## 2023-12-29 DIAGNOSIS — Z36.9 UNSPECIFIED ANTENATAL SCREENING: ICD-10-CM

## 2023-12-29 DIAGNOSIS — Z86.711 HISTORY OF PULMONARY EMBOLISM: ICD-10-CM

## 2023-12-29 DIAGNOSIS — Z34.81 PRENATAL CARE, SUBSEQUENT PREGNANCY, FIRST TRIMESTER: ICD-10-CM

## 2023-12-29 LAB
25(OH)D3 SERPL-MCNC: 10.4 NG/ML (ref 30–100)
VIT B12 SERPL-MCNC: 162 PG/ML (ref 180–914)

## 2023-12-29 PROCEDURE — 85705 THROMBOPLASTIN INHIBITION: CPT | Performed by: OBSTETRICS & GYNECOLOGY

## 2023-12-29 PROCEDURE — 85306 CLOT INHIBIT PROT S FREE: CPT

## 2023-12-29 PROCEDURE — 85300 ANTITHROMBIN III ACTIVITY: CPT

## 2023-12-29 PROCEDURE — 36415 COLL VENOUS BLD VENIPUNCTURE: CPT

## 2023-12-29 PROCEDURE — 82105 ALPHA-FETOPROTEIN SERUM: CPT

## 2023-12-29 PROCEDURE — 85670 THROMBIN TIME PLASMA: CPT | Performed by: OBSTETRICS & GYNECOLOGY

## 2023-12-29 PROCEDURE — 85305 CLOT INHIBIT PROT S TOTAL: CPT

## 2023-12-29 PROCEDURE — 82306 VITAMIN D 25 HYDROXY: CPT

## 2023-12-29 PROCEDURE — 85613 RUSSELL VIPER VENOM DILUTED: CPT | Performed by: OBSTETRICS & GYNECOLOGY

## 2023-12-29 PROCEDURE — 82607 VITAMIN B-12: CPT

## 2023-12-29 PROCEDURE — 85732 THROMBOPLASTIN TIME PARTIAL: CPT | Performed by: OBSTETRICS & GYNECOLOGY

## 2023-12-30 LAB — DEPRECATED AT III PPP: 98 % OF NORMAL (ref 92–136)

## 2023-12-31 LAB
2ND TRIMESTER 4 SCREEN SERPL-IMP: NORMAL
AFP ADJ MOM SERPL: 0.66
AFP INTERP AMN-IMP: NORMAL
AFP INTERP SERPL-IMP: NORMAL
AFP INTERP SERPL-IMP: NORMAL
AFP SERPL-MCNC: 18.9 NG/ML
AGE AT DELIVERY: 32.4 YR
APTT SCREEN TO CONFIRM RATIO: 0.98 RATIO (ref 0–1.34)
CONFIRM APTT/NORMAL: 35.9 SEC (ref 0–47.6)
GA METHOD: NORMAL
GA: 15.7 WEEKS
IDDM PATIENT QL: NO
LA PPP-IMP: NORMAL
MULTIPLE PREGNANCY: NO
NEURAL TUBE DEFECT RISK FETUS: NORMAL %
PROT S ACT/NOR PPP: 82 % (ref 61–136)
PROT S PPP-ACNC: 74 % (ref 60–150)
SCREEN APTT: 34.3 SEC (ref 0–43.5)
SCREEN DRVVT: 39.9 SEC (ref 0–47)
THROMBIN TIME: 15.7 SEC (ref 0–23)

## 2024-01-01 LAB — PROT S ACT/NOR PPP: 66 % (ref 68–108)

## 2024-01-02 ENCOUNTER — TELEPHONE (OUTPATIENT)
Dept: OBGYN CLINIC | Facility: CLINIC | Age: 34
End: 2024-01-02

## 2024-01-02 NOTE — TELEPHONE ENCOUNTER
----- Message from Shona Watts DO sent at 1/2/2024 12:45 PM EST -----  Please tell patient to increase her vit D supplementation to 6000 IU per day. Thanks!

## 2024-01-02 NOTE — TELEPHONE ENCOUNTER
Called pt- informed of EC recommendation for pt to increase her vit D supplementation to 6000 IU per day.  Pt verbalized understanding.

## 2024-01-03 ENCOUNTER — ROUTINE PRENATAL (OUTPATIENT)
Dept: PERINATAL CARE | Facility: CLINIC | Age: 34
End: 2024-01-03
Payer: COMMERCIAL

## 2024-01-03 VITALS
HEIGHT: 64 IN | BODY MASS INDEX: 37.32 KG/M2 | DIASTOLIC BLOOD PRESSURE: 70 MMHG | HEART RATE: 81 BPM | SYSTOLIC BLOOD PRESSURE: 128 MMHG | WEIGHT: 218.6 LBS

## 2024-01-03 DIAGNOSIS — O09.292 PRIOR PERINATAL LOSS IN SECOND TRIMESTER, ANTEPARTUM: Primary | ICD-10-CM

## 2024-01-03 DIAGNOSIS — O09.891 SHORT INTERVAL BETWEEN PREGNANCIES AFFECTING PREGNANCY IN FIRST TRIMESTER, ANTEPARTUM: ICD-10-CM

## 2024-01-03 DIAGNOSIS — Z3A.16 16 WEEKS GESTATION OF PREGNANCY: ICD-10-CM

## 2024-01-03 DIAGNOSIS — O99.210 MATERNAL OBESITY, ANTEPARTUM: ICD-10-CM

## 2024-01-03 PROBLEM — O99.842 BARIATRIC SURGERY STATUS COMPLICATING PREGNANCY, SECOND TRIMESTER: Status: ACTIVE | Noted: 2018-03-15

## 2024-01-03 PROCEDURE — 76817 TRANSVAGINAL US OBSTETRIC: CPT | Performed by: OBSTETRICS & GYNECOLOGY

## 2024-01-03 PROCEDURE — 99214 OFFICE O/P EST MOD 30 MIN: CPT | Performed by: OBSTETRICS & GYNECOLOGY

## 2024-01-03 RX ORDER — MULTIVIT-MIN/IRON/FOLIC ACID/K 18-600-40
1 CAPSULE ORAL 3 TIMES DAILY
COMMUNITY

## 2024-01-03 NOTE — PROGRESS NOTES
Angelia Anderson  has no complaints today at 16w3d. She reports fetal movements and does not report any vaginal bleeding or signs of labor.  Her recently completed fetal testing revealed a normal NIPT and MSAFP. She is here today for an ultrasound for cervical length.    Problem list:  16-week  delivery after bleeding  History of pulmonary embolism at 21 weeks in her fourth pregnancy with a normal hypercoagulable state workup other than a weakly positive anticardiolipin IgM that was 24 and on repeat was 19.  She has not started her prophylactic Lovenox yet.  Obesity based on a pregravid BMI of 37  Prior gastric sleeve  History of gestational diabetes with no evidence for early onset GDM based on a hemoglobin A1c of 5.6 and a fasting of 92. She feels she can complete a glucola at 28 weeks.   Short interval pregnancy  History of significant anemia with a hemoglobin of 7.7 and a ferritin level of 7 and vit b12 levels in her previous pregnancy requiring hematology consult iron infusions and vitamin B12 injections.  Ferritin this pregnancy is low normal at 14 and vitamin B12 is low at 165 so she was given a referral to hematology which she has not completed yet so as to prevent such significant anemia from developing.   Low vitamin D of 10.4-her OB increased her vitamin D supplementation to 6000 IUs/day on 2024.    Ultrasound findings:  The ultrasound today shows normal cervical length.    Pregnancy ultrasound has limitations and is unable to detect all forms of fetal congenital abnormalities.      Specific counseling was provided on the following problems:  Encouraged her strongly to start her prophylactic Lovenox based on the fact that she had a prior pulmonary embolism in her fourth pregnancy irregardless of her normal hypercoagulable states lab work.  We discussed she may have a hypercoagulable state that we do not know exists.  She does not feel comfortable giving herself her own injections so her partner  who is with her today agrees to give her daily injections.  Recommend she also start her 1200 mg of calcium daily that I also prescribed with her Lovenox.    Follow up recommended:   Recommend a follow-up transvaginal scan which is already scheduled in 2 weeks.    Pre visit time reviewing her records   15 minutes  Face to face time 10 minutes  Post visit time on documentation of note, updating her problem list, adding orders and prescriptions 15 minutes.  Procedures that were completed today were charged separately.   The level of decision making was moderate complexity.    Melissa Anthony MD

## 2024-01-03 NOTE — LETTER
January 3, 2024     TONI Garcia  [address] [address]    Patient: Angelia Anderson   YOB: 1990   Date of Visit: 1/3/2024       Dear Dr. Guerra:    Thank you for referring Angelia Anderson to me for evaluation. Below are my notes for this consultation.    If you have questions, please do not hesitate to call me. I look forward to following your patient along with you.         Sincerely,        Melissa Anthony MD        CC: No Recipients    Melissa Anthony MD  1/3/2024  9:46 PM  Sign when Signing Visit  Angelia Anderson  has no complaints today at 16w3d. She reports fetal movements and does not report any vaginal bleeding or signs of labor.  Her recently completed fetal testing revealed a normal NIPT and MSAFP. She is here today for an ultrasound for cervical length.    Problem list:  16-week  delivery after bleeding  History of pulmonary embolism at 21 weeks in her fourth pregnancy with a normal hypercoagulable state workup other than a weakly positive anticardiolipin IgM that was 24 and on repeat was 19.  She has not started her prophylactic Lovenox yet.  Obesity based on a pregravid BMI of 37  Prior gastric sleeve  History of gestational diabetes with no evidence for early onset GDM based on a hemoglobin A1c of 5.6 and a fasting of 92. She feels she can complete a glucola at 28 weeks.   Short interval pregnancy  History of significant anemia with a hemoglobin of 7.7 and a ferritin level of 7 and vit b12 levels in her previous pregnancy requiring hematology consult iron infusions and vitamin B12 injections.  Ferritin this pregnancy is low normal at 14 and vitamin B12 is low at 165 so she was given a referral to hematology which she has not completed yet so as to prevent such significant anemia from developing.   Low vitamin D of 10.4-her OB increased her vitamin D supplementation to 6000 IUs/day on 2024.    Ultrasound findings:  The  ultrasound today shows normal cervical length.    Pregnancy ultrasound has limitations and is unable to detect all forms of fetal congenital abnormalities.      Specific counseling was provided on the following problems:  Encouraged her strongly to start her prophylactic Lovenox based on the fact that she had a prior pulmonary embolism in her fourth pregnancy irregardless of her normal hypercoagulable states lab work.  We discussed she may have a hypercoagulable state that we do not know exists.  She does not feel comfortable giving herself her own injections so her partner who is with her today agrees to give her daily injections.  Recommend she also start her 1200 mg of calcium daily that I also prescribed with her Lovenox.    Follow up recommended:   Recommend a follow-up transvaginal scan which is already scheduled in 2 weeks.    Pre visit time reviewing her records   15 minutes  Face to face time 10 minutes  Post visit time on documentation of note, updating her problem list, adding orders and prescriptions 15 minutes.  Procedures that were completed today were charged separately.   The level of decision making was moderate complexity.    Melissa Anthony MD

## 2024-01-03 NOTE — PROGRESS NOTES
Ultrasound Probe Disinfection    A transvaginal ultrasound was performed.   Prior to use, disinfection was performed with High Level Disinfection Process ([employer]).  Probe serial number [id number]: [id number] was used.      Alisa Gonzalez  01/03/24  10:56 AM

## 2024-01-09 PROBLEM — Z3A.17 17 WEEKS GESTATION OF PREGNANCY: Status: ACTIVE | Noted: 2023-12-14

## 2024-01-09 PROBLEM — N39.0 FREQUENT UTI: Status: RESOLVED | Noted: 2022-06-08 | Resolved: 2024-01-09

## 2024-01-17 ENCOUNTER — ROUTINE PRENATAL (OUTPATIENT)
Age: 34
End: 2024-01-17
Payer: COMMERCIAL

## 2024-01-17 VITALS
SYSTOLIC BLOOD PRESSURE: 112 MMHG | HEART RATE: 80 BPM | DIASTOLIC BLOOD PRESSURE: 68 MMHG | WEIGHT: 218.4 LBS | HEIGHT: 64 IN | BODY MASS INDEX: 37.28 KG/M2

## 2024-01-17 DIAGNOSIS — O09.891 HISTORY OF PRETERM DELIVERY, CURRENTLY PREGNANT, FIRST TRIMESTER: ICD-10-CM

## 2024-01-17 DIAGNOSIS — Z3A.18 18 WEEKS GESTATION OF PREGNANCY: Primary | ICD-10-CM

## 2024-01-17 PROCEDURE — 76817 TRANSVAGINAL US OBSTETRIC: CPT | Performed by: OBSTETRICS & GYNECOLOGY

## 2024-01-17 PROCEDURE — 76815 OB US LIMITED FETUS(S): CPT | Performed by: OBSTETRICS & GYNECOLOGY

## 2024-01-17 RX ORDER — ENOXAPARIN SODIUM 100 MG/ML
INJECTION SUBCUTANEOUS
COMMUNITY

## 2024-01-17 NOTE — LETTER
January 17, 2024     Shona Watts DO  [address] [facility]  [place] [address]    Patient: Angelia Anderson   YOB: 1990   Date of Visit: 1/17/2024       Dear Dr. Watts:    Thank you for referring Angelia Anderson to me for evaluation. Below are my notes for this consultation.    If you have questions, please do not hesitate to call me. I look forward to following your patient along with you.         Sincerely,        Maikel Liz MD        CC: No Recipients    Maikel Liz MD  1/17/2024 10:34 AM  Sign when Signing Visit  Please refer to the [facility] ultrasound report in Ob Procedures for additional information regarding today's visit

## 2024-01-17 NOTE — PROGRESS NOTES
Please refer to the [facility] ultrasound report in Ob Procedures for additional information regarding today's visit

## 2024-02-04 NOTE — PROGRESS NOTES
Please refer to the [facility] ultrasound report in Ob Procedures for additional information regarding today's visit

## 2024-02-05 ENCOUNTER — ROUTINE PRENATAL (OUTPATIENT)
Dept: PERINATAL CARE | Facility: OTHER | Age: 34
End: 2024-02-05
Payer: COMMERCIAL

## 2024-02-05 VITALS
HEIGHT: 64 IN | DIASTOLIC BLOOD PRESSURE: 64 MMHG | BODY MASS INDEX: 37.66 KG/M2 | SYSTOLIC BLOOD PRESSURE: 126 MMHG | HEART RATE: 99 BPM | WEIGHT: 220.6 LBS

## 2024-02-05 DIAGNOSIS — O09.892 SHORT INTERVAL BETWEEN PREGNANCIES COMPLICATING PREGNANCY, ANTEPARTUM, SECOND TRIMESTER: ICD-10-CM

## 2024-02-05 DIAGNOSIS — Z3A.21 21 WEEKS GESTATION OF PREGNANCY: ICD-10-CM

## 2024-02-05 DIAGNOSIS — Z36.3 ENCOUNTER FOR ANTENATAL SCREENING FOR MALFORMATIONS: ICD-10-CM

## 2024-02-05 DIAGNOSIS — O99.842 BARIATRIC SURGERY STATUS COMPLICATING PREGNANCY, SECOND TRIMESTER: ICD-10-CM

## 2024-02-05 DIAGNOSIS — Z86.711 HISTORY OF PULMONARY EMBOLISM: ICD-10-CM

## 2024-02-05 DIAGNOSIS — O99.212 MATERNAL OBESITY, ANTEPARTUM, SECOND TRIMESTER: Primary | ICD-10-CM

## 2024-02-05 DIAGNOSIS — O09.892 HISTORY OF PREMATURE DELIVERY, CURRENTLY PREGNANT, SECOND TRIMESTER: ICD-10-CM

## 2024-02-05 PROCEDURE — 99214 OFFICE O/P EST MOD 30 MIN: CPT | Performed by: OBSTETRICS & GYNECOLOGY

## 2024-02-05 PROCEDURE — 76817 TRANSVAGINAL US OBSTETRIC: CPT | Performed by: OBSTETRICS & GYNECOLOGY

## 2024-02-05 PROCEDURE — 76811 OB US DETAILED SNGL FETUS: CPT | Performed by: OBSTETRICS & GYNECOLOGY

## 2024-02-05 NOTE — LETTER
February 5, 2024     Shona Watts DO  [address] [facility]  [place] [address]    Patient: Angelia Anderson   YOB: 1990   Date of Visit: 2/5/2024       Dear Dr. Watts:    Thank you for referring Angelia Andersno to me for evaluation. Below are my notes for this consultation.    If you have questions, please do not hesitate to call me. I look forward to following your patient along with you.         Sincerely,        Maikel Liz MD        CC: No Recipients    Maikel Liz MD  2/5/2024  5:58 PM  Sign when Signing Visit  Please refer to the [facility] ultrasound report in Ob Procedures for additional information regarding today's visit

## 2024-02-05 NOTE — PROGRESS NOTES
Ultrasound Probe Disinfection    A transvaginal ultrasound was performed.   Prior to use, disinfection was performed with High Level Disinfection Process ([employer]).  Probe serial number [id number]: [id number] was used.      Mili Mohr  02/05/24  5:22 PM

## 2024-02-06 ENCOUNTER — ROUTINE PRENATAL (OUTPATIENT)
Dept: OBGYN CLINIC | Facility: CLINIC | Age: 34
End: 2024-02-06
Payer: COMMERCIAL

## 2024-02-06 VITALS
BODY MASS INDEX: 37.45 KG/M2 | DIASTOLIC BLOOD PRESSURE: 62 MMHG | OXYGEN SATURATION: 100 % | SYSTOLIC BLOOD PRESSURE: 120 MMHG | WEIGHT: 218.2 LBS | HEART RATE: 75 BPM

## 2024-02-06 DIAGNOSIS — Z34.81 PRENATAL CARE, SUBSEQUENT PREGNANCY, FIRST TRIMESTER: Primary | ICD-10-CM

## 2024-02-06 DIAGNOSIS — Z86.32 HISTORY OF GESTATIONAL DIABETES: ICD-10-CM

## 2024-02-06 DIAGNOSIS — Z3A.21 21 WEEKS GESTATION OF PREGNANCY: ICD-10-CM

## 2024-02-06 DIAGNOSIS — O09.891 SHORT INTERVAL BETWEEN PREGNANCIES AFFECTING PREGNANCY IN FIRST TRIMESTER, ANTEPARTUM: ICD-10-CM

## 2024-02-06 DIAGNOSIS — Z86.711 HISTORY OF PULMONARY EMBOLISM: ICD-10-CM

## 2024-02-06 LAB
SL AMB  POCT GLUCOSE, UA: NORMAL
SL AMB POCT URINE PROTEIN: NORMAL

## 2024-02-06 PROCEDURE — 81002 URINALYSIS NONAUTO W/O SCOPE: CPT | Performed by: STUDENT IN AN ORGANIZED HEALTH CARE EDUCATION/TRAINING PROGRAM

## 2024-02-06 PROCEDURE — 99213 OFFICE O/P EST LOW 20 MIN: CPT | Performed by: STUDENT IN AN ORGANIZED HEALTH CARE EDUCATION/TRAINING PROGRAM

## 2024-02-06 NOTE — PROGRESS NOTES
21 weeks gestation of pregnancy  35yo  at 21.2wks presents for routine prenatal visit     Pt denies contractions, vaginal bleeding, leakage of fluid. Endorses fetal movement.     -continue PNVs   -28wk labs ordered today   -AFP negative   -level II scheduled 24  - labor precautions provided  -return in 3 weeks     History of gestational diabetes  -did not complete early 1 hr, 28wk labs ordered today, encouraged to complete    History of pulmonary embolism  -APS and thrombophilia workup negative   -asymptomatic today   -continue Lovenox 40mg daily       Short interval between pregnancies affecting pregnancy in first trimester, antepartum  -continue cervical length surveillance

## 2024-02-06 NOTE — ASSESSMENT & PLAN NOTE
33yo  at 21.2wks presents for routine prenatal visit     Pt denies contractions, vaginal bleeding, leakage of fluid. Endorses fetal movement.     -continue PNVs   -28wk labs ordered today   -AFP negative   -level II scheduled 24  - labor precautions provided  -return in 3 weeks

## 2024-02-14 ENCOUNTER — TELEPHONE (OUTPATIENT)
Age: 34
End: 2024-02-14

## 2024-02-14 NOTE — TELEPHONE ENCOUNTER
Angelia called to notify [facility] that she is unable to make her appt today at  at 945 am. (Pt called at 945 am). This was to be her last TV check, she has an appt scheduled for 4/23/24 at  at 915 am (arrive time 900 am) confirmed with patient. Discussed case with Dr. Suarez, as per discussion patient does not need to reschedule TV as today was last check. Confirmed next appointment with patient.

## 2024-03-06 ENCOUNTER — ROUTINE PRENATAL (OUTPATIENT)
Dept: OBGYN CLINIC | Facility: CLINIC | Age: 34
End: 2024-03-06
Payer: COMMERCIAL

## 2024-03-06 VITALS — WEIGHT: 223 LBS | DIASTOLIC BLOOD PRESSURE: 66 MMHG | BODY MASS INDEX: 38.28 KG/M2 | SYSTOLIC BLOOD PRESSURE: 104 MMHG

## 2024-03-06 DIAGNOSIS — Z34.82 ENCOUNTER FOR SUPERVISION OF OTHER NORMAL PREGNANCY, SECOND TRIMESTER: Primary | ICD-10-CM

## 2024-03-06 DIAGNOSIS — Z3A.25 25 WEEKS GESTATION OF PREGNANCY: ICD-10-CM

## 2024-03-06 PROCEDURE — 99213 OFFICE O/P EST LOW 20 MIN: CPT | Performed by: STUDENT IN AN ORGANIZED HEALTH CARE EDUCATION/TRAINING PROGRAM

## 2024-03-06 NOTE — PROGRESS NOTES
Pt is here for routine ob visit   Numbness in left leg   Unable to void   No LOF,VB,Contractions  +FM

## 2024-03-06 NOTE — PROGRESS NOTES
34 y.o.  at 25w3d, here for routine OB visit. Feeling well overall and without concerns. Good FM. Denies LOF, VB, contractions.   No questions/concerns.       Problem List Items Addressed This Visit          Other    25 weeks gestation of pregnancy     -precautions reviewed  -reviewed 3T labs, previously ordered  -prepregnancy BMI 37 with goal weight gain 11-20#: TWG = 3#          Other Visit Diagnoses       Encounter for supervision of other normal pregnancy, second trimester    -  Primary

## 2024-03-06 NOTE — ASSESSMENT & PLAN NOTE
-precautions reviewed  -reviewed 3T labs, previously ordered  -prepregnancy BMI 37 with goal weight gain 11-20#: TWG = 3#

## 2024-04-04 ENCOUNTER — ROUTINE PRENATAL (OUTPATIENT)
Dept: OBGYN CLINIC | Facility: CLINIC | Age: 34
End: 2024-04-04
Payer: COMMERCIAL

## 2024-04-04 VITALS
DIASTOLIC BLOOD PRESSURE: 58 MMHG | HEART RATE: 103 BPM | SYSTOLIC BLOOD PRESSURE: 110 MMHG | WEIGHT: 226.4 LBS | BODY MASS INDEX: 38.86 KG/M2

## 2024-04-04 DIAGNOSIS — O99.842 BARIATRIC SURGERY STATUS COMPLICATING PREGNANCY, SECOND TRIMESTER: ICD-10-CM

## 2024-04-04 DIAGNOSIS — O99.210 MATERNAL OBESITY, ANTEPARTUM: ICD-10-CM

## 2024-04-04 DIAGNOSIS — Z87.59 HISTORY OF POSTPARTUM DEPRESSION: ICD-10-CM

## 2024-04-04 DIAGNOSIS — O09.891 SHORT INTERVAL BETWEEN PREGNANCIES AFFECTING PREGNANCY IN FIRST TRIMESTER, ANTEPARTUM: Primary | ICD-10-CM

## 2024-04-04 DIAGNOSIS — Z23 NEED FOR TDAP VACCINATION: ICD-10-CM

## 2024-04-04 DIAGNOSIS — Z86.32 HISTORY OF GESTATIONAL DIABETES: ICD-10-CM

## 2024-04-04 DIAGNOSIS — Z86.59 HISTORY OF POSTPARTUM DEPRESSION: ICD-10-CM

## 2024-04-04 DIAGNOSIS — Z3A.29 29 WEEKS GESTATION OF PREGNANCY: ICD-10-CM

## 2024-04-04 DIAGNOSIS — Z86.711 HISTORY OF PULMONARY EMBOLISM: ICD-10-CM

## 2024-04-04 DIAGNOSIS — Z34.83 PRENATAL CARE, SUBSEQUENT PREGNANCY, THIRD TRIMESTER: ICD-10-CM

## 2024-04-04 LAB
DME PARACHUTE DELIVERY DATE REQUESTED: NORMAL
DME PARACHUTE ITEM DESCRIPTION: NORMAL
DME PARACHUTE ORDER STATUS: NORMAL
DME PARACHUTE SUPPLIER NAME: NORMAL
DME PARACHUTE SUPPLIER PHONE: NORMAL
SL AMB  POCT GLUCOSE, UA: NORMAL
SL AMB POCT URINE PROTEIN: NORMAL

## 2024-04-04 PROCEDURE — 99213 OFFICE O/P EST LOW 20 MIN: CPT | Performed by: PHYSICIAN ASSISTANT

## 2024-04-04 PROCEDURE — 81002 URINALYSIS NONAUTO W/O SCOPE: CPT | Performed by: PHYSICIAN ASSISTANT

## 2024-04-04 NOTE — PROGRESS NOTES
Problem List Items Addressed This Visit       Bariatric surgery status complicating pregnancy, second trimester    History of postpartum depression    History of gestational diabetes    Maternal obesity, antepartum    History of pulmonary embolism     Continues lovenox 40 mg daily          29 weeks gestation of pregnancy     Angelia  is a 34 y.o.  @29w4d who presents for routine prenatal visit.      28 wk labs - has order but not yet completed. Encouraged to go for this ASAP.   Growth scan @ 32 weeks - scheduled   TDAP- declined today. Counseling provided. Will consider for future appt.   Delivery consent - signed today.   Plans to breastfeed. Pump - ordered   Has yellow packet.  TWG 2.903 kg (6 lb 6.4 oz)    Reports good fetal movement.  Denies LOF, vaginal bleeding, regular uterine contractions, cramping, headaches or visual changes.      Reviewed PTL/Labor precautions and FKC.      The patient was counseled about the labor process. She was counseled regarding potential reasons that she may need a  section including arrest of dilation/descent, non-reassuring fetal status, or worsening maternal status.      She was counseled on the risks of  including bleeding, infection, and injury to surrounding structures including the bowel and bladder. She was counseled that there are medical and surgical methods to manage excessive postpartum bleeding. She was counseled that in the event of excessive blood loss, she may require blood transfusion which includes a small risk of blood borne diseases such as hepatitis and HIV. The patient is OK with receiving a blood transfusion if necessary.  The patient had an opportunity to ask questions and signed consent.      She was counseled about the possible need for operative delivery using the vacuum or forceps and the indications for doing so. She was counseled that there is a small risk of  complications including intracranial hemorrhage, lacerations,  nerve damage or fracture as well as the increased risk for more severe maternal laceration. The patient signed consent.              Short interval between pregnancies affecting pregnancy in first trimester, antepartum - Primary     Other Visit Diagnoses       Prenatal care, subsequent pregnancy, third trimester        Relevant Orders    POCT urine dip (Completed)    Need for Tdap vaccination

## 2024-04-04 NOTE — ASSESSMENT & PLAN NOTE
Angelia  is a 34 y.o.  @29w4d who presents for routine prenatal visit.      28 wk labs - has order but not yet completed. Encouraged to go for this ASAP.   Growth scan @ 32 weeks - scheduled   TDAP- declined today. Counseling provided. Will consider for future appt.   Delivery consent - signed today.   Plans to breastfeed. Pump - ordered   Has yellow packet.  TWG 2.903 kg (6 lb 6.4 oz)    Reports good fetal movement.  Denies LOF, vaginal bleeding, regular uterine contractions, cramping, headaches or visual changes.      Reviewed PTL/Labor precautions and FKC.      The patient was counseled about the labor process. She was counseled regarding potential reasons that she may need a  section including arrest of dilation/descent, non-reassuring fetal status, or worsening maternal status.      She was counseled on the risks of  including bleeding, infection, and injury to surrounding structures including the bowel and bladder. She was counseled that there are medical and surgical methods to manage excessive postpartum bleeding. She was counseled that in the event of excessive blood loss, she may require blood transfusion which includes a small risk of blood borne diseases such as hepatitis and HIV. The patient is OK with receiving a blood transfusion if necessary.  The patient had an opportunity to ask questions and signed consent.      She was counseled about the possible need for operative delivery using the vacuum or forceps and the indications for doing so. She was counseled that there is a small risk of  complications including intracranial hemorrhage, lacerations, nerve damage or fracture as well as the increased risk for more severe maternal laceration. The patient signed consent.

## 2024-04-04 NOTE — PROGRESS NOTES
Patient here for prenatal visit.  She denies any complaints.   Good fetal movement.   28 week labs not completed.  Yellow folder given and reviewed.   Tdap offered; she declined today.  She plans on breastfeeding; pump ordered.  Delivery consent signed.  Urine neg for protein and glucose.

## 2024-04-07 ENCOUNTER — APPOINTMENT (EMERGENCY)
Dept: CT IMAGING | Facility: HOSPITAL | Age: 34
End: 2024-04-07
Payer: COMMERCIAL

## 2024-04-07 ENCOUNTER — HOSPITAL ENCOUNTER (EMERGENCY)
Facility: HOSPITAL | Age: 34
Discharge: HOME | End: 2024-04-07
Attending: EMERGENCY MEDICINE
Payer: COMMERCIAL

## 2024-04-07 VITALS
TEMPERATURE: 98.2 F | DIASTOLIC BLOOD PRESSURE: 63 MMHG | OXYGEN SATURATION: 100 % | SYSTOLIC BLOOD PRESSURE: 133 MMHG | RESPIRATION RATE: 18 BRPM | HEART RATE: 88 BPM

## 2024-04-07 DIAGNOSIS — R06.02 SOB (SHORTNESS OF BREATH): Primary | ICD-10-CM

## 2024-04-07 LAB
ANION GAP SERPL CALCULATED.3IONS-SCNC: 7 MMOL/L (ref 4–13)
BASOPHILS # BLD AUTO: 0.02 THOUSANDS/ÂΜL (ref 0–0.1)
BASOPHILS NFR BLD AUTO: 0 % (ref 0–1)
BNP SERPL-MCNC: 17 PG/ML (ref 0–100)
BUN SERPL-MCNC: 7 MG/DL (ref 5–25)
CALCIUM SERPL-MCNC: 8.8 MG/DL (ref 8.4–10.2)
CARDIAC TROPONIN I PNL SERPL HS: <2 NG/L
CHLORIDE SERPL-SCNC: 105 MMOL/L (ref 96–108)
CO2 SERPL-SCNC: 23 MMOL/L (ref 21–32)
CREAT SERPL-MCNC: 0.62 MG/DL (ref 0.6–1.3)
EOSINOPHIL # BLD AUTO: 0.02 THOUSAND/ÂΜL (ref 0–0.61)
EOSINOPHIL NFR BLD AUTO: 0 % (ref 0–6)
ERYTHROCYTE [DISTWIDTH] IN BLOOD BY AUTOMATED COUNT: 14.1 % (ref 11.6–15.1)
GFR SERPL CREATININE-BSD FRML MDRD: 119 ML/MIN/1.73SQ M
GLUCOSE SERPL-MCNC: 66 MG/DL (ref 65–140)
HCT VFR BLD AUTO: 32.5 % (ref 34.8–46.1)
HGB BLD-MCNC: 10.3 G/DL (ref 11.5–15.4)
IMM GRANULOCYTES # BLD AUTO: 0.05 THOUSAND/UL (ref 0–0.2)
IMM GRANULOCYTES NFR BLD AUTO: 1 % (ref 0–2)
LYMPHOCYTES # BLD AUTO: 2.15 THOUSANDS/ÂΜL (ref 0.6–4.47)
LYMPHOCYTES NFR BLD AUTO: 25 % (ref 14–44)
MCH RBC QN AUTO: 24.6 PG (ref 26.8–34.3)
MCHC RBC AUTO-ENTMCNC: 31.7 G/DL (ref 31.4–37.4)
MCV RBC AUTO: 78 FL (ref 82–98)
MONOCYTES # BLD AUTO: 0.56 THOUSAND/ÂΜL (ref 0.17–1.22)
MONOCYTES NFR BLD AUTO: 6 % (ref 4–12)
NEUTROPHILS # BLD AUTO: 5.9 THOUSANDS/ÂΜL (ref 1.85–7.62)
NEUTS SEG NFR BLD AUTO: 68 % (ref 43–75)
NRBC BLD AUTO-RTO: 0 /100 WBCS
PLATELET # BLD AUTO: 273 THOUSANDS/UL (ref 149–390)
PMV BLD AUTO: 9.9 FL (ref 8.9–12.7)
POTASSIUM SERPL-SCNC: 3.3 MMOL/L (ref 3.5–5.3)
RBC # BLD AUTO: 4.19 MILLION/UL (ref 3.81–5.12)
SODIUM SERPL-SCNC: 135 MMOL/L (ref 135–147)
WBC # BLD AUTO: 8.7 THOUSAND/UL (ref 4.31–10.16)

## 2024-04-07 PROCEDURE — 93005 ELECTROCARDIOGRAM TRACING: CPT

## 2024-04-07 PROCEDURE — 84484 ASSAY OF TROPONIN QUANT: CPT | Performed by: EMERGENCY MEDICINE

## 2024-04-07 PROCEDURE — 80048 BASIC METABOLIC PNL TOTAL CA: CPT | Performed by: EMERGENCY MEDICINE

## 2024-04-07 PROCEDURE — 83880 ASSAY OF NATRIURETIC PEPTIDE: CPT | Performed by: EMERGENCY MEDICINE

## 2024-04-07 PROCEDURE — 99285 EMERGENCY DEPT VISIT HI MDM: CPT | Performed by: EMERGENCY MEDICINE

## 2024-04-07 PROCEDURE — 71275 CT ANGIOGRAPHY CHEST: CPT

## 2024-04-07 PROCEDURE — 36415 COLL VENOUS BLD VENIPUNCTURE: CPT | Performed by: EMERGENCY MEDICINE

## 2024-04-07 PROCEDURE — 85025 COMPLETE CBC W/AUTO DIFF WBC: CPT | Performed by: EMERGENCY MEDICINE

## 2024-04-07 RX ADMIN — IOHEXOL 85 ML: 350 INJECTION, SOLUTION INTRAVENOUS at 18:05

## 2024-04-07 NOTE — DISCHARGE INSTRUCTIONS
Your scan does not show a blood clot in the lungs at this time, and the other lab tests are reassuring that the recurrent shortness of breath are not being caused by another lung problem or a heart problem.  You should continue the Lovenox as advised, and follow up with OB/GYN . Return to ED for severe increasing shortness of breath.

## 2024-04-07 NOTE — ED PROVIDER NOTES
History  Chief Complaint   Patient presents with    Shortness of Breath     Patient with hx of PE's, taking lovenox, and increased SOB started today.     35 yo F at 30 weeks gestation, h/o PE during last pregnancy diagnosed 3/2023, for which she was on Lovenox, essentially continuously, since she became pregnant in the interval, c/o onset of shortness of breath, palpitations, which has been a recurrent syndrome since she had the PE, but symptoms typically pass on their, so she has not sought any ED care.  Today the symptoms were not resolving so she opted to come to the ED.        History provided by:  Patient  Shortness of Breath      Prior to Admission Medications   Prescriptions Last Dose Informant Patient Reported? Taking?   Vitamin D, Cholecalciferol, 50 MCG ( UT) CAPS  Self Yes No   Sig: Take 1 tablet by mouth 3 (three) times a day   Patient not taking: Reported on 3/6/2024   acetaminophen (TYLENOL) 325 mg tablet  Self No No   Sig: Take 2 tablets (650 mg total) by mouth every 4 (four) hours as needed for mild pain or moderate pain   enoxaparin (Lovenox) 40 mg/0.4 mL  Self Yes No   Sig: Inject under the skin      Facility-Administered Medications: None       Past Medical History:   Diagnosis Date    Anemia     H/O gastric sleeve 2016    History of transfusion     states no transfusion rxn    Miscarriage     Personal history of COVID-19     recovered at home    Post partum depression 2014    Pulmonary embolus (HCC) 2022    approx, during pregnancy    Sleep apnea, obstructive     prior to weight loss surg       Past Surgical History:   Procedure Laterality Date    BARIATRIC SURGERY      DILATION AND EVACUATION  2019        GASTRIC RESTRICTION SURGERY  2015    gastric sleeve    INCISION AND DRAINAGE OF WOUND Right 2022    Procedure: INCISION AND DRAINAGE (I&D) HEAD/FACE;  Surgeon: Sumit Matos DMD;  Location: [facility];  Service: Maxillofacial    MULTIPLE TOOTH EXTRACTIONS Right  2022    Procedure: EXTRACTION TEETH MULTIPLE;  Surgeon: Sumit Matos DMD;  Location: [facility];  Service: Maxillofacial    OPEN ANTERIOR SHOULDER RECONSTRUCTION Left     OTHER SURGICAL HISTORY      sweat gland removal    VT TX MISSED  FIRST TRIMESTER SURGICAL N/A 2023    Procedure: DILATATION AND EVACUATION (D&E);  Surgeon: Radha Knowles MD;  Location:  MAIN OR;  Service: Gynecology    RETAINED PLACENTA REMOVAL N/A 2020    Procedure: EXTRACTION OF PLACENTA,MANUAL;  Surgeon: Gray Campa MD;  Location: [facility] ;  Service: Obstetrics       Family History   Problem Relation Age of Onset    Stroke Mother     Heart disease Mother     Seizures Mother     Hypertension Mother     Anxiety disorder Mother     Bipolar disorder Mother     Kidney disease Mother     Diabetes Mother     Sudden death Father     No Known Problems Sister     No Known Problems Sister     No Known Problems Brother     No Known Problems Brother     No Known Problems Brother     Sudden death Brother 20        MVA    No Known Problems Son     Pancreatic cancer Maternal Grandmother     Seizures Maternal Grandmother     Diabetes Maternal Grandmother     Cancer Maternal Grandmother     No Known Problems Daughter      I have reviewed and agree with the history as documented.    E-Cigarette/Vaping    E-Cigarette Use Never User      E-Cigarette/Vaping Substances    Nicotine No     THC No     CBD No     Flavoring No     Other No     Unknown No      Social History     Tobacco Use    Smoking status: Former     Current packs/day: 0.00     Average packs/day: 0.3 packs/day for 10.0 years (2.5 ttl pk-yrs)     Types: Cigarettes     Start date: 2010     Quit date: 2020     Years since quitting: 3.9     Passive exposure: Past    Smokeless tobacco: Former   Vaping Use    Vaping status: Never Used   Substance Use Topics    Alcohol use: Not Currently    Drug use: Never       Review of Systems   Respiratory:  Positive for  shortness of breath.        Physical Exam  Physical Exam  Vitals and nursing note reviewed.   Constitutional:       General: She is not in acute distress.     Appearance: She is well-developed. She is not diaphoretic.   HENT:      Head: Normocephalic and atraumatic.      Right Ear: External ear normal.      Left Ear: External ear normal.      Nose: Nose normal.      Mouth/Throat:      Mouth: Mucous membranes are moist.   Eyes:      Conjunctiva/sclera: Conjunctivae normal.      Pupils: Pupils are equal, round, and reactive to light.   Cardiovascular:      Rate and Rhythm: Regular rhythm. Tachycardia present.   Pulmonary:      Effort: Pulmonary effort is normal. Tachypnea (breathing pattern is variable, takes long slow deep breaths) present.   Abdominal:      General: There is distension (gravid).      Palpations: Abdomen is soft.      Tenderness: There is no abdominal tenderness.   Musculoskeletal:         General: Normal range of motion.      Cervical back: Normal range of motion and neck supple.   Skin:     General: Skin is warm and dry.      Capillary Refill: Capillary refill takes less than 2 seconds.      Findings: No rash.   Neurological:      Mental Status: She is alert and oriented to person, place, and time.      Gait: Gait normal.   Psychiatric:         Behavior: Behavior normal.         Thought Content: Thought content normal.         Judgment: Judgment normal.         Vital Signs  ED Triage Vitals [04/07/24 1705]   Temperature Pulse Respirations Blood Pressure SpO2   98.2 °F (36.8 °C) 102 20 129/84 100 %      Temp Source Heart Rate Source Patient Position - Orthostatic VS BP Location FiO2 (%)   Oral Monitor Sitting Right arm --      Pain Score       No Pain           Vitals:    04/07/24 1705 04/07/24 1815 04/07/24 1928   BP: 129/84 122/80 133/63   Pulse: 102 89 88   Patient Position - Orthostatic VS: Sitting  Sitting         Visual Acuity      ED Medications  Medications   iohexol (OMNIPAQUE) 350 MG/ML  injection (MULTI-DOSE) 85 mL (85 mL Intravenous Given 4/7/24 1805)       Diagnostic Studies  Results Reviewed       Procedure Component Value Units Date/Time    HS Troponin 0hr (reflex protocol) [[id number]]  (Normal) Collected: 04/07/24 1722    Lab Status: Final result Specimen: Blood from Arm, Right Updated: 04/07/24 1758     hs TnI 0hr <2 ng/L     B-Type Natriuretic Peptide(BNP) [[id number]]  (Normal) Collected: 04/07/24 1722    Lab Status: Final result Specimen: Blood from Arm, Right Updated: 04/07/24 1757     BNP 17 pg/mL     Basic metabolic panel [[id number]]  (Abnormal) Collected: 04/07/24 1722    Lab Status: Final result Specimen: Blood from Arm, Right Updated: 04/07/24 1751     Sodium 135 mmol/L      Potassium 3.3 mmol/L      Chloride 105 mmol/L      CO2 23 mmol/L      ANION GAP 7 mmol/L      BUN 7 mg/dL      Creatinine 0.62 mg/dL      Glucose 66 mg/dL      Calcium 8.8 mg/dL      eGFR 119 ml/min/1.73sq m     Narrative:      National Kidney Disease Foundation guidelines for Chronic Kidney Disease (CKD):     Stage 1 with normal or high GFR (GFR > 90 mL/min/1.73 square meters)    Stage 2 Mild CKD (GFR = 60-89 mL/min/1.73 square meters)    Stage 3A Moderate CKD (GFR = 45-59 mL/min/1.73 square meters)    Stage 3B Moderate CKD (GFR = 30-44 mL/min/1.73 square meters)    Stage 4 Severe CKD (GFR = 15-29 mL/min/1.73 square meters)    Stage 5 End Stage CKD (GFR <15 mL/min/1.73 square meters)  Note: GFR calculation is accurate only with a steady state creatinine    CBC and differential [[id number]]  (Abnormal) Collected: 04/07/24 1722    Lab Status: Final result Specimen: Blood from Arm, Right Updated: 04/07/24 1731     WBC 8.70 Thousand/uL      RBC 4.19 Million/uL      Hemoglobin 10.3 g/dL      Hematocrit 32.5 %      MCV 78 fL      MCH 24.6 pg      MCHC 31.7 g/dL      RDW 14.1 %      MPV 9.9 fL      Platelets 273 Thousands/uL      nRBC 0 /100 WBCs      Neutrophils Relative 68 %      Immature Grans % 1 %      Lymphocytes  Relative 25 %      Monocytes Relative 6 %      Eosinophils Relative 0 %      Basophils Relative 0 %      Neutrophils Absolute 5.90 Thousands/µL      Absolute Immature Grans 0.05 Thousand/uL      Absolute Lymphocytes 2.15 Thousands/µL      Absolute Monocytes 0.56 Thousand/µL      Eosinophils Absolute 0.02 Thousand/µL      Basophils Absolute 0.02 Thousands/µL                    CTA ED chest PE study   Final Result by Jennifer Perez MD (04/07 1913)         1. No central pulmonary embolism on limited study.   2. Low lung volumes with elevation of the right diaphragm.                  Workstation performed: [id number]                    Procedures  ECG 12 Lead Documentation Only    Date/Time: 4/7/2024 5:16 PM    Performed by: Brendon Mccall MD  Authorized by: Brendon Mccall MD    Rate:     ECG rate:  97  Rhythm:     Rhythm: sinus rhythm    Ectopy:     Ectopy: none    QRS:     QRS axis:  Normal    QRS intervals:  Normal  Conduction:     Conduction: normal    ST segments:     ST segments:  Normal  T waves:     T waves: normal             ED Course  ED Course as of 04/07/24 2039   Sun Apr 07, 2024   1807 hs TnI 0hr: <2  No chest pain, screening for complications of PE, no serial value indicated   1808 BNP: 17  Normal, reassuring there is not right heart strain   1915 CTA ED chest PE study  Imaging reviewed and interpreted myself and concur with radiologist:   no PE, no other concerning lung findings.     2000 Reviewed results with patient at bedside and updated on the plan.  ED workup is reassuring.  No cardiopulmonary cause of symptoms that are recurrent for 1 year.                                 SBIRT 20yo+      Flowsheet Row Most Recent Value   Initial Alcohol Screen: US AUDIT-C     1. How often do you have a drink containing alcohol? 0 Filed at: 04/07/2024 1737   2. How many drinks containing alcohol do you have on a typical day you are drinking?  0 Filed at: 04/07/2024 1737   3a. Male UNDER 65: How  often do you have five or more drinks on one occasion? 0 Filed at: 04/07/2024 1737   3b. FEMALE Any Age, or MALE 65+: How often do you have 4 or more drinks on one occassion? 0 Filed at: 04/07/2024 1737   Audit-C Score 0 Filed at: 04/07/2024 1737   HELEN: How many times in the past year have you...    Used an illegal drug or used a prescription medication for non-medical reasons? Never Filed at: 04/07/2024 1737            Wells' Criteria for PE      Flowsheet Row Most Recent Value   Wells' Criteria for PE    Clinical signs and symptoms of DVT 0 Filed at: 04/07/2024 1719   PE is primary diagnosis or equally likely 3 Filed at: 04/07/2024 1719   HR >100 1.5 Filed at: 04/07/2024 1719   Immobilization at least 3 days or Surgery in the previous 4 weeks 0 Filed at: 04/07/2024 1719   Previous, objectively diagnosed PE or DVT 1.5 Filed at: 04/07/2024 1719   Hemoptysis 0 Filed at: 04/07/2024 1719   Malignancy with treatment within 6 months or palliative 0 Filed at: 04/07/2024 1719   Wells' Criteria Total 6 Filed at: 04/07/2024 1719                  Medical Decision Making  Interestingly, she has been on Lovenox continuously since being diagnosed with the original PE with the previous pregnancy, and the symptoms she is experiencing today have been a recurrent issue, without any follow up surveillance or diagnostic imaging, so I think the prudent course is to do imaging to confirm she does not have a persistent PE.      Amount and/or Complexity of Data Reviewed  Labs: ordered. Decision-making details documented in ED Course.  Radiology: ordered. Decision-making details documented in ED Course.    Risk  Prescription drug management.             Disposition  Final diagnoses:   SOB (shortness of breath)     Time reflects when diagnosis was documented in both MDM as applicable and the Disposition within this note       Time User Action Codes Description Comment    4/7/2024  7:28 PM Brendon Mccall Add [R06.02] SOB (shortness of  breath)           ED Disposition       ED Disposition   Discharge    Condition   Good    Date/Time   Sun Apr 7, 2024 1928    Comment   Angelia Sparksultrie discharge to home/self care.                   Follow-up Information       Follow up With Specialties Details Why Contact Info    Ana Martinez PA-C Obstetrics and Gynecology, Physician Assistant Call  For followup [address]  [address]  [place] [address]  [phone]              Discharge Medication List as of 4/7/2024  7:31 PM        CONTINUE these medications which have NOT CHANGED    Details   acetaminophen (TYLENOL) 325 mg tablet Take 2 tablets (650 mg total) by mouth every 4 (four) hours as needed for mild pain or moderate pain, Starting Sun 11/26/2023, Normal      enoxaparin (Lovenox) 40 mg/0.4 mL Inject under the skin, Historical Med      Vitamin D, Cholecalciferol, 50 MCG (2000 UT) CAPS Take 1 tablet by mouth 3 (three) times a day, Historical Med             No discharge procedures on file.    PDMP Review       None            ED Provider  Electronically Signed by             Brendon Mccall MD  04/07/24 2039

## 2024-04-09 LAB
ATRIAL RATE: 97 BPM
P AXIS: 47 DEGREES
PR INTERVAL: 134 MS
QRS AXIS: 61 DEGREES
QRSD INTERVAL: 84 MS
QT INTERVAL: 354 MS
QTC INTERVAL: 449 MS
T WAVE AXIS: 16 DEGREES
VENTRICULAR RATE: 97 BPM

## 2024-04-09 PROCEDURE — 93010 ELECTROCARDIOGRAM REPORT: CPT | Performed by: INTERNAL MEDICINE

## 2024-04-09 NOTE — ASSESSMENT & PLAN NOTE
1h GCT elevated and has not gone for 3h GTT yet  Stressed importance ASAP to ensure that we will be able to get her glucose under control prior to delivery, particularly if she would delivery early  Patient states she will go this weekend  09-Apr-2024

## 2024-04-18 ENCOUNTER — ROUTINE PRENATAL (OUTPATIENT)
Dept: OBGYN CLINIC | Facility: CLINIC | Age: 34
End: 2024-04-18
Payer: COMMERCIAL

## 2024-04-18 ENCOUNTER — TELEPHONE (OUTPATIENT)
Dept: OBGYN CLINIC | Facility: CLINIC | Age: 34
End: 2024-04-18

## 2024-04-18 VITALS
HEIGHT: 64 IN | SYSTOLIC BLOOD PRESSURE: 124 MMHG | DIASTOLIC BLOOD PRESSURE: 70 MMHG | BODY MASS INDEX: 37.39 KG/M2 | WEIGHT: 219 LBS

## 2024-04-18 DIAGNOSIS — Z34.83 PRENATAL CARE, SUBSEQUENT PREGNANCY, THIRD TRIMESTER: ICD-10-CM

## 2024-04-18 DIAGNOSIS — Z3A.31 31 WEEKS GESTATION OF PREGNANCY: Primary | ICD-10-CM

## 2024-04-18 DIAGNOSIS — Z86.711 HISTORY OF PULMONARY EMBOLISM: ICD-10-CM

## 2024-04-18 PROBLEM — O99.843 BARIATRIC SURGERY STATUS COMPLICATING PREGNANCY, THIRD TRIMESTER: Status: ACTIVE | Noted: 2018-03-15

## 2024-04-18 PROBLEM — O09.893 SHORT INTERVAL BETWEEN PREGNANCIES AFFECTING PREGNANCY IN THIRD TRIMESTER, ANTEPARTUM: Status: ACTIVE | Noted: 2023-12-14

## 2024-04-18 LAB
SL AMB  POCT GLUCOSE, UA: ABNORMAL
SL AMB POCT URINE PROTEIN: ABNORMAL

## 2024-04-18 PROCEDURE — 99213 OFFICE O/P EST LOW 20 MIN: CPT | Performed by: OBSTETRICS & GYNECOLOGY

## 2024-04-18 PROCEDURE — 81002 URINALYSIS NONAUTO W/O SCOPE: CPT | Performed by: OBSTETRICS & GYNECOLOGY

## 2024-04-18 RX ORDER — POTASSIUM CHLORIDE 750 MG/1
10 TABLET, FILM COATED, EXTENDED RELEASE ORAL 2 TIMES DAILY
Status: CANCELLED | OUTPATIENT
Start: 2024-04-18

## 2024-04-18 NOTE — PROGRESS NOTES
Problem   31 Weeks Gestation of Pregnancy    Blood Type: A. +       Pap 2020. GC/CT   PN1 Labs:  NML    H&H: 12.1/37.3  Protein S deficency,  Vit D def, Vit B def,   Hx of GDM, PE in previous preg     28 Week Labs:  AFP: Neg  Level 1:  Level 2:  Tdap:  Flu:   GBS swab:     Blue folder:  Yellow folder:     Breast pump order:  L&D forms:    Delivery consent:   IOL:         History of Pulmonary Embolism    History of pulmonary embolism at 21 weeks in her fourth pregnancy with a normal hypercoagulable state workup other than a weakly positive anticardiolipin IgM that was 24 and on repeat was 19.         31 weeks gestation of pregnancy  Angelia Anderson is a 34 y.o.   31w4d who presents for routine PNV.  28 week labs reviewed: not completed. Advised to complete today, recent labs in ED, however will need to repeat D/t need for anemia panel, will most likely need Iron infusions, message set to set up infusions  Pt was seen in the ED on 24 for SOB, CT Neg for PE, labs indicative of low potassium 3.3,  anemia 10.3/32.5   Pt is having sporadic episodes of SOB that self resolve most times, no chest pain   TWG -0.454 kg (-1 lb)   Denies OB complaints. Good fetal movement. Denies contractions, cramping, leakage of fluid or vaginal bleeding.   Tdap vaccine declined  Reviewed  labor precautions and FKCs.   Advised to start Perineal massage at 34-36 weeks   Pregnancy Essential guide and Baby and Me web site recommended       History of pulmonary embolism  On Lovenox daily

## 2024-04-18 NOTE — TELEPHONE ENCOUNTER
RN placed a call to patient to discuss scheduling Iron infusions. Patient states [facility] is closest and most convenient to her, and is free any day at 12pm. RN advised will set infusions up and call patient back today or tomorrow with new infusion appointment information. Patient verbalized an understanding. No further questions. Orders placed for provider's signature.

## 2024-04-18 NOTE — PATIENT INSTRUCTIONS
Pregnancy at 31 to 34 Weeks   AMBULATORY CARE:   Changes happening with your body:  You may continue to have symptoms such as shortness of breath, heartburn, contractions, or swelling of your ankles and feet. You may be gaining about 1 pound a week now.  Seek care immediately if:   You develop a severe headache that does not go away.    You have new or increased vision changes, such as blurred or spotted vision.    You have new or increased swelling in your face or hands.    You have vaginal spotting or bleeding.    Your water broke or you feel warm water gushing or trickling from your vagina.    Call your obstetrician if:   You have more than 5 contractions in 1 hour.    You notice any changes in your baby's movements.    You have abdominal cramps, pressure, or tightening.    You have a change in vaginal discharge.    You have chills or a fever.    You have vaginal itching, burning, or pain.    You have yellow, green, white, or foul-smelling vaginal discharge.    You have pain or burning when you urinate, less urine than usual, or pink or bloody urine.    You have questions or concerns about your condition or care.    How to care for yourself at this stage of your pregnancy:       Eat a variety of healthy foods.  Healthy foods include fruits, vegetables, whole-grain breads, low-fat dairy foods, beans, lean meats, and fish. Drink liquids as directed. Ask how much liquid to drink each day and which liquids are best for you. Limit caffeine to less than 200 milligrams each day. Limit your intake of fish to 2 servings each week. Choose fish low in mercury such as canned light tuna, shrimp, salmon, cod, or tilapia. Do not  eat fish high in mercury such as swordfish, tilefish, [place] mackerel, and shark.         Manage heartburn  by eating 4 or 5 small meals each day instead of large meals. Avoid spicy food.    Manage swelling  by lying down and putting your feet up.         Take prenatal vitamins as directed.  Your need  for certain vitamins and minerals, such as folic acid, increases during pregnancy. Prenatal vitamins provide some of the extra vitamins and minerals you need. Prenatal vitamins may also help to decrease the risk of certain birth defects.         Talk to your healthcare provider about exercise.  Moderate exercise can help you stay fit. Your healthcare provider will help you plan an exercise program that is safe for you during pregnancy.         Do not smoke.  Smoking increases your risk of a miscarriage and other health problems during your pregnancy. Smoking can cause your baby to be born too early or weigh less at birth. Ask your healthcare provider for information if you need help quitting.    Do not drink alcohol.  Alcohol passes from your body to your baby through the placenta. It can affect your baby's brain development and cause fetal alcohol syndrome (FAS). FAS is a group of conditions that causes mental, behavior, and growth problems.    Talk to your healthcare provider before you take any medicines.  Many medicines may harm your baby if you take them when you are pregnant. Do not take any medicines, vitamins, herbs, or supplements without first talking to your healthcare provider. Never use illegal or street drugs (such as marijuana or cocaine) while you are pregnant.  Safety tips during pregnancy:   Avoid hot tubs and saunas.  Do not use a hot tub or sauna while you are pregnant, especially during your first trimester. Hot tubs and saunas may raise your baby's temperature and increase the risk of birth defects.    Avoid toxoplasmosis.  This is an infection caused by eating raw meat or being around infected cat feces. It can cause birth defects, miscarriages, and other problems. Wash your hands after you touch raw meat. Make sure any meat is well-cooked before you eat it. Avoid raw eggs and unpasteurized milk. Use gloves or ask someone else to clean your cat's litter box while you are pregnant.        Changes happening with your baby:  By 34 weeks, your baby may weigh more than 5 pounds. Your baby will be about 12 ½ inches long from the top of the head to the rump (baby's bottom). Your baby is gaining about ½ pound a week. Your baby's eyes open and close now. Your baby's kicks and movements are more forceful at this time.  What you need to know about prenatal care:  Your healthcare provider will check your blood pressure and weight. You may also need the following:  A urine test  may also be done to check for sugar and protein. These can be signs of gestational diabetes or infection. Protein in your urine may also be a sign of preeclampsia. Preeclampsia is a condition that can develop during week 20 or later of your pregnancy. It causes high blood pressure, and it can cause problems with your kidneys and other organs.    A gestational diabetes screen  may be done. Your healthcare provider may order either a 1-step or 2-step oral glucose tolerance test (OGTT).     1-step OGTT:  Your blood sugar level will be tested after you have not eaten for 8 hours (fasting). You will then be given a glucose drink. Your level will be tested again 1 hour and 2 hours after you finish the drink.    2-step OGTT:  You do not have to fast for the first part of the test. You will have the glucose drink at any time of day. Your blood sugar level will be checked 1 hour later. If your blood sugar is higher than a certain level, another test will be ordered. You will fast and your blood sugar level will be tested. You will have the glucose drink. Your blood will be tested again 1 hour, 2 hours, and 3 hours after you finish the glucose drink.    A Tdap vaccine  may be recommended by your healthcare provider.    Fundal height  is a measurement of your uterus to check your baby's growth. This number is usually the same as the number of weeks that you have been pregnant. Your healthcare provider may also check your baby's  position.    Your baby's heart rate  will be checked.    Follow up with your obstetrician as directed:  Write down your questions so you remember to ask them during your visits.  © Copyright [employer] 2023 Information is for End User's use only and may not be sold, redistributed or otherwise used for commercial purposes.  The above information is an  only. It is not intended as medical advice for individual conditions or treatments. Talk to your doctor, nurse or pharmacist before following any medical regimen to see if it is safe and effective for you.  Kick Counts in Pregnancy   AMBULATORY CARE:   Kick counts  measure how much your baby is moving in your womb. A kick from your baby can be felt as a twist, turn, swish, roll, or jab. It is common to feel your baby kicking at 26 to 28 weeks of pregnancy. You may feel your baby kick as early as 20 weeks of pregnancy. You may want to start counting at 28 weeks.   Contact your doctor immediately if:   You feel a change in the number of kicks or movements of your baby.     You feel fewer than 10 kicks within 2 hours.     You have questions or concerns about your baby's movements.    Why measure kick counts:  Your baby's movement may provide information about your baby's health. He or she may move less, or not at all, if there are problems. Your baby may move less if he or she is not getting enough oxygen or nutrition from the placenta. Do not smoke while you are pregnant. Smoking decreases the amount of oxygen that gets to your baby. Talk to your healthcare provider if you need help to quit smoking. Tell your healthcare provider as soon as you feel a change in your baby's movements.  When to measure kick counts:   Measure kick counts at the same time every day.      Measure kick counts when your baby is awake and most active. Your baby may be most active in the evening.    How to measure kick counts:  Check that your baby is awake before you measure kick  counts. You can wake up your baby by lightly pushing on your belly, walking, or drinking something cold. Your healthcare provider may tell you different ways to measure kick counts. You may be told to do the following:  Use a chart or clock to keep track of the time you start and finish counting.     Sit in a chair or lie on your left side.     Place your hands on the largest part of your belly.     Count until you reach 10 kicks. Write down how much time it takes to count 10 kicks.     It may take 30 minutes to 2 hours to count 10 kicks. It should not take more than 2 hours to count 10 kicks.    Follow up with your doctor as directed:  Write down your questions so you remember to ask them during your visits.   © Copyright [employer] 2023 Information is for End User's use only and may not be sold, redistributed or otherwise used for commercial purposes.  The above information is an  only. It is not intended as medical advice for individual conditions or treatments. Talk to your doctor, nurse or pharmacist before following any medical regimen to see if it is safe and effective for you.  Kick Counts in Pregnancy   AMBULATORY CARE:   Kick counts  measure how much your baby is moving in your womb. A kick from your baby can be felt as a twist, turn, swish, roll, or jab. It is common to feel your baby kicking at 26 to 28 weeks of pregnancy. You may feel your baby kick as early as 20 weeks of pregnancy. You may want to start counting at 28 weeks.   Contact your doctor immediately if:   You feel a change in the number of kicks or movements of your baby.     You feel fewer than 10 kicks within 2 hours.     You have questions or concerns about your baby's movements.    Why measure kick counts:  Your baby's movement may provide information about your baby's health. He or she may move less, or not at all, if there are problems. Your baby may move less if he or she is not getting enough oxygen or nutrition from the  placenta. Do not smoke while you are pregnant. Smoking decreases the amount of oxygen that gets to your baby. Talk to your healthcare provider if you need help to quit smoking. Tell your healthcare provider as soon as you feel a change in your baby's movements.  When to measure kick counts:   Measure kick counts at the same time every day.      Measure kick counts when your baby is awake and most active. Your baby may be most active in the evening.    How to measure kick counts:  Check that your baby is awake before you measure kick counts. You can wake up your baby by lightly pushing on your belly, walking, or drinking something cold. Your healthcare provider may tell you different ways to measure kick counts. You may be told to do the following:  Use a chart or clock to keep track of the time you start and finish counting.     Sit in a chair or lie on your left side.     Place your hands on the largest part of your belly.     Count until you reach 10 kicks. Write down how much time it takes to count 10 kicks.     It may take 30 minutes to 2 hours to count 10 kicks. It should not take more than 2 hours to count 10 kicks.    Follow up with your doctor as directed:  Write down your questions so you remember to ask them during your visits.   © Copyright [employer] 2023 Information is for End User's use only and may not be sold, redistributed or otherwise used for commercial purposes.  The above information is an  only. It is not intended as medical advice for individual conditions or treatments. Talk to your doctor, nurse or pharmacist before following any medical regimen to see if it is safe and effective for you.      Perineal Massage    Perineal massage is recommended starting after 34 weeks in order to reduce risks of perineal tearing during childbirth.  You have been provided and instructional sheet in your yellow 28 week prenatal packet.       Early Labor Signs   AMBULATORY CARE:   Early labor signs  and symptoms  are the changes in your body that signal your baby is getting ready to be delivered. Early labor signs can happen weeks, days or hours before delivery.  Call 911 for any of the following:   You have heavy vaginal bleeding.    You cannot get to the hospital before the baby starts to come out.    Seek care immediately if:   You have regular, painful contractions that are less than 5 minutes apart and last 30 to 70 seconds each.    You have a constant trickle or sudden gush of clear fluid from your vagina.    You notice a sudden decrease in your baby's movement.    Contact your obstetrician or healthcare provider if:   You have pain in your lower back or abdomen that does not get better when you change positions.    You have bloody mucus or show.    You have questions or concerns about your condition or care.    Early labor signs and symptoms:   Lightening  occurs when your baby drops inside your pelvis. You may feel increased pressure in your pelvis. This may happen a few weeks to a few hours before your labor begins.    Contractions  are cramps and tightening that occur in your uterus to help move the baby through your birth canal. Contractions occur regularly and more often each time. Each one lasts about 30 to 70 seconds, and gets stronger until you deliver your baby. Contractions do not go away with movement. The pain usually starts in your lower back and moves to your abdomen.     Effacement  occurs when your cervix softens and thins, so it can easily open for the baby. You will not be able to feel effacement. Your healthcare provider will examine your cervix for effacement.     Dilation  is widening of your cervix. Your healthcare provider will examine your cervix for dilation. Your cervix may start to dilate weeks before your baby is delivered. Your cervix will be fully opened and ready for delivery when it is dilated to 10 centimeters.     Increased discharge  from your vagina may occur. It may  be brown, pink, clear, or slightly bloody. This discharge may also be called bloody show. Bloody show is a mucus plug that forms and blocks your cervix during pregnancy. The discharge may mean that your cervix is opening up and getting ready for delivery.    Rupture of membranes  is a sudden release of clear fluid from your vagina. Ruptured membranes means your water broke. Your healthcare provider may need to break your water if it does not happen on its own.    False labor: You may have false labor signs, which are also called [place] contractions. False labor is common and may happen several weeks or days before your actual labor. The contractions are not regular, and do not get closer together. The pain is usually mild, does not worsen, and is felt only in front. [place]y contractions may happen later in the day, and stop after you change position, walk, or rest.  Follow up with your obstetrician or healthcare provider as directed:  Write down your questions so you remember to ask them during your visit.  © Copyright [employer] 2023 Information is for End User's use only and may not be sold, redistributed or otherwise used for commercial purposes.  The above information is an  only. It is not intended as medical advice for individual conditions or treatments. Talk to your doctor, nurse or pharmacist before following any medical regimen to see if it is safe and effective for you.

## 2024-04-18 NOTE — TELEPHONE ENCOUNTER
----- Message from TONI Garcia sent at 4/18/2024 11:02 AM EDT -----  Pt will need iron infusions, anemic , gastric sleeve     Thank you,   Ignacia MUÑOZ

## 2024-04-18 NOTE — PROGRESS NOTES
31w4d  Pap 2020.Negative   GC/CT:2023 neg/ neg   PN1 Labs: 2023  Blood Type: A  Positive    MFM Level 1:2023  MFM Level 2:1/3/2024  AFP: 2023  28 Week Labs:  TDap:2023  Flu:  GBS:   Blue Folder: given   Yellow Folder:given   Ped Referral :  Breast pump:order   L&D forms: Done  Delivery consent: Done     Patient reports positive fetal movements with concerns .

## 2024-04-18 NOTE — ASSESSMENT & PLAN NOTE
Angelia Anderson is a 34 y.o.   31w4d who presents for routine PNV.  28 week labs reviewed: not completed. Advised to complete today, recent labs in ED, however will need to repeat D/t need for anemia panel, will most likely need Iron infusions, message set to set up infusions  Pt was seen in the ED on 24 for SOB, CT Neg for PE, labs indicative of low potassium 3.3,  anemia 10.3/32.5   Pt is having sporadic episodes of SOB that self resolve most times, no chest pain   TWG -0.454 kg (-1 lb)   Denies OB complaints. Good fetal movement. Denies contractions, cramping, leakage of fluid or vaginal bleeding.   Tdap vaccine declined  Reviewed  labor precautions and FKCs.   Advised to start Perineal massage at 34-36 weeks   Pregnancy Essential guide and Baby and Me web site recommended

## 2024-04-22 DIAGNOSIS — E53.8 B12 DEFICIENCY: ICD-10-CM

## 2024-04-22 DIAGNOSIS — D50.8 IRON DEFICIENCY ANEMIA SECONDARY TO INADEQUATE DIETARY IRON INTAKE: Primary | ICD-10-CM

## 2024-04-22 RX ORDER — SODIUM CHLORIDE 9 MG/ML
20 INJECTION, SOLUTION INTRAVENOUS ONCE
OUTPATIENT
Start: 2024-04-29

## 2024-04-22 NOTE — TELEPHONE ENCOUNTER
Orders signed.   Placed call to [facility] infusion center, spoke to Liset. Patient is scheduled for 4/29/24 at 2:30 PM.   Placed call to patient, advised of date and time. Patient may not be able to make the appointment as she has to pick her son up from school. Infusion number provided to patient to reschedule if needed.

## 2024-04-23 ENCOUNTER — ULTRASOUND (OUTPATIENT)
Age: 34
End: 2024-04-23
Payer: COMMERCIAL

## 2024-04-23 VITALS
DIASTOLIC BLOOD PRESSURE: 54 MMHG | SYSTOLIC BLOOD PRESSURE: 102 MMHG | WEIGHT: 224.6 LBS | HEART RATE: 97 BPM | HEIGHT: 64 IN | BODY MASS INDEX: 38.35 KG/M2

## 2024-04-23 DIAGNOSIS — Z86.711 HISTORY OF PULMONARY EMBOLISM: ICD-10-CM

## 2024-04-23 DIAGNOSIS — Z36.89 ENCOUNTER FOR ULTRASOUND TO ASSESS FETAL GROWTH: ICD-10-CM

## 2024-04-23 DIAGNOSIS — Z86.32 HISTORY OF GESTATIONAL DIABETES: ICD-10-CM

## 2024-04-23 DIAGNOSIS — O99.210 MATERNAL OBESITY, ANTEPARTUM: ICD-10-CM

## 2024-04-23 DIAGNOSIS — O99.843 BARIATRIC SURGERY STATUS COMPLICATING PREGNANCY, THIRD TRIMESTER: Primary | ICD-10-CM

## 2024-04-23 DIAGNOSIS — O09.893 SHORT INTERVAL BETWEEN PREGNANCIES AFFECTING PREGNANCY IN THIRD TRIMESTER, ANTEPARTUM: ICD-10-CM

## 2024-04-23 DIAGNOSIS — O09.292 PRIOR PERINATAL LOSS IN SECOND TRIMESTER, ANTEPARTUM: ICD-10-CM

## 2024-04-23 DIAGNOSIS — Z3A.32 32 WEEKS GESTATION OF PREGNANCY: ICD-10-CM

## 2024-04-23 PROCEDURE — 99214 OFFICE O/P EST MOD 30 MIN: CPT | Performed by: OBSTETRICS & GYNECOLOGY

## 2024-04-23 PROCEDURE — 76816 OB US FOLLOW-UP PER FETUS: CPT | Performed by: OBSTETRICS & GYNECOLOGY

## 2024-04-23 NOTE — PROGRESS NOTES
"[facility]: Angelia Anderson was seen today for fetal growth assessment ultrasound.  See ultrasound report under \"OB Procedures\" tab.   The time spent on this established patient on the encounter date included 6 minutes previsit service time reviewing records and precharting, 8 minutes face-to-face service time counseling regarding results and coordinating care, and  6 minutes charting, totalling 20 minutes.  Please don't hesitate to contact our office with any concerns or questions.  -Carlene Martinez MD    "

## 2024-04-23 NOTE — PATIENT INSTRUCTIONS
Kick Counts in Pregnancy   Kick counts  measure how much your baby is moving in your womb. A kick from your baby can be felt as a twist, turn, swish, roll, or jab. It is common to feel your baby kicking at 26 to 28 weeks of pregnancy. You may feel your baby kick as early as 20 weeks of pregnancy.    Seek care immediately if:   You feel your baby kick less as the day goes on.   You do not feel any kicks in a day.    Contact your healthcare provider if:   You feel a change in the number of kicks or movements of your baby.   You feel fewer than 10 kicks within 2 hours after counting twice.   You have questions or concerns about your baby's movements.    Why measure kick counts:  Your baby's movement may provide information about your baby's health. They may move less, or not at all, if there are problems. They may move less if he does not have enough room to grow in your uterus (womb). They may also move less if he is not getting enough oxygen or nutrition from the placenta. Tell your healthcare provider as soon as you feel a change in your baby's movements. Problems that are found earlier are easier to treat.     When to measure kick counts:   Measure kick counts at the same time every day.    Measure kick counts when your baby is awake and most active. Your baby may be most active in the evening.   Measure kick counts after a meal or snack. Your baby may be more active after you eat. Wait 2 hours after you drink liquids that contain caffeine. Caffeine can make your baby more active than usual.  You should not smoke while you are pregnant. Smoking increases the risk of health problems for you and for your baby during your pregnancy. If you do smoke, wait 2 hours to measure kick counts. Nicotine can make your baby more active than usual.    How to measure kick counts:  Check that your baby is awake before you measure kick counts. You can wake up your baby by lightly pushing on your belly, walking, or drinking something  cold. Your healthcare provider may tell you different ways to measure kick counts. He may tell you to do the following:  Use a chart or clock to keep track of the time you start and finish counting. Sit in a chair or lie on your left side. Place your hands on the largest part of your belly.   Count until you reach 10 kicks. Write down how much time it takes to count 10 kicks. It may take 30 minutes to 2 hours to count 10 kicks. It should not take more than 2 hours to count 10 kicks. If you do not feel 10 kicks within 2 hours, wait 1 hour and count again. Your baby can sleep for up to 40 minutes at one time.

## 2024-04-24 ENCOUNTER — APPOINTMENT (OUTPATIENT)
Dept: LAB | Facility: HOSPITAL | Age: 34
End: 2024-04-24
Payer: COMMERCIAL

## 2024-04-24 DIAGNOSIS — Z34.81 PRENATAL CARE, SUBSEQUENT PREGNANCY, FIRST TRIMESTER: ICD-10-CM

## 2024-04-24 LAB
BASOPHILS # BLD AUTO: 0.02 THOUSANDS/ÂΜL (ref 0–0.1)
BASOPHILS NFR BLD AUTO: 0 % (ref 0–1)
EOSINOPHIL # BLD AUTO: 0.02 THOUSAND/ÂΜL (ref 0–0.61)
EOSINOPHIL NFR BLD AUTO: 0 % (ref 0–6)
ERYTHROCYTE [DISTWIDTH] IN BLOOD BY AUTOMATED COUNT: 14.3 % (ref 11.6–15.1)
FERRITIN SERPL-MCNC: 4 NG/ML (ref 11–307)
GLUCOSE 1H P 50 G GLC PO SERPL-MCNC: 77 MG/DL (ref 70–134)
HCT VFR BLD AUTO: 31 % (ref 34.8–46.1)
HGB BLD-MCNC: 9.7 G/DL (ref 11.5–15.4)
IMM GRANULOCYTES # BLD AUTO: 0.04 THOUSAND/UL (ref 0–0.2)
IMM GRANULOCYTES NFR BLD AUTO: 1 % (ref 0–2)
LYMPHOCYTES # BLD AUTO: 1.99 THOUSANDS/ÂΜL (ref 0.6–4.47)
LYMPHOCYTES NFR BLD AUTO: 26 % (ref 14–44)
MCH RBC QN AUTO: 24.2 PG (ref 26.8–34.3)
MCHC RBC AUTO-ENTMCNC: 31.3 G/DL (ref 31.4–37.4)
MCV RBC AUTO: 77 FL (ref 82–98)
MONOCYTES # BLD AUTO: 0.43 THOUSAND/ÂΜL (ref 0.17–1.22)
MONOCYTES NFR BLD AUTO: 6 % (ref 4–12)
NEUTROPHILS # BLD AUTO: 5.06 THOUSANDS/ÂΜL (ref 1.85–7.62)
NEUTS SEG NFR BLD AUTO: 67 % (ref 43–75)
NRBC BLD AUTO-RTO: 0 /100 WBCS
PLATELET # BLD AUTO: 240 THOUSANDS/UL (ref 149–390)
PMV BLD AUTO: 10.2 FL (ref 8.9–12.7)
RBC # BLD AUTO: 4.01 MILLION/UL (ref 3.81–5.12)
TREPONEMA PALLIDUM IGG+IGM AB [PRESENCE] IN SERUM OR PLASMA BY IMMUNOASSAY: NORMAL
WBC # BLD AUTO: 7.56 THOUSAND/UL (ref 4.31–10.16)

## 2024-04-24 PROCEDURE — 86780 TREPONEMA PALLIDUM: CPT

## 2024-04-24 PROCEDURE — 82728 ASSAY OF FERRITIN: CPT

## 2024-04-24 PROCEDURE — 85025 COMPLETE CBC W/AUTO DIFF WBC: CPT

## 2024-04-24 PROCEDURE — 36415 COLL VENOUS BLD VENIPUNCTURE: CPT

## 2024-04-24 PROCEDURE — 82950 GLUCOSE TEST: CPT

## 2024-04-29 ENCOUNTER — HOSPITAL ENCOUNTER (OUTPATIENT)
Dept: INFUSION CENTER | Facility: CLINIC | Age: 34
End: 2024-04-29

## 2024-05-02 ENCOUNTER — ROUTINE PRENATAL (OUTPATIENT)
Dept: OBGYN CLINIC | Facility: CLINIC | Age: 34
End: 2024-05-02
Payer: COMMERCIAL

## 2024-05-02 VITALS
DIASTOLIC BLOOD PRESSURE: 66 MMHG | SYSTOLIC BLOOD PRESSURE: 116 MMHG | WEIGHT: 222.4 LBS | HEART RATE: 102 BPM | OXYGEN SATURATION: 97 % | BODY MASS INDEX: 38.17 KG/M2

## 2024-05-02 DIAGNOSIS — Z3A.33 33 WEEKS GESTATION OF PREGNANCY: ICD-10-CM

## 2024-05-02 DIAGNOSIS — Z86.59 HISTORY OF POSTPARTUM DEPRESSION: ICD-10-CM

## 2024-05-02 DIAGNOSIS — Z86.711 HISTORY OF PULMONARY EMBOLISM: ICD-10-CM

## 2024-05-02 DIAGNOSIS — L98.9 SKIN LESION: ICD-10-CM

## 2024-05-02 DIAGNOSIS — Z34.83 PRENATAL CARE, SUBSEQUENT PREGNANCY, THIRD TRIMESTER: Primary | ICD-10-CM

## 2024-05-02 DIAGNOSIS — D50.8 IRON DEFICIENCY ANEMIA SECONDARY TO INADEQUATE DIETARY IRON INTAKE: ICD-10-CM

## 2024-05-02 DIAGNOSIS — Z87.59 HISTORY OF POSTPARTUM DEPRESSION: ICD-10-CM

## 2024-05-02 PROCEDURE — 99213 OFFICE O/P EST LOW 20 MIN: CPT | Performed by: STUDENT IN AN ORGANIZED HEALTH CARE EDUCATION/TRAINING PROGRAM

## 2024-05-02 NOTE — PROGRESS NOTES
Iron deficiency anemia secondary to inadequate dietary iron intake  First infusion scheduled    33 weeks gestation of pregnancy  35 yo here for ob visit.  at 33+4 here for routine ob visit. Feeling overall well. No contractions, leaking or bleeding. Great fetal movement. Return in 2 wks.    History of postpartum depression  Related to prior pregnancy loss, most recently no issues with last delivery. Reviewed increased risk.      History of pulmonary embolism  Will plan for IOL     Skin lesion  2 cm ulcerated mass on abdominal wall beneath breasts. Recommend stat gen surg eval for possible biopsy and plan for removal/management.

## 2024-05-02 NOTE — ASSESSMENT & PLAN NOTE
33 yo here for ob visit.  at 33+4 here for routine ob visit. Feeling overall well. No contractions, leaking or bleeding. Great fetal movement. Return in 2 wks.

## 2024-05-02 NOTE — ASSESSMENT & PLAN NOTE
Related to prior pregnancy loss, most recently no issues with last delivery. Reviewed increased risk.

## 2024-05-02 NOTE — PROGRESS NOTES
Patient here for prenatal visit.   GA: 33w4d    Denies leaking of fluid, bleeding, cramping  Normal Fetal Movement  Patient unable to void.   Labs-Utd  Tdap-declined previously  Delivery consent signed 24  Breast pump ordered previously  No concerns at this time.

## 2024-05-02 NOTE — ASSESSMENT & PLAN NOTE
2 cm ulcerated mass on abdominal wall beneath breasts. Recommend stat gen surg eval for possible biopsy and plan for removal/management.

## 2024-05-06 ENCOUNTER — HOSPITAL ENCOUNTER (OUTPATIENT)
Dept: INFUSION CENTER | Facility: CLINIC | Age: 34
Discharge: HOME | End: 2024-05-06

## 2024-05-06 ENCOUNTER — TELEPHONE (OUTPATIENT)
Dept: OBGYN CLINIC | Facility: CLINIC | Age: 34
End: 2024-05-06

## 2024-05-06 DIAGNOSIS — D50.8 IRON DEFICIENCY ANEMIA SECONDARY TO INADEQUATE DIETARY IRON INTAKE: ICD-10-CM

## 2024-05-06 DIAGNOSIS — E53.8 B12 DEFICIENCY: ICD-10-CM

## 2024-05-06 NOTE — TELEPHONE ENCOUNTER
Breat pump ordered through [facility] per Patient request. RX faxed on 5/6/2024.

## 2024-05-06 NOTE — PROGRESS NOTES
Assessment/Plan:   Angelia Anderson is a 34 y.o.female who is here for   Chief Complaint   Patient presents with    Skin Lesion     Patient is here today to discuss a lesion between her breast ongoing a few years.          On exam found to have Sebaceous Cyst of the : central chest wall.    Plan: Excise lesion(s) in the office under local anesthetic. Patient is currently 8 months pregnant and on lovenox and would like to wait until after delivery. Cyst is not infected today and healing nicely from recent drainage. If it becomes irritated/infected prior to removal date she knows to call and we can do conservative management int he office.    Third trimester in her pregnancy       Positioning: supine    Post Op Pain Management:   Motrin and Tylenol    - Patient has been instructed to avoid herbs or non-directed vitamins the week prior to surgery to ensure no drug interactions with perioperative surgical and anesthetic medications.  - Patient should continue beta-blocker medication up through and including the day of surgery but hold any other hypertensive medications, including diuretics, unless instructed by PCP or anesthesia  - Patient should continue his statin medication up through and including the day of surgery.  - Hold metformin , If on this medication, the morning of surgery and do not resume until 48 hours AFTER surgery to avoid risk of lactic acidosis. Do not resume if eGFR is < 30  - Insulin Management:If on Insulin, patient advised to call PCP for explicit instructions. In general, will need to take one-half normal dose am of surgery but pt advised to consult PCP before making any changes.   - Patient has been instructed to avoid aspirin containing medications or non-steroidal anti-inflammatory drugs for SEVEN days preceding surgery.      Preoperative Clearance: None          _______________________________________________________  CC:Skin Lesion (Patient is here today to discuss a lesion between her  breast ongoing a few years. )  .    HPI:  Angelia Anderson is a 34 y.o.female who was referred for evaluation of Skin Lesion (Patient is here today to discuss a lesion between her breast ongoing a few years. )  .    Currently patient reports she has had a cyst on her chest for 3+ years. Recently was infected and drained on its own. Today she reports it is not painful and no longer drainage. Patient is feeling much better but would ultimately like to get the cyst removed. Patient is not on anticoagulation when not pregnant.      ROS:  General ROS: negative  negative for - chills, fatigue, fever or night sweats, weight loss  Respiratory ROS: no cough, shortness of breath, or wheezing  Cardiovascular ROS: no chest pain or dyspnea on exertion  Genito-Urinary ROS: no dysuria, trouble voiding, or hematuria  Musculoskeletal ROS: negative for - gait disturbance, joint pain or muscle pain  Neurological ROS: no TIA or stroke symptoms  Skin ROS: See HPI  GI ROS: see HPI  Skin ROS: no new rashes or lesions   Lymphatic ROS: no new adenopathy noted by pt.   Psy ROS: no new mental or behavioral disturbances       Patient Active Problem List   Diagnosis    Bariatric surgery status complicating pregnancy, third trimester    Iron deficiency anemia secondary to inadequate dietary iron intake    OAB (overactive bladder)    Prior  loss in second trimester, antepartum    History of postpartum depression    History of gestational diabetes    Maternal obesity, antepartum    Vitamin D deficiency    Vitamin A deficiency    B12 deficiency    History of pulmonary embolism    33 weeks gestation of pregnancy    Short interval between pregnancies affecting pregnancy in third trimester, antepartum    Skin lesion         Allergies:  Patient has no known allergies.      Current Outpatient Medications:     acetaminophen (TYLENOL) 325 mg tablet, Take 2 tablets (650 mg total) by mouth every 4 (four) hours as needed for mild pain or moderate  pain, Disp: 30 tablet, Rfl: 0    enoxaparin (Lovenox) 40 mg/0.4 mL, Inject under the skin, Disp: , Rfl:     Past Medical History:   Diagnosis Date    Anemia     H/O gastric sleeve 2016    History of transfusion     states no transfusion rxn    Miscarriage     Personal history of COVID-19     recovered at home    Post partum depression     Pulmonary embolus (HCC) 2022    approx, during pregnancy    Sleep apnea, obstructive     prior to weight loss surg       Past Surgical History:   Procedure Laterality Date    BARIATRIC SURGERY      DILATION AND EVACUATION  2019        GASTRIC RESTRICTION SURGERY  2015    gastric sleeve    INCISION AND DRAINAGE OF WOUND Right 2022    Procedure: INCISION AND DRAINAGE (I&D) HEAD/FACE;  Surgeon: Sumit Matos DMD;  Location: [facility];  Service: Maxillofacial    MULTIPLE TOOTH EXTRACTIONS Right 2022    Procedure: EXTRACTION TEETH MULTIPLE;  Surgeon: Sumit Matos DMD;  Location: [facility];  Service: Maxillofacial    OPEN ANTERIOR SHOULDER RECONSTRUCTION Left     OTHER SURGICAL HISTORY      sweat gland removal    NH TX MISSED  FIRST TRIMESTER SURGICAL N/A 2023    Procedure: DILATATION AND EVACUATION (D&E);  Surgeon: Radha Knowles MD;  Location: [facility];  Service: Gynecology    RETAINED PLACENTA REMOVAL N/A 2020    Procedure: EXTRACTION OF PLACENTA,MANUAL;  Surgeon: Gray Campa MD;  Location: [facility] ;  Service: Obstetrics       Family History   Problem Relation Age of Onset    Stroke Mother     Heart disease Mother     Seizures Mother     Hypertension Mother     Anxiety disorder Mother     Bipolar disorder Mother     Kidney disease Mother     Diabetes Mother     Sudden death Father     No Known Problems Sister     No Known Problems Sister     No Known Problems Brother     No Known Problems Brother     No Known Problems Brother     Sudden death Brother 20        MVA    No Known Problems Son     Pancreatic cancer  Maternal Grandmother     Seizures Maternal Grandmother     Diabetes Maternal Grandmother     Cancer Maternal Grandmother     No Known Problems Daughter     Cancer Maternal Aunt         reports that she quit smoking about 4 years ago. Her smoking use included cigarettes. She started smoking about 14 years ago. She has a 2.5 pack-year smoking history. She has been exposed to tobacco smoke. She has quit using smokeless tobacco. She reports that she does not currently use alcohol. She reports that she does not use drugs.    Vitals:    05/13/24 0935   BP: 108/62   Pulse: 91   Resp: 16   Temp: 97.7 °F (36.5 °C)   SpO2: 98%        PHYSICAL EXAM  General Appearance:    Alert, cooperative, no distress,    Head:    Normocephalic without obvious abnormality   Eyes:    PERRL, conjunctiva/corneas clear     Neck:   Supple, no adenopathy, no JVD   Back:      Lungs:      Heart:     Abdomen:     Benign, no rebound or guarding.    Extremities:   Extremities normal. No clubbing, cyanosis or edema   Psych:   Normal Affect, AOx3.    Neurologic:  Skin:   CNII-XII intact. Strength symmetric, speech intact    Warm, dry, intact, no visible rashes or lesions except as follows: There is a 1 cm cystic structure in central chest, no erythema or tenderness on palpation.                 Treva Son PA-C    Date: 5/13/2024 Time: 9:39 AM

## 2024-05-06 NOTE — PROGRESS NOTES
Patient arrived to the unit with her one year old daughter. Unfortunately she must be rescheduled because children are not allowed in the infusion center. Patient does not have  and will call us back to schedule future appointments.

## 2024-05-09 ENCOUNTER — TELEPHONE (OUTPATIENT)
Age: 34
End: 2024-05-09

## 2024-05-09 NOTE — TELEPHONE ENCOUNTER
Pt called regarding other treatments for to improve her iron levels. Due to childcare pt cannot do the iron infusions. Please advise

## 2024-05-13 ENCOUNTER — CONSULT (OUTPATIENT)
Dept: SURGERY | Facility: CLINIC | Age: 34
End: 2024-05-13
Payer: COMMERCIAL

## 2024-05-13 VITALS
WEIGHT: 220 LBS | RESPIRATION RATE: 16 BRPM | BODY MASS INDEX: 37.56 KG/M2 | TEMPERATURE: 97.7 F | DIASTOLIC BLOOD PRESSURE: 62 MMHG | OXYGEN SATURATION: 98 % | HEIGHT: 64 IN | HEART RATE: 91 BPM | SYSTOLIC BLOOD PRESSURE: 108 MMHG

## 2024-05-13 DIAGNOSIS — L98.9 SKIN LESION: ICD-10-CM

## 2024-05-13 PROCEDURE — 99202 OFFICE O/P NEW SF 15 MIN: CPT | Performed by: PHYSICIAN ASSISTANT

## 2024-05-15 ENCOUNTER — TELEPHONE (OUTPATIENT)
Age: 34
End: 2024-05-15

## 2024-05-15 NOTE — TELEPHONE ENCOUNTER
Regarding: Infusion  ----- Message from Concepcion ROJAS sent at 5/15/2024  4:15 PM EDT -----  Patient contacted [place] office for infusions, as instructed, however the office has advised the patient that they are unable to help her. She is still needing assistance with her infusion concerns/questions.

## 2024-05-15 NOTE — TELEPHONE ENCOUNTER
Patient called back asking if she can take the iron pills. Discussed with patient the importance of getting the infusions completed. Advised to use support system from home for childcare.  Advised to call infusion center at [facility] for accommodations and contact number given.  She verbalized understanding.

## 2024-05-15 NOTE — TELEPHONE ENCOUNTER
Return call to Angelia. [facility] was unable to accommodate her request to bring 1 yr old daughter to infusion appointment.  She states she has no one who can help her with childcare or anyone who could go to infusion with her and tend to daughter while she is being infused.      She will discuss with Ana at tomorrow's OB visit

## 2024-05-15 NOTE — TELEPHONE ENCOUNTER
Patient unable to be assisted by [facility] for iron infusions as instructed. Sending message to [facility].

## 2024-05-15 NOTE — TELEPHONE ENCOUNTER
Pt called and stated that she can;t go to her iron infusion appt because she has her 2yo. Pt would like to know if she needs to take an iron pill? If yes, how much?  Please follow up with pt. Thank you

## 2024-05-16 ENCOUNTER — HOSPITAL ENCOUNTER (OUTPATIENT)
Dept: INFUSION CENTER | Facility: CLINIC | Age: 34
End: 2024-05-16

## 2024-05-16 ENCOUNTER — TELEPHONE (OUTPATIENT)
Dept: INFUSION CENTER | Facility: HOSPITAL | Age: 34
End: 2024-05-16

## 2024-05-16 ENCOUNTER — ROUTINE PRENATAL (OUTPATIENT)
Dept: OBGYN CLINIC | Facility: CLINIC | Age: 34
End: 2024-05-16
Payer: COMMERCIAL

## 2024-05-16 DIAGNOSIS — Z86.32 HISTORY OF GESTATIONAL DIABETES: ICD-10-CM

## 2024-05-16 DIAGNOSIS — Z3A.35 35 WEEKS GESTATION OF PREGNANCY: ICD-10-CM

## 2024-05-16 DIAGNOSIS — O09.292 PRIOR PERINATAL LOSS IN SECOND TRIMESTER, ANTEPARTUM: ICD-10-CM

## 2024-05-16 DIAGNOSIS — O09.893 SHORT INTERVAL BETWEEN PREGNANCIES AFFECTING PREGNANCY IN THIRD TRIMESTER, ANTEPARTUM: Primary | ICD-10-CM

## 2024-05-16 DIAGNOSIS — Z86.711 HISTORY OF PULMONARY EMBOLISM: ICD-10-CM

## 2024-05-16 DIAGNOSIS — Z34.83 PRENATAL CARE, SUBSEQUENT PREGNANCY, THIRD TRIMESTER: ICD-10-CM

## 2024-05-16 DIAGNOSIS — O99.843 BARIATRIC SURGERY STATUS COMPLICATING PREGNANCY, THIRD TRIMESTER: ICD-10-CM

## 2024-05-16 DIAGNOSIS — D50.8 IRON DEFICIENCY ANEMIA SECONDARY TO INADEQUATE DIETARY IRON INTAKE: ICD-10-CM

## 2024-05-16 PROBLEM — O99.013 ANEMIA AFFECTING PREGNANCY IN THIRD TRIMESTER: Status: ACTIVE | Noted: 2024-05-16

## 2024-05-16 LAB
SL AMB  POCT GLUCOSE, UA: ABNORMAL
SL AMB POCT URINE PROTEIN: ABNORMAL

## 2024-05-16 PROCEDURE — 81002 URINALYSIS NONAUTO W/O SCOPE: CPT | Performed by: PHYSICIAN ASSISTANT

## 2024-05-16 PROCEDURE — 99213 OFFICE O/P EST LOW 20 MIN: CPT | Performed by: PHYSICIAN ASSISTANT

## 2024-05-16 NOTE — TELEPHONE ENCOUNTER
Call from [facility] regarding patient Venofer appts.  Patient with difficulty with childcare during the week.  Only has  for Saturdays.  I added patient to this Saturday May 18th at 1200 noon.  I did explain May 25 and June 1st will be more difficult as schedule is limited.  [facility] does have appt at [facility] in [facility] and the team did offer her to come at 4pm and stay late to get the infusion in.  [facility] will talk to patient today and discuss and the office will call us back with outcome.

## 2024-05-16 NOTE — ASSESSMENT & PLAN NOTE
H/H: 4/24/24 - 9.7/31.0   Has not gone for iron infusions due to lack of childcare. Infusion center contacted by nursing staff and able to accommodate a Saturday appt this weekend. Also scheduled for appt early next week. Thinks  may be able work from home to accommodate this.   Reviewed neg impact of untreated iron deficiency anemia and complications that can arise as a result. 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

## 2024-05-16 NOTE — ASSESSMENT & PLAN NOTE
Angelia  is a 34 y.o.  @35w4d who presents for routine prenatal visit.      Interested in IOL.    28 wk labs - anemia (see separate dx), normal 1 hr, NR syphilis screen   Growth scan - 24   TDAP - received   Delivery consent - on file   GBS @ 36 weeks     TWG 1.089 kg (2 lb 6.4 oz)    Reports good fetal movement.  Denies LOF, vaginal bleeding, regular uterine contractions, cramping, headaches or visual changes.      Reviewed PTL/Labor precautions and FKC.

## 2024-05-16 NOTE — TELEPHONE ENCOUNTER
"Called [facility], to inquire about Saturdays  per Brendon , \"they are not open on Saturdays.     Called [facility]- they can see her this Saturday, 5/18/24 at 1200.    She has appt for   [facility] 5/20 at 1200, but if cannot find childcare, she should call to change appt to 4PM, if childcare is available at that time.    "

## 2024-05-18 ENCOUNTER — HOSPITAL ENCOUNTER (OUTPATIENT)
Dept: INFUSION CENTER | Facility: HOSPITAL | Age: 34
Discharge: HOME | End: 2024-05-18
Attending: STUDENT IN AN ORGANIZED HEALTH CARE EDUCATION/TRAINING PROGRAM
Payer: COMMERCIAL

## 2024-05-18 VITALS
SYSTOLIC BLOOD PRESSURE: 120 MMHG | TEMPERATURE: 96.6 F | DIASTOLIC BLOOD PRESSURE: 76 MMHG | RESPIRATION RATE: 18 BRPM | HEART RATE: 111 BPM

## 2024-05-18 DIAGNOSIS — O99.013 ANEMIA AFFECTING PREGNANCY IN THIRD TRIMESTER: Primary | ICD-10-CM

## 2024-05-18 DIAGNOSIS — D50.8 IRON DEFICIENCY ANEMIA SECONDARY TO INADEQUATE DIETARY IRON INTAKE: ICD-10-CM

## 2024-05-18 DIAGNOSIS — E53.8 B12 DEFICIENCY: ICD-10-CM

## 2024-05-18 RX ORDER — SODIUM CHLORIDE 9 MG/ML
20 INJECTION, SOLUTION INTRAVENOUS ONCE
Status: CANCELLED | OUTPATIENT
Start: 2024-05-20

## 2024-05-18 RX ORDER — SODIUM CHLORIDE 9 MG/ML
20 INJECTION, SOLUTION INTRAVENOUS ONCE
Status: COMPLETED | OUTPATIENT
Start: 2024-05-18 | End: 2024-05-18

## 2024-05-18 RX ADMIN — IRON SUCROSE 100 MG: 20 INJECTION, SOLUTION INTRAVENOUS at 12:49

## 2024-05-18 RX ADMIN — SODIUM CHLORIDE 20 ML/HR: 0.9 INJECTION, SOLUTION INTRAVENOUS at 12:49

## 2024-05-18 NOTE — PROGRESS NOTES
Angelia Anderson  tolerated treatment well with no complications.      Angelia Anderson is aware of future appt on 05/20/2024 at 12:00 pm, at [facility].     Venofer infusion given without incident.  Patient declined printed AVS.  Patient to return to department as scheduled.

## 2024-05-20 ENCOUNTER — HOSPITAL ENCOUNTER (OUTPATIENT)
Dept: INFUSION CENTER | Facility: CLINIC | Age: 34
Discharge: HOME | End: 2024-05-20
Payer: COMMERCIAL

## 2024-05-20 VITALS
RESPIRATION RATE: 18 BRPM | HEART RATE: 103 BPM | DIASTOLIC BLOOD PRESSURE: 71 MMHG | TEMPERATURE: 99 F | SYSTOLIC BLOOD PRESSURE: 111 MMHG

## 2024-05-20 DIAGNOSIS — D50.8 IRON DEFICIENCY ANEMIA SECONDARY TO INADEQUATE DIETARY IRON INTAKE: ICD-10-CM

## 2024-05-20 DIAGNOSIS — O99.013 ANEMIA AFFECTING PREGNANCY IN THIRD TRIMESTER: ICD-10-CM

## 2024-05-20 DIAGNOSIS — E53.8 B12 DEFICIENCY: Primary | ICD-10-CM

## 2024-05-20 RX ORDER — SODIUM CHLORIDE 9 MG/ML
20 INJECTION, SOLUTION INTRAVENOUS ONCE
Status: CANCELLED | OUTPATIENT
Start: 2024-05-24

## 2024-05-20 RX ORDER — SODIUM CHLORIDE 9 MG/ML
20 INJECTION, SOLUTION INTRAVENOUS ONCE
Status: COMPLETED | OUTPATIENT
Start: 2024-05-20 | End: 2024-05-20

## 2024-05-20 RX ADMIN — IRON SUCROSE 100 MG: 20 INJECTION, SOLUTION INTRAVENOUS at 12:22

## 2024-05-20 RX ADMIN — SODIUM CHLORIDE 20 ML/HR: 0.9 INJECTION, SOLUTION INTRAVENOUS at 12:21

## 2024-05-20 NOTE — PROGRESS NOTES
Pt tolerated venofer infusion without incident.  Pt states she needs two more appts next week.  Pt agreeable to go to [facility] as [facility] did not have room next week.  Pt is aware of her next appt on May 24 at 11:30.

## 2024-05-21 ENCOUNTER — ULTRASOUND (OUTPATIENT)
Dept: PERINATAL CARE | Facility: CLINIC | Age: 34
End: 2024-05-21
Payer: COMMERCIAL

## 2024-05-21 VITALS
SYSTOLIC BLOOD PRESSURE: 105 MMHG | WEIGHT: 226.2 LBS | HEART RATE: 97 BPM | BODY MASS INDEX: 38.62 KG/M2 | DIASTOLIC BLOOD PRESSURE: 70 MMHG | HEIGHT: 64 IN

## 2024-05-21 DIAGNOSIS — Z3A.36 36 WEEKS GESTATION OF PREGNANCY: ICD-10-CM

## 2024-05-21 DIAGNOSIS — O99.210 MATERNAL OBESITY, ANTEPARTUM: Primary | ICD-10-CM

## 2024-05-21 PROCEDURE — 99213 OFFICE O/P EST LOW 20 MIN: CPT | Performed by: OBSTETRICS & GYNECOLOGY

## 2024-05-21 PROCEDURE — 76816 OB US FOLLOW-UP PER FETUS: CPT | Performed by: OBSTETRICS & GYNECOLOGY

## 2024-05-24 ENCOUNTER — ROUTINE PRENATAL (OUTPATIENT)
Dept: OBGYN CLINIC | Facility: CLINIC | Age: 34
End: 2024-05-24
Payer: COMMERCIAL

## 2024-05-24 ENCOUNTER — HOSPITAL ENCOUNTER (OUTPATIENT)
Dept: INFUSION CENTER | Facility: CLINIC | Age: 34
End: 2024-05-24
Payer: COMMERCIAL

## 2024-05-24 VITALS
WEIGHT: 229.2 LBS | OXYGEN SATURATION: 95 % | BODY MASS INDEX: 39.34 KG/M2 | HEART RATE: 97 BPM | DIASTOLIC BLOOD PRESSURE: 68 MMHG | SYSTOLIC BLOOD PRESSURE: 120 MMHG

## 2024-05-24 VITALS
HEART RATE: 87 BPM | SYSTOLIC BLOOD PRESSURE: 112 MMHG | DIASTOLIC BLOOD PRESSURE: 71 MMHG | TEMPERATURE: 98.2 F | RESPIRATION RATE: 18 BRPM

## 2024-05-24 DIAGNOSIS — Z3A.36 36 WEEKS GESTATION OF PREGNANCY: ICD-10-CM

## 2024-05-24 DIAGNOSIS — O99.013 ANEMIA AFFECTING PREGNANCY IN THIRD TRIMESTER: Primary | ICD-10-CM

## 2024-05-24 DIAGNOSIS — D50.8 IRON DEFICIENCY ANEMIA SECONDARY TO INADEQUATE DIETARY IRON INTAKE: ICD-10-CM

## 2024-05-24 DIAGNOSIS — Z34.83 PRENATAL CARE, SUBSEQUENT PREGNANCY, THIRD TRIMESTER: Primary | ICD-10-CM

## 2024-05-24 DIAGNOSIS — E53.8 B12 DEFICIENCY: ICD-10-CM

## 2024-05-24 DIAGNOSIS — O99.013 ANEMIA AFFECTING PREGNANCY IN THIRD TRIMESTER: ICD-10-CM

## 2024-05-24 DIAGNOSIS — Z86.711 HISTORY OF PULMONARY EMBOLISM: ICD-10-CM

## 2024-05-24 DIAGNOSIS — Z86.32 HISTORY OF GESTATIONAL DIABETES: ICD-10-CM

## 2024-05-24 LAB
SL AMB  POCT GLUCOSE, UA: NEGATIVE
SL AMB POCT URINE PROTEIN: NEGATIVE

## 2024-05-24 PROCEDURE — 87150 DNA/RNA AMPLIFIED PROBE: CPT | Performed by: PHYSICIAN ASSISTANT

## 2024-05-24 PROCEDURE — 81002 URINALYSIS NONAUTO W/O SCOPE: CPT | Performed by: PHYSICIAN ASSISTANT

## 2024-05-24 PROCEDURE — 99213 OFFICE O/P EST LOW 20 MIN: CPT | Performed by: PHYSICIAN ASSISTANT

## 2024-05-24 PROCEDURE — 96365 THER/PROPH/DIAG IV INF INIT: CPT

## 2024-05-24 RX ORDER — SODIUM CHLORIDE 9 MG/ML
20 INJECTION, SOLUTION INTRAVENOUS ONCE
Status: COMPLETED | OUTPATIENT
Start: 2024-05-24 | End: 2024-05-24

## 2024-05-24 RX ORDER — SODIUM CHLORIDE 9 MG/ML
20 INJECTION, SOLUTION INTRAVENOUS ONCE
Status: CANCELLED | OUTPATIENT
Start: 2024-05-29

## 2024-05-24 RX ADMIN — IRON SUCROSE 100 MG: 20 INJECTION, SOLUTION INTRAVENOUS at 11:57

## 2024-05-24 RX ADMIN — SODIUM CHLORIDE 20 ML/HR: 0.9 INJECTION, SOLUTION INTRAVENOUS at 11:57

## 2024-05-24 NOTE — PROGRESS NOTES
Patient here for prenatal visit.  GA: 36w5d    Denies leaking of fluid, bleeding  Patient has cramping  Normal Fetal Movement  Labs-Utd  Tdap-declined previously  Delivery consent signed 24  GBS collected today  No concerns at this time.

## 2024-05-24 NOTE — PROGRESS NOTES
36 weeks gestation of pregnancy  Angelia  is a 34 y.o.  @36w5d who presents for routine prenatal visit.      28 wk labs - Hgb 9.7- receiving IV iron infusions, normal RPR and GTT  Growth scan- EFW20% at 36 wks  TDAP declined  GBS collected today  Delivery consent signed    Reports good fetal movement.  Denies LOF, vaginal bleeding, regular uterine contractions, cramping, headaches or visual changes.      Reviewed PTL/Labor precautions and FKC.        History of gestational diabetes  Passed 1 hour GTT    History of pulmonary embolism  Continues Lovenox  Interested in 39 wk IOL  Request submitted and consents signed today    Iron deficiency anemia secondary to inadequate dietary iron intake  Continues IV venofer    Anemia affecting pregnancy in third trimester  Continues IV venofer

## 2024-05-24 NOTE — PROGRESS NOTES
Angelia Anderson  tolerated venofer well with no complications.      Angelia Anderson is aware of future appt on Wednesday May 29, 2024 10:00 AM.    AVS declined by Angelia Anderson) ***

## 2024-05-24 NOTE — PROGRESS NOTES
Pt tolerated venofer without incident. Pt declined AVS, aware of next appt 5/29 at 9:30am at the [facility].

## 2024-05-24 NOTE — ASSESSMENT & PLAN NOTE
Angelia  is a 34 y.o.  @36w5d who presents for routine prenatal visit.      28 wk labs - Hgb 9.7- receiving IV iron infusions, normal RPR and GTT  Growth scan- EFW20% at 36 wks  TDAP declined  GBS collected today  Delivery consent signed    Reports good fetal movement.  Denies LOF, vaginal bleeding, regular uterine contractions, cramping, headaches or visual changes.      Reviewed PTL/Labor precautions and FKC.

## 2024-05-26 LAB — GP B STREP DNA SPEC QL NAA+PROBE: NEGATIVE

## 2024-05-28 ENCOUNTER — TELEPHONE (OUTPATIENT)
Dept: OBGYN CLINIC | Facility: CLINIC | Age: 34
End: 2024-05-28

## 2024-05-28 NOTE — TELEPHONE ENCOUNTER
----- Message from Ana Martinez PA-C sent at 5/24/2024  4:29 PM EDT -----  Regarding: IOL  Procedure to be scheduled (IOL or CS): IOL  OLIVER: Estimated Date of Delivery: 6/16/24  Indication for delivery: medical - anticoagulation in pregnancy   Requested date (s) of delivery: 39 wk IOL    If requested date is unavailable, is there a date by which the pt must be delivered? Preferably 39 wks  Physician preference: n/a    If IOL, anticipated method: without ripening  If CS, with or without tubal: n/a

## 2024-05-28 NOTE — TELEPHONE ENCOUNTER
6/10 7am OLIVER    Patient notified of IOL date,time,and location. Advised patient she may eat a light breakfast/dinner prior to going to L&D. In the interim please report any vaginal bleeding,leakage of fluid,decreased fetal movement or contractions.Reviewed fetal kick counts. Advised to keep all upcoming prenatal visits.

## 2024-05-29 ENCOUNTER — ULTRASOUND (OUTPATIENT)
Dept: PERINATAL CARE | Facility: CLINIC | Age: 34
End: 2024-05-29
Payer: COMMERCIAL

## 2024-05-29 VITALS
BODY MASS INDEX: 39.54 KG/M2 | HEIGHT: 64 IN | WEIGHT: 231.6 LBS | DIASTOLIC BLOOD PRESSURE: 56 MMHG | SYSTOLIC BLOOD PRESSURE: 128 MMHG | HEART RATE: 90 BPM

## 2024-05-29 DIAGNOSIS — O99.210 MATERNAL OBESITY, ANTEPARTUM: Primary | ICD-10-CM

## 2024-05-29 DIAGNOSIS — Z3A.37 37 WEEKS GESTATION OF PREGNANCY: ICD-10-CM

## 2024-05-29 PROCEDURE — 59025 FETAL NON-STRESS TEST: CPT | Performed by: STUDENT IN AN ORGANIZED HEALTH CARE EDUCATION/TRAINING PROGRAM

## 2024-05-29 PROCEDURE — 76815 OB US LIMITED FETUS(S): CPT | Performed by: STUDENT IN AN ORGANIZED HEALTH CARE EDUCATION/TRAINING PROGRAM

## 2024-05-29 NOTE — PROGRESS NOTES
This patient received  care under my supervision on 24 at 37w3d gestational age at [facility].  NST is reactive.  -Treva Childress MD

## 2024-05-29 NOTE — PROGRESS NOTES
Non-Stress Testing:    Non-Stress test, equipment, procedure, and expected outcomes explained. Reviewed fetal kick counts and when to call OB.Verified patient understanding of fetal kick counts with teach back method. Patient reports feeling daily fetal movements. Patient has no questions or concerns.            Dr. Childress viewed NST strip prior to completion of visit.

## 2024-05-31 ENCOUNTER — HOSPITAL ENCOUNTER (OUTPATIENT)
Dept: INFUSION CENTER | Facility: HOSPITAL | Age: 34
End: 2024-05-31
Payer: COMMERCIAL

## 2024-05-31 ENCOUNTER — ROUTINE PRENATAL (OUTPATIENT)
Dept: OBGYN CLINIC | Facility: CLINIC | Age: 34
End: 2024-05-31
Payer: COMMERCIAL

## 2024-05-31 VITALS
OXYGEN SATURATION: 96 % | SYSTOLIC BLOOD PRESSURE: 98 MMHG | DIASTOLIC BLOOD PRESSURE: 66 MMHG | BODY MASS INDEX: 39.75 KG/M2 | WEIGHT: 231.6 LBS | HEART RATE: 93 BPM

## 2024-05-31 VITALS
TEMPERATURE: 98 F | RESPIRATION RATE: 20 BRPM | HEART RATE: 99 BPM | DIASTOLIC BLOOD PRESSURE: 70 MMHG | OXYGEN SATURATION: 99 % | SYSTOLIC BLOOD PRESSURE: 125 MMHG

## 2024-05-31 DIAGNOSIS — Z86.711 HISTORY OF PULMONARY EMBOLISM: ICD-10-CM

## 2024-05-31 DIAGNOSIS — E53.8 B12 DEFICIENCY: ICD-10-CM

## 2024-05-31 DIAGNOSIS — O99.013 ANEMIA AFFECTING PREGNANCY IN THIRD TRIMESTER: ICD-10-CM

## 2024-05-31 DIAGNOSIS — D50.8 IRON DEFICIENCY ANEMIA SECONDARY TO INADEQUATE DIETARY IRON INTAKE: ICD-10-CM

## 2024-05-31 DIAGNOSIS — O99.210 MATERNAL OBESITY, ANTEPARTUM: ICD-10-CM

## 2024-05-31 DIAGNOSIS — O09.893 SHORT INTERVAL BETWEEN PREGNANCIES AFFECTING PREGNANCY IN THIRD TRIMESTER, ANTEPARTUM: ICD-10-CM

## 2024-05-31 DIAGNOSIS — O99.013 ANEMIA AFFECTING PREGNANCY IN THIRD TRIMESTER: Primary | ICD-10-CM

## 2024-05-31 DIAGNOSIS — Z86.32 HISTORY OF GESTATIONAL DIABETES: ICD-10-CM

## 2024-05-31 DIAGNOSIS — Z34.83 PRENATAL CARE, SUBSEQUENT PREGNANCY, THIRD TRIMESTER: Primary | ICD-10-CM

## 2024-05-31 DIAGNOSIS — Z3A.37 37 WEEKS GESTATION OF PREGNANCY: ICD-10-CM

## 2024-05-31 LAB
SL AMB  POCT GLUCOSE, UA: NEGATIVE
SL AMB POCT URINE PROTEIN: ABNORMAL

## 2024-05-31 PROCEDURE — 99213 OFFICE O/P EST LOW 20 MIN: CPT | Performed by: PHYSICIAN ASSISTANT

## 2024-05-31 PROCEDURE — 81002 URINALYSIS NONAUTO W/O SCOPE: CPT | Performed by: PHYSICIAN ASSISTANT

## 2024-05-31 RX ORDER — SODIUM CHLORIDE 9 MG/ML
20 INJECTION, SOLUTION INTRAVENOUS ONCE
Status: COMPLETED | OUTPATIENT
Start: 2024-05-31 | End: 2024-05-31

## 2024-05-31 RX ORDER — SODIUM CHLORIDE 9 MG/ML
20 INJECTION, SOLUTION INTRAVENOUS ONCE
OUTPATIENT
Start: 2024-06-03

## 2024-05-31 RX ADMIN — IRON SUCROSE 100 MG: 20 INJECTION, SOLUTION INTRAVENOUS at 10:00

## 2024-05-31 RX ADMIN — SODIUM CHLORIDE 20 ML/HR: 0.9 INJECTION, SOLUTION INTRAVENOUS at 09:54

## 2024-05-31 NOTE — PROGRESS NOTES
37 weeks gestation of pregnancy  Angelia  is a 34 y.o.  @37w5d who presents for routine prenatal visit.      IOL scheduled    28 wk labs - Hgb 9.7- receiving IV iron infusions, normal RPR and GTT  Growth scan- EFW20% at 36 wks  TDAP declined  GBS negative  Delivery consent signed    Reports good fetal movement.  Denies LOF, vaginal bleeding, regular uterine contractions, cramping, headaches or visual changes.      Reviewed PTL/Labor precautions and FKC.        History of pulmonary embolism  Continues Lovenox  39 wk IOL scheduled    History of gestational diabetes  Passed 1 hour GTT    Maternal obesity, antepartum  Continues  testing    Anemia affecting pregnancy in third trimester  Continues IV venofer

## 2024-05-31 NOTE — ASSESSMENT & PLAN NOTE
Angelia  is a 34 y.o.  @37w5d who presents for routine prenatal visit.      IOL scheduled    28 wk labs - Hgb 9.7- receiving IV iron infusions, normal RPR and GTT  Growth scan- EFW20% at 36 wks  TDAP declined  GBS negative  Delivery consent signed    Reports good fetal movement.  Denies LOF, vaginal bleeding, regular uterine contractions, cramping, headaches or visual changes.      Reviewed PTL/Labor precautions and FKC.

## 2024-05-31 NOTE — PROGRESS NOTES
Pt tolerated Venofer today with no adverse reactions. AVS declined, next apt confirmed 6/6/24 @ 1:30. Left unit ambulatory with a steady gait.

## 2024-05-31 NOTE — PROGRESS NOTES
Patient here for prenatal visit.   GA: 37w5d    Denies leaking of fluid, bleeding  Patient complaining of cramping  Normal Fetal Movement  Tdap-declined previously  Delivery consent signed 24  IOL signed 24  GBS-negative  No concerns at this time.

## 2024-06-04 ENCOUNTER — ROUTINE PRENATAL (OUTPATIENT)
Dept: OBGYN CLINIC | Facility: CLINIC | Age: 34
End: 2024-06-04
Payer: COMMERCIAL

## 2024-06-04 ENCOUNTER — ULTRASOUND (OUTPATIENT)
Dept: PERINATAL CARE | Facility: CLINIC | Age: 34
End: 2024-06-04
Payer: COMMERCIAL

## 2024-06-04 VITALS
DIASTOLIC BLOOD PRESSURE: 60 MMHG | BODY MASS INDEX: 39.69 KG/M2 | HEART RATE: 88 BPM | WEIGHT: 231.2 LBS | SYSTOLIC BLOOD PRESSURE: 120 MMHG

## 2024-06-04 VITALS
HEART RATE: 78 BPM | WEIGHT: 233 LBS | DIASTOLIC BLOOD PRESSURE: 62 MMHG | BODY MASS INDEX: 39.78 KG/M2 | SYSTOLIC BLOOD PRESSURE: 106 MMHG | HEIGHT: 64 IN

## 2024-06-04 DIAGNOSIS — Z3A.38 38 WEEKS GESTATION OF PREGNANCY: ICD-10-CM

## 2024-06-04 DIAGNOSIS — Z34.83 PRENATAL CARE, SUBSEQUENT PREGNANCY, THIRD TRIMESTER: Primary | ICD-10-CM

## 2024-06-04 DIAGNOSIS — Z87.59 HISTORY OF POSTPARTUM DEPRESSION: ICD-10-CM

## 2024-06-04 DIAGNOSIS — Z86.32 HISTORY OF GESTATIONAL DIABETES: ICD-10-CM

## 2024-06-04 DIAGNOSIS — Z86.59 HISTORY OF POSTPARTUM DEPRESSION: ICD-10-CM

## 2024-06-04 DIAGNOSIS — O99.843 BARIATRIC SURGERY STATUS COMPLICATING PREGNANCY, THIRD TRIMESTER: ICD-10-CM

## 2024-06-04 DIAGNOSIS — O99.210 MATERNAL OBESITY, ANTEPARTUM: ICD-10-CM

## 2024-06-04 DIAGNOSIS — O99.013 ANEMIA AFFECTING PREGNANCY IN THIRD TRIMESTER: ICD-10-CM

## 2024-06-04 DIAGNOSIS — Z86.711 HISTORY OF PULMONARY EMBOLISM: ICD-10-CM

## 2024-06-04 DIAGNOSIS — O99.210 MATERNAL OBESITY, ANTEPARTUM: Primary | ICD-10-CM

## 2024-06-04 LAB
SL AMB  POCT GLUCOSE, UA: NEGATIVE
SL AMB POCT URINE PROTEIN: NEGATIVE

## 2024-06-04 PROCEDURE — 99213 OFFICE O/P EST LOW 20 MIN: CPT | Performed by: OBSTETRICS & GYNECOLOGY

## 2024-06-04 PROCEDURE — 59025 FETAL NON-STRESS TEST: CPT | Performed by: OBSTETRICS & GYNECOLOGY

## 2024-06-04 PROCEDURE — 81002 URINALYSIS NONAUTO W/O SCOPE: CPT | Performed by: OBSTETRICS & GYNECOLOGY

## 2024-06-04 PROCEDURE — 76815 OB US LIMITED FETUS(S): CPT | Performed by: OBSTETRICS & GYNECOLOGY

## 2024-06-04 NOTE — PROGRESS NOTES
Prenatal visit @ 38w2d. Some BH ctxs but not worsening.  Denies VB, LOF.  Good FM.  Feeling ready.     Problem List Items Addressed This Visit       Bariatric surgery status complicating pregnancy, third trimester     Avoid NSAIDs 2/2 gastric sleeve.         History of postpartum depression     Monitor postpartum (but related to prior pregnancy losses).         History of gestational diabetes     1h GCT normal.          Maternal obesity, antepartum     TWG 11#.          History of pulmonary embolism     On lovenox - aware to stop on 6/9.  Plan for IOL on 6/10 - strict precautions given.          38 weeks gestation of pregnancy     GBS negative on 5/24.  Encouraged her to pack bags.  Labor precautions reviewed.          Anemia affecting pregnancy in third trimester     Next venofer scheduled 6/6.          Other Visit Diagnoses       Prenatal care, subsequent pregnancy, third trimester    -  Primary    Relevant Orders    POCT urine dip

## 2024-06-04 NOTE — PROGRESS NOTES
Non-stress Test (NST)  24  Angelia Anderson  1990  Estimated Date of Delivery: 24    Reason for testing: Pre-grav BMI 37.74  Repeat Non-Stress Test at 38w2d.    Vitals:  Pre- Vitals      Flowsheet Row Most Recent Value   Blood Pressure 106/62   Weight - Scale 106 kg (233 lb)     Patient verbalizes fetal movement.   Patient is performing daily kick counts.   Patient denies ALL:               Leaking of fluid   Contractions   Vaginal bleeding   Decreased fetal movement    Patient verbalized understanding. Patient has no questions or concerns.   NST strip reviewed by Dr. Anthony.     Nithya Gordon RN

## 2024-06-10 ENCOUNTER — HOSPITAL ENCOUNTER (OUTPATIENT)
Dept: LABOR AND DELIVERY | Facility: HOSPITAL | Age: 34
Discharge: HOME | DRG: 560 | End: 2024-06-10
Payer: COMMERCIAL

## 2024-06-10 ENCOUNTER — ANESTHESIA (INPATIENT)
Dept: ANESTHESIOLOGY | Facility: HOSPITAL | Age: 34
DRG: 560 | End: 2024-06-10
Payer: COMMERCIAL

## 2024-06-10 ENCOUNTER — HOSPITAL ENCOUNTER (INPATIENT)
Facility: HOSPITAL | Age: 34
LOS: 2 days | Discharge: HOME | DRG: 560 | End: 2024-06-12
Attending: STUDENT IN AN ORGANIZED HEALTH CARE EDUCATION/TRAINING PROGRAM | Admitting: STUDENT IN AN ORGANIZED HEALTH CARE EDUCATION/TRAINING PROGRAM
Payer: COMMERCIAL

## 2024-06-10 ENCOUNTER — ANESTHESIA EVENT (INPATIENT)
Dept: ANESTHESIOLOGY | Facility: HOSPITAL | Age: 34
DRG: 560 | End: 2024-06-10
Payer: COMMERCIAL

## 2024-06-10 DIAGNOSIS — Z86.711 HISTORY OF PULMONARY EMBOLISM: ICD-10-CM

## 2024-06-10 DIAGNOSIS — Z3A.39 39 WEEKS GESTATION OF PREGNANCY: Primary | ICD-10-CM

## 2024-06-10 PROBLEM — Z34.90 ENCOUNTER FOR INDUCTION OF LABOR: Chronic | Status: ACTIVE | Noted: 2024-06-10

## 2024-06-10 PROBLEM — G47.33 OSA (OBSTRUCTIVE SLEEP APNEA): Status: ACTIVE | Noted: 2018-03-15

## 2024-06-10 PROBLEM — Z34.90 ENCOUNTER FOR INDUCTION OF LABOR: Status: ACTIVE | Noted: 2024-06-10

## 2024-06-10 LAB
ABO GROUP BLD: NORMAL
BASE EXCESS BLDCOA CALC-SCNC: -9.7 MMOL/L (ref 3–11)
BASE EXCESS BLDCOV CALC-SCNC: -5.1 MMOL/L (ref 1–9)
BLD GP AB SCN SERPL QL: NEGATIVE
ERYTHROCYTE [DISTWIDTH] IN BLOOD BY AUTOMATED COUNT: 22 % (ref 11.6–15.1)
HCO3 BLDCOA-SCNC: 19.1 MMOL/L (ref 17.3–27.3)
HCO3 BLDCOV-SCNC: 21.7 MMOL/L (ref 12.2–28.6)
HCT VFR BLD AUTO: 34.7 % (ref 34.8–46.1)
HGB BLD-MCNC: 10.5 G/DL (ref 11.5–15.4)
HOLD SPECIMEN: NORMAL
MCH RBC QN AUTO: 23.2 PG (ref 26.8–34.3)
MCHC RBC AUTO-ENTMCNC: 30.3 G/DL (ref 31.4–37.4)
MCV RBC AUTO: 77 FL (ref 82–98)
O2 CT VFR BLDCOA CALC: 8.2 ML/DL
OXYHGB MFR BLDCOA: 38.2 %
OXYHGB MFR BLDCOV: 45.4 %
PCO2 BLDCOA: 52.5 MM[HG] (ref 30–60)
PCO2 BLDCOV: 46.8 MM HG (ref 27–43)
PH BLDCOA: 7.18 [PH] (ref 7.23–7.43)
PH BLDCOV: 7.28 [PH] (ref 7.19–7.49)
PLATELET # BLD AUTO: 231 THOUSANDS/UL (ref 149–390)
PMV BLD AUTO: 9.3 FL (ref 8.9–12.7)
PO2 BLDCOA: 21.3 MM HG (ref 5–25)
PO2 BLDCOV: 19.4 MM HG (ref 15–45)
RBC # BLD AUTO: 4.53 MILLION/UL (ref 3.81–5.12)
RH BLD: POSITIVE
SAO2 % BLDCOV: 9.7 ML/DL
SPECIMEN EXPIRATION DATE: NORMAL
TREPONEMA PALLIDUM IGG+IGM AB [PRESENCE] IN SERUM OR PLASMA BY IMMUNOASSAY: NORMAL
WBC # BLD AUTO: 6.01 THOUSAND/UL (ref 4.31–10.16)

## 2024-06-10 PROCEDURE — 86780 TREPONEMA PALLIDUM: CPT

## 2024-06-10 PROCEDURE — 86900 BLOOD TYPING SEROLOGIC ABO: CPT

## 2024-06-10 PROCEDURE — 86901 BLOOD TYPING SEROLOGIC RH(D): CPT

## 2024-06-10 PROCEDURE — 10907ZC DRAINAGE OF AMNIOTIC FLUID, THERAPEUTIC FROM PRODUCTS OF CONCEPTION, VIA NATURAL OR ARTIFICIAL OPENING: ICD-10-PCS | Performed by: STUDENT IN AN ORGANIZED HEALTH CARE EDUCATION/TRAINING PROGRAM

## 2024-06-10 PROCEDURE — 88307 TISSUE EXAM BY PATHOLOGIST: CPT | Performed by: PATHOLOGY

## 2024-06-10 PROCEDURE — 10D17Z9 MANUAL EXTRACTION OF PRODUCTS OF CONCEPTION, RETAINED, VIA NATURAL OR ARTIFICIAL OPENING: ICD-10-PCS | Performed by: STUDENT IN AN ORGANIZED HEALTH CARE EDUCATION/TRAINING PROGRAM

## 2024-06-10 PROCEDURE — 85027 COMPLETE CBC AUTOMATED: CPT

## 2024-06-10 PROCEDURE — 59409 OBSTETRICAL CARE: CPT | Performed by: STUDENT IN AN ORGANIZED HEALTH CARE EDUCATION/TRAINING PROGRAM

## 2024-06-10 PROCEDURE — 4A1HXCZ MONITORING OF PRODUCTS OF CONCEPTION, CARDIAC RATE, EXTERNAL APPROACH: ICD-10-PCS | Performed by: STUDENT IN AN ORGANIZED HEALTH CARE EDUCATION/TRAINING PROGRAM

## 2024-06-10 PROCEDURE — 86850 RBC ANTIBODY SCREEN: CPT

## 2024-06-10 PROCEDURE — 82805 BLOOD GASES W/O2 SATURATION: CPT | Performed by: STUDENT IN AN ORGANIZED HEALTH CARE EDUCATION/TRAINING PROGRAM

## 2024-06-10 PROCEDURE — NC001 PR NO CHARGE: Performed by: STUDENT IN AN ORGANIZED HEALTH CARE EDUCATION/TRAINING PROGRAM

## 2024-06-10 RX ORDER — BUPIVACAINE HYDROCHLORIDE 2.5 MG/ML
30 INJECTION, SOLUTION EPIDURAL; INFILTRATION; INTRACAUDAL ONCE AS NEEDED
Status: DISCONTINUED | OUTPATIENT
Start: 2024-06-10 | End: 2024-06-12 | Stop reason: HOSPADM

## 2024-06-10 RX ORDER — ONDANSETRON 2 MG/ML
4 INJECTION INTRAMUSCULAR; INTRAVENOUS EVERY 8 HOURS PRN
Status: DISCONTINUED | OUTPATIENT
Start: 2024-06-10 | End: 2024-06-12 | Stop reason: HOSPADM

## 2024-06-10 RX ORDER — IBUPROFEN 600 MG/1
600 TABLET ORAL EVERY 6 HOURS
Status: DISCONTINUED | OUTPATIENT
Start: 2024-06-10 | End: 2024-06-11

## 2024-06-10 RX ORDER — OXYTOCIN/RINGER'S LACTATE 30/500 ML
250 PLASTIC BAG, INJECTION (ML) INTRAVENOUS CONTINUOUS
Status: ACTIVE | OUTPATIENT
Start: 2024-06-10 | End: 2024-06-10

## 2024-06-10 RX ORDER — CEFAZOLIN SODIUM 2 G/50ML
2000 SOLUTION INTRAVENOUS ONCE
Status: COMPLETED | OUTPATIENT
Start: 2024-06-10 | End: 2024-06-10

## 2024-06-10 RX ORDER — DOCUSATE SODIUM 100 MG/1
100 CAPSULE, LIQUID FILLED ORAL 2 TIMES DAILY
Status: DISCONTINUED | OUTPATIENT
Start: 2024-06-10 | End: 2024-06-12 | Stop reason: HOSPADM

## 2024-06-10 RX ORDER — ONDANSETRON 2 MG/ML
4 INJECTION INTRAMUSCULAR; INTRAVENOUS EVERY 4 HOURS PRN
Status: DISCONTINUED | OUTPATIENT
Start: 2024-06-10 | End: 2024-06-12 | Stop reason: HOSPADM

## 2024-06-10 RX ORDER — OXYTOCIN/RINGER'S LACTATE 30/500 ML
1-30 PLASTIC BAG, INJECTION (ML) INTRAVENOUS
Status: DISCONTINUED | OUTPATIENT
Start: 2024-06-10 | End: 2024-06-12 | Stop reason: HOSPADM

## 2024-06-10 RX ORDER — LIDOCAINE HYDROCHLORIDE AND EPINEPHRINE 15; 5 MG/ML; UG/ML
INJECTION, SOLUTION EPIDURAL
Status: COMPLETED | OUTPATIENT
Start: 2024-06-10 | End: 2024-06-10

## 2024-06-10 RX ORDER — BENZOCAINE/MENTHOL 6 MG-10 MG
1 LOZENGE MUCOUS MEMBRANE DAILY PRN
Status: DISCONTINUED | OUTPATIENT
Start: 2024-06-10 | End: 2024-06-12 | Stop reason: HOSPADM

## 2024-06-10 RX ORDER — CALCIUM CARBONATE 500 MG/1
1000 TABLET, CHEWABLE ORAL DAILY PRN
Status: DISCONTINUED | OUTPATIENT
Start: 2024-06-10 | End: 2024-06-12 | Stop reason: HOSPADM

## 2024-06-10 RX ORDER — DIPHENHYDRAMINE HCL 25 MG
25 TABLET ORAL EVERY 6 HOURS PRN
Status: DISCONTINUED | OUTPATIENT
Start: 2024-06-10 | End: 2024-06-12 | Stop reason: HOSPADM

## 2024-06-10 RX ORDER — ACETAMINOPHEN 325 MG/1
650 TABLET ORAL EVERY 6 HOURS
Status: DISCONTINUED | OUTPATIENT
Start: 2024-06-10 | End: 2024-06-12 | Stop reason: HOSPADM

## 2024-06-10 RX ORDER — DIPHENHYDRAMINE HYDROCHLORIDE 50 MG/ML
25 INJECTION INTRAMUSCULAR; INTRAVENOUS EVERY 6 HOURS PRN
Status: DISCONTINUED | OUTPATIENT
Start: 2024-06-10 | End: 2024-06-10

## 2024-06-10 RX ORDER — SODIUM CHLORIDE, SODIUM LACTATE, POTASSIUM CHLORIDE, CALCIUM CHLORIDE 600; 310; 30; 20 MG/100ML; MG/100ML; MG/100ML; MG/100ML
125 INJECTION, SOLUTION INTRAVENOUS CONTINUOUS
Status: DISCONTINUED | OUTPATIENT
Start: 2024-06-10 | End: 2024-06-12 | Stop reason: HOSPADM

## 2024-06-10 RX ADMIN — SODIUM CHLORIDE, SODIUM LACTATE, POTASSIUM CHLORIDE, AND CALCIUM CHLORIDE 125 ML/HR: .6; .31; .03; .02 INJECTION, SOLUTION INTRAVENOUS at 13:52

## 2024-06-10 RX ADMIN — Medication 250 MILLI-UNITS/MIN: at 15:36

## 2024-06-10 RX ADMIN — CEFAZOLIN SODIUM 2000 MG: 2 SOLUTION INTRAVENOUS at 16:27

## 2024-06-10 RX ADMIN — ROPIVACAINE HYDROCHLORIDE: 2 INJECTION, SOLUTION EPIDURAL; INFILTRATION at 11:51

## 2024-06-10 RX ADMIN — LIDOCAINE HYDROCHLORIDE AND EPINEPHRINE 5 ML: 15; 5 INJECTION, SOLUTION EPIDURAL at 11:35

## 2024-06-10 RX ADMIN — DOCUSATE SODIUM 100 MG: 100 CAPSULE, LIQUID FILLED ORAL at 21:38

## 2024-06-10 RX ADMIN — SODIUM CHLORIDE, SODIUM LACTATE, POTASSIUM CHLORIDE, AND CALCIUM CHLORIDE 999 ML/HR: .6; .31; .03; .02 INJECTION, SOLUTION INTRAVENOUS at 11:00

## 2024-06-10 RX ADMIN — Medication 2 MILLI-UNITS/MIN: at 08:56

## 2024-06-10 RX ADMIN — ACETAMINOPHEN 650 MG: 325 TABLET, FILM COATED ORAL at 21:25

## 2024-06-10 RX ADMIN — BENZOCAINE AND LEVOMENTHOL 1 APPLICATION: 200; 5 SPRAY TOPICAL at 21:49

## 2024-06-10 RX ADMIN — SODIUM CHLORIDE, SODIUM LACTATE, POTASSIUM CHLORIDE, AND CALCIUM CHLORIDE 125 ML/HR: .6; .31; .03; .02 INJECTION, SOLUTION INTRAVENOUS at 08:11

## 2024-06-10 NOTE — L&D DELIVERY NOTE
Vaginal Delivery Summary - OB/GYN   Angelia Anderson 34 y.o. female MRN: [id number]  Unit/Bed#: -01 Encounter: [id number]    Pre-delivery Diagnosis:   1. Pregnancy at 39 weeks   2. Iron deficiency anemia  3. History of bariatric surgery   4. History of pulmonary embolism    Post-delivery Diagnosis: same, delivered    Procedure: Spontaneous Vaginal Delivery, Manual extraction of placenta    Attending: Dr. Sánchez    Assistant(s): Dr. Lilly    Anesthesia: Epidural    QBL: pending    Complications: retained placenta     Specimens:   1. Arterial and venous cord gases  2. Cord blood  3. Segment of umbilical cord  4. Placenta to pathology      Findings:  1. Viable female on 6/10/2024 at 1459, with APGARS of 9 and 9 at 1 and 5 minutes respectively,  2. Manual extraction of placenta  3. No lacerations  4. Umbilical Cord Venous Blood Gas:  Results from last 7 days   Lab Units 06/10/24  1500   PH COV  7.284   PCO2 COV mm HG 46.8*   HCO3 COV mmol/L 21.7   BASE EXC COV mmol/L -5.1*   O2 CT CD VB mL/dL 9.7   O2 HGB, VENOUS CORD % 45.4     5. Umbilical Cord Arterial Blood Gas:  Results from last 7 days   Lab Units 06/10/24  1500   PH COA  7.178*   PCO2 COA  52.5   PO2 COA mm HG 21.3   HCO3 COA mmol/L 19.1   BASE EXC COA mmol/L -9.7*   O2 CONTENT CORD ART ml/dl 8.2   O2 HGB, ARTERIAL CORD % 38.2       Disposition:  Patient tolerated the procedure well and was recovering in labor and delivery room.     Brief history and labor course:  Angelia Anderson is a 34 y.o.  at 39w1d who was initially admitted for scheduled elective induction of labor.  She was not taking her prescribed Lovenox prior to admission for h/o PE in prior pregnancy.  On admission, cervical exam was 4.5/60/-3, and she was induced with Pitocin.  She received an epidural for pain control.  She progressed to 5.5/50/-2, and amniotomy was performed for clear fluid.  She progressed to complete dilation and began to push.     Description of  procedure:  After pushing for 5 minutes, at 1459 patient delivered a viable female , wt pending, apgars of 9 (1 min) and 9 (5 min). The fetal vertex delivered spontaneously. There was no nuchal cord. Baby was OA, restituted to MERARI. The right anterior shoulder delivered atraumatically with maternal expulsive forces and the assistance of gentle downward traction. The left posterior shoulder delivered with maternal expulsive forces and the assistance of gentle upward traction. The remainder of the fetus delivered spontaneously.     Upon delivery, the infant was placed on the mothers abdomen and the cord was clamped and cut. The infant was noted to cry spontaneously and was moving all extremities appropriately. There was no evidence for injury. Awaiting nurse resuscitators evaluated the . Arterial and venous cord blood gases and cord blood was collected for analysis. These were promptly sent to the lab. In the immediate post-partum, 30 units of IV pitocin was administered, and the uterus was noted to contract down well with massage and pitocin.     The placenta was noted to be retained after 30 minutes of gentle fundal massage. Manual extraction of the placenta was performed. It was noted to have a centrally inserted 3 vessel cord. Patient received 2 g Ancef for infection prophylaxis.    The vagina, cervix, perineum, and rectum were inspected and there was noted to be a no lacerations.    At the conclusion of the procedure, all needle, sponge, and instrument counts were noted to be correct. Patient tolerated the procedure well and was allowed to recover in labor and delivery room with family and  before being transferred to the post-partum floor. Dr. Sánchez was present and participated in all key portions of the case.      Arlin Lilly MD  OB/GYN PGY-1  6/10/2024  3:53 PM

## 2024-06-10 NOTE — DISCHARGE INSTRUCTIONS
Vaginal Delivery   WHAT YOU SHOULD KNOW:   A vaginal delivery is the birth of your baby through your vagina (birth canal).        AFTER YOU LEAVE:   Medicines:  NSAIDs  help decrease swelling and pain or fever. This medicine is available with or without a doctor's order. NSAIDs can cause stomach bleeding or kidney problems in certain people. If you take blood thinner medicine, always ask your healthcare provider if NSAIDs are safe for you. Always read the medicine label and follow directions.    Take your medicine as directed.  Call your healthcare provider if you think your medicine is not helping or if you have side effects. Tell him if you are allergic to any medicine. Keep a list of the medicines, vitamins, and herbs you take. Include the amounts, and when and why you take them. Bring the list or the pill bottles to follow-up visits. Carry your medicine list with you in case of an emergency.  Follow up with your primary healthcare provider:  Most women need to return 6 weeks after a vaginal delivery. Ask about how to care for your wounds or stitches. Write down your questions so you remember to ask them during your visits.  Activity:  Rest as much as possible. Try to keep all activities short. You may be able to do some exercise soon after you have your baby. Talk with your primary healthcare provider before you start exercising. If you work outside the home, ask when you can return to your job.  Kegel exercises:  Kegel exercises may help your vaginal and rectal muscles heal faster. You can do Kegel exercises by tightening and relaxing the muscles around your vagina. Kegel exercises help make the muscles stronger.   Breast care:  When your milk comes in, your breasts may feel full and hard. Ask how to care for your breasts, even if you are not breastfeeding.   Constipation:  Do not try to push the bowel movement out if it is too hard. High-fiber foods, extra liquids, and regular exercise can help you prevent  constipation. Examples of high-fiber foods are fruit and bran. Prune juice and water are good liquids to drink. Regular exercise helps your digestive system work. You may also be told to take over-the-counter fiber and stool softener medicines. Take these items as directed.   Hemorrhoids:  Pregnancy can cause severe hemorrhoids. You may have rectal pain because of the hemorrhoids. Ask how to prevent or treat hemorrhoids.  Perineum care:  Your perineum is the area between your vagina and anus. Keep the area clean and dry to help it heal and to prevent infection. Wash the area gently with soap and water when you bathe or shower. Rinse your perineum with warm water when you use the toilet. Your primary healthcare provider may suggest you use a warm sitz bath to help decrease pain. A sitz bath is a bathtub or basin filled to hip level. Stay in the sitz bath for 20 to 30 minutes, or as directed.   Vaginal discharge:  You will have vaginal discharge, called lochia, after your delivery. The lochia is bright red the first day or two after the birth. By the fourth day, the amount decreases, and it turns red-brown. Use a sanitary pad rather than a tampon to prevent a vaginal infection. It is normal to have lochia up to 8 weeks after your baby is born.   Monthly periods:  Your period may start again within 7 to 12 weeks after your baby is born. If you are breastfeeding, it may take longer for your period to start again. You can still get pregnant again even though you do not have your monthly period. Talk with your primary healthcare provider about a birth control method that will be good for you if you do not want to get pregnant.  Mood changes:  Many new mothers have some kind of mood changes after delivery. Some of these changes occur because of lack of sleep, hormone changes, and caring for a new baby. Some mood changes can be more serious, such as postpartum depression. Talk with your primary healthcare provider if you  feel unable to care for yourself or your baby.  Sexual activity:  You may need to avoid sex for 6 to 7 weeks after you have your baby. You may notice you have a decreased desire for sex, or sex may be painful. You may need to use a vaginal lubricant (gel) to help make sex more comfortable.  Contact your primary healthcare provider if:   You have heavy vaginal bleeding that fills 1 or more sanitary pads in 1 hour.    You have a fever.    Your pain does not go away, or gets worse.    The skin between your vagina and rectum is swollen, warm, or red.    You have swollen, hard, or painful breasts.    You feel very sad or depressed.    You feel more tired than usual.     You have questions or concerns about your condition or care.  Seek care immediately or call 911 if:   You have pus or yellow drainage coming from your vagina or wound.    You are urinating very little, or not at all.    Your arm or leg feels warm, tender, and painful. It may look swollen and red.    You feel lightheaded, have sudden and worsening chest pain, or trouble breathing. You may have more pain when you take deep breaths or cough, or you may cough up blood.  © 2014 [employer]. Information is for End User's use only and may not be sold, redistributed or otherwise used for commercial purposes. All illustrations and images included in CareNotes® are the copyrighted property of [employer] or [employer].  The above information is an  only. It is not intended as medical advice for individual conditions or treatments. Talk to your doctor, nurse or pharmacist before following any medical regimen to see if it is safe and effective for you.    Postpartum Perineal Care   WHAT YOU NEED TO KNOW:   Postpartum perineal care is care for your perineum after you have a baby. The perineum is your vagina and anus.   DISCHARGE INSTRUCTIONS:   Care for your perineum:  Healthcare providers will give you a small squirt  bottle and show you how to use it. Do the following after you use the toilet and before you put on a new pad:  Remove the soiled pad    Use the squirt bottle to rinse your perineum from front to back while you sit on the toilet     Pat the area dry from front to back with toilet paper or a cotton cloth     Put on a fresh pad    Wash your hands  Decrease pain:  Ask your healthcare provider about these and other ways to decrease perineal pain:  Sitz baths:  Healthcare providers may give you a portable sitz bath. This is a small tub that fits in the toilet. Fill the sitz bath or bathtub with 4 to 6 inches of warm water. Sit in the warm water for 20 minutes 2 to 3 times a day.    Ice:  Ice helps decrease swelling and pain. Ice may also help prevent tissue damage. Use an ice pack, or put crushed ice in a plastic bag. Cover it with a towel and place it on your perineum for 15 to 20 minutes every hour, or as directed.    Medicine spray, wipes, or pads:  Healthcare providers may give you a medicine spray or wipes soaked with numbing medicine to decrease the pain. Pads that contain an herb called witch hazel may also help reduce pain. Use these after perineal care or a sitz bath.  Follow up with your healthcare provider as directed:  Write down your questions so you remember to ask them during your visits.   Contact your healthcare provider if:   You have heavy vaginal bleeding that fills 1 or more sanitary pads in 1 hour.    You have foul-smelling vaginal discharge.    You feel weak or lightheaded.    You have questions or concerns about your condition or care.  Seek care immediately or call 911 if:   You have large blood clots or bright red blood coming from your vagina.    You have abdominal pain, vomiting, and a fever.  © 2017 [employer] Information is for End User's use only and may not be sold, redistributed or otherwise used for commercial purposes. All illustrations and images included in CareNotes®  are the copyrighted property of [employer]. or [email or web address].  The above information is an  only. It is not intended as medical advice for individual conditions or treatments. Talk to your doctor, nurse or pharmacist before following any medical regimen to see if it is safe and effective for you.      Postpartum Depression   WHAT YOU NEED TO KNOW:   What is postpartum depression?  Postpartum depression is a mood disorder that occurs after giving birth. A mood is an emotion or a feeling. Moods affect your behavior and how you feel about yourself and life in general. Depression is a sad mood that you cannot control. Women often feel sad, afraid, or nervous after their baby is born. These feelings are called postpartum blues or baby blues, and they usually go away in 1 to 2 weeks. With postpartum depression, these symptoms get worse and continue for more than 2 weeks. Postpartum depression is a serious condition that affects your daily activities and relationships.   What causes postpartum depression?  Healthcare providers do not know exactly what causes postpartum depression. It may be caused by a sudden drop in hormone levels after childbirth. A previous episode of postpartum depression or a family history of depression may increase your risk. Several things may trigger postpartum depression:  Lack of support from the baby's father or other family members    Feeling more tired than usual    Stress, a poor diet, or lack of sleep    Pain after childbirth or pain during breastfeeding    Sudden change in lifestyle  How is postpartum depression diagnosed?  Postpartum depression affects your daily activities and your relationships with other people. Healthcare providers will ask you questions about your signs and symptoms and how they are affecting your life. The symptoms of postpartum depression usually begin within 1 month after childbirth. You feel depressed or lose interest in activities you  enjoy nearly every day for at least 2 weeks. You also have 4 or more of the following symptoms:  You feel tired or have less energy than usual.     You feel unimportant or guilty most of the time.    You think about hurting or killing yourself.    Your appetite changes. You may lose your appetite and lose weight without trying. Your appetite may also increase and you may gain weight.    You are restless, irritable, or withdrawn.    You have trouble concentrating and remembering things. You have trouble doing daily tasks or making decisions.    You have trouble sleeping, even after the baby is asleep.  How is postpartum depression treated?   Psychotherapy:  During therapy, you will talk with healthcare providers about how to cope with your feelings and moods. This can be done alone or in a group. It may also be done with family members or your partner.     Antidepressants:  This medicine is given to decrease or stop the symptoms of depression. You usually need to take antidepressants for several weeks before you begin to feel better. Do not stop taking antidepressants unless your healthcare provider tells you to. Healthcare providers may try a different antidepressant if one type does not work.  What can I do to feel better?   Rest:  Do not try to do everything all at the same time. Do only what is needed and let other things wait until later. Ask your family or friends for help, especially if you have other children. Ask your partner to help with night feedings or other baby care. Try to sleep when the baby naps.     Get emotional support:  Share your feelings with your partner, a friend, or another mother.     Take care of yourself:  Shower and dress each day. Do not skip meals. Try to get out of the house a little each day. Get regular exercise. Eat a healthy diet. Avoid alcohol because it can make your depression worse. Do not isolate yourself. Go for a walk or meet with a friend. It is also important that you  have some time by yourself each day.  How do I find support and more information?   [employer] ([facility]), Public Information & Communication Branch  [address], [address], [address]  [place] [address]   Phone: 1- 295 - [phone]  Phone: 7- 329 - [phone]  Web Address: http://[email or web address].gov/  When should I contact my healthcare provider?   You cannot make it to your next visit.    Your depression does not get better with treatment or it gets worse.     You have questions or concerns about your condition or care.  When should I seek immediate care or call 911?   You think about hurting or killing yourself, your baby, or someone else.    You feel like other people want to hurt you.     You hear voices telling you to hurt yourself or your baby.  CARE AGREEMENT:   You have the right to help plan your care. Learn about your health condition and how it may be treated. Discuss treatment options with your caregivers to decide what care you want to receive. You always have the right to refuse treatment. The above information is an  only. It is not intended as medical advice for individual conditions or treatments. Talk to your doctor, nurse or pharmacist before following any medical regimen to see if it is safe and effective for you.  © 2017 [employer] Information is for End User's use only and may not be sold, redistributed or otherwise used for commercial purposes. All illustrations and images included in CareNotes® are the copyrighted property of [employer].D.A.M., Inc. or [employer].      Postpartum Bleeding   WHAT YOU NEED TO KNOW:   Postpartum bleeding is vaginal bleeding after childbirth. This bleeding is normal, whether your baby was born vaginally or by . It contains blood and the tissue that lined the inside of your uterus when you were pregnant.   DISCHARGE INSTRUCTIONS:   What to expect with postpartum bleeding:  Postpartum  bleeding usually lasts at least 10 days, and may last longer than 6 weeks. Your bleeding may range from light (barely staining a pad) to heavy (soaking a pad in 1 hour). Usually, you have heavier bleeding right after childbirth, which slows over the next few weeks until it stops. The bleeding is red or dark brown with clots for the first 1 to 3 days. It then turns pink for several days, and then becomes a white or yellow discharge until it ends.  Follow up with your obstetrician as directed:  Do not have sex until your obstetrician says it is okay. Write down your questions so you remember to ask them during your visits.  Contact your healthcare provider or obstetrician if:   Your bleeding increases, or you have heavy bleeding that soaks a pad in 1 hour for 2 hours in a row.    You pass large blood clots.    You are breathing faster than normal, or your heart is beating faster than normal.    You are urinating less than usual, or not at all.    You feel dizzy.    You have questions or concerns about your condition or care.  Seek immediate care or call 911 if:   You are suddenly short of breath and feel lightheaded.    You have sudden chest pain.  © 2017 [employer] Information is for End User's use only and may not be sold, redistributed or otherwise used for commercial purposes. All illustrations and images included in CareNotes® are the copyrighted property of "[employer] or [employer].  The above information is an  only. It is not intended as medical advice for individual conditions or treatments. Talk to your doctor, nurse or pharmacist before following any medical regimen to see if it is safe and effective for you.      Breast Care for the Breast Feeding Mother   WHAT YOU SHOULD KNOW:   Your breasts will go through normal changes while you are breastfeeding. Sometimes breast and nipple problems can develop while you are breastfeeding. Learn about changes that are  normal and those that may be a problem. Breast care can help you prevent and manage problems so you and your baby can enjoy the benefits of breastfeeding.  AFTER YOU LEAVE:   Breast changes while you are breastfeeding:   For the first few days after your baby is born, your body makes a small amount of breast milk (colostrum). Within about 2 to 5 days, your body will begin making mature milk. It may take up to 10 days or longer for mature milk to come in. When your mature milk comes in, your breasts will become full and firm. They may feel tender.     Breastfeeding your baby will decrease the full feeling in your breasts. You may feel a tingly sensation during feedings as milk is released from your breasts. This is called the milk let-down reflex. After 7 or more days, the fullness may feel like it has decreased. Your nipples should look the same as they did before you started breastfeeding. Breasts that feel full before and empty after breastfeeding are signs that breastfeeding is going well.  Breast problems that can occur while you are breastfeeding:   Nipple soreness  may occur when you begin to breastfeed your baby. You may also have nipple soreness if your baby is not latched on to your breast correctly. Correct positioning and latch-on may decrease or stop the pain in your nipples. Work with your caregivers to help your baby latch on correctly. It may also be helpful to place warm, wet compresses on your nipples to help decrease pain.     Plugged milk ducts  may cause painful breast lumps. Plugged ducts may be caused by not emptying your breasts completely during feedings. When your baby pauses during breastfeeding, massage and gently squeeze your breast. Gentle massage may unplug a blocked milk duct. Pump out any milk left in your breasts after your baby is done breastfeeding. Avoid wearing tight tops, tight bras, or under-wire bras, because they may put pressure on your breasts.    Engorgement  may occur as  your milk comes in soon after you begin breastfeeding. Engorgement may cause your breasts to become swollen and painful. Your breasts may also become engorged if you miss a feeding or you do not breastfeed on demand. The best way to decrease engorgement symptoms is to empty your breasts by feeding your baby often. Engorgement can make it hard for your baby to latch on to your breast. If this happens, express a small amount of milk and then have your baby latch on. Cold compresses, gel packs, or ice packs on your breasts can help decrease pain and swelling. Ask your caregiver how often and how long you should use cold, or ice packs.     A breast infection called mastitis  can develop if you have plugged milk ducts or engorgement. Mastitis causes your breasts to become red, swollen, and painful. You may also have flu-like symptoms, such as chills and a fever. Place heat on your breasts to help decrease the pain. You may want to place a moist, warm cloth on the painful breast or both of your breasts. Ask how often to do this. Your primary healthcare provider (PHP) may suggest that you take an NSAID, such as ibuprofen, to decrease pain and swelling. He may also order antibiotics to treat mastitis. Ask about feeding your baby when you have a breast infection.  How to help prevent or manage breast problems while you are breastfeeding:   Learn how to position your baby and latch him on correctly.  To latch your baby correctly to your breast, make sure that his mouth covers most of your areola (dark area around your nipple). He should not be attached only to the nipple. Your baby is latched on well if you feel comfortable and do not feel pain. A correct latch helps him get enough milk and can help to prevent sore nipples and other breast problems. There are several breastfeeding positions that you can try. Find the position that works best for you and your baby. Ask your caregiver for more information about how to hold and  breastfeed your baby.     Prevent biting.  Your baby may get teeth at about 3 to 4 months of age. To help prevent biting, break his suction once he is finished or if he has fallen asleep. To break his suction, slip a finger into the side of his mouth. If your baby bites you, respond with surprise or unhappiness. Offer praise when he does not bite you.     Breastfeed your baby regularly.  Feed your baby 8 to 12 times a day. You may need to wake up your baby at night to feed him. It is okay to feed from 1 or both breasts at each feeding. Your baby should breastfeed from both breasts equally over the course of a day. If your baby only feeds from 1 side during a feeding, offer your other breast to him first for the next feeding.     Schedule and keep follow-up visits.  Talk to your baby's pediatrician or your PHP during follow-up visits if you have breast problems. Caregivers may suggest that you, or you and your partner, attend classes on breastfeeding. You also may want to join a breastfeeding support group. Caregivers may suggest that you see a lactation consultant. This is a caregiver who can help you with breastfeeding.  Contact your PHP if:   You have a fever and chills.    You have body aches and you feel like you do not have any energy.    One or both of your breasts is red, swollen or hard, painful, and feels warm or hot.    You have breast engorgement that does not get better within 24 hours.     You see or feel a lump in your breast that hurts when you touch it.    You have nipple pain during breastfeeding or between feedings.     Your nipples are red, dry, cracked, or bleeding, or they have scabs on them.     You have questions or concerns about your condition or care.  © 2014 [employer] Information is for End User's use only and may not be sold, redistributed or otherwise used for commercial purposes. All illustrations and images included in CareNotes® are the copyrighted property of  [employer].M., Inc. or [employer].  The above information is an  only. It is not intended as medical advice for individual conditions or treatments. Talk to your doctor, nurse or pharmacist before following any medical regimen to see if it is safe and effective for you.

## 2024-06-10 NOTE — PLAN OF CARE
Problem: Knowledge Deficit  Goal: Verbalizes understanding of labor plan  Description: Assess patient/family/caregiver's baseline knowledge level and ability to understand information.  Provide education via patient/family/caregiver's preferred learning method at appropriate level of understanding.     1. Provide teaching at level of understanding.  2. Provide teaching via preferred learning method(s).  Outcome: Progressing  Goal: Patient/family/caregiver demonstrates understanding of disease process, treatment plan, medications, and discharge instructions  Description: Complete learning assessment and assess knowledge base.  Interventions:  - Provide teaching at level of understanding  - Provide teaching via preferred learning methods  Outcome: Progressing     Problem: Labor & Delivery  Goal: Manages discomfort  Description: Assess and monitor for signs and symptoms of discomfort.  Assess patient's pain level regularly and per hospital policy.  Administer medications as ordered. Support use of nonpharmacological methods to help control pain such as distraction, imagery, relaxation, and application of heat and cold.  Collaborate with interdisciplinary team and patient to determine appropriate pain management plan.    1. Include patient in decisions related to comfort.  2. Offer non-pharmacological pain management interventions.  3. Report ineffective pain management to physician.  Outcome: Progressing  Goal: Patient vital signs are stable  Description: 1. Assess vital signs - vaginal delivery.  Outcome: Progressing     Problem: PAIN - ADULT  Goal: Verbalizes/displays adequate comfort level or baseline comfort level  Description: Interventions:  - Encourage patient to monitor pain and request assistance  - Assess pain using appropriate pain scale  - Administer analgesics based on type and severity of pain and evaluate response  - Implement non-pharmacological measures as appropriate and evaluate response  -  Consider cultural and social influences on pain and pain management  - Notify physician/advanced practitioner if interventions unsuccessful or patient reports new pain  Outcome: Progressing     Problem: INFECTION - ADULT  Goal: Absence or prevention of progression during hospitalization  Description: INTERVENTIONS:  - Assess and monitor for signs and symptoms of infection  - Monitor lab/diagnostic results  - Monitor all insertion sites, i.e. indwelling lines, tubes, and drains  - Monitor endotracheal if appropriate and nasal secretions for changes in amount and color  - [place] appropriate cooling/warming therapies per order  - Administer medications as ordered  - Instruct and encourage patient and family to use good hand hygiene technique  - Identify and instruct in appropriate isolation precautions for identified infection/condition  Outcome: Progressing  Goal: Absence of fever/infection during neutropenic period  Description: INTERVENTIONS:  - Monitor WBC    Outcome: Progressing     Problem: SAFETY ADULT  Goal: Patient will remain free of falls  Description: INTERVENTIONS:  - Educate patient/family on patient safety including physical limitations  - Instruct patient to call for assistance with activity   - Consult OT/PT to assist with strengthening/mobility   - Keep Call bell within reach  - Keep bed low and locked with side rails adjusted as appropriate  - Keep care items and personal belongings within reach  - Initiate and maintain comfort rounds  - Make Fall Risk Sign visible to staff  - Offer Toileting every 2 Hours, in advance of need  - Apply yellow socks and bracelet for high fall risk patients  - Consider moving patient to room near nurses station  Outcome: Progressing  Goal: Maintain or return to baseline ADL function  Description: INTERVENTIONS:  -  Assess patient's ability to carry out ADLs; assess patient's baseline for ADL function and identify physical deficits which impact ability to perform ADLs  (bathing, care of mouth/teeth, toileting, grooming, dressing, etc.)  - Assess/evaluate cause of self-care deficits   - Assess range of motion  - Assess patient's mobility; develop plan if impaired  - Assess patient's need for assistive devices and provide as appropriate  - Encourage maximum independence but intervene and supervise when necessary  - Involve family in performance of ADLs  - Assess for home care needs following discharge   - Consider OT consult to assist with ADL evaluation and planning for discharge  - Provide patient education as appropriate  Outcome: Progressing  Goal: Maintains/Returns to pre admission functional level  Description: INTERVENTIONS:  - Perform AM-PAC 6 Click Basic Mobility/ Daily Activity assessment daily.  - Set and communicate daily mobility goal to care team and patient/family/caregiver.   - Collaborate with rehabilitation services on mobility goals if consulted  - Out of bed for toileting  - Record patient progress and toleration of activity level   Outcome: Progressing     Problem: DISCHARGE PLANNING  Goal: Discharge to home or other facility with appropriate resources  Description: INTERVENTIONS:  - Identify barriers to discharge w/patient and caregiver  - Arrange for needed discharge resources and transportation as appropriate  - Identify discharge learning needs (meds, wound care, etc.)  - Arrange for interpretive services to assist at discharge as needed  - Refer to Case Management Department for coordinating discharge planning if the patient needs post-hospital services based on physician/advanced practitioner order or complex needs related to functional status, cognitive ability, or social support system  Outcome: Progressing     Problem: BIRTH - VAGINAL/ SECTION  Goal: Fetal and maternal status remain reassuring during the birth process  Description: INTERVENTIONS:  - Monitor vital signs  - Monitor fetal heart rate  - Monitor uterine activity  - Monitor labor  progression (vaginal delivery)  - DVT prophylaxis  - Antibiotic prophylaxis  Outcome: Progressing  Goal: Emotionally satisfying birthing experience for mother/fetus  Description: Interventions:  - Assess, plan, implement and evaluate the nursing care given to the patient in labor  - Advocate the philosophy that each childbirth experience is a unique experience and support the family's chosen level of involvement and control during the labor process   - Actively participate in both the patient's and family's teaching of the birth process  - Consider cultural, Latter-day and age-specific factors and plan care for the patient in labor  Outcome: Progressing     Problem: POSTPARTUM  Goal: Experiences normal postpartum course  Description: INTERVENTIONS:  - Monitor maternal vital signs  - Assess uterine involution and lochia  Outcome: Progressing  Goal: Appropriate maternal -  bonding  Description: INTERVENTIONS:  - Identify family support  - Assess for appropriate maternal/infant bonding   -Encourage maternal/infant bonding opportunities  - Referral to  or  as needed  Outcome: Progressing  Goal: Establishment of infant feeding pattern  Description: INTERVENTIONS:  - Assess breast/bottle feeding  - Refer to lactation as needed  Outcome: Progressing  Goal: Incision(s), wounds(s) or drain site(s) healing without S/S of infection  Description: INTERVENTIONS  - Assess and document dressing, incision, wound bed, drain sites and surrounding tissue  - Provide patient and family education    Outcome: Progressing

## 2024-06-10 NOTE — OB LABOR/OXYTOCIN SAFETY PROGRESS
Oxytocin Safety Progress Check Note - Angelia Anderson 34 y.o. female MRN: [id number]    Unit/Bed#: -01 Encounter: [id number]    Dose (brandi-units/min) Oxytocin: 8 brandi-units/min  Contraction Frequency (minutes): 2-3  Contraction Intensity: Moderate/Strong  Uterine Activity Characteristics: Regular  Cervical Dilation: 5-6        Cervical Effacement: 50  Fetal Station: -2  Baseline Rate (FHR): 125 bpm  Fetal Heart Rate (FHT): 118 BPM  FHR Category: 1               Vital Signs:   Vitals:    06/10/24 1155   BP:    Pulse: 77   Resp:    Temp:    SpO2: 99%       Patient now comfortable with epidural.  SVE as above. FHT cat 1. Coty q2-3 regularly. Amniotomy performed for clear fluid. Pitocin at 8, will continue to titrate as tolerated. D/w Dr. Sánchez.       Arlin Lilly MD 6/10/2024 12:19 PM

## 2024-06-10 NOTE — ASSESSMENT & PLAN NOTE
Continue routine post partum care  Pain well controlled: tylenol/motrin scheduled  Lochia within normal limits: continue to monitor   OOB: as able, encourage ambulation  Passing flatus  Voiding spontaneously  Encourage breastfeeding  Contraception: condoms to tubal  Baby in: room  Dispo: anticipate d/c home PPD2

## 2024-06-10 NOTE — ANESTHESIA PREPROCEDURE EVALUATION
Procedure:  LABOR ANALGESIA    Hx of PE during pregnancy, off lovenox since 30 weeks    Relevant Problems   GYN   (+) 39 weeks gestation of pregnancy   (+) Short interval between pregnancies affecting pregnancy in third trimester, antepartum      HEMATOLOGY   (+) Anemia affecting pregnancy in third trimester   (+) Iron deficiency anemia secondary to inadequate dietary iron intake      PULMONARY   (+) HARRISON (obstructive sleep apnea)      Obstetrics/Gynecology   (+) Bariatric surgery status complicating pregnancy, third trimester        Physical Exam    Airway    Mallampati score: III  TM Distance: >3 FB  Neck ROM: full     Dental       Cardiovascular      Pulmonary      Other Findings  post-pubertal.      Anesthesia Plan  ASA Score- 3     Anesthesia Type- epidural with ASA Monitors.         Additional Monitors:     Airway Plan:     Comment: Recent labs personally reviewed:  Lab Results       Component                Value               Date                       WBC                      6.01                06/10/2024                 HGB                      10.5 (L)            06/10/2024                 PLT                      231                 06/10/2024            Lab Results       Component                Value               Date                       K                        3.3 (L)             04/07/2024                 BUN                      7                   04/07/2024                 CREATININE               0.62                04/07/2024            Lab Results       Component                Value               Date                       PTT                      32                  08/17/2023             Lab Results       Component                Value               Date                       INR                      1.06                08/17/2023              Blood type A      I, Ludy Thakkar MD, have personally seen and evaluated the patient prior to anesthetic care.  I have reviewed the pre-anesthetic  record, and other medical records if appropriate to the anesthetic care.  If a CRNA is involved in the case, I have reviewed the CRNA assessment, if present, and agree.I discussed the risks associated with neuroaxial anesthesia including PDPH, total spinal anesthesia, hypotension associated with neuraxial block that could result in changes in fetal heart tracing, and need for replacement of epidural if insufficient analgesia. Discussed anesthetic plan in event of , including possibility of general anesthesia. All questions and concerns were addressed.     .       Plan Factors-Exercise tolerance (METS): >4 METS.    Chart reviewed.   Existing labs reviewed. Patient summary reviewed.    Patient is not a current smoker.  Patient did not smoke on day of surgery.    Obstructive sleep apnea risk education given perioperatively.        Induction- intravenous.    Postoperative Plan-     Perioperative Resuscitation Plan - Level 1 - Full Code.       Informed Consent- Anesthetic plan and risks discussed with patient.  I personally reviewed this patient with the CRNA. Discussed and agreed on the Anesthesia Plan with the CRNA..

## 2024-06-10 NOTE — DISCHARGE SUMMARY
Discharge Summary - OB/GYN  Angelia Anderson 34 y.o. female MRN: [id number]  Unit/Bed#:  206-01 Encounter: [id number]    Admission Date: 6/10/2024     Discharge Date: 24    Admitting Attending: Suri Sánchez Jordi*    Delivering Attending: Dr. Amin    Discharging Attending: Dr. Karyn Wood    Principal Diagnosis: Pregnancy at 39w1d    Secondary Diagnosis:  Short interval pregnancy  Iron deficiency anemia  H/o gastric sleeve  H/o PE in prior pregnancy  H/o postpartum depression    Procedures: spontaneous vaginal delivery    Anesthesia: epidural    Hospital course:  Angelia Anderson is a 34 y.o.  at 39w1d who was initially admitted for scheduled elective induction of labor.  She was not taking her prescribed Lovenox prior to admission for h/o PE in prior pregnancy. On admission, cervical exam was 4.5/60/-3, and she was induced with Pitocin.  She received an epidural for pain control.  She progressed to 5.5/50/-2, and amniotomy was performed for clear fluid.  She progressed to complete dilation and began to push.    She delivered a viable female  on 6/10/2024 at 1459. Weight 7lbs 3.2oz via normal spontaneous vaginal delivery. She sustained no lacerations during delivery. Apgars were 9 (1 min) and 9 (5 min).  was transferred to  nursery. Patient tolerated the procedure well.    Her post-delivery course was complicated by fundal tenderness which was treated with 24 hours of Unasyn due to concern for endometritis. She remained afebrile and had overall normal vital signs throughout her postpartum course. Her postpartum pain was well controlled with oral analgesics.    On day of discharge, she was ambulating and able to reasonably perform all ADLs. She was voiding and had appropriate bowel function. Pain was well controlled. She was discharged home on postpartum day #2 without complications. She was prescribed 6 weeks of Lovenox for post-partum DVT prophylaxis due to her  history of PE in prior pregnancy. Patient was instructed to follow up with her OB as an outpatient and was given appropriate warnings to call provider if she develops signs of infection or uncontrolled pain.    Complications: none apparent    Condition at discharge: good     Discharge instructions/Information to patient and family:   See after visit summary for information provided to patient and family.      Provisions for Follow-Up Care:  See after visit summary for information related to follow-up care and any pertinent home health orders.      Disposition: See After Visit Summary for discharge disposition information.    Planned Readmission: No    Discharge medications and instructions:   Please see AVS for full list of medications upon discharge.    Arlin Lilly MD  PGY-1 OBGYN

## 2024-06-10 NOTE — OB LABOR/OXYTOCIN SAFETY PROGRESS
Oxytocin Safety Progress Check Note - Angelia Anderson 34 y.o. female MRN: [id number]    Unit/Bed#: -01 Encounter: [id number]    Dose (brandi-units/min) Oxytocin: 8 brandi-units/min  Contraction Frequency (minutes): 2-3  Contraction Intensity: Moderate  Uterine Activity Characteristics: Irregular  Cervical Dilation: 4-5        Cervical Effacement: 50  Fetal Station: -3  Baseline Rate (FHR): 120 bpm  Fetal Heart Rate (FHT): 124 BPM  FHR Category: 1               Vital Signs:   Vitals:    06/10/24 1104   BP: 122/69   Pulse: 74   Resp:    Temp:    SpO2:        Patient now 2hours on pitocin. SVE deferred; she would like her epidural. Plan to AROM once comfortable. D/w Dr. Sánchez.    Arlin Lilly MD 6/10/2024 11:05 AM       No

## 2024-06-10 NOTE — ANESTHESIA PROCEDURE NOTES
Epidural Block    Start time: 6/10/2024 11:38 AM  Reason for block: procedure for pain  Staffing  Performed by: Ludy Thakkar MD  Authorized by: Ludy Thakkar MD    Preanesthetic Checklist  Completed: patient identified, IV checked, site marked, risks and benefits discussed, surgical consent, monitors and equipment checked, pre-op evaluation and timeout performed  Epidural  Patient position: sitting  Prep: ChloraPrep  Sedation Level: no sedation  Patient monitoring: frequent blood pressure checks, continuous pulse oximetry and heart rate  Approach: midline  Location: lumbar, L3-4  Injection technique: FAVIOLA saline  Needle  Needle type: Tuohy   Needle gauge: 17 G  Needle insertion depth: 8.5 cm  Catheter type: multi-orifice  Catheter size: 19 G  Catheter at skin depth: 13.5 cm  Catheter securement method: stabilization device and clear occlusive dressing  Test dose: negativelidocaine-epinephrine (XYLOCAINE-MPF/EPINEPHRINE) 1.5 %-1:200,000 injection 3 mL - Epidural   5 mL - 6/10/2024 11:35:00 AM  Assessment  Sensory level: T10  Number of attempts: 2negative aspiration for CSF, negative aspiration for heme and no paresthesia on injection  patient tolerated the procedure well with no immediate complications  Additional Notes  First attempt by SRNA. Second attempt attending. No issues. Easily placement.

## 2024-06-10 NOTE — PLAN OF CARE
Problem: PAIN - ADULT  Goal: Verbalizes/displays adequate comfort level or baseline comfort level  Description: Interventions:  - Encourage patient to monitor pain and request assistance  - Assess pain using appropriate pain scale  - Administer analgesics based on type and severity of pain and evaluate response  - Implement non-pharmacological measures as appropriate and evaluate response  - Consider cultural and social influences on pain and pain management  - Notify physician/advanced practitioner if interventions unsuccessful or patient reports new pain  6/10/2024 1522 by Carol Ann Sargent RN  Outcome: Progressing  6/10/2024 0830 by Carol Ann Sargent RN  Outcome: Progressing     Problem: INFECTION - ADULT  Goal: Absence or prevention of progression during hospitalization  Description: INTERVENTIONS:  - Assess and monitor for signs and symptoms of infection  - Monitor lab/diagnostic results  - Monitor all insertion sites, i.e. indwelling lines, tubes, and drains  - Monitor endotracheal if appropriate and nasal secretions for changes in amount and color  - [place] appropriate cooling/warming therapies per order  - Administer medications as ordered  - Instruct and encourage patient and family to use good hand hygiene technique  - Identify and instruct in appropriate isolation precautions for identified infection/condition  6/10/2024 1522 by Carol Ann Sargent RN  Outcome: Progressing  6/10/2024 0830 by Carol Ann Sargent RN  Outcome: Progressing  Goal: Absence of fever/infection during neutropenic period  Description: INTERVENTIONS:  - Monitor WBC    6/10/2024 1522 by Carol Ann Sargent RN  Outcome: Progressing  6/10/2024 0830 by Carol Ann Sargent RN  Outcome: Progressing     Problem: SAFETY ADULT  Goal: Patient will remain free of falls  Description: INTERVENTIONS:  - Educate patient/family on patient safety including physical limitations  - Instruct patient to call for assistance with activity   - Consult OT/PT to  assist with strengthening/mobility   - Keep Call bell within reach  - Keep bed low and locked with side rails adjusted as appropriate  - Keep care items and personal belongings within reach  - Initiate and maintain comfort rounds  - Make Fall Risk Sign visible to staff  - Offer Toileting every 2 Hours, in advance of need  - Apply yellow socks and bracelet for high fall risk patients  - Consider moving patient to room near nurses station  6/10/2024 1522 by Carol Ann Sargent RN  Outcome: Progressing  6/10/2024 0830 by Carol Ann Sargent RN  Outcome: Progressing  Goal: Maintain or return to baseline ADL function  Description: INTERVENTIONS:  -  Assess patient's ability to carry out ADLs; assess patient's baseline for ADL function and identify physical deficits which impact ability to perform ADLs (bathing, care of mouth/teeth, toileting, grooming, dressing, etc.)  - Assess/evaluate cause of self-care deficits   - Assess range of motion  - Assess patient's mobility; develop plan if impaired  - Assess patient's need for assistive devices and provide as appropriate  - Encourage maximum independence but intervene and supervise when necessary  - Involve family in performance of ADLs  - Assess for home care needs following discharge   - Consider OT consult to assist with ADL evaluation and planning for discharge  - Provide patient education as appropriate  6/10/2024 1522 by Carol Ann Sargent RN  Outcome: Progressing  6/10/2024 0830 by Carol Ann Sargent RN  Outcome: Progressing  Goal: Maintains/Returns to pre admission functional level  Description: INTERVENTIONS:  - Perform AM-PAC 6 Click Basic Mobility/ Daily Activity assessment daily.  - Set and communicate daily mobility goal to care team and patient/family/caregiver.   - Collaborate with rehabilitation services on mobility goals if consulted  - Out of bed for toileting  - Record patient progress and toleration of activity level   6/10/2024 1522 by Carol Ann Sargent  RN  Outcome: Progressing  6/10/2024 0830 by Carol Ann Sargent RN  Outcome: Progressing     Problem: DISCHARGE PLANNING  Goal: Discharge to home or other facility with appropriate resources  Description: INTERVENTIONS:  - Identify barriers to discharge w/patient and caregiver  - Arrange for needed discharge resources and transportation as appropriate  - Identify discharge learning needs (meds, wound care, etc.)  - Arrange for interpretive services to assist at discharge as needed  - Refer to Case Management Department for coordinating discharge planning if the patient needs post-hospital services based on physician/advanced practitioner order or complex needs related to functional status, cognitive ability, or social support system  6/10/2024 152 by Carol Ann Sargent RN  Outcome: Progressing  6/10/2024 0830 by Carol Ann Sargent RN  Outcome: Progressing     Problem: POSTPARTUM  Goal: Experiences normal postpartum course  Description: INTERVENTIONS:  - Monitor maternal vital signs  - Assess uterine involution and lochia  6/10/2024 1522 by Carol Ann Sargent RN  Outcome: Progressing  6/10/2024 0830 by Carol Ann Sargent RN  Outcome: Progressing  Goal: Appropriate maternal -  bonding  Description: INTERVENTIONS:  - Identify family support  - Assess for appropriate maternal/infant bonding   -Encourage maternal/infant bonding opportunities  - Referral to  or  as needed  6/10/2024 152 by Carol Ann Sargent RN  Outcome: Progressing  6/10/2024 0830 by Carol Ann Sargent RN  Outcome: Progressing  Goal: Establishment of infant feeding pattern  Description: INTERVENTIONS:  - Assess breast/bottle feeding  - Refer to lactation as needed  6/10/2024 1522 by Carol Ann Sargent RN  Outcome: Progressing  6/10/2024 0830 by Carol Ann Sargent RN  Outcome: Progressing  Goal: Incision(s), wounds(s) or drain site(s) healing without S/S of infection  Description: INTERVENTIONS  - Assess and document dressing, incision,  wound bed, drain sites and surrounding tissue  - Provide patient and family education  6/10/2024 1522 by Carol Ann Sargent, RN  Outcome: Progressing  6/10/2024 0830 by Carol Ann Sargent, RN  Outcome: Progressing

## 2024-06-10 NOTE — ASSESSMENT & PLAN NOTE
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

## 2024-06-10 NOTE — ASSESSMENT & PLAN NOTE
Hx of PE at 21wks in fourth pregnancy  Work-up at that time with weakly positive anticardiolipin IgM  Patient stopped taking Lovenox this pregnancy at 30 weeks when her CT scan was negative for PE  Counseled patient on importance of Lovenox for 6 weeks postpartum

## 2024-06-10 NOTE — ASSESSMENT & PLAN NOTE
Admit to OBGYN   Clear liquid diet, IVF LR 125cc/hr   F/u T&S, CBC, RPR   GBS negative; EFW: 20%   SVE: 4.5/50/-3  Continuous fetal monitoring and tocometry   Analgesia at maternal request   Vertex by TAUS  Plan: pitocin

## 2024-06-10 NOTE — OB LABOR/OXYTOCIN SAFETY PROGRESS
Oxytocin Safety Progress Check Note - Angelia Anderson 34 y.o. female MRN: [id number]    Unit/Bed#: -01 Encounter: [id number]    Dose (brandi-units/min) Oxytocin: 10 brandi-units/min  Contraction Frequency (minutes): 2-4  Contraction Intensity: Moderate/Strong  Uterine Activity Characteristics: Regular  Cervical Dilation: 10  Dilation Complete Date: 06/10/24  Dilation Complete Time: 1452  Cervical Effacement: 100  Fetal Station: -2  Baseline Rate (FHR): 150 bpm  Fetal Heart Rate (FHT): 152 BPM  FHR Category: 2               Vital Signs:   Vitals:    06/10/24 1418   BP: 109/64   Pulse: 72   Resp:    Temp:    SpO2:        SVE now complete. Will begin pushing.  at bedside.    Arlin Lilly MD 6/10/2024 2:53 PM

## 2024-06-10 NOTE — LACTATION NOTE
This note was copied from a baby's chart.  CONSULT - LACTATION  Baby Girl (Chintan Anderson 0 days female MRN: [id number]    [facility] NURSERY Room / Bed: (N)/(N) Encounter: [id number]    Maternal Information     MOTHER:  Angelia Anderson  Maternal Age: 34 y.o.  OB History: # 1 - Date: 14, Sex: Male, Weight: 2948 g (6 lb 8 oz), GA: 37w0d, Type: Vaginal, Spontaneous, Apgar1: None, Apgar5: None, Living: Living, Birth Comments: None    # 2 - Date: 2019, Sex: None, Weight: None, GA: 10w0d, Type: None, Apgar1: None, Apgar5: None, Living: None, Birth Comments: None    # 3 - Date: , Sex: None, Weight: None, GA: 16w0d, Type: None, Apgar1: None, Apgar5: None, Living: None, Birth Comments: None    # 4 - Date: 03/15/23, Sex: Female, Weight: 2975 g (6 lb 8.9 oz), GA: 39w1d, Type: Vaginal, Spontaneous, Apgar1: 9, Apgar5: 9, Living: Living, Birth Comments: None    # 5 - Date: 23, Sex: None, Weight: None, GA: None, Type: Spontaneous , Apgar1: None, Apgar5: None, Living: None, Birth Comments: None    # 6 - Date: 06/10/24, Sex: Female, Weight: 3265 g (7 lb 3.2 oz), GA: 39w1d, Type: None, Apgar1: 9, Apgar5: 9, Living: Living, Birth Comments: None   Previouse breast reduction surgery? No    Lactation history:   Has patient previously breast fed: Yes   How long had patient previously breast fed: 3 mo   Previous breast feeding complications:       Past Surgical History:   Procedure Laterality Date    BARIATRIC SURGERY      DILATION AND EVACUATION  2019        GASTRIC RESTRICTION SURGERY  2015    gastric sleeve    INCISION AND DRAINAGE OF WOUND Right 2022    Procedure: INCISION AND DRAINAGE (I&D) HEAD/FACE;  Surgeon: Sumit Matos DMD;  [facility];  Service: Maxillofacial    MULTIPLE TOOTH EXTRACTIONS Right 2022    Procedure: EXTRACTION TEETH MULTIPLE;  Surgeon: Sumit Matos DMD;  Location[facility];  Service: Maxillofacial    OPEN  ANTERIOR SHOULDER RECONSTRUCTION Left 2018    OTHER SURGICAL HISTORY      sweat gland removal    [place] MISSED  FIRST TRIMESTER SURGICAL N/A 2023    Procedure: DILATATION AND EVACUATION (D&E);  Surgeon: Radha Knowles MD;  Location: [facility];  Service: Gynecology    RETAINED PLACENTA REMOVAL N/A 2020    Procedure: EXTRACTION OF PLACENTA,MANUAL;  Surgeon: Gray Campa MD;  Location: [facility] ;  Service: Obstetrics       Birth information:  YOB: 2022   Time of birth: 2:59 PM   Sex: female   Delivery type:     Birth Weight: 3265 g (7 lb 3.2 oz)   Percent of Weight Change: 0%     Gestational Age: 39w1d   [unfilled]    Assessment     Breast and nipple assessment: normal assessment     Assessment: restricted tongue movement    Feeding assessment: latch difficulty (sometimes can latch, but mostly ends up on the edge of nipple diameter)  LATCH:  Latch: Repeated attempts, hold nipple in mouth, stimulate to suck   Audible Swallowing: A few with stimulation   Type of Nipple: Everted (After stimulation)   Comfort (Breast/Nipple): Soft/non-tender   Hold (Positioning): Full assist, staff holds infant at breast   LATCH Score: 6         06/10/24 1700   Lactation Consultation   Reason for Consult 20;10 min   Breasts/Nipples   Left Breast Soft   Right Breast Soft   Left Nipple Everted   Right Nipple Everted   Intervention Hand expression   Breastfeeding Status Yes   Breastfeeding Progress Not yet established   Patient Follow-Up   Lactation Consult Status 2   Follow-Up Type Inpatient;Call as needed   Other OB Lactation Documentation    Additional Problem Noted Baby takes only a margin of the nipple diameter. She tries to suck through pursed lips and backs off from latch when it is placed deeply. Hand expression effective. Finger fed with gloved finger. Encouraged pumping if unable to get baby to latch to the breast. Gloved oral exam reveals frenulum attached just behind gum  line.       Feeding recommendations:  breast feed on demand, If unable to latch, pump/hand express. Discussed donor breast milk.    Information on hand expression given. Discussed benefits of knowing how to manually express breast including stimulating milk supply, softening nipple for latch and evacuating breast in the event of engorgement.    Reviewed how to bring baby to the breast so that her lower lip and chin touch the breast with her nose just above the nipple to encourage a wider, more asymmetric latch.    Encouraged parents to call for assistance, questions, and concerns about breastfeeding.  Extension provided.      Monika Love RN 6/10/2024 5:08 PM

## 2024-06-11 PROBLEM — R52 TENDERNESS: Status: ACTIVE | Noted: 2024-06-11

## 2024-06-11 PROCEDURE — 99024 POSTOP FOLLOW-UP VISIT: CPT | Performed by: OBSTETRICS & GYNECOLOGY

## 2024-06-11 RX ORDER — OXYCODONE HYDROCHLORIDE 5 MG/1
5 TABLET ORAL ONCE
Status: COMPLETED | OUTPATIENT
Start: 2024-06-11 | End: 2024-06-11

## 2024-06-11 RX ADMIN — SODIUM CHLORIDE 3 G: 9 INJECTION, SOLUTION INTRAVENOUS at 21:08

## 2024-06-11 RX ADMIN — ACETAMINOPHEN 650 MG: 325 TABLET, FILM COATED ORAL at 22:29

## 2024-06-11 RX ADMIN — SODIUM CHLORIDE 3 G: 9 INJECTION, SOLUTION INTRAVENOUS at 09:17

## 2024-06-11 RX ADMIN — DOCUSATE SODIUM 100 MG: 100 CAPSULE, LIQUID FILLED ORAL at 09:17

## 2024-06-11 RX ADMIN — OXYCODONE HYDROCHLORIDE 5 MG: 5 TABLET ORAL at 17:33

## 2024-06-11 RX ADMIN — DOCUSATE SODIUM 100 MG: 100 CAPSULE, LIQUID FILLED ORAL at 17:33

## 2024-06-11 RX ADMIN — SODIUM CHLORIDE 3 G: 9 INJECTION, SOLUTION INTRAVENOUS at 14:39

## 2024-06-11 RX ADMIN — OXYCODONE HYDROCHLORIDE 5 MG: 5 TABLET ORAL at 03:05

## 2024-06-11 RX ADMIN — ACETAMINOPHEN 650 MG: 325 TABLET, FILM COATED ORAL at 09:17

## 2024-06-11 RX ADMIN — ACETAMINOPHEN 650 MG: 325 TABLET, FILM COATED ORAL at 16:26

## 2024-06-11 RX ADMIN — ACETAMINOPHEN 650 MG: 325 TABLET, FILM COATED ORAL at 03:05

## 2024-06-11 RX ADMIN — SODIUM CHLORIDE 3 G: 9 INJECTION, SOLUTION INTRAVENOUS at 03:20

## 2024-06-11 NOTE — PROGRESS NOTES
Obstetrics Progress Note  Angelia Anderson 34 y.o. female MRN: [id number]  Unit/Bed#:  313-01 Encounter: [id number]    Assessment/Plan:    Postpartum Day #1 s/p  complicated by manual extraction of the placenta, currently stable. Baby in room. By issue:    Fundal tenderness  Assessment & Plan  Called out overnight with fundal tenderness   Afebrile, HR wnl, no other signs/symptoms of infection  Tenderness likely due to extensive bimanual exam for manual extraction of placenta  Unasyn x24 hours ordered though low suspicion for endometritis  Continue to monitor     (spontaneous vaginal delivery)  Assessment & Plan       Continue routine post partum care  Pain well controlled: tylenol/motrin scheduled  Lochia within normal limits: continue to monitor   OOB: as able, encourage ambulation  Passing flatus  Voiding spontaneously  Encourage breastfeeding  Contraception: condoms to tubal  Baby in: room  Dispo: anticipate d/c home PPD2      History of pulmonary embolism  Assessment & Plan  Hx of PE at 21wks in fourth pregnancy  Work-up at that time with weakly positive anticardiolipin IgM  Patient stopped taking Lovenox this pregnancy at 30 weeks when her CT scan was negative for PE  Counseled patient on importance of Lovenox for 6 weeks postpartum      History of postpartum depression  Assessment & Plan  Consider early EPDS    Iron deficiency anemia secondary to inadequate dietary iron intake  Assessment & Plan  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 She received one dose of benjamin 5mg. Otherwise, pain: controlled. Tolerating PO: yes. Voiding: spontaneously. Flatus: passing. Ambulating: yes. Chest pain: no. Shortness of breath: no. Leg  pain: no. Lochia: within normal limits. Baby in room, feeding via breast.    Objective:   Vitals:   Temp:  [97.8 °F (36.6 °C)-98.5 °F (36.9 °C)] 98.2 °F (36.8 °C)  HR:  [] 76  Resp:  [16-20] 20  BP: (103-132)/(56-80) 111/56       Intake/Output Summary (Last 24 hours) at 6/11/2024 0655  Last data filed at 6/11/2024 0201  Gross per 24 hour   Intake 2209.5 ml   Output 1900 ml   Net 309.5 ml       Lab Results   Component Value Date    WBC 6.01 06/10/2024    HGB 10.5 (L) 06/10/2024    HCT 34.7 (L) 06/10/2024    MCV 77 (L) 06/10/2024     06/10/2024       Physical Exam:   General: alert and oriented x3, in no apparent distress  Cardiovascular: regular rate  Pulmonary: normal effort  Abdomen: Soft, non-tender, non-distended, no rebound or guarding. Uterine fundus firm and non-tender, +3 cm above the umbilicus  Extremities: Non tender with minimal edema      Arlin Lilly MD  PGY-I, OBGYN  6/11/2024, 6:55 AM

## 2024-06-11 NOTE — CASE MANAGEMENT
Case Management Progress Note    Patient name Angelia Anderson  Location /-01 MRN [id number]  : 1990 Date 2024       LOS (days): 1  Geometric Mean LOS (GMLOS) (days):   Days to GMLOS:        OBJECTIVE:        Current admission status: Inpatient  Preferred Pharmacy:   [facility] [address] [address]  [place] [address] [place] [id number]  Phone: [phone] Fax: [phone]    Primary Care Provider: Karely Torrez MD    Primary Insurance: [employer]  Secondary Insurance:     PROGRESS NOTE:    CM received consult for MOB requesting Zomee for home use. Order placed to [employer] via [place]. Pump delivered to bedside by CM.

## 2024-06-11 NOTE — ASSESSMENT & PLAN NOTE
Called out PPD1 with fundal tenderness   Tenderness likely due to extensive bimanual exam for manual extraction of placenta  Remains afebrile, VSS   Now s/p Unasyn x24 hours

## 2024-06-11 NOTE — PLAN OF CARE
Problem: PAIN - ADULT  Goal: Verbalizes/displays adequate comfort level or baseline comfort level  Description: Interventions:  - Encourage patient to monitor pain and request assistance  - Assess pain using appropriate pain scale  - Administer analgesics based on type and severity of pain and evaluate response  - Implement non-pharmacological measures as appropriate and evaluate response  - Consider cultural and social influences on pain and pain management  - Notify physician/advanced practitioner if interventions unsuccessful or patient reports new pain  Outcome: Progressing     Problem: INFECTION - ADULT  Goal: Absence or prevention of progression during hospitalization  Description: INTERVENTIONS:  - Assess and monitor for signs and symptoms of infection  - Monitor lab/diagnostic results  - Monitor all insertion sites, i.e. indwelling lines, tubes, and drains  - Monitor endotracheal if appropriate and nasal secretions for changes in amount and color  - [place] appropriate cooling/warming therapies per order  - Administer medications as ordered  - Instruct and encourage patient and family to use good hand hygiene technique  - Identify and instruct in appropriate isolation precautions for identified infection/condition  Outcome: Progressing  Goal: Absence of fever/infection during neutropenic period  Description: INTERVENTIONS:  - Monitor WBC    Outcome: Progressing     Problem: SAFETY ADULT  Goal: Patient will remain free of falls  Description: INTERVENTIONS:  - Educate patient/family on patient safety including physical limitations  - Instruct patient to call for assistance with activity   - Consult OT/PT to assist with strengthening/mobility   - Keep Call bell within reach  - Keep bed low and locked with side rails adjusted as appropriate  - Keep care items and personal belongings within reach  - Initiate and maintain comfort rounds  - Make Fall Risk Sign visible to staff  - Apply yellow socks and bracelet  for high fall risk patients  - Consider moving patient to room near nurses station  Outcome: Progressing  Goal: Maintain or return to baseline ADL function  Description: INTERVENTIONS:  -  Assess patient's ability to carry out ADLs; assess patient's baseline for ADL function and identify physical deficits which impact ability to perform ADLs (bathing, care of mouth/teeth, toileting, grooming, dressing, etc.)  - Assess/evaluate cause of self-care deficits   - Assess range of motion  - Assess patient's mobility; develop plan if impaired  - Assess patient's need for assistive devices and provide as appropriate  - Encourage maximum independence but intervene and supervise when necessary  - Involve family in performance of ADLs  - Assess for home care needs following discharge   - Consider OT consult to assist with ADL evaluation and planning for discharge  - Provide patient education as appropriate  Outcome: Progressing  Goal: Maintains/Returns to pre admission functional level  Description: INTERVENTIONS:  - Perform AM-PAC 6 Click Basic Mobility/ Daily Activity assessment daily.  - Set and communicate daily mobility goal to care team and patient/family/caregiver.   - Out of bed for toileting  - Record patient progress and toleration of activity level   Outcome: Progressing     Problem: DISCHARGE PLANNING  Goal: Discharge to home or other facility with appropriate resources  Description: INTERVENTIONS:  - Identify barriers to discharge w/patient and caregiver  - Arrange for needed discharge resources and transportation as appropriate  - Identify discharge learning needs (meds, wound care, etc.)  - Arrange for interpretive services to assist at discharge as needed  - Refer to Case Management Department for coordinating discharge planning if the patient needs post-hospital services based on physician/advanced practitioner order or complex needs related to functional status, cognitive ability, or social support  system  Outcome: Progressing     Problem: POSTPARTUM  Goal: Experiences normal postpartum course  Description: INTERVENTIONS:  - Monitor maternal vital signs  - Assess uterine involution and lochia  Outcome: Progressing  Goal: Appropriate maternal -  bonding  Description: INTERVENTIONS:  - Identify family support  - Assess for appropriate maternal/infant bonding   -Encourage maternal/infant bonding opportunities  - Referral to  or  as needed  Outcome: Progressing  Goal: Establishment of infant feeding pattern  Description: INTERVENTIONS:  - Assess breast/bottle feeding  - Refer to lactation as needed  Outcome: Progressing  Goal: Incision(s), wounds(s) or drain site(s) healing without S/S of infection  Description: INTERVENTIONS  - Assess and document dressing, incision, wound bed, drain sites and surrounding tissue  - Provide patient and family education    Outcome: Progressing

## 2024-06-11 NOTE — LACTATION NOTE
This note was copied from a baby's chart.  Follow up Lactation: Angelia states baby has not been able to latch to the breast. Parents have been feeding DBM via a bottle overnight. Mom has a pump in the room but has not used it.    Baby is currently demonstrating late stage feeding cues. Ed. On feeding plan for d/c and enc. To attempt.     Brought baby to the right breast. Ed. On how to breast feed with large breasts. Teach back of hand expression on the right breast has transitional milk dripping out of the nipple. When mom compresses the right breast, nipple appears to be inverted, however, excess breast tissue surrounds the nipple. Demonstration and teach back of tea-cup hold at the base of the nipple. Demonstration of pillows to lift the breast and create a shelf so mom can see the nipple and latch.    Demonstration and teach back of football with ear, shoulder hip alignment and snug hold of baby to the breast.     Upon oral assessment: baby sucks her lower lip deeply into her mouth. Loud smacking noises occur. Upper and lower lips are pursed and taut. Baby can not latch to the breast.    Demonstration and teach back of infant oral mandible massage. Baby demonstrates a slightly wider oral gape.     Paced bottle feeding with right arm supporting baby is demonstrated. Baby took 14 mls of the 19 mls of DBM provided.    Ed. On need for s2s and NNS on the opposite breast after feeding. Brought baby back to the right breast as it was still on the pillow and this consult being the first time baby attempted a latch on the right breast. Baby did open wider and did provide a 3 suck burst before she comes off the breast.    Enc. Pumping for approx 10 min. After the s2s and feeding attempt. Mom wants 28 mm flanges as she states that what she has used previously. Mom will pump after restroom. Enc. To call lactation to assist with pumping and verify flange size is not to big.    Breast shells provided for elongating the nipple.  "Zomee pump ordered. Enc. To inform lactation how much breast milk whey wish to take home.      Discharge feeding plan    1. Meet early feeding cues  2. Use massage, warmth, hand expression / hand pump to stimulate breasts  3. Align nipple to nose, drag nipple to chin, (move baby not breast) and bring baby to breast when mouth is wide and deep latch is achieved. (Scan QR code for [employer] - positions)  4. Use breast compressions to stimulate suck  5. From \"U\" shape hold under the breast, move fingers to provide tea-cup hold of the breast at the lower tania of baby's mouth  6. Once baby tires, or unlatches, feed any expressed milk via syringe / paced bottle feeding method.  7. Burp baby between the breasts.   8. Allow the baby to do non-nutritive suck and skin to skin at the breast  9. If baby does not meet feeding goals, pump after each feed to stimulate breasts and have expressed milk for next feed    When baby is not meeting feeding goals, use syringe or the paced bottle feeding method for feeds. Review [employer] on Youtube or scan QR code for [employer] video        Milk Mob        [employer] - positions    Parents want DBM for home. Mom is taking bottles from [facility]. Ed. On first 12 bottles are not covered under DR. GAGNON. Ed. On cost of each 100 ml bottle. Ed. On amount mom will need for bridging to milk supply being established. Handouts on [facility] [employer] info. Provided.     .      "

## 2024-06-11 NOTE — PROGRESS NOTES
Notified by nursing that patient was having worsening abdominal pain.  Examined patient at bedside.  She states that she is feeling painful contractions.  She denies any perineal pain.  She denies fevers or chills.  She denies chest pain, shortness of breath.  Lochia per nursing has been within normal limits.  She does just passed a small clot but was firm afterwards without any gushes.  On examination patient does have fundal tenderness with the fundus firm at U +1.  Her belly is soft and nondistended.  Vital signs are stable and she has not been febrile.    Plan for Unasyn for 24 hours postpartum for concern for endometritis given fundal tenderness and uterine manipulation at delivery for manual extraction of placenta  Plan for Silvana 5 mg one-time dose now for analgesia  Pain likely related to extra manipulation at the time of delivery and inability to treat preemptively with NSAIDs given history of gastric sleeve.  Continue to monitor.  Discussed with Dr. Sánchez.

## 2024-06-11 NOTE — PLAN OF CARE
Problem: PAIN - ADULT  Goal: Verbalizes/displays adequate comfort level or baseline comfort level  Description: Interventions:  - Encourage patient to monitor pain and request assistance  - Assess pain using appropriate pain scale  - Administer analgesics based on type and severity of pain and evaluate response  - Implement non-pharmacological measures as appropriate and evaluate response  - Consider cultural and social influences on pain and pain management  - Notify physician/advanced practitioner if interventions unsuccessful or patient reports new pain  Outcome: Progressing     Problem: INFECTION - ADULT  Goal: Absence or prevention of progression during hospitalization  Description: INTERVENTIONS:  - Assess and monitor for signs and symptoms of infection  - Monitor lab/diagnostic results  - Monitor all insertion sites, i.e. indwelling lines, tubes, and drains  - Monitor endotracheal if appropriate and nasal secretions for changes in amount and color  - [place] appropriate cooling/warming therapies per order  - Administer medications as ordered  - Instruct and encourage patient and family to use good hand hygiene technique  - Identify and instruct in appropriate isolation precautions for identified infection/condition  Outcome: Progressing  Goal: Absence of fever/infection during neutropenic period  Description: INTERVENTIONS:  - Monitor WBC    Outcome: Progressing     Problem: SAFETY ADULT  Goal: Patient will remain free of falls  Description: INTERVENTIONS:  - Educate patient/family on patient safety including physical limitations  - Instruct patient to call for assistance with activity   - Consult OT/PT to assist with strengthening/mobility   - Keep Call bell within reach  - Keep bed low and locked with side rails adjusted as appropriate  - Keep care items and personal belongings within reach  - Initiate and maintain comfort rounds  - Make Fall Risk Sign visible to staff  - Offer Toileting every  Hours,  in advance of need  - Initiate/Maintain alarm  - Obtain necessary fall risk management equipment:   - Apply yellow socks and bracelet for high fall risk patients  - Consider moving patient to room near nurses station  Outcome: Progressing  Goal: Maintain or return to baseline ADL function  Description: INTERVENTIONS:  -  Assess patient's ability to carry out ADLs; assess patient's baseline for ADL function and identify physical deficits which impact ability to perform ADLs (bathing, care of mouth/teeth, toileting, grooming, dressing, etc.)  - Assess/evaluate cause of self-care deficits   - Assess range of motion  - Assess patient's mobility; develop plan if impaired  - Assess patient's need for assistive devices and provide as appropriate  - Encourage maximum independence but intervene and supervise when necessary  - Involve family in performance of ADLs  - Assess for home care needs following discharge   - Consider OT consult to assist with ADL evaluation and planning for discharge  - Provide patient education as appropriate  Outcome: Progressing  Goal: Maintains/Returns to pre admission functional level  Description: INTERVENTIONS:  - Perform AM-PAC 6 Click Basic Mobility/ Daily Activity assessment daily.  - Set and communicate daily mobility goal to care team and patient/family/caregiver.   - Collaborate with rehabilitation services on mobility goals if consulted  - Perform Range of Motion  times a day.  - Reposition patient every  hours.  - Dangle patient  times a day  - Stand patient  times a day  - Ambulate patient  times a day  - Out of bed to chair  times a day   - Out of bed for meals  times a day  - Out of bed for toileting  - Record patient progress and toleration of activity level   Outcome: Progressing     Problem: DISCHARGE PLANNING  Goal: Discharge to home or other facility with appropriate resources  Description: INTERVENTIONS:  - Identify barriers to discharge w/patient and caregiver  - Arrange for  needed discharge resources and transportation as appropriate  - Identify discharge learning needs (meds, wound care, etc.)  - Arrange for interpretive services to assist at discharge as needed  - Refer to Case Management Department for coordinating discharge planning if the patient needs post-hospital services based on physician/advanced practitioner order or complex needs related to functional status, cognitive ability, or social support system  Outcome: Progressing     Problem: POSTPARTUM  Goal: Experiences normal postpartum course  Description: INTERVENTIONS:  - Monitor maternal vital signs  - Assess uterine involution and lochia  Outcome: Progressing  Goal: Appropriate maternal -  bonding  Description: INTERVENTIONS:  - Identify family support  - Assess for appropriate maternal/infant bonding   -Encourage maternal/infant bonding opportunities  - Referral to  or  as needed  Outcome: Progressing  Goal: Establishment of infant feeding pattern  Description: INTERVENTIONS:  - Assess breast/bottle feeding  - Refer to lactation as needed  Outcome: Progressing  Goal: Incision(s), wounds(s) or drain site(s) healing without S/S of infection  Description: INTERVENTIONS  - Assess and document dressing, incision, wound bed, drain sites and surrounding tissue  - Provide patient and family education  - Perform skin care/dressing changes every   Problem: PAIN - ADULT  Goal: Verbalizes/displays adequate comfort level or baseline comfort level  Description: Interventions:  - Encourage patient to monitor pain and request assistance  - Assess pain using appropriate pain scale  - Administer analgesics based on type and severity of pain and evaluate response  - Implement non-pharmacological measures as appropriate and evaluate response  - Consider cultural and social influences on pain and pain management  - Notify physician/advanced practitioner if interventions unsuccessful or patient reports new  pain  Outcome: Progressing     Problem: INFECTION - ADULT  Goal: Absence or prevention of progression during hospitalization  Description: INTERVENTIONS:  - Assess and monitor for signs and symptoms of infection  - Monitor lab/diagnostic results  - Monitor all insertion sites, i.e. indwelling lines, tubes, and drains  - Monitor endotracheal if appropriate and nasal secretions for changes in amount and color  - [place] appropriate cooling/warming therapies per order  - Administer medications as ordered  - Instruct and encourage patient and family to use good hand hygiene technique  - Identify and instruct in appropriate isolation precautions for identified infection/condition  Outcome: Progressing  Goal: Absence of fever/infection during neutropenic period  Description: INTERVENTIONS:  - Monitor WBC    Outcome: Progressing     Problem: SAFETY ADULT  Goal: Patient will remain free of falls  Description: INTERVENTIONS:  - Educate patient/family on patient safety including physical limitations  - Instruct patient to call for assistance with activity   - Consult OT/PT to assist with strengthening/mobility   - Keep Call bell within reach  - Keep bed low and locked with side rails adjusted as appropriate  - Keep care items and personal belongings within reach  - Initiate and maintain comfort rounds  - Make Fall Risk Sign visible to staff  - Offer Toileting every  Hours, in advance of need  - Initiate/Maintain alarm  - Obtain necessary fall risk management equipment:   - Apply yellow socks and bracelet for high fall risk patients  - Consider moving patient to room near nurses station  Outcome: Progressing  Goal: Maintain or return to baseline ADL function  Description: INTERVENTIONS:  -  Assess patient's ability to carry out ADLs; assess patient's baseline for ADL function and identify physical deficits which impact ability to perform ADLs (bathing, care of mouth/teeth, toileting, grooming, dressing, etc.)  -  Assess/evaluate cause of self-care deficits   - Assess range of motion  - Assess patient's mobility; develop plan if impaired  - Assess patient's need for assistive devices and provide as appropriate  - Encourage maximum independence but intervene and supervise when necessary  - Involve family in performance of ADLs  - Assess for home care needs following discharge   - Consider OT consult to assist with ADL evaluation and planning for discharge  - Provide patient education as appropriate  Outcome: Progressing  Goal: Maintains/Returns to pre admission functional level  Description: INTERVENTIONS:  - Perform AM-PAC 6 Click Basic Mobility/ Daily Activity assessment daily.  - Set and communicate daily mobility goal to care team and patient/family/caregiver.   - Collaborate with rehabilitation services on mobility goals if consulted  - Perform Range of Motion  times a day.  - Reposition patient every  hours.  - Dangle patient  times a day  - Stand patient  times a day  - Ambulate patient  times a day  - Out of bed to chair  times a day   - Out of bed for meals  times a day  - Out of bed for toileting  - Record patient progress and toleration of activity level   Outcome: Progressing     Problem: DISCHARGE PLANNING  Goal: Discharge to home or other facility with appropriate resources  Description: INTERVENTIONS:  - Identify barriers to discharge w/patient and caregiver  - Arrange for needed discharge resources and transportation as appropriate  - Identify discharge learning needs (meds, wound care, etc.)  - Arrange for interpretive services to assist at discharge as needed  - Refer to Case Management Department for coordinating discharge planning if the patient needs post-hospital services based on physician/advanced practitioner order or complex needs related to functional status, cognitive ability, or social support system  Outcome: Progressing     Problem: POSTPARTUM  Goal: Experiences normal postpartum  course  Description: INTERVENTIONS:  - Monitor maternal vital signs  - Assess uterine involution and lochia  Outcome: Progressing  Goal: Appropriate maternal -  bonding  Description: INTERVENTIONS:  - Identify family support  - Assess for appropriate maternal/infant bonding   -Encourage maternal/infant bonding opportunities  - Referral to  or  as needed  Outcome: Progressing  Goal: Establishment of infant feeding pattern  Description: INTERVENTIONS:  - Assess breast/bottle feeding  - Refer to lactation as needed  Outcome: Progressing  Goal: Incision(s), wounds(s) or drain site(s) healing without S/S of infection  Description: INTERVENTIONS  - Assess and document dressing, incision, wound bed, drain sites and surrounding tissue  - Provide patient and family education  - Perform skin care/dressing changes every   Outcome: Progressing     Outcome: Progressing

## 2024-06-12 VITALS
OXYGEN SATURATION: 99 % | BODY MASS INDEX: 39.78 KG/M2 | HEIGHT: 64 IN | HEART RATE: 67 BPM | RESPIRATION RATE: 16 BRPM | TEMPERATURE: 98 F | WEIGHT: 233 LBS | DIASTOLIC BLOOD PRESSURE: 59 MMHG | SYSTOLIC BLOOD PRESSURE: 104 MMHG

## 2024-06-12 PROCEDURE — 99024 POSTOP FOLLOW-UP VISIT: CPT | Performed by: OBSTETRICS & GYNECOLOGY

## 2024-06-12 PROCEDURE — NC001 PR NO CHARGE: Performed by: OBSTETRICS & GYNECOLOGY

## 2024-06-12 RX ORDER — ENOXAPARIN SODIUM 100 MG/ML
40 INJECTION SUBCUTANEOUS
Status: DISCONTINUED | OUTPATIENT
Start: 2024-06-12 | End: 2024-06-12 | Stop reason: HOSPADM

## 2024-06-12 RX ORDER — ENOXAPARIN SODIUM 100 MG/ML
40 INJECTION SUBCUTANEOUS
Qty: 16.8 ML | Refills: 0 | Status: SHIPPED | OUTPATIENT
Start: 2024-06-13 | End: 2024-07-25

## 2024-06-12 RX ORDER — BENZOCAINE/MENTHOL 6 MG-10 MG
1 LOZENGE MUCOUS MEMBRANE DAILY PRN
Start: 2024-06-12

## 2024-06-12 RX ORDER — ENOXAPARIN SODIUM 100 MG/ML
40 INJECTION SUBCUTANEOUS
Qty: 16.8 ML | Refills: 0 | Status: CANCELLED | OUTPATIENT
Start: 2024-06-12 | End: 2024-07-24

## 2024-06-12 RX ORDER — ACETAMINOPHEN 325 MG/1
650 TABLET ORAL EVERY 6 HOURS
Start: 2024-06-12

## 2024-06-12 RX ADMIN — DOCUSATE SODIUM 100 MG: 100 CAPSULE, LIQUID FILLED ORAL at 09:45

## 2024-06-12 RX ADMIN — ACETAMINOPHEN 650 MG: 325 TABLET, FILM COATED ORAL at 04:20

## 2024-06-12 RX ADMIN — WITCH HAZEL 1 PAD: 500 SOLUTION RECTAL; TOPICAL at 09:52

## 2024-06-12 RX ADMIN — ENOXAPARIN SODIUM 40 MG: 40 INJECTION SUBCUTANEOUS at 09:45

## 2024-06-12 RX ADMIN — ACETAMINOPHEN 650 MG: 325 TABLET, FILM COATED ORAL at 09:45

## 2024-06-12 RX ADMIN — HYDROCORTISONE 1 APPLICATION: 1 CREAM TOPICAL at 09:52

## 2024-06-12 NOTE — PROGRESS NOTES
Obstetrics Progress Note  Angelia Anderson 34 y.o. female MRN: [id number]  Unit/Bed#:  313-01 Encounter: [id number]    Assessment/Plan:    Postpartum Day #2 s/p  complicated by retained placenta requiring manual extraction at the bedside, currently stable. Baby in room. By issue:    Fundal tenderness  Assessment & Plan  Called out PPD1 with fundal tenderness   Tenderness likely due to extensive bimanual exam for manual extraction of placenta  Remains afebrile, VSS   Now s/p Unasyn x24 hours     (spontaneous vaginal delivery)  Assessment & Plan       Continue routine post partum care  Pain well controlled: tylenol/motrin scheduled  Lochia within normal limits: continue to monitor   OOB: as able, encourage ambulation  Passing flatus  Voiding spontaneously  Encourage breastfeeding  Contraception: condoms to tubal  Baby in: room  Dispo: anticipate d/c home PPD2      History of pulmonary embolism  Assessment & Plan  Hx of PE at 21wks in fourth pregnancy  Work-up at that time with weakly positive anticardiolipin IgM  Patient stopped taking Lovenox this pregnancy at 30 weeks when her CT scan was negative for PE  Counseled patient on importance of Lovenox for 6 weeks postpartum      History of postpartum depression  Assessment & Plan  Consider early EPDS    Iron deficiency anemia secondary to inadequate dietary iron intake  Assessment & Plan  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 Pain: controlled with Tylenol/Motrin. Tolerating PO: yes. Voiding: spontaneously. Flatus: passing. Ambulating: yes. Chest pain: no. Shortness of breath: no. Leg pain: no. Lochia: within normal limits. Baby in room, feeding via breast.    Objective:   Vitals:   Temp:  [97.8 °F (36.6  °C)-98.1 °F (36.7 °C)] 98 °F (36.7 °C)  HR:  [67-80] 67  Resp:  [16-18] 16  BP: (101-122)/(52-66) 111/52     No intake or output data in the 24 hours ending 06/12/24 0553    Lab Results   Component Value Date    WBC 6.01 06/10/2024    HGB 10.5 (L) 06/10/2024    HCT 34.7 (L) 06/10/2024    MCV 77 (L) 06/10/2024     06/10/2024       Physical Exam:   General: alert and oriented x3, in no apparent distress  Cardiovascular: regular rate  Pulmonary: normal effort  Abdomen: Soft, non-tender, non-distended, no rebound or guarding. Uterine fundus firm and moderately tender to touch, +1 cm above the umbilicus  Extremities: Non tender with no edema    Arlin Lilly MD  PGY-I, OBGYN  6/12/2024, 5:53 AM

## 2024-06-12 NOTE — LACTATION NOTE
This note was copied from a baby's chart.  DBM distributed to pt prior to discharge.   4 bottles  Paid $60.00  Transaction ID: [id number]    [id number] (9) 4/3/2025  [id number] (17), (18), (22) 4/3/25

## 2024-06-12 NOTE — CASE MANAGEMENT
Case Management Progress Note    Patient name Angelia Anderson  Location /-01 N [id number]  : 1990 Date 2024       LOS (days): 2  Geometric Mean LOS (GMLOS) (days):   Days to GMLOS:        OBJECTIVE:        Current admission status: Inpatient  Preferred Pharmacy:   [facility] [address] [address]  [address]  [place] [address]  Phone: [phone] Fax: [phone]    Primary Care Provider: Karely Torrez MD    Primary Insurance: [employer]  Secondary Insurance:     PROGRESS NOTE:    No issues with obtaining Lovenox, MOB previously on it, covered by insurance. No further case management needs.

## 2024-06-12 NOTE — LACTATION NOTE
This note was copied from a baby's chart.  CONSULT - LACTATION  Baby Girl (Chintan Anderson 2 days female MRN: [id number]    [facility] Room / Bed: (N)/(N) Encounter: [id number]    Maternal Information     MOTHER:  Angelia Anderson  Maternal Age: 34 y.o.  OB History: # 1 - Date: 14, Sex: Male, Weight: 2948 g (6 lb 8 oz), GA: 37w0d, Type: Vaginal, Spontaneous, Apgar1: None, Apgar5: None, Living: Living, Birth Comments: None    # 2 - Date: 2019, Sex: None, Weight: None, GA: 10w0d, Type: None, Apgar1: None, Apgar5: None, Living: None, Birth Comments: None    # 3 - Date: , Sex: None, Weight: None, GA: 16w0d, Type: None, Apgar1: None, Apgar5: None, Living: None, Birth Comments: None    # 4 - Date: 03/15/23, Sex: Female, Weight: 2975 g (6 lb 8.9 oz), GA: 39w1d, Type: Vaginal, Spontaneous, Apgar1: 9, Apgar5: 9, Living: Living, Birth Comments: None    # 5 - Date: 23, Sex: None, Weight: None, GA: None, Type: Spontaneous , Apgar1: None, Apgar5: None, Living: None, Birth Comments: None    # 6 - Date: 06/10/24, Sex: Female, Weight: 3265 g (7 lb 3.2 oz), GA: 39w1d, Type: None, Apgar1: 9, Apgar5: 9, Living: Living, Birth Comments: None   Previouse breast reduction surgery? No    Lactation history:   Has patient previously breast fed: Yes (3 mo)   How long had patient previously breast fed: 3 mo.   Previous breast feeding complications: Breast/nipple pain     Past Surgical History:   Procedure Laterality Date    BARIATRIC SURGERY      DILATION AND EVACUATION  2019        GASTRIC RESTRICTION SURGERY  2015    gastric sleeve    INCISION AND DRAINAGE OF WOUND Right 2022    Procedure: INCISION AND DRAINAGE (I&D) HEAD/FACE;  Surgeon: Sumit Matos DMD;  Location: [facility];  Service: Maxillofacial    MULTIPLE TOOTH EXTRACTIONS Right 2022    Procedure: EXTRACTION TEETH MULTIPLE;  Surgeon: Sumit Matos DMD;  Location: [facility];   Service: Maxillofacial    OPEN ANTERIOR SHOULDER RECONSTRUCTION Left     OTHER SURGICAL HISTORY      sweat gland removal    ND TX MISSED  FIRST TRIMESTER SURGICAL N/A 2023    Procedure: DILATATION AND EVACUATION (D&E);  Surgeon: Radha Knowles MD;  Location: [facility];  Service: Gynecology    RETAINED PLACENTA REMOVAL N/A 2020    Procedure: EXTRACTION OF PLACENTA,MANUAL;  Surgeon: Gray Campa MD;  [facility]: [facility];  Service: Obstetrics       Birth information:  YOB: 2022   Time of birth: 2:59 PM   Sex: female   Delivery type:     Birth Weight: 3265 g (7 lb 3.2 oz)   Percent of Weight Change: -4%     Gestational Age: 39w1d   [unfilled]    Assessment        24 0800   Lactation Consultation   Reason for Consult 20 minutes   Breasts/Nipples   Breastfeeding Status Yes   Breastfeeding Progress Not yet established   Reasons for not Breastfeeding Maternal preference   Other OB Lactation Tools   Feeding Devices Bottle;Other (Comment)  (DBM)   Breast Pump   Pump 3;2;1   Patient Follow-Up   Lactation Consult Status 2   Follow-Up Type Inpatient;Call as needed   Other OB Lactation Documentation    Additional Problem Noted Mom states she has not put baby to breast or has been pumping. Mom giving 15-20 mLs of DBM, plans to go home with DBM. Encouraged mom to pump every 2-3 hours to protect milk supply. Message sent via EPIC to Baby and Me.       Feeding recommendations: mixed feeding plan       Mom states she has not been putting baby to breast or pumping since help with lactation yesterday. Mom is giving 15-20 mLs of DBM. Encouraged mom to try to latch baby at breast, if no success she is to give expressed milk or donor breast milk. Encouraged mom to pump when able to after feedings. Reviewed DBM for home, thawing guidelines. Encouraged mom to call for latch assessment and for donor milk for home.     Feeding Plan:   1. Meet early feeding cues   2. Bring baby to  breast skin to skin   3. Extend chin, anchoring it onto the breast to assist with deeper latch   4. Align nipple to nose, not sliding it to the lower lip, but instead, pivoting the compressed areola over the lower lip if using parent led latching so as to have the nipple come to rest in the arch of the baby's mouth   5. May use breast compressions to stimulate suck and provide more breast milk for enticement.   6. Introduce breast first for feeding, unless baby is not latching. May use hand expression to entice baby to wake   7. Feed infant expressed breast milk after breast feeding   8. Then, feed baby formula/donor breast milk per your preference   9.  Pump after as many feedings at the breast as reasonable   10. Follow up with outpatient lactation as soon as possible       Encouraged parents to call for assistance, questions, and concerns about breastfeeding.  Extension provided.    Yajaira Hernandez 6/12/2024 8:50 AM

## 2024-06-12 NOTE — PLAN OF CARE
Problem: PAIN - ADULT  Goal: Verbalizes/displays adequate comfort level or baseline comfort level  Description: Interventions:  - Encourage patient to monitor pain and request assistance  - Assess pain using appropriate pain scale  - Administer analgesics based on type and severity of pain and evaluate response  - Implement non-pharmacological measures as appropriate and evaluate response  - Consider cultural and social influences on pain and pain management  - Notify physician/advanced practitioner if interventions unsuccessful or patient reports new pain  Outcome: Progressing     Problem: INFECTION - ADULT  Goal: Absence or prevention of progression during hospitalization  Description: INTERVENTIONS:  - Assess and monitor for signs and symptoms of infection  - Monitor lab/diagnostic results  - Monitor all insertion sites, i.e. indwelling lines, tubes, and drains  - Monitor endotracheal if appropriate and nasal secretions for changes in amount and color  - [place] appropriate cooling/warming therapies per order  - Administer medications as ordered  - Instruct and encourage patient and family to use good hand hygiene technique  - Identify and instruct in appropriate isolation precautions for identified infection/condition  Outcome: Progressing  Goal: Absence of fever/infection during neutropenic period  Description: INTERVENTIONS:  - Monitor WBC    Outcome: Progressing     Problem: SAFETY ADULT  Goal: Patient will remain free of falls  Description: INTERVENTIONS:  - Educate patient/family on patient safety including physical limitations  - Instruct patient to call for assistance with activity   - Consult OT/PT to assist with strengthening/mobility   - Keep Call bell within reach  - Keep bed low and locked with side rails adjusted as appropriate  - Keep care items and personal belongings within reach  - Initiate and maintain comfort rounds  - Make Fall Risk Sign visible to staff  - Apply yellow socks and bracelet  for high fall risk patients  - Consider moving patient to room near nurses station  Outcome: Progressing  Goal: Maintain or return to baseline ADL function  Description: INTERVENTIONS:  -  Assess patient's ability to carry out ADLs; assess patient's baseline for ADL function and identify physical deficits which impact ability to perform ADLs (bathing, care of mouth/teeth, toileting, grooming, dressing, etc.)  - Assess/evaluate cause of self-care deficits   - Assess range of motion  - Assess patient's mobility; develop plan if impaired  - Assess patient's need for assistive devices and provide as appropriate  - Encourage maximum independence but intervene and supervise when necessary  - Involve family in performance of ADLs  - Assess for home care needs following discharge   - Consider OT consult to assist with ADL evaluation and planning for discharge  - Provide patient education as appropriate  Outcome: Progressing  Goal: Maintains/Returns to pre admission functional level  Description: INTERVENTIONS:  - Perform AM-PAC 6 Click Basic Mobility/ Daily Activity assessment daily.  - Set and communicate daily mobility goal to care team and patient/family/caregiver.   - Collaborate with rehabilitation services on mobility goals if consulted  - Out of bed for toileting  - Record patient progress and toleration of activity level   Outcome: Progressing     Problem: DISCHARGE PLANNING  Goal: Discharge to home or other facility with appropriate resources  Description: INTERVENTIONS:  - Identify barriers to discharge w/patient and caregiver  - Arrange for needed discharge resources and transportation as appropriate  - Identify discharge learning needs (meds, wound care, etc.)  - Arrange for interpretive services to assist at discharge as needed  - Refer to Case Management Department for coordinating discharge planning if the patient needs post-hospital services based on physician/advanced practitioner order or complex needs  related to functional status, cognitive ability, or social support system  Outcome: Progressing     Problem: POSTPARTUM  Goal: Experiences normal postpartum course  Description: INTERVENTIONS:  - Monitor maternal vital signs  - Assess uterine involution and lochia  Outcome: Progressing  Goal: Appropriate maternal -  bonding  Description: INTERVENTIONS:  - Identify family support  - Assess for appropriate maternal/infant bonding   -Encourage maternal/infant bonding opportunities  - Referral to  or  as needed  Outcome: Progressing  Goal: Establishment of infant feeding pattern  Description: INTERVENTIONS:  - Assess breast/bottle feeding  - Refer to lactation as needed  Outcome: Progressing  Goal: Incision(s), wounds(s) or drain site(s) healing without S/S of infection  Description: INTERVENTIONS  - Assess and document dressing, incision, wound bed, drain sites and surrounding tissue  - Provide patient and family education  Outcome: Progressing

## 2024-06-12 NOTE — UTILIZATION REVIEW
"Mother and baby discharged on 2024       NOTIFICATION OF INPATIENT ADMISSION   MATERNITY/DELIVERY AUTHORIZATION REQUEST   SERVICING FACILITY:   [facility] - L&D, [place], NICU  [address] [place] [address] [place] [address]  Tax ID: [id number]  NPI: [id number]   ATTENDING PROVIDER:  Attending Name and NPI#: Suri Douglas Md [[id number]]  Address: [address] [place] [address] [place] [address]  Phone: [phone]   ADMISSION INFORMATION:  Place of Service: Inpatient [facility]  Place of Service Code: 21  Inpatient Admission Date/Time: 6/10/24  7:22 AM  Discharge Date/Time: 2024 12:50 PM  Admitting Diagnosis Code/Description:  Encounter for planned induction of labor [Z34.90]  Encounter for full-term uncomplicated delivery [O80]     Mother: Angelia Anderson 1990 Estimated Date of Delivery: 24  Delivering clinician: Suri Douglas   OB History          6    Para   3    Term   3       0    AB   3    Living   3         SAB   2    IAB   1    Ectopic   0    Multiple   0    Live Births   3               [place] Name & MRN:   Information for the patient's :  Justin, Baby Girl (Angelia) [[id number]]   [place] Delivery Information:  Sex: female  Delivered 6/10/2024 2:59 PM by ; Gestational Age: 39w1d    [place] Measurements:  Weight: 7 lb 3.2 oz (3265 g);  Height: 19.5\"    APGAR 1 minute 5 minutes 10 minutes   Totals: 9 9       UTILIZATION REVIEW CONTACT:  Rosemarie Disla Utilization   Network Utilization Review Department  Phone: [phone]  Fax [phone]  Email: [email or web address]  Contact for approvals/pending authorizations, clinical reviews, and discharge.     PHYSICIAN ADVISORY SERVICES:  Medical Necessity Denial & Xdny-zi-Ypay Review  Phone: [phone]  Fax: [phone]  Email: [email or web address]     DISCHARGE SUPPORT TEAM:  For Patients Discharge Needs & Updates  Phone: " [phone] opt. 2 Fax: [phone]  Email: [email or web address]

## 2024-06-12 NOTE — PLAN OF CARE
Problem: PAIN - ADULT  Goal: Verbalizes/displays adequate comfort level or baseline comfort level  Description: Interventions:  - Encourage patient to monitor pain and request assistance  - Assess pain using appropriate pain scale  - Administer analgesics based on type and severity of pain and evaluate response  - Implement non-pharmacological measures as appropriate and evaluate response  - Consider cultural and social influences on pain and pain management  - Notify physician/advanced practitioner if interventions unsuccessful or patient reports new pain  Outcome: Adequate for Discharge     Problem: POSTPARTUM  Goal: Experiences normal postpartum course  Description: INTERVENTIONS:  - Monitor maternal vital signs  - Assess uterine involution and lochia  Outcome: Adequate for Discharge

## 2024-06-13 PROCEDURE — 88307 TISSUE EXAM BY PATHOLOGIST: CPT | Performed by: PATHOLOGY

## 2024-09-10 NOTE — PROGRESS NOTES
The patient was seen today for an ultrasound.  Please see ultrasound report (located under Ob Procedures) for additional details.   Thank you very much for allowing us to participate in the care of this very nice patient.  Should you have any questions, please do not hesitate to contact me.     Gray Mondragon MD FACOG  Attending Physician, Maternal-Fetal Medicine  [facility]   [Negative] : Heme/Lymph

## 2024-10-22 ENCOUNTER — OFFICE VISIT (OUTPATIENT)
Dept: BARIATRICS | Facility: CLINIC | Age: 34
End: 2024-10-22
Payer: COMMERCIAL

## 2024-10-22 ENCOUNTER — PREP FOR PROCEDURE (OUTPATIENT)
Dept: BARIATRICS | Facility: CLINIC | Age: 34
End: 2024-10-22

## 2024-10-22 VITALS
DIASTOLIC BLOOD PRESSURE: 82 MMHG | WEIGHT: 218 LBS | TEMPERATURE: 98.6 F | RESPIRATION RATE: 16 BRPM | HEART RATE: 79 BPM | OXYGEN SATURATION: 99 % | BODY MASS INDEX: 36.32 KG/M2 | SYSTOLIC BLOOD PRESSURE: 118 MMHG | HEIGHT: 65 IN

## 2024-10-22 DIAGNOSIS — Z98.84 BARIATRIC SURGERY STATUS: Primary | ICD-10-CM

## 2024-10-22 DIAGNOSIS — K91.2 POSTSURGICAL MALABSORPTION: ICD-10-CM

## 2024-10-22 DIAGNOSIS — Z48.815 ENCOUNTER FOR SURGICAL AFTERCARE FOLLOWING SURGERY OF DIGESTIVE SYSTEM: Primary | ICD-10-CM

## 2024-10-22 DIAGNOSIS — E66.812 OBESITY, CLASS II, BMI 35-39.9: ICD-10-CM

## 2024-10-22 DIAGNOSIS — Z98.84 BARIATRIC SURGERY STATUS: ICD-10-CM

## 2024-10-22 DIAGNOSIS — G47.33 OSA (OBSTRUCTIVE SLEEP APNEA): ICD-10-CM

## 2024-10-22 DIAGNOSIS — E56.9 INADEQUATE VITAMIN INTAKE: ICD-10-CM

## 2024-10-22 PROCEDURE — 99213 OFFICE O/P EST LOW 20 MIN: CPT | Performed by: NURSE PRACTITIONER

## 2024-10-22 PROCEDURE — 99203 OFFICE O/P NEW LOW 30 MIN: CPT | Performed by: NURSE PRACTITIONER

## 2024-10-22 RX ORDER — ERGOCALCIFEROL 1.25 MG/1
50000 CAPSULE, LIQUID FILLED ORAL 2 TIMES WEEKLY
Qty: 24 CAPSULE | Refills: 0 | Status: SHIPPED | OUTPATIENT
Start: 2024-10-22

## 2024-10-22 RX ORDER — ERGOCALCIFEROL 1.25 MG/1
50000 CAPSULE, LIQUID FILLED ORAL 2 TIMES WEEKLY
Qty: 24 CAPSULE | Refills: 0 | Status: SHIPPED | OUTPATIENT
Start: 2024-10-24 | End: 2024-10-22

## 2024-10-22 NOTE — PROGRESS NOTES
Date of surgery: 2016  Procedure: Sleeve   Performing surgeon: MARKIE Martinez     Initial Weight - 260.0 lb   Current Weight - 218.0 lb   Jac Weight - 140.0 lb   Total Body Weight Loss (EWL)- N/A  EWL% - N/A  TWB % - N/A

## 2024-10-22 NOTE — PATIENT INSTRUCTIONS
- start on bariatric vitamin patches  - start on vitamin D2 50,000 IU twice a week. Obtain labs in 2-3 months.     - obtain UGI (xray) and EGD and follow up with our surgeon discuss surgical options  - start off with proteins first, then vegetables. Track caloric intake of 2765-0153 calories per day.

## 2024-10-22 NOTE — PROGRESS NOTES
Assessment/Plan:     Patient ID: Angelia Anderson is a 34 y.o. female.     Bariatric Surgery Status/BMI 36    -s/p Vertical Sleeve Gastrectomy with Dr. Ferguson at [facility] now [facility] in 2016. Presents to the office today to establish care with concerns of weight regain. Continues to eat very small portions, does not currently track her caloric intake. Doesn't always make the healthiest choices. Has a 1 year old and a 4 month old - unable to have routine exercise but is active overall with her children. Interested in surgical options to help with weight loss. She feels she is not able to do AOMs because she would not be compliant and is fearful of injections.     Tolerates a regular diet. Denies abdominal pain, heartburn issues, N/V/D/C, regurgitation, reflux or dysphagia.     Inadequate vitamin intake - not currently on any medications/supplements. She reports having trouble with vitamin intake and other medications as she tends to forget. Review labs - has a hx of iron deficiency, vitamin D deficiency, and vitamin B12 deficiency    HARRISON - currently does not use a CPAP machine. Unsure if her sleep apnea has resolved. Recommend to f/u to monitor for resolution or not.     PLAN:     - UGI and EGD to be done; follow up with surgeon to discuss surgical options.  - encouraged healthy habits. Should track caloric intake of 4587-3170 calories per day.   - recommended to start on bariatric multivitamin patches - which includes all vitamins needed. Encouraged to use an alarm to place a new patch. Continue with method for 2-3 months and obtain labs to evaluate for vitamin deficiencies.   - start on vitamin D2 50,000 IU twice a week for 12 weeks - chronically low. Will treat.   - Continue with healthy lifestyle, adequate protein intake of 60 gm, fluid intake of at least 64 oz.   - Continue with MVI daily.   - Activity as tolerated.   - Labs ordered and will adjust accordingly if any deficiency.   - Follow up with RD and  SW as needed.       Continued/Maintain healthy weight loss with good nutrition intakes.  Adequate hydration with at least 64oz. fluid intake.  Follow diet as discussed.  Follow vitamin and mineral recommendations as reviewed with you.  Exercise as tolerated.    Colonoscopy referral made: n/a  EGD screening - due - will schedule.     Follow-up after EGD to review surgical options. We kindly ask that your arrive 15 minutes before your scheduled appointment time with your provider to allow our staff to room you, get your vital signs and update your chart.    Get lab work done prior to visit. Please call the office if you need a script.  It is recommended to check with your insurance BEFORE getting labs done to make sure they are covered by your policy.      Call our office if you have any problems with abdominal pain especially associated with fever, chills, nausea, vomiting or any other concerns.    All  Post-bariatric surgery patients should be aware that very small quantities of any alcohol can cause impairment and it is very possible not to feel the effect. The effect can be in the system for several hours.  It is also a stomach irritant.     It is advised to AVOID alcohol, Nonsteroidal antiinflammatory drugs (NSAIDS) and nicotine of all forms . Any of these can cause stomach irritation/pain.    Discussed the effects of alcohol on a bariatric patient and the increased impairment risk.     Keep up the good work!     Postsurgical Malabsorption   -At risk for malabsorption of vitamins/minerals secondary to malabsorption and restriction of intake from bariatric surgery  -NOT Currently taking adequate postop bariatric surgery vitamin supplementation  -Next set of bariatric labs ordered for approximately 3 months  -Patient received education about the importance of adhering to a lifelong supplementation regimen to avoid vitamin/mineral deficiencies      Diagnoses and all orders for this visit:    Encounter for surgical  aftercare following surgery of digestive system  -     FL UPPER GI UGI; Future  -     CBC; Future  -     Comprehensive metabolic panel; Future  -     Folate; Future  -     Iron Panel (Includes Ferritin, Iron Sat%, Iron, and TIBC); Future  -     PTH, intact; Future  -     Vitamin A; Future  -     Vitamin B1, whole blood; Future  -     Vitamin B12; Future  -     Vitamin D 25 hydroxy; Future  -     Zinc; Future  -     Discontinue: ergocalciferol (VITAMIN D2) 50,000 units; Take 1 capsule (50,000 Units total) by mouth 2 (two) times a week  -     Methylmalonic acid, serum; Future  -     ergocalciferol (VITAMIN D2) 50,000 units; Take 1 capsule (50,000 Units total) by mouth 2 (two) times a week    Bariatric surgery status  -     FL UPPER GI UGI; Future  -     CBC; Future  -     Comprehensive metabolic panel; Future  -     Folate; Future  -     Iron Panel (Includes Ferritin, Iron Sat%, Iron, and TIBC); Future  -     PTH, intact; Future  -     Vitamin A; Future  -     Vitamin B1, whole blood; Future  -     Vitamin B12; Future  -     Vitamin D 25 hydroxy; Future  -     Zinc; Future  -     Discontinue: ergocalciferol (VITAMIN D2) 50,000 units; Take 1 capsule (50,000 Units total) by mouth 2 (two) times a week  -     Methylmalonic acid, serum; Future  -     ergocalciferol (VITAMIN D2) 50,000 units; Take 1 capsule (50,000 Units total) by mouth 2 (two) times a week    Postsurgical malabsorption  -     FL UPPER GI UGI; Future  -     CBC; Future  -     Comprehensive metabolic panel; Future  -     Folate; Future  -     Iron Panel (Includes Ferritin, Iron Sat%, Iron, and TIBC); Future  -     PTH, intact; Future  -     Vitamin A; Future  -     Vitamin B1, whole blood; Future  -     Vitamin B12; Future  -     Vitamin D 25 hydroxy; Future  -     Zinc; Future  -     Discontinue: ergocalciferol (VITAMIN D2) 50,000 units; Take 1 capsule (50,000 Units total) by mouth 2 (two) times a week  -     Methylmalonic acid, serum; Future  -      ergocalciferol (VITAMIN D2) 50,000 units; Take 1 capsule (50,000 Units total) by mouth 2 (two) times a week    Obesity, Class II, BMI 35-39.9  -     FL UPPER GI UGI; Future  -     CBC; Future  -     Comprehensive metabolic panel; Future  -     Folate; Future  -     Iron Panel (Includes Ferritin, Iron Sat%, Iron, and TIBC); Future  -     PTH, intact; Future  -     Vitamin A; Future  -     Vitamin B1, whole blood; Future  -     Vitamin B12; Future  -     Vitamin D 25 hydroxy; Future  -     Zinc; Future  -     Discontinue: ergocalciferol (VITAMIN D2) 50,000 units; Take 1 capsule (50,000 Units total) by mouth 2 (two) times a week  -     Methylmalonic acid, serum; Future  -     ergocalciferol (VITAMIN D2) 50,000 units; Take 1 capsule (50,000 Units total) by mouth 2 (two) times a week    BMI 36.0-36.9,adult  -     FL UPPER GI UGI; Future  -     CBC; Future  -     Comprehensive metabolic panel; Future  -     Folate; Future  -     Iron Panel (Includes Ferritin, Iron Sat%, Iron, and TIBC); Future  -     PTH, intact; Future  -     Vitamin A; Future  -     Vitamin B1, whole blood; Future  -     Vitamin B12; Future  -     Vitamin D 25 hydroxy; Future  -     Zinc; Future  -     Discontinue: ergocalciferol (VITAMIN D2) 50,000 units; Take 1 capsule (50,000 Units total) by mouth 2 (two) times a week  -     Methylmalonic acid, serum; Future  -     ergocalciferol (VITAMIN D2) 50,000 units; Take 1 capsule (50,000 Units total) by mouth 2 (two) times a week    Inadequate vitamin intake  -     FL UPPER GI UGI; Future  -     CBC; Future  -     Comprehensive metabolic panel; Future  -     Folate; Future  -     Iron Panel (Includes Ferritin, Iron Sat%, Iron, and TIBC); Future  -     PTH, intact; Future  -     Vitamin A; Future  -     Vitamin B1, whole blood; Future  -     Vitamin B12; Future  -     Vitamin D 25 hydroxy; Future  -     Zinc; Future  -     Discontinue: ergocalciferol (VITAMIN D2) 50,000 units; Take 1 capsule (50,000 Units  total) by mouth 2 (two) times a week  -     Methylmalonic acid, serum; Future  -     ergocalciferol (VITAMIN D2) 50,000 units; Take 1 capsule (50,000 Units total) by mouth 2 (two) times a week    HARRISON (obstructive sleep apnea)         Subjective:      Patient ID: Angelia Anderson is a 34 y.o. female.    -s/p Vertical Sleeve Gastrectomy with Dr. Ferguson at [facility] in 2016. Presents to the office today to establish care with concerns of weight regain. Continues to eat very small portions, does not currently track her caloric intake. Doesn't always make the healthiest choices. Has a 1 year old and a 4 month old - unable to have routine exercise but is active overall with her children. Interested in surgical options to help with weight loss. She feels she is not able to do AOMs because she would not be compliant and is fearful of injections.     Tolerates a regular diet. Denies abdominal pain, heartburn issues, N/V/D/C, regurgitation, reflux or dysphagia.     Inadequate vitamin intake - not currently on any medications/supplements. She reports having trouble with vitamin intake and other medications as she tends to forget. Review labs - has a hx of iron deficiency, vitamin D deficiency, and vitamin B12 deficiency    Initial: 260 lbs  Current: 218 lbs  EWL: (Weight loss is ahead of schedule at this post surgical period.)  Jac: 140 lbs  Current BMI is Body mass index is 36.84 kg/m².    Tolerating a regular diet-yes  Eating at least 60 grams of protein per day-no - may not be getting this enough.   Following 30/60 minute rule with liquids-yes  Drinking at least 64 ounces of fluid per day- yes   Drinking carbonated beverages-no  Sufficient exercise-no - active with children but no formal exercise.   Using NSAIDs regularly-no  Using nicotine-no  Using alcohol-rarely   Supplements:  NONE - recommended to try patches.      The following portions of the patient's history were reviewed and updated as  "appropriate: allergies, current medications, past family history, past medical history, past social history, past surgical history and problem list.    Review of Systems   Constitutional:  Positive for activity change, appetite change, fatigue and unexpected weight change.   Respiratory: Negative.     Cardiovascular: Negative.    Gastrointestinal: Negative.    Musculoskeletal: Negative.    Neurological: Negative.    Psychiatric/Behavioral: Negative.           Objective:    /82 (BP Location: Left arm, Patient Position: Sitting, Cuff Size: Adult)   Pulse 79   Temp 98.6 °F (37 °C) (Tympanic)   Resp 16   Ht 5' 4.5\" (1.638 m)   Wt 98.9 kg (218 lb)   SpO2 99%   BMI 36.84 kg/m²      Physical Exam  Vitals and nursing note reviewed.   Constitutional:       Appearance: Normal appearance. She is obese.   Cardiovascular:      Rate and Rhythm: Normal rate and regular rhythm.      Pulses: Normal pulses.      Heart sounds: Normal heart sounds.   Pulmonary:      Effort: Pulmonary effort is normal.      Breath sounds: Normal breath sounds.   Abdominal:      General: Bowel sounds are normal.      Palpations: Abdomen is soft.      Tenderness: There is no abdominal tenderness.   Musculoskeletal:         General: Normal range of motion.   Skin:     General: Skin is warm and dry.   Neurological:      General: No focal deficit present.      Mental Status: She is alert and oriented to person, place, and time.   Psychiatric:         Mood and Affect: Mood normal.         Behavior: Behavior normal.         Thought Content: Thought content normal.         Judgment: Judgment normal.             "

## 2024-10-22 NOTE — H&P (VIEW-ONLY)
Assessment/Plan:     Patient ID: Angelia Andesron is a 34 y.o. female.     Bariatric Surgery Status/BMI 36    -s/p Vertical Sleeve Gastrectomy with Dr. Ferguson at [facility] now [facility] in 2016. Presents to the office today to establish care with concerns of weight regain. Continues to eat very small portions, does not currently track her caloric intake. Doesn't always make the healthiest choices. Has a 1 year old and a 4 month old - unable to have routine exercise but is active overall with her children. Interested in surgical options to help with weight loss. She feels she is not able to do AOMs because she would not be compliant and is fearful of injections.     Tolerates a regular diet. Denies abdominal pain, heartburn issues, N/V/D/C, regurgitation, reflux or dysphagia.     Inadequate vitamin intake - not currently on any medications/supplements. She reports having trouble with vitamin intake and other medications as she tends to forget. Review labs - has a hx of iron deficiency, vitamin D deficiency, and vitamin B12 deficiency    HARRISON - currently does not use a CPAP machine. Unsure if her sleep apnea has resolved. Recommend to f/u to monitor for resolution or not.     PLAN:     - UGI and EGD to be done; follow up with surgeon to discuss surgical options.  - encouraged healthy habits. Should track caloric intake of 6579-8622 calories per day.   - recommended to start on bariatric multivitamin patches - which includes all vitamins needed. Encouraged to use an alarm to place a new patch. Continue with method for 2-3 months and obtain labs to evaluate for vitamin deficiencies.   - start on vitamin D2 50,000 IU twice a week for 12 weeks - chronically low. Will treat.   - Continue with healthy lifestyle, adequate protein intake of 60 gm, fluid intake of at least 64 oz.   - Continue with MVI daily.   - Activity as tolerated.   - Labs ordered and will adjust accordingly if any deficiency.   - Follow up with RD and  SW as needed.       Continued/Maintain healthy weight loss with good nutrition intakes.  Adequate hydration with at least 64oz. fluid intake.  Follow diet as discussed.  Follow vitamin and mineral recommendations as reviewed with you.  Exercise as tolerated.    Colonoscopy referral made: n/a  EGD screening - due - will schedule.     Follow-up after EGD to review surgical options. We kindly ask that your arrive 15 minutes before your scheduled appointment time with your provider to allow our staff to room you, get your vital signs and update your chart.    Get lab work done prior to visit. Please call the office if you need a script.  It is recommended to check with your insurance BEFORE getting labs done to make sure they are covered by your policy.      Call our office if you have any problems with abdominal pain especially associated with fever, chills, nausea, vomiting or any other concerns.    All  Post-bariatric surgery patients should be aware that very small quantities of any alcohol can cause impairment and it is very possible not to feel the effect. The effect can be in the system for several hours.  It is also a stomach irritant.     It is advised to AVOID alcohol, Nonsteroidal antiinflammatory drugs (NSAIDS) and nicotine of all forms . Any of these can cause stomach irritation/pain.    Discussed the effects of alcohol on a bariatric patient and the increased impairment risk.     Keep up the good work!     Postsurgical Malabsorption   -At risk for malabsorption of vitamins/minerals secondary to malabsorption and restriction of intake from bariatric surgery  -NOT Currently taking adequate postop bariatric surgery vitamin supplementation  -Next set of bariatric labs ordered for approximately 3 months  -Patient received education about the importance of adhering to a lifelong supplementation regimen to avoid vitamin/mineral deficiencies      Diagnoses and all orders for this visit:    Encounter for surgical  aftercare following surgery of digestive system  -     FL UPPER GI UGI; Future  -     CBC; Future  -     Comprehensive metabolic panel; Future  -     Folate; Future  -     Iron Panel (Includes Ferritin, Iron Sat%, Iron, and TIBC); Future  -     PTH, intact; Future  -     Vitamin A; Future  -     Vitamin B1, whole blood; Future  -     Vitamin B12; Future  -     Vitamin D 25 hydroxy; Future  -     Zinc; Future  -     Discontinue: ergocalciferol (VITAMIN D2) 50,000 units; Take 1 capsule (50,000 Units total) by mouth 2 (two) times a week  -     Methylmalonic acid, serum; Future  -     ergocalciferol (VITAMIN D2) 50,000 units; Take 1 capsule (50,000 Units total) by mouth 2 (two) times a week    Bariatric surgery status  -     FL UPPER GI UGI; Future  -     CBC; Future  -     Comprehensive metabolic panel; Future  -     Folate; Future  -     Iron Panel (Includes Ferritin, Iron Sat%, Iron, and TIBC); Future  -     PTH, intact; Future  -     Vitamin A; Future  -     Vitamin B1, whole blood; Future  -     Vitamin B12; Future  -     Vitamin D 25 hydroxy; Future  -     Zinc; Future  -     Discontinue: ergocalciferol (VITAMIN D2) 50,000 units; Take 1 capsule (50,000 Units total) by mouth 2 (two) times a week  -     Methylmalonic acid, serum; Future  -     ergocalciferol (VITAMIN D2) 50,000 units; Take 1 capsule (50,000 Units total) by mouth 2 (two) times a week    Postsurgical malabsorption  -     FL UPPER GI UGI; Future  -     CBC; Future  -     Comprehensive metabolic panel; Future  -     Folate; Future  -     Iron Panel (Includes Ferritin, Iron Sat%, Iron, and TIBC); Future  -     PTH, intact; Future  -     Vitamin A; Future  -     Vitamin B1, whole blood; Future  -     Vitamin B12; Future  -     Vitamin D 25 hydroxy; Future  -     Zinc; Future  -     Discontinue: ergocalciferol (VITAMIN D2) 50,000 units; Take 1 capsule (50,000 Units total) by mouth 2 (two) times a week  -     Methylmalonic acid, serum; Future  -      ergocalciferol (VITAMIN D2) 50,000 units; Take 1 capsule (50,000 Units total) by mouth 2 (two) times a week    Obesity, Class II, BMI 35-39.9  -     FL UPPER GI UGI; Future  -     CBC; Future  -     Comprehensive metabolic panel; Future  -     Folate; Future  -     Iron Panel (Includes Ferritin, Iron Sat%, Iron, and TIBC); Future  -     PTH, intact; Future  -     Vitamin A; Future  -     Vitamin B1, whole blood; Future  -     Vitamin B12; Future  -     Vitamin D 25 hydroxy; Future  -     Zinc; Future  -     Discontinue: ergocalciferol (VITAMIN D2) 50,000 units; Take 1 capsule (50,000 Units total) by mouth 2 (two) times a week  -     Methylmalonic acid, serum; Future  -     ergocalciferol (VITAMIN D2) 50,000 units; Take 1 capsule (50,000 Units total) by mouth 2 (two) times a week    BMI 36.0-36.9,adult  -     FL UPPER GI UGI; Future  -     CBC; Future  -     Comprehensive metabolic panel; Future  -     Folate; Future  -     Iron Panel (Includes Ferritin, Iron Sat%, Iron, and TIBC); Future  -     PTH, intact; Future  -     Vitamin A; Future  -     Vitamin B1, whole blood; Future  -     Vitamin B12; Future  -     Vitamin D 25 hydroxy; Future  -     Zinc; Future  -     Discontinue: ergocalciferol (VITAMIN D2) 50,000 units; Take 1 capsule (50,000 Units total) by mouth 2 (two) times a week  -     Methylmalonic acid, serum; Future  -     ergocalciferol (VITAMIN D2) 50,000 units; Take 1 capsule (50,000 Units total) by mouth 2 (two) times a week    Inadequate vitamin intake  -     FL UPPER GI UGI; Future  -     CBC; Future  -     Comprehensive metabolic panel; Future  -     Folate; Future  -     Iron Panel (Includes Ferritin, Iron Sat%, Iron, and TIBC); Future  -     PTH, intact; Future  -     Vitamin A; Future  -     Vitamin B1, whole blood; Future  -     Vitamin B12; Future  -     Vitamin D 25 hydroxy; Future  -     Zinc; Future  -     Discontinue: ergocalciferol (VITAMIN D2) 50,000 units; Take 1 capsule (50,000 Units  total) by mouth 2 (two) times a week  -     Methylmalonic acid, serum; Future  -     ergocalciferol (VITAMIN D2) 50,000 units; Take 1 capsule (50,000 Units total) by mouth 2 (two) times a week    HARRISON (obstructive sleep apnea)         Subjective:      Patient ID: Angelia Anderson is a 34 y.o. female.    -s/p Vertical Sleeve Gastrectomy with Dr. Ferguson at [facility] in 2016. Presents to the office today to establish care with concerns of weight regain. Continues to eat very small portions, does not currently track her caloric intake. Doesn't always make the healthiest choices. Has a 1 year old and a 4 month old - unable to have routine exercise but is active overall with her children. Interested in surgical options to help with weight loss. She feels she is not able to do AOMs because she would not be compliant and is fearful of injections.     Tolerates a regular diet. Denies abdominal pain, heartburn issues, N/V/D/C, regurgitation, reflux or dysphagia.     Inadequate vitamin intake - not currently on any medications/supplements. She reports having trouble with vitamin intake and other medications as she tends to forget. Review labs - has a hx of iron deficiency, vitamin D deficiency, and vitamin B12 deficiency    Initial: 260 lbs  Current: 218 lbs  EWL: (Weight loss is ahead of schedule at this post surgical period.)  Jac: 140 lbs  Current BMI is Body mass index is 36.84 kg/m².    Tolerating a regular diet-yes  Eating at least 60 grams of protein per day-no - may not be getting this enough.   Following 30/60 minute rule with liquids-yes  Drinking at least 64 ounces of fluid per day- yes   Drinking carbonated beverages-no  Sufficient exercise-no - active with children but no formal exercise.   Using NSAIDs regularly-no  Using nicotine-no  Using alcohol-rarely   Supplements:  NONE - recommended to try patches.      The following portions of the patient's history were reviewed and updated as  "appropriate: allergies, current medications, past family history, past medical history, past social history, past surgical history and problem list.    Review of Systems   Constitutional:  Positive for activity change, appetite change, fatigue and unexpected weight change.   Respiratory: Negative.     Cardiovascular: Negative.    Gastrointestinal: Negative.    Musculoskeletal: Negative.    Neurological: Negative.    Psychiatric/Behavioral: Negative.           Objective:    /82 (BP Location: Left arm, Patient Position: Sitting, Cuff Size: Adult)   Pulse 79   Temp 98.6 °F (37 °C) (Tympanic)   Resp 16   Ht 5' 4.5\" (1.638 m)   Wt 98.9 kg (218 lb)   SpO2 99%   BMI 36.84 kg/m²      Physical Exam  Vitals and nursing note reviewed.   Constitutional:       Appearance: Normal appearance. She is obese.   Cardiovascular:      Rate and Rhythm: Normal rate and regular rhythm.      Pulses: Normal pulses.      Heart sounds: Normal heart sounds.   Pulmonary:      Effort: Pulmonary effort is normal.      Breath sounds: Normal breath sounds.   Abdominal:      General: Bowel sounds are normal.      Palpations: Abdomen is soft.      Tenderness: There is no abdominal tenderness.   Musculoskeletal:         General: Normal range of motion.   Skin:     General: Skin is warm and dry.   Neurological:      General: No focal deficit present.      Mental Status: She is alert and oriented to person, place, and time.   Psychiatric:         Mood and Affect: Mood normal.         Behavior: Behavior normal.         Thought Content: Thought content normal.         Judgment: Judgment normal.             "

## 2024-10-29 RX ORDER — SODIUM CHLORIDE 9 MG/ML
125 INJECTION, SOLUTION INTRAVENOUS CONTINUOUS
Status: CANCELLED | OUTPATIENT
Start: 2024-10-29

## 2024-10-30 ENCOUNTER — ANESTHESIA EVENT (OUTPATIENT)
Dept: GASTROENTEROLOGY | Facility: HOSPITAL | Age: 34
End: 2024-10-30
Payer: COMMERCIAL

## 2024-10-30 ENCOUNTER — HOSPITAL ENCOUNTER (OUTPATIENT)
Dept: GASTROENTEROLOGY | Facility: HOSPITAL | Age: 34
Setting detail: OUTPATIENT SURGERY
Discharge: HOME | End: 2024-10-30
Attending: SURGERY
Payer: COMMERCIAL

## 2024-10-30 ENCOUNTER — ANESTHESIA (OUTPATIENT)
Dept: GASTROENTEROLOGY | Facility: HOSPITAL | Age: 34
End: 2024-10-30
Payer: COMMERCIAL

## 2024-10-30 VITALS
BODY MASS INDEX: 36.32 KG/M2 | OXYGEN SATURATION: 100 % | RESPIRATION RATE: 16 BRPM | HEART RATE: 77 BPM | WEIGHT: 218 LBS | HEIGHT: 65 IN | TEMPERATURE: 97.9 F | DIASTOLIC BLOOD PRESSURE: 66 MMHG | SYSTOLIC BLOOD PRESSURE: 132 MMHG

## 2024-10-30 DIAGNOSIS — Z98.84 BARIATRIC SURGERY STATUS: ICD-10-CM

## 2024-10-30 PROBLEM — Z87.891 FORMER SMOKER: Status: ACTIVE | Noted: 2024-10-30

## 2024-10-30 PROBLEM — E66.9 OBESITY (BMI 30-39.9): Status: ACTIVE | Noted: 2024-10-30

## 2024-10-30 LAB
EXT PREGNANCY TEST URINE: NEGATIVE
EXT. CONTROL: NORMAL

## 2024-10-30 PROCEDURE — 43239 EGD BIOPSY SINGLE/MULTIPLE: CPT | Performed by: SURGERY

## 2024-10-30 PROCEDURE — 88305 TISSUE EXAM BY PATHOLOGIST: CPT | Performed by: PATHOLOGY

## 2024-10-30 PROCEDURE — 81025 URINE PREGNANCY TEST: CPT | Performed by: ANESTHESIOLOGY

## 2024-10-30 RX ORDER — PROPOFOL 10 MG/ML
INJECTION, EMULSION INTRAVENOUS AS NEEDED
Status: DISCONTINUED | OUTPATIENT
Start: 2024-10-30 | End: 2024-10-30

## 2024-10-30 RX ORDER — LIDOCAINE HYDROCHLORIDE 20 MG/ML
INJECTION, SOLUTION EPIDURAL; INFILTRATION; INTRACAUDAL; PERINEURAL AS NEEDED
Status: DISCONTINUED | OUTPATIENT
Start: 2024-10-30 | End: 2024-10-30

## 2024-10-30 RX ORDER — SODIUM CHLORIDE 9 MG/ML
125 INJECTION, SOLUTION INTRAVENOUS CONTINUOUS
Status: DISCONTINUED | OUTPATIENT
Start: 2024-10-30 | End: 2024-11-03 | Stop reason: HOSPADM

## 2024-10-30 RX ADMIN — LIDOCAINE HYDROCHLORIDE 100 MG: 20 INJECTION, SOLUTION EPIDURAL; INFILTRATION; INTRACAUDAL at 08:39

## 2024-10-30 RX ADMIN — SODIUM CHLORIDE 125 ML/HR: 0.9 INJECTION, SOLUTION INTRAVENOUS at 08:04

## 2024-10-30 RX ADMIN — PROPOFOL 200 MG: 10 INJECTION, EMULSION INTRAVENOUS at 08:39

## 2024-10-30 RX ADMIN — PROPOFOL 100 MG: 10 INJECTION, EMULSION INTRAVENOUS at 08:40

## 2024-10-30 NOTE — INTERVAL H&P NOTE
H&P reviewed. After examining the patient I find no changes in the patients condition since the H&P had been written.    Vitals:    10/30/24 0801   BP: 125/77   Pulse: 66   Resp: 16   Temp: 97.9 °F (36.6 °C)   SpO2: 98%

## 2024-10-30 NOTE — ANESTHESIA PREPROCEDURE EVALUATION
Procedure:  EGD    Relevant Problems   HEMATOLOGY   (+) Anemia affecting pregnancy in third trimester   (+) Iron deficiency anemia secondary to inadequate dietary iron intake      PULMONARY   (+) HARRISON (obstructive sleep apnea)      Behavioral Health   (+) Former smoker   (+) History of postpartum depression      Obstetrics/Gynecology   (+) Bariatric surgery status complicating pregnancy, third trimester   (+) History of gestational diabetes      Other   (+) History of pulmonary embolism (2022)   (+) Obesity (BMI 30-39.9)        Physical Exam    Airway    Mallampati score: II  TM Distance: >3 FB       Dental   No notable dental hx     Cardiovascular  Rhythm: regular, Rate: normal    Pulmonary   Breath sounds clear to auscultation    Other Findings  post-pubertal.      Anesthesia Plan  ASA Score- 2     Anesthesia Type- IV sedation with anesthesia with ASA Monitors.         Additional Monitors:     Airway Plan:     Comment: Ga PRN.       Plan Factors-Exercise tolerance (METS): >4 METS.    Chart reviewed.   Existing labs reviewed.     Patient is not a current smoker.              Induction- intravenous.    Postoperative Plan-     Perioperative Resuscitation Plan - Level 1 - Full Code.       Informed Consent- Anesthetic plan and risks discussed with patient.

## 2024-10-30 NOTE — ANESTHESIA POSTPROCEDURE EVALUATION
Post-Op Assessment Note    CV Status:  Stable    Pain management: adequate       Mental Status:  Awake   Hydration Status:  Stable   PONV Controlled:  Controlled   Airway Patency:  Patent     Post Op Vitals Reviewed: Yes    No anethesia notable event occurred.    Staff: Anesthesiologist           Last Filed PACU Vitals:  Vitals Value Taken Time   Temp     Pulse 77 10/30/24 0905   /66 10/30/24 0905   Resp 16 10/30/24 0905   SpO2 100 % 10/30/24 0905       Modified Bambi:  Activity: 2 (10/30/2024  9:05 AM)  Respiration: 2 (10/30/2024  9:05 AM)  Circulation: 2 (10/30/2024  9:05 AM)  Consciousness: 1 (10/30/2024  9:05 AM)  Oxygen Saturation: 2 (10/30/2024  9:05 AM)  Modified Bambi Score: 9 (10/30/2024  9:05 AM)

## 2024-10-31 ENCOUNTER — HOSPITAL ENCOUNTER (OUTPATIENT)
Dept: RADIOLOGY | Facility: HOSPITAL | Age: 34
Discharge: HOME | End: 2024-10-31
Payer: COMMERCIAL

## 2024-10-31 DIAGNOSIS — E66.812 OBESITY, CLASS II, BMI 35-39.9: ICD-10-CM

## 2024-10-31 DIAGNOSIS — Z48.815 ENCOUNTER FOR SURGICAL AFTERCARE FOLLOWING SURGERY OF DIGESTIVE SYSTEM: ICD-10-CM

## 2024-10-31 DIAGNOSIS — K91.2 POSTSURGICAL MALABSORPTION: ICD-10-CM

## 2024-10-31 DIAGNOSIS — E56.9 INADEQUATE VITAMIN INTAKE: ICD-10-CM

## 2024-10-31 DIAGNOSIS — Z98.84 BARIATRIC SURGERY STATUS: ICD-10-CM

## 2024-10-31 PROCEDURE — 74240 X-RAY XM UPR GI TRC 1CNTRST: CPT

## 2024-11-04 PROCEDURE — 88305 TISSUE EXAM BY PATHOLOGIST: CPT | Performed by: PATHOLOGY

## 2024-11-14 ENCOUNTER — OFFICE VISIT (OUTPATIENT)
Dept: BARIATRICS | Facility: CLINIC | Age: 34
End: 2024-11-14
Payer: COMMERCIAL

## 2024-11-14 VITALS
HEIGHT: 65 IN | SYSTOLIC BLOOD PRESSURE: 120 MMHG | HEART RATE: 74 BPM | TEMPERATURE: 98 F | BODY MASS INDEX: 37.07 KG/M2 | DIASTOLIC BLOOD PRESSURE: 78 MMHG | WEIGHT: 222.5 LBS | OXYGEN SATURATION: 99 %

## 2024-11-14 DIAGNOSIS — E66.812 OBESITY, CLASS II, BMI 35-39.9: Primary | ICD-10-CM

## 2024-11-14 PROCEDURE — 99213 OFFICE O/P EST LOW 20 MIN: CPT | Performed by: SURGERY

## 2024-11-14 NOTE — PROGRESS NOTES
Date of surgery: 2015  Procedure: Sleeve  Per[facility]    Initial Weight - 218.0 lbs.  Current Weight - 222.5 lbs.  Jac Weight - 218.0 lbs.  Total Body Weight Loss (EWL) - (-4.6) lbs.  EWL% - (-6%)  TWB % - (-2%)

## 2024-11-14 NOTE — PROGRESS NOTES
OFFICE VISIT - BARIATRIC SURGERY  Angelia Anderson 34 y.o. female MRN: [id number]  Unit/Bed#:  Encounter: [id number]      HPI:  Angelia Anderson is a 34 y.o. female status post lap sleeve by Dr. Ferguson on 2016. Comes to the office today with complaints of weight regain.    Subjective     She doesn't report any issues with upper GI sx. She does report fluctuations in stool consistency (diarrhea vs constipation) with Dairy.    Reason for weight regain: Recent pregnancy and taking care of 5 month child. Patient barely eats large meals, however will snack all day.    Reported Jac 140 lbs. Currently 222 lbs. Goal 150 lbs. Worsening back pain due to weight regain.    No additional abdominal surgery.      Tolerating diet: Yes  Main symptoms: No regurgitation  Worse with food: No  Nausea: Slight  Vomiting: No  Diarrhea: No  Smoking: No  Alcohol use: Social  NSAID use: Hx of headaches, not often  Caffeine use: Not heavy coffee drinker  Previous cholecystectomy or pertinent abdominal surgeries:   Previous EGD: Done  Previous Upper GI: Done   Ambulation is not impaired.     Review of Systems   Constitutional:  Negative for chills and fever.   HENT:  Negative for sore throat.    Eyes:  Negative for visual disturbance.   Respiratory:  Negative for cough and shortness of breath.    Gastrointestinal:  Negative for abdominal pain and vomiting.   Musculoskeletal:  Negative for arthralgias.   Skin:  Negative for color change and rash.   Neurological:  Negative for seizures and syncope.   All other systems reviewed and are negative.      Historical Information   Past Medical History:   Diagnosis Date    Anemia     H/O gastric sleeve 2016    History of transfusion     states no transfusion rxn    Miscarriage     Personal history of COVID-19 2021    recovered at home    Post partum depression 2014    Pulmonary embolus (HCC) 11/2022    approx, during pregnancy    Sleep apnea, obstructive     prior to weight loss surg     Past Surgical  "History:   Procedure Laterality Date    BARIATRIC SURGERY      DILATION AND EVACUATION  2019        GASTRIC RESTRICTION SURGERY  2015    gastric sleeve    INCISION AND DRAINAGE OF WOUND Right 2022    Procedure: INCISION AND DRAINAGE (I&D) HEAD/FACE;  Surgeon: Sumit Matos DMD;  Location: [facility];  Service: Maxillofacial    MULTIPLE TOOTH EXTRACTIONS Right 2022    Procedure: EXTRACTION TEETH MULTIPLE;  Surgeon: Sumit Matos DMD;  Location: [facility];  Service: Maxillofacial    OPEN ANTERIOR SHOULDER RECONSTRUCTION Left 2018    OTHER SURGICAL HISTORY      sweat gland removal    IA TX MISSED  FIRST TRIMESTER SURGICAL N/A 2023    Procedure: DILATATION AND EVACUATION (D&E);  Surgeon: Radha Knowles MD;  Location: [facility];  Service: Gynecology    RETAINED PLACENTA REMOVAL N/A 2020    Procedure: EXTRACTION OF PLACENTA,MANUAL;  Surgeon: Gray Campa MD;  Location: [facility];  Service: Obstetrics     Social History   Social History     Substance and Sexual Activity   Alcohol Use Yes    Comment: occasional     Social History     Substance and Sexual Activity   Drug Use Never     Social History     Tobacco Use   Smoking Status Former    Current packs/day: 0.00    Average packs/day: 0.3 packs/day for 10.0 years (2.5 ttl pk-yrs)    Types: Cigarettes    Start date: 2010    Quit date: 2020    Years since quittin.6    Passive exposure: Past   Smokeless Tobacco Former       Objective       Current Vitals:   Blood Pressure: 120/78 (24)  Pulse: 74 (24)  Temperature: 98 °F (36.7 °C) (24)  Temp Source: Tympanic (24)  Height: 5' 4.5\" (163.8 cm) (24)  Weight - Scale: 101 kg (222 lb 8 oz) (24)  SpO2: 99 % (24)    Invasive Devices       None                   Physical Exam  Vitals reviewed.   Constitutional:       Appearance: Normal appearance.   HENT:      Head: Atraumatic.      Nose: Nose " normal.   Cardiovascular:      Pulses: Normal pulses.   Pulmonary:      Effort: Pulmonary effort is normal.   Musculoskeletal:         General: Normal range of motion.   Neurological:      General: No focal deficit present.   Psychiatric:         Behavior: Behavior normal.           Pathology, and Other Studies: Results Review Statement: I personally reviewed the following image studies/reports in PACS and discussed pertinent findings with Radiology:  . My interpretation of the radiology images/reports is:  .    Workup includes:     UPPER GI SERIES -SINGLE CONTRAST     INDICATION: Z48.815: Encounter for surgical aftercare following surgery on the digestive system  Z98.84: Bariatric surgery status  K91.2: Postsurgical malabsorption, not elsewhere classified  E66.812: Obesity, class 2  Z68.36: Body mass index (BMI) 36.0-36.9, adult  E56.9: Vitamin deficiency, unspecified. Status post sleeve gastrectomy. Weight gain.     COMPARISON: 4/7/2024     TECHNIQUE: The patient was given effervescent granules and barium by mouth and images of the esophagus, stomach, and small bowel were obtained.     IMAGES: 28     FLUOROSCOPY TIME: 1.8 minutes     FINDINGS:     The esophagus is normal in caliber. Esophageal motility is normal and emptying of contrast from the esophagus is prompt. There is no mucosal mass, ulceration or fold thickening identified.     Expected postoperative changes with relative under distention of the stomach status post sleeve gastrectomy. The stomach empties promptly into the duodenum. The gastric mucosa is normal. No penetrating ulcers or masses.     Contrast empties promptly into the duodenum. The duodenum is normal in caliber. The duodenojejunal junction is in its normal left upper quadrant location.     Gastroesophageal reflux was not observed.     There is no hiatal hernia.     IMPRESSION:     Expected postoperative changes status post sleeve gastrectomy.       EGD  DATE OF SERVICE:  10/30/24      PHYSICIAN(S):  Attending:   Gregg Montez MD      Fellow:   No Staff Documented         INDICATION:  Bariatric surgery status     POST-OP DIAGNOSIS:  See the impression below.     PREPROCEDURE:  Informed consent was obtained for the procedure, including sedation.  Risks of perforation, hemorrhage, adverse drug reaction and aspiration were discussed. The patient was placed in the left lateral decubitus position.     Patient was explained about the risks and benefits of the procedure. Risks including but not limited to bleeding, infection, and perforation were explained in detail. Also explained about less than 100% sensitivity with the exam and other alternatives.     PROCEDURE: EGD     DETAILS OF PROCEDURE:   Patient was taken to the procedure room where a time out was performed to confirm correct patient and correct procedure. The patient underwent monitored anesthesia care, which was administered by an anesthesia professional. The patient's blood pressure, heart rate, level of consciousness, respirations, oxygen, ECG and ETCO2 were monitored throughout the procedure. The scope was introduced through the mouth and advanced to the second part of the duodenum. Retroflexion was performed in the fundus. Prior to the procedure, the patient's H. Pylori status was unknown. The patient experienced no blood loss. The procedure was not difficult. The patient tolerated the procedure well. There were no apparent adverse events. -s/p Vertical Sleeve Gastrectomy with Dr. Ferguson at [facility] now [facility] in 2016. Presents to the office today to establish care with concerns of weight regain. Continues to eat very small portions, does not currently track her caloric intake. Doesn't always make the healthiest choices. Has a 1 year old and a 4 month old - unable to have routine exercise but is active overall with her children. Interested in surgical options to help with weight loss. She feels she is not able to do AOMs  because she would not be compliant and is fearful of injections.      Tolerates a regular diet. Denies abdominal pain, heartburn issues, N/V/D/C, regurgitation, reflux or dysphagia.      ANESTHESIA INFORMATION:  ASA: II  Anesthesia Type: IV Sedation with Anesthesia     MEDICATIONS:  sodium chloride 0.9 % infusion 500 mL*               *From user-documented volume   (Totals for administrations occurring from 0837 to 0845 on 10/30/24)         FINDINGS:  Mild, generalized erythematous mucosa in the stomach; performed 6 cold forceps biopsies to rule out H. 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  Slightly prominent proximal portion of the sleeve        RECOMMENDATION:  Continue with the bariatric program process and follow up in the office.  Biopsy results pending.      Pathology from EGD   A.  Stomach, biopsy:  -Benign gastric oxyntic and antral mucosa with mild chronic inflammation and reactive change.  -Negative for Helicobacter pylori, by H&E stain.  -Negative for atrophy, intestinal metaplasia, dysplasia or carcinoma.    --------------------------------------------------------------------    Assessment/PLAN:    Angelia Anderson is a 34 y.o. female Angelia Anderson is a 34 y.o. female status post lap sleeve by Dr. Ferguson on 2016. Comes to the office today with complaints of weight regain. EGD/UGI reviewed.    Level 2, conversion of sleeve to (ABRAHAM vs Bypass).          Darwin Mason  Bariatric Surgery  11/14/2024  11:52 AM

## 2024-12-03 ENCOUNTER — TELEPHONE (OUTPATIENT)
Age: 34
End: 2024-12-03

## 2024-12-03 NOTE — TELEPHONE ENCOUNTER
I received a Care Request from patient to schedule for:    First Name: Angelia  Last Name: Justin  Email: [email or web address]  Phone: [phone]  YOB: 1990  Zip code: [id number]  Where does it hurt? Back        I lvm to [facility] at [phone].    
Stable

## 2024-12-13 ENCOUNTER — OFFICE VISIT (OUTPATIENT)
Dept: OBGYN CLINIC | Facility: MEDICAL CENTER | Age: 34
End: 2024-12-13
Payer: COMMERCIAL

## 2024-12-13 ENCOUNTER — APPOINTMENT (OUTPATIENT)
Dept: RADIOLOGY | Facility: MEDICAL CENTER | Age: 34
End: 2024-12-13
Payer: COMMERCIAL

## 2024-12-13 VITALS
WEIGHT: 222 LBS | HEIGHT: 65 IN | BODY MASS INDEX: 36.99 KG/M2 | HEART RATE: 68 BPM | DIASTOLIC BLOOD PRESSURE: 76 MMHG | SYSTOLIC BLOOD PRESSURE: 128 MMHG

## 2024-12-13 DIAGNOSIS — M54.42 CHRONIC BILATERAL LOW BACK PAIN WITH LEFT-SIDED SCIATICA: Primary | ICD-10-CM

## 2024-12-13 DIAGNOSIS — G89.29 CHRONIC BILATERAL LOW BACK PAIN WITH LEFT-SIDED SCIATICA: Primary | ICD-10-CM

## 2024-12-13 DIAGNOSIS — G89.29 CHRONIC BILATERAL LOW BACK PAIN, UNSPECIFIED WHETHER SCIATICA PRESENT: ICD-10-CM

## 2024-12-13 DIAGNOSIS — M54.50 CHRONIC BILATERAL LOW BACK PAIN, UNSPECIFIED WHETHER SCIATICA PRESENT: ICD-10-CM

## 2024-12-13 PROCEDURE — 72100 X-RAY EXAM L-S SPINE 2/3 VWS: CPT

## 2024-12-13 PROCEDURE — 99213 OFFICE O/P EST LOW 20 MIN: CPT | Performed by: EMERGENCY MEDICINE

## 2024-12-13 RX ORDER — METHYLPREDNISOLONE 4 MG/1
TABLET ORAL
Qty: 1 EACH | Refills: 0 | Status: SHIPPED | OUTPATIENT
Start: 2024-12-13

## 2024-12-13 RX ORDER — CELECOXIB 100 MG/1
100 CAPSULE ORAL 2 TIMES DAILY
Qty: 30 CAPSULE | Refills: 1 | Status: SHIPPED | OUTPATIENT
Start: 2024-12-13

## 2024-12-13 NOTE — PATIENT INSTRUCTIONS
While taking the oral steroid Medrol Dosepak, do not take any NSAIDs such as Advil, Motrin, ibuprofen, Motrin, Aleve, naproxen, Celebrex or Meloxicam.  You can restart the NSAIDs after you finish the steroids.    However you may take Tylenol 500mg every 4-6 hours as needed OR max 1,000mg per dose up to 3 times per day for a total of 3,000mg per day  While taking oral steroids, you may experience mild side effects such as feeling jittery or flushing.  Please call if your side effects are significant or you have any questions.     Day 1: 8 mg by mouth before breakfast, 4 mg after lunch and after dinner, and 8 mg at bedtime  Day 2: 4 mg by mouth before breakfast, after lunch, and after dinner and 8 mg at bedtime  Day 3: 4 mg by mouth before breakfast, after lunch, after dinner, and at bedtime  Day 4: 4 mg by mouth before breakfast, after lunch, and at bedtime  Day 5: 4 mg by mouth before breakfast and at bedtime  Day 6: 4 mg by mouth before breakfast    THEN    While taking Celebrex (celecoxib) do not take any other NSAIDs such as Meloxicam, Advil, Motrin, ibuprofen, naproxen, diclofenac or Aleve.  However you may take Tylenol.  You may also take Tylenol 500mg every 4-6 hours as needed OR max 1,000mg per dose up to 3 times per day for a total of 3,000mg per day

## 2024-12-13 NOTE — PROGRESS NOTES
Name: Angelia Anderson      : 1990      MRN: [id number]  Encounter Provider: Ramirez Mckeon MD  Encounter Date: 2024   Encounter department: [facility] [place]  :  Assessment & Plan  Chronic bilateral low back pain with left-sided sciatica  Chronic back pain that is likely worsened by weight gain.   Hx of sleeve gastrectomy with a reported Jac of 140 lbs. Currently 222 lbs.  Lumbar spine xray completed today with noted spinal lordosis but no disk narrowing or arthritic changes.     PLAN  -Patient to try methylprednisolone burst for symptomatic relief THEN Celebrex 100mg bid if no improvement noted. Celebrex used vs other NSAIDS due to history of sleeve gastrectomy.  - Physical therapy referral. Patient to try for 6 weeks. If no improvement or worsening of symptoms, will possibly get an MRI  - F/u in 6 weeks     Orders:  •  XR spine lumbar 2 or 3 views injury; Future  •  Ambulatory Referral to Physical Therapy; Future  •  celecoxib (CeleBREX) 100 mg capsule; Take 1 capsule (100 mg total) by mouth 2 (two) times a day  •  methylPREDNISolone 4 MG tablet therapy pack; Use as directed on package           History of Present Illness {?Quick Links Encounters * My Last Note * Last Note in Specialty * Snapshot * Since Last Visit * History :17018}    Angelia Anderson is a 34 y.o. female with a PMH of Sleeve gastrectomy presenting today for low back pain. Patient states that pain has been present for the past 11 years but is now getting worse. Back Pain began before she had her first child (not during the pregnancy). She visited a chiropractor but only got relief momentarily. No history of trauma. She described the pain as sharp and located in the bilateral lumbar region but worse on the left lumbar region. Pain sometimes  radiates to the left knee only. States pain is worse with extension of her back and she sometimes has to rest on a shopping cart to improve the pain. Denies  "tingling or numbness sensation. No changes in bowel habits or saddle anesthesia.     Back Pain  This is a chronic problem. The current episode started more than 1 year ago. The problem has been gradually worsening. Pertinent negatives include no abdominal pain, arthralgias, change in bowel habit, chest pain, chills, congestion, fever, headaches, numbness, urinary symptoms, vomiting or weakness. The symptoms are aggravated by bending. She has tried acetaminophen and NSAIDs for the symptoms. The treatment provided mild relief.       Review of Systems   Constitutional:  Negative for chills, fever and unexpected weight change.   HENT:  Negative for congestion.    Cardiovascular:  Negative for chest pain and palpitations.   Gastrointestinal:  Negative for abdominal pain, change in bowel habit, constipation, diarrhea and vomiting.   Genitourinary:  Negative for difficulty urinating, dysuria and enuresis.   Musculoskeletal:  Positive for back pain. Negative for arthralgias and gait problem.   Neurological:  Negative for dizziness, weakness, numbness and headaches.   All other systems reviewed and are negative.      Objective {?Quick Links Trend Vitals * Enter New Vitals * Results Review * Timeline (Adult) * Labs * Imaging * Cardiology * Procedures * Lung Cancer Screening * Surgical eConsent :57652}  /76   Pulse 68   Ht 5' 4.5\" (1.638 m)   Wt 101 kg (222 lb)   BMI 37.52 kg/m²      Physical Exam  Vitals and nursing note reviewed.   Constitutional:       General: She is not in acute distress.     Appearance: Normal appearance. She is well-developed. She is not ill-appearing.   HENT:      Head: Normocephalic and atraumatic.   Eyes:      Extraocular Movements: Extraocular movements intact.      Conjunctiva/sclera: Conjunctivae normal.   Cardiovascular:      Rate and Rhythm: Normal rate and regular rhythm.   Musculoskeletal:         General: Tenderness present. No swelling. Normal range of motion.      Cervical back: " Normal range of motion and neck supple.      Lumbar back: Tenderness present. No swelling. Normal range of motion. Negative right straight leg raise test and negative left straight leg raise test. No scoliosis.      Comments: Bilateral paraspinal tenderness that is worse with back extension    Skin:     General: Skin is warm and dry.   Neurological:      General: No focal deficit present.      Mental Status: She is alert.   Psychiatric:         Mood and Affect: Mood normal.

## 2025-02-01 ENCOUNTER — OFFICE VISIT (OUTPATIENT)
Dept: OBGYN CLINIC | Facility: MEDICAL CENTER | Age: 35
End: 2025-02-01
Payer: COMMERCIAL

## 2025-02-01 VITALS — BODY MASS INDEX: 39.16 KG/M2 | HEIGHT: 64 IN | WEIGHT: 229.4 LBS

## 2025-02-01 DIAGNOSIS — G89.29 CHRONIC BILATERAL LOW BACK PAIN WITH LEFT-SIDED SCIATICA: Primary | ICD-10-CM

## 2025-02-01 DIAGNOSIS — M54.42 CHRONIC BILATERAL LOW BACK PAIN WITH LEFT-SIDED SCIATICA: Primary | ICD-10-CM

## 2025-02-01 PROCEDURE — 99213 OFFICE O/P EST LOW 20 MIN: CPT | Performed by: EMERGENCY MEDICINE

## 2025-02-01 NOTE — PROGRESS NOTES
Assessment/Plan:    Diagnoses and all orders for this visit:    Chronic bilateral low back pain with left-sided sciatica    Patient will try physical therapy also to consider the chiropractor.  We discussed working on core flexibility and strengthening as well as potentially starting yoga.  At this point in time she is not interested in seeing pain management or receiving injections.  Status post Medrol Dosepak and Celebrex    Return if symptoms worsen or fail to improve.      Subjective:   Patient ID: Angelia Anderson is a 35 y.o. female.    Angelia returns with no significant improvement s/p MEDROL pack and Celebrex  Was not able to start physical therapy due to illness    Initial note:    Angelia Anderson is a 34 y.o. female with a PMH of Sleeve gastrectomy presenting today for low back pain. Patient states that pain has been present for the past 11 years but is now getting worse. Back Pain began before she had her first child (not during the pregnancy). She visited a chiropractor but only got relief momentarily. No history of trauma. She described the pain as sharp and located in the bilateral lumbar region but worse on the left lumbar region. Pain sometimes  radiates to the left knee only. States pain is worse with extension of her back and she sometimes has to rest on a shopping cart to improve the pain. Denies tingling or numbness sensation. No changes in bowel habits or saddle anesthesia.           Review of Systems    The following portions of the patient's chart were reviewed and updated as appropriate:   Allergy:  No Known Allergies    Medications:    Current Outpatient Medications:     celecoxib (CeleBREX) 100 mg capsule, Take 1 capsule (100 mg total) by mouth 2 (two) times a day, Disp: 30 capsule, Rfl: 1    ergocalciferol (VITAMIN D2) 50,000 units, Take 1 capsule (50,000 Units total) by mouth 2 (two) times a week, Disp: 24 capsule, Rfl: 0    acetaminophen (TYLENOL) 325 mg tablet, Take 2 tablets (650 mg  "total) by mouth every 6 (six) hours (Patient not taking: Reported on 2024), Disp: , Rfl:     benzocaine-menthol-lanolin-aloe (DERMOPLAST) 20-0.5 % topical spray, Apply 1 Application topically every 6 (six) hours as needed for mild pain or irritation (Patient not taking: Reported on 2024), Disp: , Rfl:     enoxaparin (LOVENOX) 40 mg/0.4 mL, Inject 0.4 mL (40 mg total) under the skin every 24 hours (Patient not taking: Reported on 2024), Disp: 16.8 mL, Rfl: 0    hydrocortisone 1 % cream, Apply 1 Application topically daily as needed for rash or irritation (Patient not taking: Reported on 2024), Disp: , Rfl:     methylPREDNISolone 4 MG tablet therapy pack, Use as directed on package (Patient not taking: Reported on 2025), Disp: 1 each, Rfl: 0    witch hazel-glycerin (TUCKS) topical pad, Apply 1 Pad topically every 4 (four) hours as needed for irritation (Patient not taking: Reported on 2024), Disp: , Rfl:     Patient Active Problem List   Diagnosis    HARRISON (obstructive sleep apnea)    Bariatric surgery status complicating pregnancy, third trimester    Iron deficiency anemia secondary to inadequate dietary iron intake    OAB (overactive bladder)    Prior  loss in second trimester, antepartum    History of postpartum depression    History of gestational diabetes    Maternal obesity, antepartum    Vitamin D deficiency    Vitamin A deficiency    B12 deficiency    History of pulmonary embolism     (spontaneous vaginal delivery)    Short interval between pregnancies affecting pregnancy in third trimester, antepartum    Skin lesion    Anemia affecting pregnancy in third trimester    Encounter for induction of labor    Fundal tenderness    Former smoker    Obesity (BMI 30-39.9)       Objective:  Ht 5' 4\" (1.626 m)   Wt 104 kg (229 lb 6.4 oz)   BMI 39.38 kg/m²     Ortho Exam    Physical Exam  Constitutional:       Appearance: She is well-developed.   HENT:      Head: Normocephalic and " atraumatic.   Eyes:      Conjunctiva/sclera: Conjunctivae normal.   Pulmonary:      Effort: Pulmonary effort is normal.   Musculoskeletal:      Cervical back: Neck supple.   Skin:     General: Skin is warm and dry.   Neurological:      Mental Status: She is alert and oriented to person, place, and time.   Psychiatric:         Behavior: Behavior normal.           Neurologic Exam     Mental Status   Oriented to person, place, and time.       Procedures    I have personally reviewed the written report of the pertinent studies.             Past Medical History:   Diagnosis Date    Anemia     H/O gastric sleeve 2016    History of transfusion     states no transfusion rxn    Miscarriage     Personal history of COVID-19     recovered at home    Post partum depression 2014    Pulmonary embolus (HCC) 2022    approx, during pregnancy    Sleep apnea, obstructive     prior to weight loss surg       Past Surgical History:   Procedure Laterality Date    BARIATRIC SURGERY      DILATION AND EVACUATION  2019        GASTRIC RESTRICTION SURGERY  2015    gastric sleeve    INCISION AND DRAINAGE OF WOUND Right 2022    Procedure: INCISION AND DRAINAGE (I&D) HEAD/FACE;  Surgeon: Sumit Matos DMD;  Location: [facility];  Service: Maxillofacial    MULTIPLE TOOTH EXTRACTIONS Right 2022    Procedure: EXTRACTION TEETH MULTIPLE;  Surgeon: Sumit Matos DMD;  Location: [facility];  Service: Maxillofacial    OPEN ANTERIOR SHOULDER RECONSTRUCTION Left     OTHER SURGICAL HISTORY      sweat gland removal    SC TX MISSED  FIRST TRIMESTER SURGICAL N/A 2023    Procedure: DILATATION AND EVACUATION (D&E);  Surgeon: Radha Knowles MD;  Location: [facility];  Service: Gynecology    RETAINED PLACENTA REMOVAL N/A 2020    Procedure: EXTRACTION OF PLACENTA,MANUAL;  Surgeon: Gray Campa MD;  Location: [facility] ;  Service: Obstetrics       Social History     Socioeconomic History    Marital status:  Single     Spouse name: Not on file    Number of children: Not on file    Years of education: Not on file    Highest education level: Not on file   Occupational History    Not on file   Tobacco Use    Smoking status: Former     Current packs/day: 0.00     Average packs/day: 0.3 packs/day for 10.0 years (2.5 ttl pk-yrs)     Types: Cigarettes     Start date: 2010     Quit date: 2020     Years since quittin.8     Passive exposure: Past    Smokeless tobacco: Former   Vaping Use    Vaping status: Never Used   Substance and Sexual Activity    Alcohol use: Yes     Comment: occasional    Drug use: Never    Sexual activity: Yes     Partners: Male     Birth control/protection: None   Other Topics Concern    Not on file   Social History Narrative    Not on file     Social Drivers of Health     Financial Resource Strain: Medium Risk (2024)    [facility]    Overall Financial Resource Strain (CARDIA)     Difficulty of Paying Living Expenses: Somewhat hard   Food Insecurity: Food Insecurity Present (6/10/2024)    Nursing - Inadequate Food Risk Classification     Worried About Running Out of Food in the Last Year: Sometimes true     Ran Out of Food in the Last Year: Sometimes true     Ran Out of Food in the Last Year: Not on file   Transportation Needs: Unmet Transportation Needs (6/10/2024)    PRAPARE - Transportation     Lack of Transportation (Medical): No     Lack of Transportation (Non-Medical): Yes   Physical Activity: Not on file   Stress: No Stress Concern Present (2024)    [facility]    [employer] - Occupational Stress Questionnaire     Feeling of Stress : Not at all   Social Connections: Unknown (2024)    Received from [employer]    Social [employer]     How often do you feel lonely or  isolated from those around you? (Adult - for ages 18 years and over): Not on file   Intimate Partner Violence: Not At Risk (2/4/2024)    [facility]    Humiliation, Afraid, Rape, and Kick questionnaire     Fear of Current or Ex-Partner: No     Emotionally Abused: No     Physically Abused: No     Sexually Abused: No   Housing Stability: High Risk (6/10/2024)    Housing Stability Vital Sign     Unable to Pay for Housing in the Last Year: Yes     Number of Times Moved in the Last Year: 0     Homeless in the Last Year: No       Family History   Problem Relation Age of Onset    Stroke Mother     Heart disease Mother     Seizures Mother     Hypertension Mother     Anxiety disorder Mother     Bipolar disorder Mother     Kidney disease Mother     Diabetes Mother     Sudden death Father     No Known Problems Sister     No Known Problems Sister     No Known Problems Brother     No Known Problems Brother     No Known Problems Brother     Sudden death Brother 20        MVA    No Known Problems Son     Pancreatic cancer Maternal Grandmother     Seizures Maternal Grandmother     Diabetes Maternal Grandmother     Cancer Maternal Grandmother     No Known Problems Daughter     Cancer Maternal Aunt

## 2025-02-19 ENCOUNTER — NURSE TRIAGE (OUTPATIENT)
Age: 35
End: 2025-02-19

## 2025-02-19 ENCOUNTER — APPOINTMENT (OUTPATIENT)
Dept: LAB | Facility: HOSPITAL | Age: 35
End: 2025-02-19
Payer: COMMERCIAL

## 2025-02-19 DIAGNOSIS — R39.15 URINARY URGENCY: ICD-10-CM

## 2025-02-19 DIAGNOSIS — R39.15 URINARY URGENCY: Primary | ICD-10-CM

## 2025-02-19 LAB
BACTERIA UR QL AUTO: ABNORMAL /HPF
BILIRUB UR QL STRIP: NEGATIVE
CLARITY UR: ABNORMAL
COLOR UR: YELLOW
GLUCOSE UR STRIP-MCNC: NEGATIVE MG/DL
HGB UR QL STRIP.AUTO: NEGATIVE
KETONES UR STRIP-MCNC: NEGATIVE MG/DL
LEUKOCYTE ESTERASE UR QL STRIP: ABNORMAL
MUCOUS THREADS UR QL AUTO: ABNORMAL
NITRITE UR QL STRIP: NEGATIVE
NON-SQ EPI CELLS URNS QL MICRO: ABNORMAL /HPF
PH UR STRIP.AUTO: 6.5 [PH]
PROT UR STRIP-MCNC: ABNORMAL MG/DL
RBC #/AREA URNS AUTO: ABNORMAL /HPF
SP GR UR STRIP.AUTO: 1.03 (ref 1–1.03)
UROBILINOGEN UR STRIP-ACNC: 8 MG/DL
WBC #/AREA URNS AUTO: ABNORMAL /HPF

## 2025-02-19 PROCEDURE — 81001 URINALYSIS AUTO W/SCOPE: CPT

## 2025-02-19 PROCEDURE — 87086 URINE CULTURE/COLONY COUNT: CPT

## 2025-02-19 NOTE — TELEPHONE ENCOUNTER
"Spoke with patient who advised she started with urinary urgency and odor a few days ago.  She denies dysuria, flank pain, fevers or blood in urine.  She was advised UA/CS order would be placed for her to get done and we will call with results.  She also noted LMP 1/16 and is late, so she was advised to take a HPT and call back if positive. Pt verbalized understanding, no further questions or concerns at this time.       Reason for Disposition   Bad or foul-smelling urine    Answer Assessment - Initial Assessment Questions  1. SYMPTOM: \"What's the main symptom you're concerned about?\" (e.g., frequency, incontinence)      Urgency and odor  2. ONSET: \"When did the  synptoms  start?\"      Few days ago  3. PAIN: \"Is there any pain?\" If Yes, ask: \"How bad is it?\" (Scale: 1-10; mild, moderate, severe)      denies  4. CAUSE: \"What do you think is causing the symptoms?\"      UTI  5. OTHER SYMPTOMS: \"Do you have any other symptoms?\" (e.g., blood in urine, fever, flank pain, pain with urination)      denies  6. PREGNANCY: \"Is there any chance you are pregnant?\" \"When was your last menstrual period?\"     1/16    Protocols used: Urinary Symptoms-Adult-[place]    "

## 2025-02-21 ENCOUNTER — NURSE TRIAGE (OUTPATIENT)
Age: 35
End: 2025-02-21

## 2025-02-21 LAB — BACTERIA UR CULT: NORMAL

## 2025-02-21 NOTE — TELEPHONE ENCOUNTER
LMP 1/16/25 + pregnancy test 2 days ago, she will be going to [facility] [place].  Patient recently had urine testing 2/19, and still having symptoms of urgency and freq.  Pharmacy on file confirmed.  HP sent to provider.

## 2025-02-21 NOTE — TELEPHONE ENCOUNTER
Regarding: Results/Questions  ----- Message from Barbra ZHANG sent at 2/21/2025  8:44 AM EST -----  Pt called wanting to go over specimen results also said her last cycle was 1/16/24 and tested positive for pregnancy but is unsure if she is going to keep it. I gave her the number and info for an office she can speak too. I tried to transfer pt no CTS available  please call pt back when able too.

## 2025-02-24 ENCOUNTER — OFFICE VISIT (OUTPATIENT)
Dept: PODIATRY | Facility: CLINIC | Age: 35
End: 2025-02-24
Payer: COMMERCIAL

## 2025-02-24 VITALS — WEIGHT: 218 LBS | HEIGHT: 64 IN | BODY MASS INDEX: 37.22 KG/M2

## 2025-02-24 DIAGNOSIS — R39.15 URINARY URGENCY: Primary | ICD-10-CM

## 2025-02-24 DIAGNOSIS — B35.1 TINEA UNGUIUM: Primary | ICD-10-CM

## 2025-02-24 PROCEDURE — 99203 OFFICE O/P NEW LOW 30 MIN: CPT | Performed by: PODIATRIST

## 2025-02-24 RX ORDER — NITROFURANTOIN 25; 75 MG/1; MG/1
100 CAPSULE ORAL 2 TIMES DAILY
Qty: 14 CAPSULE | Refills: 0 | Status: SHIPPED | OUTPATIENT
Start: 2025-02-24 | End: 2025-03-03

## 2025-02-24 RX ORDER — TERBINAFINE HYDROCHLORIDE 250 MG/1
250 TABLET ORAL DAILY
Qty: 14 TABLET | Refills: 2 | Status: SHIPPED | OUTPATIENT
Start: 2025-02-24 | End: 2025-04-07

## 2025-02-24 NOTE — PROGRESS NOTES
"Name: Angelia Anderson      : 1990      MRN: [id number]  Encounter Provider: Yohannes Kemp DPM  Encounter Date: 2025   Encounter department: [facility] [place]  :  Assessment & Plan  Tinea unguium  Discussed oral lamisil vs topical treatments for fungal toenails. Patient clearly told to abstain from any alcohol use while taking oral lamisil (terbinafine). Call with any GI distress or unusual side effects while taking this medication. Will pulse dose the medication to reduce hepatic risk.     Reviewed bloodwork, last CMP did not show any liver concerns. Patient instructed not to drink any alcohol while taking this medication    Patient to take 1 pill daily for 2 weeks (14 days) then stop for 2 weeks. Repeat this cycle two more times.     Orders:  •  terbinafine (LamISIL) 250 mg tablet; Take 1 tablet (250 mg total) by mouth daily take 1 pill daily for 2 weeks (14 days) then stop for 2 weeks. Repeat this cycle two more times.  •  Comprehensive metabolic panel; Future        History of Present Illness   HPI  Angelia Anderson is a 35 y.o. female who presents with a toenail deformity. A few years ago the nail infected (left great toenail).       Review of Systems  As stated in HPI, otherwise normal    Medical History Reviewed by provider this encounter:  Tobacco  Allergies  Meds  Problems  Med Hx  Surg Hx  Fam Hx           Objective   Ht 5' 4\" (1.626 m)   Wt 98.9 kg (218 lb)   BMI 37.42 kg/m²      Physical Exam  Vitals reviewed.   Cardiovascular:      Rate and Rhythm: Normal rate.      Pulses: Normal pulses.           Dorsalis pedis pulses are 2+ on the left side.        Posterior tibial pulses are 2+ on the left side.   Pulmonary:      Effort: Pulmonary effort is normal. No respiratory distress.   Musculoskeletal:         General: No deformity.   Feet:      Left foot:      Toenail Condition: Left toenails are abnormally thick. Fungal disease present.  Skin:     General: Skin is " warm.      Findings: No erythema or rash.   Neurological:      Mental Status: She is alert.      Sensory: No sensory deficit.

## 2025-02-24 NOTE — TELEPHONE ENCOUNTER
Patient's urine culture did not show evidence of infection, but given her symptoms, I sent a prescription to her pharmacy.  She should let me know if her symptoms don't resolve after using it.

## 2025-02-26 ENCOUNTER — OFFICE VISIT (OUTPATIENT)
Dept: FAMILY MEDICINE CLINIC | Facility: CLINIC | Age: 35
End: 2025-02-26
Payer: COMMERCIAL

## 2025-02-26 VITALS
RESPIRATION RATE: 16 BRPM | TEMPERATURE: 97.8 F | DIASTOLIC BLOOD PRESSURE: 80 MMHG | HEIGHT: 64 IN | WEIGHT: 232.8 LBS | SYSTOLIC BLOOD PRESSURE: 132 MMHG | OXYGEN SATURATION: 98 % | BODY MASS INDEX: 39.75 KG/M2 | HEART RATE: 86 BPM

## 2025-02-26 DIAGNOSIS — Z98.84 GASTRIC BYPASS STATUS FOR OBESITY: ICD-10-CM

## 2025-02-26 DIAGNOSIS — E53.8 B12 DEFICIENCY: ICD-10-CM

## 2025-02-26 DIAGNOSIS — E55.9 VITAMIN D DEFICIENCY: ICD-10-CM

## 2025-02-26 DIAGNOSIS — Z00.00 ANNUAL PHYSICAL EXAM: Primary | ICD-10-CM

## 2025-02-26 PROCEDURE — 99385 PREV VISIT NEW AGE 18-39: CPT | Performed by: FAMILY MEDICINE

## 2025-02-26 NOTE — PROGRESS NOTES
Adult Annual Physical  Name: Angelia Anderson      : 1990      MRN: [id number]  Encounter Provider: Cortez Torres MD  Encounter Date: 2025   Encounter department: [facility]    Assessment & Plan  Annual physical exam         B12 deficiency    Check labs may need supplement given gastric bypass       Gastric bypass status for obesity    Had lost a lot of weight, has mostly recently regained the weight, planning on starting a diet.    Orders:    Comprehensive metabolic panel; Future    CBC and differential; Future    Iron, TIBC and Ferritin Panel; Future    Vitamin B12; Future    Vitamin D deficiency    Orders:    Vitamin D 25 hydroxy; Future    Immunizations and preventive care screenings were discussed with patient today. Appropriate education was printed on patient's after visit summary.    Counseling:  Alcohol/drug use: discussed moderation in alcohol intake, the recommendations for healthy alcohol use, and avoidance of illicit drug use.  Dental Health: discussed importance of regular tooth brushing, flossing, and dental visits.  Injury prevention: discussed safety/seat belts, safety helmets, smoke detectors, carbon monoxide detectors, and smoking near bedding or upholstery.  Sexual health: discussed sexually transmitted diseases, partner selection, use of condoms, avoidance of unintended pregnancy, and contraceptive alternatives.  Exercise: the importance of regular exercise/physical activity was discussed. Recommend exercise 3-5 times per week for at least 30 minutes.          History of Present Illness     Adult Annual Physical:  Patient presents for annual physical. 35 year old presenting to [facility] of gastric sleeve, PE while pregnant,     Overall has been doing well, planning to start diet and exercise program soon, has been busy with young children.    Starting lamisl for fungal toe nail infection.    .     Diet and Physical Activity:  -  "Diet/Nutrition: well balanced diet.  - Exercise: walking.    Depression Screening:  - PHQ-2 Score: 0    General Health:  - Sleep: sleeps well.    /GYN Health:  - Follows with GYN: yes.   - Menopause: premenopausal.     Review of Systems   Constitutional:  Negative for chills and fever.   HENT:  Negative for ear pain and sore throat.    Eyes:  Negative for pain and visual disturbance.   Respiratory:  Negative for cough and shortness of breath.    Cardiovascular:  Negative for chest pain and palpitations.   Gastrointestinal:  Negative for abdominal pain and vomiting.   Genitourinary:  Negative for dysuria and hematuria.   Musculoskeletal:  Positive for back pain. Negative for arthralgias.   Skin:  Negative for color change and rash.   Neurological:  Negative for seizures and syncope.   All other systems reviewed and are negative.        Objective   /80 (BP Location: Left arm, Patient Position: Sitting, Cuff Size: Standard)   Pulse 86   Temp 97.8 °F (36.6 °C) (Tympanic)   Resp 16   Ht 5' 4\" (1.626 m)   Wt 106 kg (232 lb 12.8 oz)   SpO2 98%   BMI 39.96 kg/m²     Physical Exam  Vitals and nursing note reviewed.   Constitutional:       General: She is not in acute distress.     Appearance: She is well-developed.   HENT:      Head: Normocephalic and atraumatic.   Eyes:      Conjunctiva/sclera: Conjunctivae normal.   Cardiovascular:      Rate and Rhythm: Normal rate and regular rhythm.      Heart sounds: No murmur heard.  Pulmonary:      Effort: Pulmonary effort is normal. No respiratory distress.      Breath sounds: Normal breath sounds.   Abdominal:      Palpations: Abdomen is soft.      Tenderness: There is no abdominal tenderness.   Musculoskeletal:         General: No swelling.      Cervical back: Neck supple.   Skin:     General: Skin is warm and dry.      Capillary Refill: Capillary refill takes less than 2 seconds.   Neurological:      Mental Status: She is alert.   Psychiatric:         Mood and " Affect: Mood normal.

## 2025-03-02 NOTE — PROGRESS NOTES
Prenatal visit  GA 35w 4d  Good fetal movement  28 wk labs completed   Tdap: 1/12/24  Delivery consent signed  Breast pump ordered  NST's scheduled  
Problem List Items Addressed This Visit       Bariatric surgery status complicating pregnancy, third trimester    Iron deficiency anemia secondary to inadequate dietary iron intake     H/H: 24 - 9.7/31.0   Has not gone for iron infusions due to lack of childcare. Infusion center contacted by nursing staff and able to accommodate a Saturday appt this weekend. Also scheduled for appt early next week. Thinks  may be able work from home to accommodate this.   Reviewed neg impact of untreated iron deficiency anemia and complications that can arise as a result. 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           Prior  loss in second trimester, antepartum    History of gestational diabetes     Normal 1hr          History of pulmonary embolism     Continues lovenox  Can plan for IOL          35 weeks gestation of pregnancy     Angelia  is a 34 y.o.  @35w4d who presents for routine prenatal visit.      Interested in IOL.    28 wk labs - anemia (see separate dx), normal 1 hr, NR syphilis screen   Growth scan - 24   TDAP - received   Delivery consent - on file   GBS @ 36 weeks     TWG 1.089 kg (2 lb 6.4 oz)    Reports good fetal movement.  Denies LOF, vaginal bleeding, regular uterine contractions, cramping, headaches or visual changes.      Reviewed PTL/Labor precautions and FKC.             Short interval between pregnancies affecting pregnancy in third trimester, antepartum     Other Visit Diagnoses       Prenatal care, subsequent pregnancy, third trimester    -  Primary    Relevant Orders    POCT urine dip            
PAST MEDICAL HISTORY:  Back pain     CAD (coronary artery disease)     Colostomy present     Depression with rectal cancer    H/O carotid stenosis     H/O renal calculi     History of urostomy     HLD (hyperlipidemia)     HTN (hypertension)     DANO (iron deficiency anemia)     Lumbar compression fracture     Mitral regurgitation     Neuropathy feet s/p chemo    Rectal cancer 2/2019 s/p chemo and radiation; recurrence of cancer 2022    Splenic artery aneurysm     Stented coronary artery circumflex 2005 LAD 2017 x4    Umbilical hernia

## 2025-03-23 ENCOUNTER — HOSPITAL ENCOUNTER (EMERGENCY)
Facility: HOSPITAL | Age: 35
Discharge: HOME | End: 2025-03-23
Attending: EMERGENCY MEDICINE | Admitting: EMERGENCY MEDICINE
Payer: COMMERCIAL

## 2025-03-23 VITALS
WEIGHT: 236.99 LBS | OXYGEN SATURATION: 100 % | RESPIRATION RATE: 18 BRPM | TEMPERATURE: 98.4 F | SYSTOLIC BLOOD PRESSURE: 137 MMHG | DIASTOLIC BLOOD PRESSURE: 79 MMHG | BODY MASS INDEX: 40.68 KG/M2 | HEART RATE: 85 BPM

## 2025-03-23 DIAGNOSIS — L21.9 SEBORRHEIC DERMATITIS OF SCALP: Primary | ICD-10-CM

## 2025-03-23 PROCEDURE — 99282 EMERGENCY DEPT VISIT SF MDM: CPT

## 2025-03-23 PROCEDURE — 99284 EMERGENCY DEPT VISIT MOD MDM: CPT

## 2025-03-23 RX ORDER — DIPHENHYDRAMINE HCL 25 MG
50 TABLET ORAL ONCE
Status: COMPLETED | OUTPATIENT
Start: 2025-03-23 | End: 2025-03-23

## 2025-03-23 RX ORDER — ACETAMINOPHEN 325 MG/1
975 TABLET ORAL ONCE
Status: COMPLETED | OUTPATIENT
Start: 2025-03-23 | End: 2025-03-23

## 2025-03-23 RX ORDER — KETOCONAZOLE 20 MG/ML
1 SHAMPOO, SUSPENSION TOPICAL 2 TIMES WEEKLY
Qty: 120 ML | Refills: 0 | Status: SHIPPED | OUTPATIENT
Start: 2025-03-24

## 2025-03-23 RX ORDER — TRIAMCINOLONE ACETONIDE 0.25 MG/G
CREAM TOPICAL 2 TIMES DAILY
Status: DISCONTINUED | OUTPATIENT
Start: 2025-03-23 | End: 2025-03-23 | Stop reason: HOSPADM

## 2025-03-23 RX ORDER — TRIAMCINOLONE ACETONIDE 0.25 MG/G
CREAM TOPICAL 2 TIMES DAILY
Qty: 15 G | Refills: 0 | Status: SHIPPED | OUTPATIENT
Start: 2025-03-23

## 2025-03-23 RX ADMIN — TRIAMCINOLONE ACETONIDE: 0.25 CREAM TOPICAL at 02:22

## 2025-03-23 RX ADMIN — DIPHENHYDRAMINE HYDROCHLORIDE 50 MG: 25 TABLET ORAL at 02:22

## 2025-03-23 RX ADMIN — ACETAMINOPHEN 975 MG: 325 TABLET, FILM COATED ORAL at 02:22

## 2025-03-23 NOTE — DISCHARGE INSTRUCTIONS
Apply to affected areas daily in the shower, leave on for 5 min then wash off. Use for 1 week    Use Kenalog lotion twice a day for up to 2 weeks. If symptoms improve prior, you can stop sooner    Follow up with the dermatologist    You can use the shampoo once a week or intermittently to prevent further flare ups. You can also use selsun blue shampoo 1-2 times a week     Return for fever, chills, worsening symptoms despite treatment

## 2025-03-24 NOTE — ED PROVIDER NOTES
Time reflects when diagnosis was documented in both MDM as applicable and the Disposition within this note       Time User Action Codes Description Comment    3/23/2025  2:43 AM Phyllis Rodgers Add [L21.9] Seborrheic dermatitis of scalp           ED Disposition       ED Disposition   Discharge    Condition   Stable    Date/Time   Sun Mar 23, 2025  2:09 AM    Comment   Angelia Anderson discharge to home/self care.                   Assessment & Plan       Medical Decision Making  DDX including but not limited to: allergic reaction, urticaria, cellulitis, contact dermatitis, allergic dermatitis    Exam consistent with seborrheic dermatitis.  Will give Benadryl, Tylenol, Kenalog, and will prescribe ketoconazole shampoo.  Patient provided information for dermatology follow-up    At the time of discharge, the patient is in no acute distress. I discussed with the patient the diagnosis, treatment plan, follow-up, return precautions, and discharge instructions; they were given the opportunity to ask questions and verbalized understanding.    Problems Addressed:  Seborrheic dermatitis of scalp: acute illness or injury    Amount and/or Complexity of Data Reviewed  External Data Reviewed: labs and notes.    Risk  OTC drugs.  Prescription drug management.             Medications   diphenhydrAMINE (BENADRYL) tablet 50 mg (50 mg Oral Given 3/23/25 0222)   acetaminophen (TYLENOL) tablet 975 mg (975 mg Oral Given 3/23/25 0222)       ED Risk Strat Scores                            SBIRT 20yo+      Flowsheet Row Most Recent Value   Initial Alcohol Screen: US AUDIT-C     1. How often do you have a drink containing alcohol? 0 Filed at: 03/23/2025 0110   2. How many drinks containing alcohol do you have on a typical day you are drinking?  0 Filed at: 03/23/2025 0110   3b. FEMALE Any Age, or MALE 65+: How often do you have 4 or more drinks on one occassion? 0 Filed at: 03/23/2025 0110   Audit-C Score 0 Filed at: 03/23/2025 0110   HELEN:  How many times in the past year have you...    Used an illegal drug or used a prescription medication for non-medical reasons? Never Filed at: 2025 0110                            History of Present Illness       Chief Complaint   Patient presents with    Rash     Pt reports rash on scalp with oozing and scabs.  Pt reports itching and burning.  No new hair products       Past Medical History:   Diagnosis Date    Anemia     H/O gastric sleeve 2016    History of transfusion     states no transfusion rxn    Miscarriage     Obesity     Personal history of COVID-19     recovered at home    Post partum depression 2014    Pulmonary embolus (HCC) 2022    approx, during pregnancy    Sleep apnea, obstructive     prior to weight loss surg      Past Surgical History:   Procedure Laterality Date    BARIATRIC SURGERY      DILATION AND EVACUATION  2019        GASTRIC RESTRICTION SURGERY  2015    gastric sleeve    INCISION AND DRAINAGE OF WOUND Right 2022    Procedure: INCISION AND DRAINAGE (I&D) HEAD/FACE;  Surgeon: Sumit Matos DMD;  Location: [facility];  Service: Maxillofacial    MULTIPLE TOOTH EXTRACTIONS Right 2022    Procedure: EXTRACTION TEETH MULTIPLE;  Surgeon: Sumit Matos DMD;  Location: [facility];  Service: Maxillofacial    OPEN ANTERIOR SHOULDER RECONSTRUCTION Left     OTHER SURGICAL HISTORY      sweat gland removal    ND TX MISSED  FIRST TRIMESTER SURGICAL N/A 2023    Procedure: DILATATION AND EVACUATION (D&E);  Surgeon: Radha Knowles MD;  Location: [facility];  Service: Gynecology    RETAINED PLACENTA REMOVAL N/A 2020    Procedure: EXTRACTION OF PLACENTA,MANUAL;  Surgeon: Gray Campa MD;  Location: [facility];  Service: Obstetrics    SHOULDER SURGERY        Family History   Problem Relation Age of Onset    Stroke Mother     Heart disease Mother     Seizures Mother     Hypertension Mother     Anxiety disorder Mother     Bipolar disorder Mother      Kidney disease Mother     Diabetes Mother     Sudden death Father     No Known Problems Sister     No Known Problems Sister     No Known Problems Brother     No Known Problems Brother     No Known Problems Brother     Sudden death Brother 20        MVA    No Known Problems Son     Pancreatic cancer Maternal Grandmother     Seizures Maternal Grandmother     Diabetes Maternal Grandmother     Cancer Maternal Grandmother     No Known Problems Daughter     Cancer Maternal Aunt       Social History     Tobacco Use    Smoking status: Former     Current packs/day: 0.00     Average packs/day: 0.3 packs/day for 11.7 years (3.0 ttl pk-yrs)     Types: Cigarettes     Start date: 2010     Quit date: 2020     Years since quittin.9     Passive exposure: Past    Smokeless tobacco: Never   Vaping Use    Vaping status: Never Used   Substance Use Topics    Alcohol use: Yes     Comment: occasional    Drug use: Never      E-Cigarette/Vaping    E-Cigarette Use Never User       E-Cigarette/Vaping Substances    Nicotine No     THC No     CBD No     Flavoring No     Other No     Unknown No       I have reviewed and agree with the history as documented.     The patient is a 35-year-old female with history of gastric sleeve, seborrheic dermatitis who presents to the ED for evaluation of several day history of worsening pruritus to her scalp.  She reports over the last several hours, she noticed a burning sensation to her scalp, as well as discharge from the skin.  She denies new shampoo conditioner or other hair products.  She reports she does have a dry scalp at baseline, does not use any medications for this.  She otherwise denies fever, chills.        Review of Systems   Constitutional:  Negative for chills and fever.   Skin:  Positive for rash and wound.           Objective       ED Triage Vitals [25 0105]   Temperature Pulse Blood Pressure Respirations SpO2 Patient Position - Orthostatic VS   98.4 °F (36.9 °C) 85  137/79 18 100 % --      Temp Source Heart Rate Source BP Location FiO2 (%) Pain Score    Oral -- -- -- 5      Vitals      Date and Time Temp Pulse SpO2 Resp BP Pain Score FACES Pain Rating User   03/23/25 0105 98.4 °F (36.9 °C) 85 100 % 18 137/79 5 -- BRB            Physical Exam  Vitals and nursing note reviewed.   Constitutional:       General: She is not in acute distress.     Appearance: She is well-developed.   HENT:      Head: Normocephalic and atraumatic.   Eyes:      Conjunctiva/sclera: Conjunctivae normal.   Cardiovascular:      Rate and Rhythm: Normal rate and regular rhythm.   Pulmonary:      Effort: Pulmonary effort is normal.   Musculoskeletal:         General: Normal range of motion.      Cervical back: Neck supple.   Skin:     General: Skin is warm and dry.      Capillary Refill: Capillary refill takes less than 2 seconds.      Findings: Rash present. Rash is crusting and scaling.      Comments: Scaling, dry rash to scalp with areas of excoriation and oozing. No pustules. No rash elsewhere. No sloughing of skin   Neurological:      Mental Status: She is alert.   Psychiatric:         Mood and Affect: Mood normal.         Results Reviewed       None            No orders to display       Procedures    ED Medication and Procedure Management   Prior to Admission Medications   Prescriptions Last Dose Informant Patient Reported? Taking?   hydrocortisone 1 % cream  Self No No   Sig: Apply 1 Application topically daily as needed for rash or irritation   Patient not taking: Reported on 7/8/2024   terbinafine (LamISIL) 250 mg tablet   No No   Sig: Take 1 tablet (250 mg total) by mouth daily take 1 pill daily for 2 weeks (14 days) then stop for 2 weeks. Repeat this cycle two more times.   witch hazel-glycerin (TUCKS) topical pad  Self No No   Sig: Apply 1 Pad topically every 4 (four) hours as needed for irritation   Patient not taking: Reported on 7/8/2024      Facility-Administered Medications: None      Discharge Medication List as of 3/23/2025  2:50 AM        START taking these medications    Details   ketoconazole (NIZORAL) 2 % shampoo Apply 1 Application topically 2 (two) times a week, Starting Mon 3/24/2025, Normal      triamcinolone (KENALOG) 0.025 % cream Apply topically 2 (two) times a day, Starting Sun 3/23/2025, Normal           CONTINUE these medications which have NOT CHANGED    Details   hydrocortisone 1 % cream Apply 1 Application topically daily as needed for rash or irritation, Starting Wed 6/12/2024, No Print      terbinafine (LamISIL) 250 mg tablet Take 1 tablet (250 mg total) by mouth daily take 1 pill daily for 2 weeks (14 days) then stop for 2 weeks. Repeat this cycle two more times., Starting Mon 2/24/2025, Until Mon 4/7/2025, Normal      witch hazel-glycerin (TUCKS) topical pad Apply 1 Pad topically every 4 (four) hours as needed for irritation, Starting Wed 6/12/2024, No Print             ED SEPSIS DOCUMENTATION   Time reflects when diagnosis was documented in both MDM as applicable and the Disposition within this note       Time User Action Codes Description Comment    3/23/2025  2:43 AM Phyllis Rodgers Add [L21.9] Seborrheic dermatitis of scalp                  Phyllis Rodgers PA-C  03/24/25 4738

## 2025-03-31 ENCOUNTER — TELEPHONE (OUTPATIENT)
Age: 35
End: 2025-03-31

## 2025-03-31 NOTE — TELEPHONE ENCOUNTER
Pt calling with ASAP ref to derm for HFU from ED visit on 3/23    Pt has rash on scalp that is oozing with burning    Offered HFU at [facility] this Thurs, pt declined wanting to be seen at [place] or  if possible    Advised I will check with mgt/providers and call her back at [phone]

## 2025-03-31 NOTE — TELEPHONE ENCOUNTER
Pt seen in ED 3/23 and diagnosed with seborrheic dermatitis of scalp. Please advise if patient can be seen with [facility] in parallel?

## 2025-04-24 ENCOUNTER — OFFICE VISIT (OUTPATIENT)
Dept: OBGYN CLINIC | Facility: CLINIC | Age: 35
End: 2025-04-24
Payer: COMMERCIAL

## 2025-04-24 ENCOUNTER — NURSE TRIAGE (OUTPATIENT)
Age: 35
End: 2025-04-24

## 2025-04-24 VITALS — WEIGHT: 238 LBS | DIASTOLIC BLOOD PRESSURE: 70 MMHG | SYSTOLIC BLOOD PRESSURE: 122 MMHG | BODY MASS INDEX: 40.85 KG/M2

## 2025-04-24 DIAGNOSIS — R35.0 URINARY FREQUENCY: Primary | ICD-10-CM

## 2025-04-24 DIAGNOSIS — N32.81 OAB (OVERACTIVE BLADDER): ICD-10-CM

## 2025-04-24 PROBLEM — R52 TENDERNESS: Status: RESOLVED | Noted: 2024-06-11 | Resolved: 2025-04-24

## 2025-04-24 PROBLEM — O09.893 SHORT INTERVAL BETWEEN PREGNANCIES AFFECTING PREGNANCY IN THIRD TRIMESTER, ANTEPARTUM: Status: RESOLVED | Noted: 2023-12-14 | Resolved: 2025-04-24

## 2025-04-24 PROBLEM — O99.013 ANEMIA AFFECTING PREGNANCY IN THIRD TRIMESTER: Status: RESOLVED | Noted: 2024-05-16 | Resolved: 2025-04-24

## 2025-04-24 PROBLEM — O99.210 MATERNAL OBESITY, ANTEPARTUM: Status: RESOLVED | Noted: 2022-11-12 | Resolved: 2025-04-24

## 2025-04-24 PROBLEM — O99.843 BARIATRIC SURGERY STATUS COMPLICATING PREGNANCY, THIRD TRIMESTER: Status: RESOLVED | Noted: 2018-03-15 | Resolved: 2025-04-24

## 2025-04-24 PROBLEM — Z34.90 ENCOUNTER FOR INDUCTION OF LABOR: Chronic | Status: RESOLVED | Noted: 2024-06-10 | Resolved: 2025-04-24

## 2025-04-24 PROBLEM — O09.292 PRIOR PERINATAL LOSS IN SECOND TRIMESTER, ANTEPARTUM: Status: RESOLVED | Noted: 2022-10-04 | Resolved: 2025-04-24

## 2025-04-24 LAB
SL AMB  POCT GLUCOSE, UA: ABNORMAL
SL AMB LEUKOCYTE ESTERASE,UA: ABNORMAL
SL AMB POCT BILIRUBIN,UA: ABNORMAL
SL AMB POCT BLOOD,UA: ABNORMAL
SL AMB POCT CLARITY,UA: ABNORMAL
SL AMB POCT COLOR,UA: ABNORMAL
SL AMB POCT KETONES,UA: ABNORMAL
SL AMB POCT NITRITE,UA: ABNORMAL
SL AMB POCT PH,UA: ABNORMAL
SL AMB POCT SPECIFIC GRAVITY,UA: 1.02
SL AMB POCT URINE PROTEIN: ABNORMAL
SL AMB POCT UROBILINOGEN: NORMAL

## 2025-04-24 PROCEDURE — 99213 OFFICE O/P EST LOW 20 MIN: CPT | Performed by: OBSTETRICS & GYNECOLOGY

## 2025-04-24 PROCEDURE — 81002 URINALYSIS NONAUTO W/O SCOPE: CPT | Performed by: OBSTETRICS & GYNECOLOGY

## 2025-04-24 PROCEDURE — 87086 URINE CULTURE/COLONY COUNT: CPT | Performed by: OBSTETRICS & GYNECOLOGY

## 2025-04-24 NOTE — ASSESSMENT & PLAN NOTE
Recommended return to urogyn for ongoing work-up and management.    Information given re: Dr Davey.

## 2025-04-24 NOTE — PROGRESS NOTES
Name: Angelia Anderson      : 1990      MRN: [id number]  Encounter Provider: Radha Knowles MD  Encounter Date: 2025   Encounter department: [facility] [place]  :  Assessment & Plan  Urinary frequency    Orders:    Urine culture    POCT urine dip  Discussed possible bladder irritants which do not seem to be a significant problem for the patient.  Will follow up on urine culture but will not Rx antibiotics at this time given recent worsening of chronic symptoms but with negative urine culture and no response to antibiotics in February.  OAB (overactive bladder)  Recommended return to urogyn for ongoing work-up and management.    Information given re: Dr Davey.            History of Present Illness   HPI  Angelia Anderson is a 35 y.o. female who presents to the office today for a complaint of urinary frequency which has been worsening over the last two months but has been present for over two years.  She states she had previously had a work-up from a urogyn at [facility] and was diagnosed with overactive bladder.  At that time, she chose not to medicate for it as it felt manageable, but she now feels that she cannot continue like this.  She would like to return to urogyn.      Discussed bladder irritants but no significant source identified.  Discussed possible diagnosis of IC though the patient reports incontinence, not pain with bladder filling.  She denies STD concerns and declines testing today.     She had a recent urine culture in February when these symptoms worsened which was negative.  She took a week of macrobid without improvement.  She would like a repeat today but understands why I would rather wait to treat given lack of previous response.        Review of Systems       Objective   /70 (BP Location: Right arm, Patient Position: Sitting, Cuff Size: Standard)   Wt 108 kg (238 lb)   LMP 2025 (Approximate)   BMI 40.85 kg/m²      Physical  Exam

## 2025-04-24 NOTE — TELEPHONE ENCOUNTER
"FOLLOW UP: Appt made for today.    REASON FOR CONVERSATION: Urinary Urgency    SYMPTOMS: Urinary urgency    OTHER: Started 2 months ago, did have +HPT at that time.  Did do a course of Macrobid without relief.  Denies any dysuria.  LMP @ 2 weeks ago.      DISPOSITION: See Within 2 Weeks in Office      Reason for Disposition   All other urine symptoms    Answer Assessment - Initial Assessment Questions  1. SYMPTOM: \"What's the main symptom you're concerned about?\" (e.g., frequency, incontinence)      Urinary urgency  2. ONSET: \"When did this  start?\"      2 months ago  3. PAIN: \"Is there any pain?\" If Yes, ask: \"How bad is it?\" (Scale: 1-10; mild, moderate, severe)      denies  4. CAUSE: \"What do you think is causing the symptoms?\"      unsure  5. OTHER SYMPTOMS: \"Do you have any other symptoms?\" (e.g., blood in urine, fever, flank pain, pain with urination)      denies  6. PREGNANCY: \"Is there any chance you are pregnant?\" \"When was your last menstrual period?\"      \"Not too long ago, 2 weeks ago\"    Protocols used: Urinary Symptoms-Adult-[place]    "

## 2025-04-25 ENCOUNTER — RESULTS FOLLOW-UP (OUTPATIENT)
Dept: OBGYN CLINIC | Facility: CLINIC | Age: 35
End: 2025-04-25

## 2025-04-25 LAB — BACTERIA UR CULT: NORMAL

## 2025-05-01 NOTE — TELEPHONE ENCOUNTER
Left VM for patient (per communication consent) to advise that her urine culture was negative. Advised patient that she can also see results on mychart and if she has any other questions or concerns, to please give us a call back.

## 2025-05-01 NOTE — TELEPHONE ENCOUNTER
----- Message from Radha Knowles MD sent at 5/1/2025  8:49 AM EDT -----  Please call the patient to inform her of the information below.  She did not check her MyChart.    Thanks!!

## 2025-07-02 ENCOUNTER — NURSE TRIAGE (OUTPATIENT)
Age: 35
End: 2025-07-02

## 2025-07-02 DIAGNOSIS — R39.9 UTI SYMPTOMS: Primary | ICD-10-CM

## 2025-07-02 NOTE — TELEPHONE ENCOUNTER
"REASON FOR CONVERSATION: Urinary Symptoms    SYMPTOMS: Cloudy and slightly malodorous urine, increased urgency.   Denies back/flank pain, burning with urination, hematuria    OTHER HEALTH INFORMATION: history OAB, saw Dr. Knowles in early May for similar symptoms - UC negative at that time. Plan was for patient to follow up with Dr. Tamica SolisGyulises. Patient reports she lost the card with the provider contact info. She is scheduled for appointment with urology on 7/9.    PROTOCOL DISPOSITION: See Today in Office    CARE ADVICE PROVIDED: Push fluids. Can try AZO for Urinary symptoms AFTER UA/CS collected.    CALL BACK IF:  * You have questions  * You become worse    CALL BACK OR GO TO UC IF:  * You develop a fever, blood in your urine, or more severe back/abdominal pain      PRACTICE FOLLOW-UP: UA/CS ordered per protocol. Message to Dr. Knowles.          Reason for Disposition   Bad or foul-smelling urine    Answer Assessment - Initial Assessment Questions  1. SYMPTOM: \"What's the main symptom you're concerned about?\" (e.g., frequency, incontinence)      Cloudy and slightly malodorous urine, increased urgency  2. ONSET: \"When did the  s/s  start?\"      4 - 5 days  3. PAIN: \"Is there any pain?\" If Yes, ask: \"How bad is it?\" (Scale: 1-10; mild, moderate, severe)      Denies  4. CAUSE: \"What do you think is causing the symptoms?\"      unsure  5. OTHER SYMPTOMS: \"Do you have any other symptoms?\" (e.g., blood in urine, fever, flank pain, pain with urination)      Denies back/flank pain, burning with urination, hematuria,   6. PREGNANCY: \"Is there any chance you are pregnant?\" \"When was your last menstrual period?\"      Denies    Protocols used: Urinary Symptoms-Adult-[place]    "

## 2025-07-04 ENCOUNTER — OFFICE VISIT (OUTPATIENT)
Dept: URGENT CARE | Facility: MEDICAL CENTER | Age: 35
End: 2025-07-04
Payer: COMMERCIAL

## 2025-07-04 VITALS
OXYGEN SATURATION: 98 % | RESPIRATION RATE: 18 BRPM | HEART RATE: 74 BPM | TEMPERATURE: 97.9 F | DIASTOLIC BLOOD PRESSURE: 74 MMHG | SYSTOLIC BLOOD PRESSURE: 136 MMHG

## 2025-07-04 DIAGNOSIS — J06.9 ACUTE URI: Primary | ICD-10-CM

## 2025-07-04 PROCEDURE — 99203 OFFICE O/P NEW LOW 30 MIN: CPT | Performed by: PHYSICIAN ASSISTANT

## 2025-07-04 PROCEDURE — S9088 SERVICES PROVIDED IN URGENT: HCPCS | Performed by: PHYSICIAN ASSISTANT

## 2025-07-04 RX ORDER — AZITHROMYCIN 250 MG/1
TABLET, FILM COATED ORAL
Qty: 6 TABLET | Refills: 0 | Status: SHIPPED | OUTPATIENT
Start: 2025-07-04 | End: 2025-07-08

## 2025-07-04 RX ORDER — BROMPHENIRAMINE MALEATE, PSEUDOEPHEDRINE HYDROCHLORIDE, AND DEXTROMETHORPHAN HYDROBROMIDE 2; 30; 10 MG/5ML; MG/5ML; MG/5ML
10 SYRUP ORAL 4 TIMES DAILY PRN
Qty: 120 ML | Refills: 0 | Status: SHIPPED | OUTPATIENT
Start: 2025-07-04

## 2025-07-04 NOTE — PROGRESS NOTES
[facility]        NAME: Angelia Anderson is a 35 y.o. female  : 1990    MRN: [id number]  DATE: 2025  TIME: 8:59 AM    Assessment and Plan   Acute URI [J06.9]  1. Acute URI  azithromycin (ZITHROMAX) 250 mg tablet    brompheniramine-pseudoephedrine-DM 30-2-10 MG/5ML syrup            Patient Instructions     Patient has persistent URI symptoms for the past week.  I prescribed her a Z-Estrada and Bromfed-DM.  Recommended fluids, rest, discussed OTC cough and cold meds, close observation.  Follow up with PCP in 3-5 days.  Proceed to  ER if symptoms worsen.    If tests have been performed at [facility], our office will contact you with results if changes need to be made to the care plan discussed with you at the visit.  You can review your full results on [facility].    Chief Complaint     Chief Complaint   Patient presents with    Cold Like Symptoms     Patient reports sore throat, cough, and congestion for last week. Denies fever         History of Present Illness       Patient presents with 1 week history of nasal congestion, sore throat, cough is primary symptoms.  Denies shortness of breath or wheezing, fever, chills, NVD.  Has taken Allegra.        Review of Systems   Review of Systems   Constitutional: Negative.    HENT:  Positive for congestion and sore throat.    Respiratory:  Positive for cough. Negative for shortness of breath and wheezing.    Cardiovascular: Negative.    Gastrointestinal: Negative.    Genitourinary: Negative.          Current Medications     Current Medications[1]    Current Allergies     Allergies as of 2025    (No Known Allergies)            The following portions of the patient's history were reviewed and updated as appropriate: allergies, current medications, past family history, past medical history, past social history, past surgical history and problem list.     Past Medical History[2]    Past Surgical History[3]    Family History[4]      Medications  have been verified.        Objective   /74   Pulse 74   Temp 97.9 °F (36.6 °C)   Resp 18   SpO2 98%   No LMP recorded.       Physical Exam     Physical Exam  Vitals reviewed.   Constitutional:       General: She is not in acute distress.     Appearance: She is well-developed.   HENT:      Right Ear: Hearing, tympanic membrane, ear canal and external ear normal.      Left Ear: Hearing, tympanic membrane, ear canal and external ear normal.      Nose: Mucosal edema (B/L boggy turbinates) and congestion present.      Mouth/Throat:      Mouth: Mucous membranes are moist.      Pharynx: Posterior oropharyngeal erythema (PND) present. No oropharyngeal exudate.      Tonsils: No tonsillar exudate.     Cardiovascular:      Rate and Rhythm: Normal rate and regular rhythm.      Pulses: Normal pulses.      Heart sounds: Normal heart sounds. No murmur heard.  Pulmonary:      Effort: Pulmonary effort is normal. No respiratory distress.      Breath sounds: Normal breath sounds.     Musculoskeletal:      Cervical back: Neck supple.   Lymphadenopathy:      Cervical: No cervical adenopathy.     Neurological:      Mental Status: She is alert and oriented to person, place, and time.                        [1]   Current Outpatient Medications:     azithromycin (ZITHROMAX) 250 mg tablet, Take 2 tablets today then 1 tablet daily x 4 days, Disp: 6 tablet, Rfl: 0    brompheniramine-pseudoephedrine-DM 30-2-10 MG/5ML syrup, Take 10 mL by mouth 4 (four) times a day as needed for congestion or cough, Disp: 120 mL, Rfl: 0    hydrocortisone 1 % cream, Apply 1 Application topically daily as needed for rash or irritation (Patient not taking: Reported on 7/8/2024), Disp: , Rfl:     ketoconazole (NIZORAL) 2 % shampoo, Apply 1 Application topically 2 (two) times a week (Patient not taking: Reported on 7/4/2025), Disp: 120 mL, Rfl: 0    triamcinolone (KENALOG) 0.025 % cream, Apply topically 2 (two) times a day (Patient not taking: Reported on  2025), Disp: 15 g, Rfl: 0    witch hazel-glycerin (TUCKS) topical pad, Apply 1 Pad topically every 4 (four) hours as needed for irritation (Patient not taking: Reported on 2024), Disp: , Rfl:   [2]   Past Medical History:  Diagnosis Date    Anemia     H/O gastric sleeve 2016    History of transfusion     states no transfusion rxn    Miscarriage     Obesity     Personal history of COVID-19     recovered at home    Post partum depression     Pulmonary embolus (HCC) 2022    approx, during pregnancy    Sleep apnea, obstructive     prior to weight loss surg   [3]   Past Surgical History:  Procedure Laterality Date    BARIATRIC SURGERY      DILATION AND EVACUATION  2019        GASTRIC RESTRICTION SURGERY  2015    gastric sleeve    INCISION AND DRAINAGE OF WOUND Right 2022    Procedure: INCISION AND DRAINAGE (I&D) HEAD/FACE;  Surgeon: Sumit Matos DMD;  [facility];  Service: Maxillofacial    MULTIPLE TOOTH EXTRACTIONS Right 2022    Procedure: EXTRACTION TEETH MULTIPLE;  Surgeon: Sumit Matos DMD;  [facility];  Service: Maxillofacial    OPEN ANTERIOR SHOULDER RECONSTRUCTION Left     OTHER SURGICAL HISTORY      sweat gland removal    NE TX MISSED  FIRST TRIMESTER SURGICAL N/A 2023    Procedure: DILATATION AND EVACUATION (D&E);  Surgeon: Radha Knowles MD;  [facility];  Service: Gynecology    RETAINED PLACENTA REMOVAL N/A 2020    Procedure: EXTRACTION OF PLACENTA,MANUAL;  Surgeon: Gray Campa MD;  [facility];  Service: Obstetrics    SHOULDER SURGERY     [4]   Family History  Problem Relation Name Age of Onset    Stroke Mother Arline     Heart disease Mother Arline     Seizures Mother Arline     Hypertension Mother Arline     Anxiety disorder Mother Arline     Bipolar disorder Mother Arline     Kidney disease Mother Arline     Diabetes Mother Arline     Sudden death Father      No Known Problems Sister       No Known Problems Sister      No Known Problems Brother      No Known Problems Brother      No Known Problems Brother      Sudden death Brother  20        MVA    No Known Problems Son      Pancreatic cancer Maternal Grandmother Domenica Anderson     Seizures Maternal Grandmother Domenica Anderson     Diabetes Maternal Grandmother Domenica Anderson     Cancer Maternal Grandmother Domenica Anderson     No Known Problems Daughter      Cancer Maternal Aunt Cheri Juarez

## 2025-07-05 ENCOUNTER — HOSPITAL ENCOUNTER (EMERGENCY)
Facility: HOSPITAL | Age: 35
Discharge: HOME | End: 2025-07-05
Attending: EMERGENCY MEDICINE | Admitting: EMERGENCY MEDICINE
Payer: COMMERCIAL

## 2025-07-05 VITALS
HEART RATE: 87 BPM | RESPIRATION RATE: 18 BRPM | SYSTOLIC BLOOD PRESSURE: 127 MMHG | TEMPERATURE: 97.6 F | OXYGEN SATURATION: 96 % | DIASTOLIC BLOOD PRESSURE: 74 MMHG

## 2025-07-05 DIAGNOSIS — J06.9 VIRAL URI: ICD-10-CM

## 2025-07-05 DIAGNOSIS — H66.003 NON-RECURRENT ACUTE SUPPURATIVE OTITIS MEDIA OF BOTH EARS WITHOUT SPONTANEOUS RUPTURE OF TYMPANIC MEMBRANES: Primary | ICD-10-CM

## 2025-07-05 PROCEDURE — 99284 EMERGENCY DEPT VISIT MOD MDM: CPT

## 2025-07-05 PROCEDURE — 99282 EMERGENCY DEPT VISIT SF MDM: CPT

## 2025-07-05 PROCEDURE — 96372 THER/PROPH/DIAG INJ SC/IM: CPT

## 2025-07-05 RX ORDER — OXYMETAZOLINE HYDROCHLORIDE 0.05 G/100ML
2 SPRAY NASAL ONCE
Status: COMPLETED | OUTPATIENT
Start: 2025-07-05 | End: 2025-07-05

## 2025-07-05 RX ORDER — KETOROLAC TROMETHAMINE 30 MG/ML
15 INJECTION, SOLUTION INTRAMUSCULAR; INTRAVENOUS ONCE
Status: COMPLETED | OUTPATIENT
Start: 2025-07-05 | End: 2025-07-05

## 2025-07-05 RX ORDER — FLUTICASONE PROPIONATE 50 MCG
1 SPRAY, SUSPENSION (ML) NASAL DAILY
Status: DISCONTINUED | OUTPATIENT
Start: 2025-07-05 | End: 2025-07-05 | Stop reason: HOSPADM

## 2025-07-05 RX ADMIN — AMOXICILLIN AND CLAVULANATE POTASSIUM 1 TABLET: 875; 125 TABLET, COATED ORAL at 19:35

## 2025-07-05 RX ADMIN — FLUTICASONE PROPIONATE 1 SPRAY: 50 SPRAY, METERED NASAL at 19:34

## 2025-07-05 RX ADMIN — KETOROLAC TROMETHAMINE 15 MG: 30 INJECTION, SOLUTION INTRAMUSCULAR; INTRAVENOUS at 19:37

## 2025-07-05 RX ADMIN — OXYMETAZOLINE HYDROCHLORIDE 2 SPRAY: 0.05 SPRAY NASAL at 19:34

## 2025-07-05 NOTE — ED PROVIDER NOTES
Time reflects when diagnosis was documented in both MDM as applicable and the Disposition within this note       Time User Action Codes Description Comment    7/5/2025  7:18 PM Omero Pemberton Add [H66.003] Non-recurrent acute suppurative otitis media of both ears without spontaneous rupture of tympanic membranes     7/5/2025  7:18 PM Omero Pemberton Add [J06.9] Viral URI           ED Disposition       ED Disposition   Discharge    Condition   Stable    Date/Time   Sat Jul 5, 2025  7:18 PM    Comment   Angelia Sparksultrie discharge to home/self care.                   Assessment & Plan       Medical Decision Making  35-year-old female presenting with bilateral ear pain and diminished hearing following a viral upper respiratory illness.  Started on azithromycin p.o. urgent care.  On exam she appears in NAD.  Vitals WNL.  Moderate nasal congestion.  Normal phonation.  Bilateral TMs are injected, bulging, suppurative.    Patient presenting with bilateral suppurative AOM.  She is on azithromycin which has had bacterial resistance.  Will start on Augmentin instead.  Given additional medications for relief of symptoms.  Recommend PCP follow-up if not improving.  Patient expresses understanding of the condition, treatment plan, follow-up instructions, and return precautions.      Risk  OTC drugs.  Prescription drug management.             Medications   amoxicillin-clavulanate (AUGMENTIN) 875-125 mg per tablet 1 tablet (1 tablet Oral Given 7/5/25 1935)   ketorolac (TORADOL) injection 15 mg (15 mg Intramuscular Given 7/5/25 1937)   oxymetazoline (AFRIN) 0.05 % nasal spray 2 spray (2 sprays Each Nare Given 7/5/25 1934)       ED Risk Strat Scores                    No data recorded                            History of Present Illness       Chief Complaint   Patient presents with    Earache     B/L ear pain and congestion for 5 days, nasal congestion, cough ,went to urgent care yesterday, zithromycin and cough meds from urgent care were  taken today        Past Medical History[1]   Past Surgical History[2]   Family History[3]   Social History[4]   E-Cigarette/Vaping    E-Cigarette Use Never User       E-Cigarette/Vaping Substances    Nicotine No     THC No     CBD No     Flavoring No     Other No     Unknown No       I have reviewed and agree with the history as documented.     35-year-old female presenting with bilateral ear pain and diminished hearing for the last few days.  Has had a viral upper respiratory illness for about 1 week.  Seen in urgent care yesterday, started on azithromycin for upper respiratory illness as well as cough medicine.  She did start taking the azithromycin today.        Review of Systems   Constitutional:  Negative for chills and fever.   HENT:  Positive for congestion, ear pain, hearing loss, postnasal drip, rhinorrhea, sinus pressure and sore throat.    Eyes:  Negative for pain and visual disturbance.   Respiratory:  Positive for cough. Negative for shortness of breath.    Cardiovascular:  Negative for chest pain and palpitations.   Gastrointestinal:  Negative for abdominal pain and vomiting.   Genitourinary:  Negative for dysuria and hematuria.   Musculoskeletal:  Negative for arthralgias and back pain.   Skin:  Negative for color change and rash.   Neurological:  Negative for seizures and syncope.   All other systems reviewed and are negative.          Objective       ED Triage Vitals   Temperature Pulse Blood Pressure Respirations SpO2 Patient Position - Orthostatic VS   07/05/25 1835 07/05/25 1835 07/05/25 1835 07/05/25 1835 07/05/25 1835 --   97.6 °F (36.4 °C) 87 127/74 18 96 %       Temp Source Heart Rate Source BP Location FiO2 (%) Pain Score    07/05/25 1835 07/05/25 1835 -- -- 07/05/25 1937    Oral Monitor   5      Vitals      Date and Time Temp Pulse SpO2 Resp BP Pain Score FACES Pain Rating User   07/05/25 1937 -- -- -- -- -- 5 -- TS   07/05/25 1835 97.6 °F (36.4 °C) 87 96 % 18 127/74 -- -- AEN             Physical Exam  Vitals and nursing note reviewed.   Constitutional:       General: She is not in acute distress.     Appearance: She is well-developed.   HENT:      Head: Normocephalic and atraumatic.      Right Ear: Tympanic membrane is injected and bulging.      Left Ear: Tympanic membrane is injected and bulging.      Nose: Congestion present.     Eyes:      Conjunctiva/sclera: Conjunctivae normal.       Cardiovascular:      Rate and Rhythm: Normal rate and regular rhythm.      Heart sounds: No murmur heard.  Pulmonary:      Effort: Pulmonary effort is normal. No respiratory distress.      Breath sounds: Normal breath sounds.   Abdominal:      Palpations: Abdomen is soft.      Tenderness: There is no abdominal tenderness.     Musculoskeletal:         General: No swelling.      Cervical back: Neck supple.     Skin:     General: Skin is warm and dry.      Capillary Refill: Capillary refill takes less than 2 seconds.     Neurological:      Mental Status: She is alert.     Psychiatric:         Mood and Affect: Mood normal.         Results Reviewed       None            No orders to display       Procedures    ED Medication and Procedure Management   Prior to Admission Medications   Prescriptions Last Dose Informant Patient Reported? Taking?   azithromycin (ZITHROMAX) 250 mg tablet   No No   Sig: Take 2 tablets today then 1 tablet daily x 4 days   brompheniramine-pseudoephedrine-DM 30-2-10 MG/5ML syrup   No No   Sig: Take 10 mL by mouth 4 (four) times a day as needed for congestion or cough   hydrocortisone 1 % cream  Self No No   Sig: Apply 1 Application topically daily as needed for rash or irritation   Patient not taking: Reported on 7/8/2024   ketoconazole (NIZORAL) 2 % shampoo   No No   Sig: Apply 1 Application topically 2 (two) times a week   Patient not taking: Reported on 7/4/2025   triamcinolone (KENALOG) 0.025 % cream   No No   Sig: Apply topically 2 (two) times a day   Patient not taking: Reported on  7/4/2025   witch hazel-glycerin (TUCKS) topical pad  Self No No   Sig: Apply 1 Pad topically every 4 (four) hours as needed for irritation   Patient not taking: Reported on 7/8/2024      Facility-Administered Medications: None     Discharge Medication List as of 7/5/2025  7:20 PM        START taking these medications    Details   amoxicillin-clavulanate (AUGMENTIN) 875-125 mg per tablet Take 1 tablet by mouth every 12 (twelve) hours for 7 days, Starting Sat 7/5/2025, Until Sat 7/12/2025, Normal           CONTINUE these medications which have NOT CHANGED    Details   azithromycin (ZITHROMAX) 250 mg tablet Take 2 tablets today then 1 tablet daily x 4 days, Normal      brompheniramine-pseudoephedrine-DM 30-2-10 MG/5ML syrup Take 10 mL by mouth 4 (four) times a day as needed for congestion or cough, Starting Fri 7/4/2025, Normal      hydrocortisone 1 % cream Apply 1 Application topically daily as needed for rash or irritation, Starting Wed 6/12/2024, No Print      ketoconazole (NIZORAL) 2 % shampoo Apply 1 Application topically 2 (two) times a week, Starting Mon 3/24/2025, Normal      triamcinolone (KENALOG) 0.025 % cream Apply topically 2 (two) times a day, Starting Sun 3/23/2025, Normal      witch hazel-glycerin (TUCKS) topical pad Apply 1 Pad topically every 4 (four) hours as needed for irritation, Starting Wed 6/12/2024, No Print           No discharge procedures on file.  ED SEPSIS DOCUMENTATION   Time reflects when diagnosis was documented in both MDM as applicable and the Disposition within this note       Time User Action Codes Description Comment    7/5/2025  7:18 PM Omero Pemberton Add [H66.003] Non-recurrent acute suppurative otitis media of both ears without spontaneous rupture of tympanic membranes     7/5/2025  7:18 PM Omero Pemberton Add [J06.9] Viral URI                      [1]   Past Medical History:  Diagnosis Date    Anemia     H/O gastric sleeve 2016    History of transfusion     states no transfusion rxn     Miscarriage     Obesity     Personal history of COVID-19     recovered at home    Post partum depression     Pulmonary embolus (HCC) 2022    approx, during pregnancy    Sleep apnea, obstructive     prior to weight loss surg   [2]   Past Surgical History:  Procedure Laterality Date    BARIATRIC SURGERY      DILATION AND EVACUATION  2019        GASTRIC RESTRICTION SURGERY  2015    gastric sleeve    INCISION AND DRAINAGE OF WOUND Right 2022    Procedure: INCISION AND DRAINAGE (I&D) HEAD/FACE;  Surgeon: Sumit Matos DMD;  [facility];  Service: Maxillofacial    MULTIPLE TOOTH EXTRACTIONS Right 2022    Procedure: EXTRACTION TEETH MULTIPLE;  Surgeon: Sumit Matos DMD;  [facility];  Service: Maxillofacial    OPEN ANTERIOR SHOULDER RECONSTRUCTION Left     OTHER SURGICAL HISTORY      sweat gland removal    NH TX MISSED  FIRST TRIMESTER SURGICAL N/A 2023    Procedure: DILATATION AND EVACUATION (D&E);  Surgeon: Radha Knowles MD;  [facility];  Service: Gynecology    RETAINED PLACENTA REMOVAL N/A 2020    Procedure: EXTRACTION OF PLACENTA,MANUAL;  Surgeon: Gray Campa MD;  [facility];  Service: Obstetrics    SHOULDER SURGERY     [3]   Family History  Problem Relation Name Age of Onset    Stroke Mother Arline     Heart disease Mother Arline     Seizures Mother Arline     Hypertension Mother Arline     Anxiety disorder Mother Arline     Bipolar disorder Mother Arline     Kidney disease Mother Arline     Diabetes Mother Arline     Sudden death Father      No Known Problems Sister      No Known Problems Sister      No Known Problems Brother      No Known Problems Brother      No Known Problems Brother      Sudden death Brother  20        MVA    No Known Problems Son      Pancreatic cancer Maternal Grandmother Domenica [place]     Seizures Maternal Grandmother Domenica Justin     Diabetes Maternal Grandmother  Domenica Anderson     Cancer Maternal Grandmother Domenica Anderson     No Known Problems Daughter      Cancer Maternal Aunt Cheri Camejoayton    [4]   Social History  Tobacco Use    Smoking status: Former     Current packs/day: 0.00     Average packs/day: 0.3 packs/day for 11.7 years (3.0 ttl pk-yrs)     Types: Cigarettes     Start date: 2010     Quit date: 2020     Years since quittin.2     Passive exposure: Past    Smokeless tobacco: Never   Vaping Use    Vaping status: Never Used   Substance Use Topics    Alcohol use: Yes     Comment: occasional    Drug use: Never        Omero Pemberton PA-C  [phone]

## 2025-07-05 NOTE — DISCHARGE INSTRUCTIONS
You have right and left sided ear infections.  While the azithromycin might help, there is very high bacterial resistance to this medication.  Therefore I am starting you instead on Augmentin 1 tablet by mouth, 2 times each day, for 7 days.  You can start taking ibuprofen every 6 hours as needed for symptomatic leaf.  You can also use the Flonase nasal spray twice daily.  For the next 2 to 3 days can use Afrin for additional relief of congestion.  Feel free to take the cough medicine that you were prescribed previously.

## 2025-07-08 ENCOUNTER — TELEPHONE (OUTPATIENT)
Age: 35
End: 2025-07-08

## 2025-07-08 NOTE — ASSESSMENT & PLAN NOTE
W/frequency and urgency. UA negative for signs of infection. Trace blood. PVR 36mL.  Discussed medication options. We will start trospium chloride for her symptoms, side effect profile discussed.   Recommend limiting bladder irritants and continue to stay well hydrated with water. Avoid constipation.   Follow up in 8-10 weeks for med check. Will send urine for micro and culture and call with results.    Orders:    POCT urine dip auto non-scope    POCT Measure PVR    trospium chloride (SANCTURA) 20 mg tablet; Take 1 tablet (20 mg total) by mouth 2 (two) times a day    Urinalysis with microscopic    Urine culture

## 2025-07-08 NOTE — TELEPHONE ENCOUNTER
Patient called reschedule appointment 07/08/2025 at 10:00 am, due to babysitting issues. New appointment date is 07/14/2025 at 10:00 am

## 2025-07-08 NOTE — PROGRESS NOTES
Name: Angelia Anderson      : 1990      MRN: [id number]  Encounter Provider: TONI Maria  Encounter Date: 2025   Encounter department: [facility] UROLOGY [place]  :  Assessment & Plan  OAB (overactive bladder)  W/frequency and urgency. UA negative for signs of infection. Trace blood. PVR 36mL.  Discussed medication options. We will start trospium chloride for her symptoms, side effect profile discussed.   Recommend limiting bladder irritants and continue to stay well hydrated with water. Avoid constipation.   Follow up in 8-10 weeks for med check. Will send urine for micro and culture and call with results.    Orders:    POCT urine dip auto non-scope    POCT Measure PVR    trospium chloride (SANCTURA) 20 mg tablet; Take 1 tablet (20 mg total) by mouth 2 (two) times a day    Urinalysis with microscopic    Urine culture        History of Present Illness   Angelia Anderson is a 35 y.o. female who presents as a new patient for OAB.  Today in the office she states that she has frequency and urgency. As soon as she feels as though she has to urinate she has to go right away. Very seldomly she will start to leak if she does not make it to the bathroom on time. She feels as though she has to urinate every hour.      She has a history of 4 vaginal births. Denies any stress incontinence.   Denies any blood in the urine and has not had any recent UTIs.      She does state that a couple years ago she did have a cystoscopy for OAB at [facility] which was negative (this is not on file).     She drinks water throughout the day- about 6 bottles of water. She occasionally has lemonade. She does state that she has a degree of constipation and only has a BM about once per week.    Review of Systems   Constitutional:  Negative for chills and fever.   Genitourinary:  Positive for frequency and urgency. Negative for dysuria, hematuria and pelvic pain.          Objective   LMP 2025  "(Approximate)     Physical Exam  Constitutional:       General: She is not in acute distress.     Appearance: She is not toxic-appearing.   HENT:      Head: Normocephalic and atraumatic.     Eyes:      Conjunctiva/sclera: Conjunctivae normal.       Cardiovascular:      Rate and Rhythm: Normal rate.   Pulmonary:      Effort: Pulmonary effort is normal.   Abdominal:      Palpations: Abdomen is soft.     Skin:     General: Skin is warm and dry.     Neurological:      Mental Status: She is alert and oriented to person, place, and time.     Psychiatric:         Mood and Affect: Mood normal.         Thought Content: Thought content normal.           Results   No results found for: \"PSA\"  Lab Results   Component Value Date    CALCIUM 8.8 04/07/2024    K 3.3 (L) 04/07/2024    CO2 23 04/07/2024     04/07/2024    BUN 7 04/07/2024    CREATININE 0.62 04/07/2024     Lab Results   Component Value Date    WBC 6.01 06/10/2024    HGB 10.5 (L) 06/10/2024    HCT 34.7 (L) 06/10/2024    MCV 77 (L) 06/10/2024     06/10/2024       Office Urine Dip  No results found for this or any previous visit (from the past hour).        "

## 2025-07-09 ENCOUNTER — OFFICE VISIT (OUTPATIENT)
Dept: UROLOGY | Facility: MEDICAL CENTER | Age: 35
End: 2025-07-09
Payer: COMMERCIAL

## 2025-07-09 VITALS
DIASTOLIC BLOOD PRESSURE: 70 MMHG | HEART RATE: 75 BPM | WEIGHT: 237 LBS | BODY MASS INDEX: 40.46 KG/M2 | HEIGHT: 64 IN | SYSTOLIC BLOOD PRESSURE: 110 MMHG | OXYGEN SATURATION: 97 %

## 2025-07-09 DIAGNOSIS — N32.81 OAB (OVERACTIVE BLADDER): Primary | ICD-10-CM

## 2025-07-09 LAB
POST-VOID RESIDUAL VOLUME, ML POC: 36 ML
SL AMB  POCT GLUCOSE, UA: ABNORMAL
SL AMB LEUKOCYTE ESTERASE,UA: ABNORMAL
SL AMB POCT BILIRUBIN,UA: ABNORMAL
SL AMB POCT BLOOD,UA: ABNORMAL
SL AMB POCT CLARITY,UA: CLEAR
SL AMB POCT COLOR,UA: YELLOW
SL AMB POCT KETONES,UA: ABNORMAL
SL AMB POCT NITRITE,UA: ABNORMAL
SL AMB POCT PH,UA: 7
SL AMB POCT SPECIFIC GRAVITY,UA: 1.02
SL AMB POCT URINE PROTEIN: ABNORMAL
SL AMB POCT UROBILINOGEN: 2

## 2025-07-09 PROCEDURE — 51798 US URINE CAPACITY MEASURE: CPT

## 2025-07-09 PROCEDURE — 81003 URINALYSIS AUTO W/O SCOPE: CPT

## 2025-07-09 PROCEDURE — 99203 OFFICE O/P NEW LOW 30 MIN: CPT

## 2025-07-09 PROCEDURE — 87086 URINE CULTURE/COLONY COUNT: CPT

## 2025-07-09 PROCEDURE — 81001 URINALYSIS AUTO W/SCOPE: CPT

## 2025-07-09 RX ORDER — TROSPIUM CHLORIDE 20 MG/1
20 TABLET, FILM COATED ORAL 2 TIMES DAILY
Qty: 60 TABLET | Refills: 3 | Status: SHIPPED | OUTPATIENT
Start: 2025-07-09

## 2025-07-10 ENCOUNTER — TELEPHONE (OUTPATIENT)
Age: 35
End: 2025-07-10

## 2025-07-10 LAB
BACTERIA UR QL AUTO: ABNORMAL /HPF
BILIRUB UR QL STRIP: NEGATIVE
CLARITY UR: CLEAR
COLOR UR: YELLOW
GLUCOSE UR STRIP-MCNC: NEGATIVE MG/DL
HGB UR QL STRIP.AUTO: NEGATIVE
KETONES UR STRIP-MCNC: NEGATIVE MG/DL
LEUKOCYTE ESTERASE UR QL STRIP: NEGATIVE
NITRITE UR QL STRIP: NEGATIVE
NON-SQ EPI CELLS URNS QL MICRO: ABNORMAL /HPF
PH UR STRIP.AUTO: 7 [PH]
PROT UR STRIP-MCNC: NEGATIVE MG/DL
RBC #/AREA URNS AUTO: ABNORMAL /HPF
SP GR UR STRIP.AUTO: 1.02 (ref 1–1.03)
UROBILINOGEN UR STRIP-ACNC: 2 MG/DL
WBC #/AREA URNS AUTO: ABNORMAL /HPF

## 2025-07-10 NOTE — TELEPHONE ENCOUNTER
Patient was seen yesterday by Coco at [facility] lab outreach called stating he received notice in epic that urine specimens were collected in the office yesterday to be send out to the lab.  They have not received them.  Please review and call Nuha to let her know since its time sensitive for them to process.     Nuha can be reached at [phone] option one

## 2025-07-11 LAB — BACTERIA UR CULT: NORMAL

## 2025-07-15 ENCOUNTER — OFFICE VISIT (OUTPATIENT)
Dept: FAMILY MEDICINE CLINIC | Facility: CLINIC | Age: 35
End: 2025-07-15
Payer: COMMERCIAL

## 2025-07-15 VITALS
DIASTOLIC BLOOD PRESSURE: 80 MMHG | SYSTOLIC BLOOD PRESSURE: 112 MMHG | WEIGHT: 234.8 LBS | HEART RATE: 70 BPM | OXYGEN SATURATION: 98 % | BODY MASS INDEX: 40.3 KG/M2

## 2025-07-15 DIAGNOSIS — H69.93 DYSFUNCTION OF BOTH EUSTACHIAN TUBES: Primary | ICD-10-CM

## 2025-07-15 PROCEDURE — 99214 OFFICE O/P EST MOD 30 MIN: CPT | Performed by: FAMILY MEDICINE

## 2025-07-15 RX ORDER — METHYLPREDNISOLONE 4 MG/1
TABLET ORAL
Qty: 21 EACH | Refills: 0 | Status: SHIPPED | OUTPATIENT
Start: 2025-07-15

## (undated) DEVICE — CURETTE VACURETTE CRVD 8MM

## (undated) DEVICE — BETHLEHEM UNIVERSAL MINOR VAG: Brand: CARDINAL HEALTH

## (undated) DEVICE — SPONGE STICK WITH PVP-I: Brand: KENDALL

## (undated) DEVICE — COLLECTION SET, DISPOSABLE WITH HANDLE AND TAPERED FITTINGS TUBING, 6 FT (183 CM): Brand: GYRUS ACMI

## (undated) DEVICE — GLOVE INDICATOR PI UNDERGLOVE SZ 6.5 BLUE

## (undated) DEVICE — VIAL DECANTER

## (undated) DEVICE — 2000CC GUARDIAN II: Brand: GUARDIAN

## (undated) DEVICE — IV CATH INTROCAN 18G X 1 1/4 SAFETY

## (undated) DEVICE — TUBING SUCTION 5MM X 12 FT

## (undated) DEVICE — INTENDED FOR TISSUE SEPARATION, AND OTHER PROCEDURES THAT REQUIRE A SHARP SURGICAL BLADE TO PUNCTURE OR CUT.: Brand: BARD-PARKER SAFETY BLADES SIZE 15, STERILE

## (undated) DEVICE — PLUMEPEN PRO 10FT

## (undated) DEVICE — STERILE MANDIBLE PACK: Brand: CARDINAL HEALTH

## (undated) DEVICE — PAD GROUNDING ADULT

## (undated) DEVICE — GLOVE SRG BIOGEL ORTHOPEDIC 7.5

## (undated) DEVICE — GLOVE PI ULTRA TOUCH SZ.7.0

## (undated) DEVICE — 3000CC GUARDIAN II: Brand: GUARDIAN

## (undated) DEVICE — NEEDLE 25G X 1 1/2

## (undated) DEVICE — LIGHT HANDLE COVER SLEEVE DISP BLUE STELLAR